# Patient Record
Sex: MALE | Race: BLACK OR AFRICAN AMERICAN | Employment: UNEMPLOYED | URBAN - METROPOLITAN AREA
[De-identification: names, ages, dates, MRNs, and addresses within clinical notes are randomized per-mention and may not be internally consistent; named-entity substitution may affect disease eponyms.]

---

## 2019-01-01 ENCOUNTER — APPOINTMENT (OUTPATIENT)
Dept: GENERAL RADIOLOGY | Age: 74
DRG: 840 | End: 2019-01-01
Attending: FAMILY MEDICINE
Payer: MEDICARE

## 2019-01-01 ENCOUNTER — APPOINTMENT (OUTPATIENT)
Dept: ULTRASOUND IMAGING | Age: 74
DRG: 840 | End: 2019-01-01
Attending: INTERNAL MEDICINE
Payer: MEDICARE

## 2019-01-01 ENCOUNTER — APPOINTMENT (OUTPATIENT)
Dept: GENERAL RADIOLOGY | Age: 74
DRG: 840 | End: 2019-01-01
Attending: INTERNAL MEDICINE
Payer: MEDICARE

## 2019-01-01 ENCOUNTER — APPOINTMENT (OUTPATIENT)
Dept: INTERVENTIONAL RADIOLOGY/VASCULAR | Age: 74
DRG: 840 | End: 2019-01-01
Attending: INTERNAL MEDICINE
Payer: MEDICARE

## 2019-01-01 ENCOUNTER — APPOINTMENT (OUTPATIENT)
Dept: CT IMAGING | Age: 74
DRG: 840 | End: 2019-01-01
Attending: INTERNAL MEDICINE
Payer: MEDICARE

## 2019-01-01 ENCOUNTER — HOSPITAL ENCOUNTER (INPATIENT)
Age: 74
LOS: 16 days | DRG: 840 | End: 2019-10-28
Attending: EMERGENCY MEDICINE | Admitting: FAMILY MEDICINE
Payer: MEDICARE

## 2019-01-01 ENCOUNTER — APPOINTMENT (OUTPATIENT)
Dept: GENERAL RADIOLOGY | Age: 74
DRG: 840 | End: 2019-01-01
Attending: EMERGENCY MEDICINE
Payer: MEDICARE

## 2019-01-01 ENCOUNTER — HOSPITAL ENCOUNTER (OUTPATIENT)
Dept: NUCLEAR MEDICINE | Age: 74
Discharge: HOME | DRG: 840 | End: 2019-10-18
Attending: INTERNAL MEDICINE
Payer: MEDICARE

## 2019-01-01 ENCOUNTER — APPOINTMENT (OUTPATIENT)
Dept: GENERAL RADIOLOGY | Age: 74
DRG: 840 | End: 2019-01-01
Attending: NURSE PRACTITIONER
Payer: MEDICARE

## 2019-01-01 ENCOUNTER — APPOINTMENT (OUTPATIENT)
Dept: CT IMAGING | Age: 74
DRG: 840 | End: 2019-01-01
Attending: EMERGENCY MEDICINE
Payer: MEDICARE

## 2019-01-01 ENCOUNTER — APPOINTMENT (OUTPATIENT)
Dept: CT IMAGING | Age: 74
DRG: 840 | End: 2019-01-01
Attending: NURSE PRACTITIONER
Payer: MEDICARE

## 2019-01-01 ENCOUNTER — APPOINTMENT (OUTPATIENT)
Dept: MRI IMAGING | Age: 74
DRG: 840 | End: 2019-01-01
Attending: NURSE PRACTITIONER
Payer: MEDICARE

## 2019-01-01 VITALS
OXYGEN SATURATION: 92 % | HEIGHT: 72 IN | BODY MASS INDEX: 31.76 KG/M2 | WEIGHT: 234.5 LBS | DIASTOLIC BLOOD PRESSURE: 42 MMHG | RESPIRATION RATE: 118 BRPM | TEMPERATURE: 96.4 F | SYSTOLIC BLOOD PRESSURE: 66 MMHG

## 2019-01-01 DIAGNOSIS — Z71.89 COUNSELING AND COORDINATION OF CARE: ICD-10-CM

## 2019-01-01 DIAGNOSIS — I48.91 ATRIAL FIBRILLATION, UNSPECIFIED TYPE (HCC): ICD-10-CM

## 2019-01-01 DIAGNOSIS — E83.39 HYPERPHOSPHATEMIA: ICD-10-CM

## 2019-01-01 DIAGNOSIS — I27.20 PULMONARY HYPERTENSION (HCC): ICD-10-CM

## 2019-01-01 DIAGNOSIS — D64.9 ANEMIA, UNSPECIFIED TYPE: ICD-10-CM

## 2019-01-01 DIAGNOSIS — J69.0 ASPIRATION PNEUMONIA OF RIGHT LOWER LOBE DUE TO GASTRIC SECRETIONS (HCC): ICD-10-CM

## 2019-01-01 DIAGNOSIS — R17 JAUNDICE: ICD-10-CM

## 2019-01-01 DIAGNOSIS — D48.0: ICD-10-CM

## 2019-01-01 DIAGNOSIS — D59.9 ACQUIRED HEMOLYTIC ANEMIA (HCC): ICD-10-CM

## 2019-01-01 DIAGNOSIS — R53.83 LETHARGY: ICD-10-CM

## 2019-01-01 DIAGNOSIS — E88.09 HYPERPROTEINEMIA: ICD-10-CM

## 2019-01-01 DIAGNOSIS — D74.9 METHEMOGLOBINEMIA: ICD-10-CM

## 2019-01-01 DIAGNOSIS — J96.01 ACUTE RESPIRATORY FAILURE WITH HYPOXIA (HCC): ICD-10-CM

## 2019-01-01 DIAGNOSIS — G93.41 ACUTE METABOLIC ENCEPHALOPATHY: ICD-10-CM

## 2019-01-01 DIAGNOSIS — R65.21 SEPTIC SHOCK (HCC): ICD-10-CM

## 2019-01-01 DIAGNOSIS — E87.5 HYPERKALEMIA: ICD-10-CM

## 2019-01-01 DIAGNOSIS — N17.9 AKI (ACUTE KIDNEY INJURY) (HCC): ICD-10-CM

## 2019-01-01 DIAGNOSIS — E80.6 HYPERBILIRUBINEMIA: ICD-10-CM

## 2019-01-01 DIAGNOSIS — R41.82 ALTERED MENTAL STATUS, UNSPECIFIED ALTERED MENTAL STATUS TYPE: ICD-10-CM

## 2019-01-01 DIAGNOSIS — E83.52 HYPERCALCEMIA: ICD-10-CM

## 2019-01-01 DIAGNOSIS — I46.9 ASYSTOLE (HCC): ICD-10-CM

## 2019-01-01 DIAGNOSIS — A41.9 SEPTIC SHOCK (HCC): ICD-10-CM

## 2019-01-01 DIAGNOSIS — R06.00 DYSPNEA, UNSPECIFIED TYPE: ICD-10-CM

## 2019-01-01 DIAGNOSIS — C90.00 MULTIPLE MYELOMA NOT HAVING ACHIEVED REMISSION (HCC): Primary | ICD-10-CM

## 2019-01-01 DIAGNOSIS — R53.81 DEBILITY: ICD-10-CM

## 2019-01-01 DIAGNOSIS — Z51.5 ENCOUNTER FOR PALLIATIVE CARE: ICD-10-CM

## 2019-01-01 DIAGNOSIS — N18.9 CHRONIC KIDNEY DISEASE, UNSPECIFIED CKD STAGE: ICD-10-CM

## 2019-01-01 DIAGNOSIS — R54 FRAILTY: ICD-10-CM

## 2019-01-01 LAB
ABO + RH BLD: NORMAL
ALBUMIN SERPL ELPH-MCNC: 2.86 G/DL (ref 3.2–5.6)
ALBUMIN SERPL-MCNC: 0.8 G/DL (ref 3.2–4.6)
ALBUMIN SERPL-MCNC: 1.1 G/DL (ref 3.2–4.6)
ALBUMIN SERPL-MCNC: 1.3 G/DL (ref 3.2–4.6)
ALBUMIN SERPL-MCNC: 1.6 G/DL (ref 3.2–4.6)
ALBUMIN SERPL-MCNC: 1.6 G/DL (ref 3.2–4.6)
ALBUMIN SERPL-MCNC: 1.7 G/DL (ref 3.2–4.6)
ALBUMIN SERPL-MCNC: 1.8 G/DL (ref 3.2–4.6)
ALBUMIN SERPL-MCNC: 1.8 G/DL (ref 3.2–4.6)
ALBUMIN SERPL-MCNC: 1.9 G/DL (ref 3.2–4.6)
ALBUMIN SERPL-MCNC: 1.9 G/DL (ref 3.2–4.6)
ALBUMIN SERPL-MCNC: 2 G/DL (ref 3.2–4.6)
ALBUMIN SERPL-MCNC: 2 G/DL (ref 3.2–4.6)
ALBUMIN SERPL-MCNC: 2.4 G/DL (ref 3.2–4.6)
ALBUMIN UR ELPH-MCNC: 254.9 MG/DL
ALBUMIN/GLOB SERPL: 0.2 {RATIO}
ALBUMIN/GLOB SERPL: 0.2 {RATIO} (ref 1.2–3.5)
ALBUMIN/GLOB SERPL: 0.4 {RATIO}
ALP SERPL-CCNC: 109 U/L (ref 50–136)
ALP SERPL-CCNC: 110 U/L (ref 50–136)
ALP SERPL-CCNC: 120 U/L (ref 50–136)
ALP SERPL-CCNC: 132 U/L (ref 50–136)
ALP SERPL-CCNC: 223 U/L (ref 50–136)
ALP SERPL-CCNC: 255 U/L (ref 50–136)
ALP SERPL-CCNC: 279 U/L (ref 50–136)
ALP SERPL-CCNC: 333 U/L (ref 50–136)
ALP SERPL-CCNC: 338 U/L (ref 50–136)
ALP SERPL-CCNC: 342 U/L (ref 50–136)
ALP SERPL-CCNC: 81 U/L (ref 50–136)
ALP SERPL-CCNC: 81 U/L (ref 50–136)
ALP SERPL-CCNC: 83 U/L (ref 50–136)
ALP SERPL-CCNC: 95 U/L (ref 50–136)
ALP SERPL-CCNC: 96 U/L (ref 50–136)
ALPHA1 GLOB 24H UR ELPH-MCNC: 27.9 MG/DL
ALPHA1 GLOB SERPL ELPH-MCNC: 0.25 G/DL (ref 0.1–0.4)
ALPHA2 GLOB SERPL ELPH-MCNC: 0.77 G/DL (ref 0.4–1.2)
ALPHA2 GLOB SERPL ELPH-MCNC: 30 MG/DL
ALT SERPL-CCNC: 10 U/L (ref 12–65)
ALT SERPL-CCNC: 144 U/L (ref 12–65)
ALT SERPL-CCNC: 162 U/L (ref 12–65)
ALT SERPL-CCNC: 17 U/L (ref 12–65)
ALT SERPL-CCNC: 181 U/L (ref 12–65)
ALT SERPL-CCNC: 190 U/L (ref 12–65)
ALT SERPL-CCNC: 208 U/L (ref 12–65)
ALT SERPL-CCNC: 210 U/L (ref 12–65)
ALT SERPL-CCNC: 226 U/L (ref 12–65)
ALT SERPL-CCNC: 226 U/L (ref 12–65)
ALT SERPL-CCNC: 241 U/L (ref 12–65)
ALT SERPL-CCNC: 263 U/L (ref 12–65)
ALT SERPL-CCNC: 325 U/L (ref 12–65)
ALT SERPL-CCNC: 37 U/L (ref 12–65)
ALT SERPL-CCNC: 95 U/L (ref 12–65)
AMMONIA PLAS-SCNC: 36 UMOL/L (ref 11–32)
AMMONIA PLAS-SCNC: 41 UMOL/L (ref 11–32)
ANION GAP SERPL CALC-SCNC: 10 MMOL/L (ref 7–16)
ANION GAP SERPL CALC-SCNC: 11 MMOL/L (ref 7–16)
ANION GAP SERPL CALC-SCNC: 12 MMOL/L (ref 7–16)
ANION GAP SERPL CALC-SCNC: 13 MMOL/L (ref 7–16)
ANION GAP SERPL CALC-SCNC: 15 MMOL/L (ref 7–16)
ANION GAP SERPL CALC-SCNC: 16 MMOL/L (ref 7–16)
ANION GAP SERPL CALC-SCNC: 16 MMOL/L (ref 7–16)
ANION GAP SERPL CALC-SCNC: 20 MMOL/L (ref 7–16)
ANION GAP SERPL CALC-SCNC: 7 MMOL/L (ref 7–16)
ANION GAP SERPL CALC-SCNC: 8 MMOL/L (ref 7–16)
ANION GAP SERPL CALC-SCNC: 9 MMOL/L (ref 7–16)
ANION GAP SERPL CALC-SCNC: 9 MMOL/L (ref 7–16)
APTT PPP: 101.6 SEC (ref 24.7–39.8)
APTT PPP: 101.9 SEC (ref 24.7–39.8)
APTT PPP: 115 SEC (ref 24.7–39.8)
APTT PPP: 138 SEC (ref 24.7–39.8)
APTT PPP: 140.5 SEC (ref 24.7–39.8)
APTT PPP: 166.6 SEC (ref 24.7–39.8)
APTT PPP: 175.5 SEC (ref 24.7–39.8)
APTT PPP: 77.2 SEC (ref 24.7–39.8)
APTT PPP: 90.7 SEC (ref 24.7–39.8)
ARTERIAL PATENCY WRIST A: ABNORMAL
ARTERIAL PATENCY WRIST A: NO
ARTERIAL PATENCY WRIST A: NO
ARTERIAL PATENCY WRIST A: YES
AST SERPL-CCNC: 209 U/L (ref 15–37)
AST SERPL-CCNC: 242 U/L (ref 15–37)
AST SERPL-CCNC: 258 U/L (ref 15–37)
AST SERPL-CCNC: 289 U/L (ref 15–37)
AST SERPL-CCNC: 314 U/L (ref 15–37)
AST SERPL-CCNC: 320 U/L (ref 15–37)
AST SERPL-CCNC: 354 U/L (ref 15–37)
AST SERPL-CCNC: 397 U/L (ref 15–37)
AST SERPL-CCNC: 425 U/L (ref 15–37)
AST SERPL-CCNC: 450 U/L (ref 15–37)
AST SERPL-CCNC: 46 U/L (ref 15–37)
AST SERPL-CCNC: 54 U/L (ref 15–37)
AST SERPL-CCNC: 573 U/L (ref 15–37)
AST SERPL-CCNC: 600 U/L (ref 15–37)
AST SERPL-CCNC: 609 U/L (ref 15–37)
ATRIAL RATE: 125 BPM
ATRIAL RATE: 136 BPM
ATRIAL RATE: 234 BPM
ATRIAL RATE: 267 BPM
ATRIAL RATE: 66 BPM
B-GLOBULIN SERPL QL ELPH: 3.77 G/DL (ref 0.6–1.3)
B-GLOBULIN UR QL ELPH: 97.7 MG/DL
B2 MICROGLOB SERPL-MCNC: 30 MG/L (ref 0.8–2.34)
BACTERIA URNS QL MICRO: NORMAL /HPF
BASE DEFICIT BLD-SCNC: 14 MMOL/L
BASE DEFICIT BLD-SCNC: 19 MMOL/L
BASE DEFICIT BLD-SCNC: 19 MMOL/L
BASE DEFICIT BLD-SCNC: 3 MMOL/L
BASE DEFICIT BLD-SCNC: 4 MMOL/L
BASE DEFICIT BLD-SCNC: 5 MMOL/L
BASE DEFICIT BLD-SCNC: 6 MMOL/L
BASE DEFICIT BLD-SCNC: 9 MMOL/L
BASE EXCESS BLD CALC-SCNC: 2 MMOL/L
BASE EXCESS BLD CALC-SCNC: 3 MMOL/L
BASE EXCESS BLD CALC-SCNC: 6 MMOL/L
BASOPHILS # BLD: 0 K/UL (ref 0–0.2)
BASOPHILS # BLD: 0.1 K/UL (ref 0–0.2)
BASOPHILS NFR BLD: 0 % (ref 0–2)
BASOPHILS NFR BLD: 1 % (ref 0–2)
BDY SITE: ABNORMAL
BILIRUB DIRECT SERPL-MCNC: 0.2 MG/DL
BILIRUB DIRECT SERPL-MCNC: 18 MG/DL
BILIRUB DIRECT SERPL-MCNC: 7.3 MG/DL
BILIRUB INDIRECT SERPL-MCNC: 2.8 MG/DL (ref 0–1.1)
BILIRUB SERPL-MCNC: 0.3 MG/DL (ref 0.2–1.1)
BILIRUB SERPL-MCNC: 0.5 MG/DL (ref 0.2–1.1)
BILIRUB SERPL-MCNC: 10.1 MG/DL (ref 0.2–1.1)
BILIRUB SERPL-MCNC: 10.9 MG/DL (ref 0.2–1.1)
BILIRUB SERPL-MCNC: 11.1 MG/DL (ref 0.2–1.1)
BILIRUB SERPL-MCNC: 15.2 MG/DL (ref 0.2–1.1)
BILIRUB SERPL-MCNC: 16.6 MG/DL (ref 0.2–1.1)
BILIRUB SERPL-MCNC: 18.2 MG/DL (ref 0.2–1.1)
BILIRUB SERPL-MCNC: 19 MG/DL (ref 0.2–1.1)
BILIRUB SERPL-MCNC: 19.5 MG/DL (ref 0.2–1.1)
BILIRUB SERPL-MCNC: 20 MG/DL (ref 0.2–1.1)
BILIRUB SERPL-MCNC: 20.3 MG/DL (ref 0.2–1.1)
BILIRUB SERPL-MCNC: 20.5 MG/DL (ref 0.2–1.1)
BILIRUB SERPL-MCNC: 21 MG/DL (ref 0.2–1.1)
BILIRUB SERPL-MCNC: 21 MG/DL (ref 0.2–1.1)
BILIRUB SERPL-MCNC: 21.5 MG/DL (ref 0.2–1.1)
BLASTS NFR BLD MANUAL: 2 %
BLD PROD TYP BPU: NORMAL
BLOOD GROUP ANTIBODIES SERPL: NORMAL
BNP SERPL-MCNC: 547 PG/ML
BNP SERPL-MCNC: 656 PG/ML
BODY TEMPERATURE: 98.6
BONE MARROW PREP & W,BMA: NORMAL
BPU ID: NORMAL
BUN SERPL-MCNC: 101 MG/DL (ref 8–23)
BUN SERPL-MCNC: 27 MG/DL (ref 8–23)
BUN SERPL-MCNC: 30 MG/DL (ref 8–23)
BUN SERPL-MCNC: 32 MG/DL (ref 8–23)
BUN SERPL-MCNC: 35 MG/DL (ref 8–23)
BUN SERPL-MCNC: 40 MG/DL (ref 8–23)
BUN SERPL-MCNC: 47 MG/DL (ref 8–23)
BUN SERPL-MCNC: 60 MG/DL (ref 8–23)
BUN SERPL-MCNC: 61 MG/DL (ref 8–23)
BUN SERPL-MCNC: 64 MG/DL (ref 8–23)
BUN SERPL-MCNC: 65 MG/DL (ref 8–23)
BUN SERPL-MCNC: 70 MG/DL (ref 8–23)
BUN SERPL-MCNC: 76 MG/DL (ref 8–23)
BUN SERPL-MCNC: 78 MG/DL (ref 8–23)
BUN SERPL-MCNC: 79 MG/DL (ref 8–23)
BUN SERPL-MCNC: 82 MG/DL (ref 8–23)
BUN SERPL-MCNC: 88 MG/DL (ref 8–23)
BUN SERPL-MCNC: 99 MG/DL (ref 8–23)
CA-I BLD-MCNC: 1.47 MMOL/L (ref 1.12–1.32)
CA-I BLD-MCNC: 1.48 MMOL/L (ref 1.12–1.32)
CA-I BLD-MCNC: 7.32 MG/DL (ref 4–5.2)
CALCIUM SERPL-MCNC: 10 MG/DL (ref 8.3–10.4)
CALCIUM SERPL-MCNC: 10.6 MG/DL (ref 8.3–10.4)
CALCIUM SERPL-MCNC: 10.7 MG/DL (ref 8.3–10.4)
CALCIUM SERPL-MCNC: 10.9 MG/DL (ref 8.3–10.4)
CALCIUM SERPL-MCNC: 11.3 MG/DL (ref 8.3–10.4)
CALCIUM SERPL-MCNC: 11.5 MG/DL (ref 8.3–10.4)
CALCIUM SERPL-MCNC: 12.4 MG/DL (ref 8.3–10.4)
CALCIUM SERPL-MCNC: 13 MG/DL (ref 8.3–10.4)
CALCIUM SERPL-MCNC: 13.2 MG/DL (ref 8.3–10.4)
CALCIUM SERPL-MCNC: 13.8 MG/DL (ref 8.3–10.4)
CALCIUM SERPL-MCNC: 8.4 MG/DL (ref 8.3–10.4)
CALCIUM SERPL-MCNC: 8.7 MG/DL (ref 8.3–10.4)
CALCIUM SERPL-MCNC: 8.8 MG/DL (ref 8.3–10.4)
CALCIUM SERPL-MCNC: 9 MG/DL (ref 8.3–10.4)
CALCIUM SERPL-MCNC: 9 MG/DL (ref 8.3–10.4)
CALCIUM SERPL-MCNC: 9.6 MG/DL (ref 8.3–10.4)
CALCIUM SERPL-MCNC: 9.7 MG/DL (ref 8.3–10.4)
CALCIUM SERPL-MCNC: 9.8 MG/DL (ref 8.3–10.4)
CALCIUM SERPL-MCNC: 9.9 MG/DL (ref 8.3–10.4)
CALCULATED P AXIS, ECG09: -101 DEGREES
CALCULATED R AXIS, ECG10: -29 DEGREES
CALCULATED R AXIS, ECG10: -48 DEGREES
CALCULATED R AXIS, ECG10: -53 DEGREES
CALCULATED R AXIS, ECG10: -55 DEGREES
CALCULATED R AXIS, ECG10: -69 DEGREES
CALCULATED T AXIS, ECG11: -32 DEGREES
CALCULATED T AXIS, ECG11: -38 DEGREES
CALCULATED T AXIS, ECG11: -4 DEGREES
CALCULATED T AXIS, ECG11: 12 DEGREES
CALCULATED T AXIS, ECG11: 19 DEGREES
CASTS URNS QL MICRO: NORMAL /LPF
CHLORIDE SERPL-SCNC: 101 MMOL/L (ref 98–107)
CHLORIDE SERPL-SCNC: 101 MMOL/L (ref 98–107)
CHLORIDE SERPL-SCNC: 102 MMOL/L (ref 98–107)
CHLORIDE SERPL-SCNC: 103 MMOL/L (ref 98–107)
CHLORIDE SERPL-SCNC: 105 MMOL/L (ref 98–107)
CHLORIDE SERPL-SCNC: 108 MMOL/L (ref 98–107)
CHLORIDE SERPL-SCNC: 109 MMOL/L (ref 98–107)
CHLORIDE SERPL-SCNC: 109 MMOL/L (ref 98–107)
CHLORIDE SERPL-SCNC: 112 MMOL/L (ref 98–107)
CHLORIDE SERPL-SCNC: 92 MMOL/L (ref 98–107)
CHLORIDE SERPL-SCNC: 93 MMOL/L (ref 98–107)
CHLORIDE SERPL-SCNC: 93 MMOL/L (ref 98–107)
CHLORIDE SERPL-SCNC: 94 MMOL/L (ref 98–107)
CHLORIDE SERPL-SCNC: 95 MMOL/L (ref 98–107)
CHLORIDE SERPL-SCNC: 95 MMOL/L (ref 98–107)
CHLORIDE SERPL-SCNC: 96 MMOL/L (ref 98–107)
CHLORIDE SERPL-SCNC: 96 MMOL/L (ref 98–107)
CHLORIDE SERPL-SCNC: 99 MMOL/L (ref 98–107)
CK MB CFR SERPL CALC: 1.2 %
CK MB CFR SERPL CALC: 1.3 %
CK MB CFR SERPL CALC: 1.3 %
CK MB SERPL-MCNC: 1.1 NG/ML (ref 0.5–3.6)
CK MB SERPL-MCNC: 1.2 NG/ML (ref 0.5–3.6)
CK MB SERPL-MCNC: 1.3 NG/ML (ref 0.5–3.6)
CK SERPL-CCNC: 103 U/L (ref 21–215)
CK SERPL-CCNC: 103 U/L (ref 21–215)
CK SERPL-CCNC: 83 U/L (ref 21–215)
CO2 BLD-SCNC: 14 MMOL/L
CO2 BLD-SCNC: 17 MMOL/L
CO2 BLD-SCNC: 20 MMOL/L
CO2 BLD-SCNC: 20 MMOL/L
CO2 BLD-SCNC: 21 MMOL/L
CO2 BLD-SCNC: 21 MMOL/L
CO2 BLD-SCNC: 22 MMOL/L
CO2 BLD-SCNC: 27 MMOL/L
CO2 BLD-SCNC: 28 MMOL/L
CO2 BLD-SCNC: 30 MMOL/L
CO2 SERPL-SCNC: 17 MMOL/L (ref 21–32)
CO2 SERPL-SCNC: 19 MMOL/L (ref 21–32)
CO2 SERPL-SCNC: 20 MMOL/L (ref 21–32)
CO2 SERPL-SCNC: 21 MMOL/L (ref 21–32)
CO2 SERPL-SCNC: 22 MMOL/L (ref 21–32)
CO2 SERPL-SCNC: 24 MMOL/L (ref 21–32)
CO2 SERPL-SCNC: 24 MMOL/L (ref 21–32)
CO2 SERPL-SCNC: 25 MMOL/L (ref 21–32)
CO2 SERPL-SCNC: 25 MMOL/L (ref 21–32)
CO2 SERPL-SCNC: 26 MMOL/L (ref 21–32)
CO2 SERPL-SCNC: 27 MMOL/L (ref 21–32)
CO2 SERPL-SCNC: 28 MMOL/L (ref 21–32)
COHGB MFR BLD: 4.8 % (ref 0–3.6)
COLLECT DURATION TIME UR: NORMAL HR
COLLECT TIME,HTIME: 1135
COLLECT TIME,HTIME: 1135
COLLECT TIME,HTIME: 1217
COLLECT TIME,HTIME: 1355
COLLECT TIME,HTIME: 1403
COLLECT TIME,HTIME: 1415
COLLECT TIME,HTIME: 1530
COLLECT TIME,HTIME: 1945
COLLECT TIME,HTIME: 2035
COLLECT TIME,HTIME: 2250
COLLECT TIME,HTIME: 24
COLLECT TIME,HTIME: 246
COLLECT TIME,HTIME: 345
COLLECT TIME,HTIME: 724
CREAT SERPL-MCNC: 4.06 MG/DL (ref 0.8–1.5)
CREAT SERPL-MCNC: 4.13 MG/DL (ref 0.8–1.5)
CREAT SERPL-MCNC: 4.63 MG/DL (ref 0.8–1.5)
CREAT SERPL-MCNC: 4.72 MG/DL (ref 0.8–1.5)
CREAT SERPL-MCNC: 4.73 MG/DL (ref 0.8–1.5)
CREAT SERPL-MCNC: 4.8 MG/DL (ref 0.8–1.5)
CREAT SERPL-MCNC: 4.95 MG/DL (ref 0.8–1.5)
CREAT SERPL-MCNC: 5.01 MG/DL (ref 0.8–1.5)
CREAT SERPL-MCNC: 5.07 MG/DL (ref 0.8–1.5)
CREAT SERPL-MCNC: 5.41 MG/DL (ref 0.8–1.5)
CREAT SERPL-MCNC: 5.83 MG/DL (ref 0.8–1.5)
CREAT SERPL-MCNC: 5.84 MG/DL (ref 0.8–1.5)
CREAT SERPL-MCNC: 6.6 MG/DL (ref 0.8–1.5)
CREAT SERPL-MCNC: 6.84 MG/DL (ref 0.8–1.5)
CREAT SERPL-MCNC: 7.41 MG/DL (ref 0.8–1.5)
CREAT SERPL-MCNC: 7.58 MG/DL (ref 0.8–1.5)
CREAT SERPL-MCNC: 7.71 MG/DL (ref 0.8–1.5)
CREAT SERPL-MCNC: 8 MG/DL (ref 0.8–1.5)
CROSSMATCH RESULT,%XM: NORMAL
CRYSTALS URNS QL MICRO: NORMAL /LPF
D DIMER PPP FEU-MCNC: 5.4 UG/ML(FEU)
DAT POLY-SP REAG RBC QL: NORMAL
DIAGNOSIS, 93000: NORMAL
DIFFERENTIAL METHOD BLD: ABNORMAL
EOSINOPHIL # BLD: 0 K/UL (ref 0–0.8)
EOSINOPHIL # BLD: 0.1 K/UL (ref 0–0.8)
EOSINOPHIL # BLD: 0.3 K/UL (ref 0–0.8)
EOSINOPHIL NFR BLD MANUAL: 1 % (ref 1–8)
EOSINOPHIL NFR BLD: 0 % (ref 0.5–7.8)
EOSINOPHIL NFR BLD: 1 % (ref 0.5–7.8)
EOSINOPHIL NFR BLD: 3 % (ref 0.5–7.8)
EPI CELLS #/AREA URNS HPF: NORMAL /HPF
ERYTHROCYTE [DISTWIDTH] IN BLOOD BY AUTOMATED COUNT: 14.7 % (ref 11.9–14.6)
ERYTHROCYTE [DISTWIDTH] IN BLOOD BY AUTOMATED COUNT: 14.9 % (ref 11.9–14.6)
ERYTHROCYTE [DISTWIDTH] IN BLOOD BY AUTOMATED COUNT: 15.9 % (ref 11.9–14.6)
ERYTHROCYTE [DISTWIDTH] IN BLOOD BY AUTOMATED COUNT: 16.2 % (ref 11.9–14.6)
ERYTHROCYTE [DISTWIDTH] IN BLOOD BY AUTOMATED COUNT: 16.3 % (ref 11.9–14.6)
ERYTHROCYTE [DISTWIDTH] IN BLOOD BY AUTOMATED COUNT: 17.1 % (ref 11.9–14.6)
ERYTHROCYTE [DISTWIDTH] IN BLOOD BY AUTOMATED COUNT: 17.1 % (ref 11.9–14.6)
ERYTHROCYTE [DISTWIDTH] IN BLOOD BY AUTOMATED COUNT: 17.4 % (ref 11.9–14.6)
ERYTHROCYTE [DISTWIDTH] IN BLOOD BY AUTOMATED COUNT: 17.7 % (ref 11.9–14.6)
ERYTHROCYTE [DISTWIDTH] IN BLOOD BY AUTOMATED COUNT: 17.8 % (ref 11.9–14.6)
ERYTHROCYTE [DISTWIDTH] IN BLOOD BY AUTOMATED COUNT: 18.4 % (ref 11.9–14.6)
ERYTHROCYTE [DISTWIDTH] IN BLOOD BY AUTOMATED COUNT: 18.6 % (ref 11.9–14.6)
ERYTHROCYTE [DISTWIDTH] IN BLOOD BY AUTOMATED COUNT: 18.6 % (ref 11.9–14.6)
ERYTHROCYTE [DISTWIDTH] IN BLOOD BY AUTOMATED COUNT: 18.9 % (ref 11.9–14.6)
ERYTHROCYTE [DISTWIDTH] IN BLOOD BY AUTOMATED COUNT: 19 % (ref 11.9–14.6)
ERYTHROCYTE [DISTWIDTH] IN BLOOD BY AUTOMATED COUNT: 19.1 % (ref 11.9–14.6)
ERYTHROCYTE [DISTWIDTH] IN BLOOD BY AUTOMATED COUNT: 19.4 % (ref 11.9–14.6)
ERYTHROCYTE [DISTWIDTH] IN BLOOD BY AUTOMATED COUNT: 19.8 % (ref 11.9–14.6)
EST. AVERAGE GLUCOSE BLD GHB EST-MCNC: 111 MG/DL
EXHALED MINUTE VOLUME, VE: 11.3 L/MIN
EXHALED MINUTE VOLUME, VE: 19.5 L/MIN
EXHALED MINUTE VOLUME, VE: 26.1 L/MIN
EXHALED MINUTE VOLUME, VE: 9.2 L/MIN
FERRITIN SERPL-MCNC: 9984 NG/ML (ref 8–388)
FLOW CYTOMETRY, FBTC1: NORMAL
FLOW RATE ISTAT,IFRATE: 11 L/MIN
FLOW RATE ISTAT,IFRATE: 12 L/MIN
FLOW RATE ISTAT,IFRATE: 2 L/MIN
FLOW RATE ISTAT,IFRATE: 50 L/MIN
FLOW RATE ISTAT,IFRATE: 55 L/MIN
FLOW RATE ISTAT,IFRATE: 60 L/MIN
FLOW RATE ISTAT,IFRATE: 60 L/MIN
FLOW RATE ISTAT,IFRATE: 8 L/MIN
FLUAV AG NPH QL IA: NEGATIVE
FLUBV AG NPH QL IA: NEGATIVE
FOLATE SERPL-MCNC: 16.4 NG/ML (ref 3.1–17.5)
G6PD BLD QN: 267 U/10E12 RBC (ref 146–376)
GAMMA GLOB MFR SERPL ELPH: 3.44 G/DL (ref 0.5–1.6)
GAMMA GLOB MFR UR ELPH: 1007.5 MG/DL
GAS FLOW.O2 O2 DELIVERY SYS: ABNORMAL L/MIN
GAS FLOW.O2 SETTING OXYMISER: 16 BPM
GAS FLOW.O2 SETTING OXYMISER: 16 BPM
GAS FLOW.O2 SETTING OXYMISER: 17 BPM
GAS FLOW.O2 SETTING OXYMISER: 20 BPM
GLOBULIN SER CALC-MCNC: 5.2 G/DL (ref 2.3–3.5)
GLOBULIN SER CALC-MCNC: 6.9 G/DL (ref 2.3–3.5)
GLOBULIN SER CALC-MCNC: 7.6 G/DL (ref 2.3–3.5)
GLOBULIN SER CALC-MCNC: 8.5 G/DL (ref 2.3–3.5)
GLOBULIN SER CALC-MCNC: 8.6 G/DL (ref 2.3–3.5)
GLOBULIN SER CALC-MCNC: 8.7 G/DL (ref 2.3–3.5)
GLOBULIN SER CALC-MCNC: 8.9 G/DL (ref 2.3–3.5)
GLOBULIN SER CALC-MCNC: 9 G/DL (ref 2.3–3.5)
GLOBULIN SER CALC-MCNC: 9.3 G/DL (ref 2.3–3.5)
GLOBULIN SER CALC-MCNC: 9.9 G/DL (ref 2.3–3.5)
GLUCOSE BLD STRIP.AUTO-MCNC: 105 MG/DL (ref 65–100)
GLUCOSE BLD STRIP.AUTO-MCNC: 107 MG/DL (ref 65–100)
GLUCOSE BLD STRIP.AUTO-MCNC: 110 MG/DL (ref 65–100)
GLUCOSE BLD STRIP.AUTO-MCNC: 119 MG/DL (ref 65–100)
GLUCOSE BLD STRIP.AUTO-MCNC: 137 MG/DL (ref 65–100)
GLUCOSE BLD STRIP.AUTO-MCNC: 142 MG/DL (ref 65–100)
GLUCOSE BLD STRIP.AUTO-MCNC: 147 MG/DL (ref 65–100)
GLUCOSE BLD STRIP.AUTO-MCNC: 151 MG/DL (ref 65–100)
GLUCOSE BLD STRIP.AUTO-MCNC: 166 MG/DL (ref 65–100)
GLUCOSE BLD STRIP.AUTO-MCNC: 166 MG/DL (ref 65–100)
GLUCOSE BLD STRIP.AUTO-MCNC: 170 MG/DL (ref 65–100)
GLUCOSE BLD STRIP.AUTO-MCNC: 173 MG/DL (ref 65–100)
GLUCOSE BLD STRIP.AUTO-MCNC: 177 MG/DL (ref 65–100)
GLUCOSE BLD STRIP.AUTO-MCNC: 178 MG/DL (ref 65–100)
GLUCOSE BLD STRIP.AUTO-MCNC: 179 MG/DL (ref 65–100)
GLUCOSE BLD STRIP.AUTO-MCNC: 186 MG/DL (ref 65–100)
GLUCOSE BLD STRIP.AUTO-MCNC: 189 MG/DL (ref 65–100)
GLUCOSE BLD STRIP.AUTO-MCNC: 191 MG/DL (ref 65–100)
GLUCOSE BLD STRIP.AUTO-MCNC: 193 MG/DL (ref 65–100)
GLUCOSE BLD STRIP.AUTO-MCNC: 196 MG/DL (ref 65–100)
GLUCOSE BLD STRIP.AUTO-MCNC: 202 MG/DL (ref 65–100)
GLUCOSE BLD STRIP.AUTO-MCNC: 207 MG/DL (ref 65–100)
GLUCOSE BLD STRIP.AUTO-MCNC: 216 MG/DL (ref 65–100)
GLUCOSE BLD STRIP.AUTO-MCNC: 222 MG/DL (ref 65–100)
GLUCOSE BLD STRIP.AUTO-MCNC: 225 MG/DL (ref 65–100)
GLUCOSE BLD STRIP.AUTO-MCNC: 226 MG/DL (ref 65–100)
GLUCOSE BLD STRIP.AUTO-MCNC: 249 MG/DL (ref 65–100)
GLUCOSE BLD STRIP.AUTO-MCNC: 250 MG/DL (ref 65–100)
GLUCOSE BLD STRIP.AUTO-MCNC: 250 MG/DL (ref 65–100)
GLUCOSE BLD STRIP.AUTO-MCNC: 251 MG/DL (ref 65–100)
GLUCOSE BLD STRIP.AUTO-MCNC: 261 MG/DL (ref 65–100)
GLUCOSE BLD STRIP.AUTO-MCNC: 270 MG/DL (ref 65–100)
GLUCOSE BLD STRIP.AUTO-MCNC: 275 MG/DL (ref 65–100)
GLUCOSE BLD STRIP.AUTO-MCNC: 289 MG/DL (ref 65–100)
GLUCOSE BLD STRIP.AUTO-MCNC: 298 MG/DL (ref 65–100)
GLUCOSE BLD STRIP.AUTO-MCNC: 301 MG/DL (ref 65–100)
GLUCOSE BLD STRIP.AUTO-MCNC: 323 MG/DL (ref 65–100)
GLUCOSE BLD STRIP.AUTO-MCNC: 333 MG/DL (ref 65–100)
GLUCOSE BLD STRIP.AUTO-MCNC: 345 MG/DL (ref 65–100)
GLUCOSE BLD STRIP.AUTO-MCNC: 372 MG/DL (ref 65–100)
GLUCOSE BLD STRIP.AUTO-MCNC: 68 MG/DL (ref 65–100)
GLUCOSE BLD STRIP.AUTO-MCNC: 68 MG/DL (ref 65–100)
GLUCOSE BLD STRIP.AUTO-MCNC: 75 MG/DL (ref 65–100)
GLUCOSE BLD STRIP.AUTO-MCNC: 78 MG/DL (ref 65–100)
GLUCOSE BLD STRIP.AUTO-MCNC: 84 MG/DL (ref 65–100)
GLUCOSE BLD STRIP.AUTO-MCNC: 85 MG/DL (ref 65–100)
GLUCOSE SERPL-MCNC: 121 MG/DL (ref 65–100)
GLUCOSE SERPL-MCNC: 123 MG/DL (ref 65–100)
GLUCOSE SERPL-MCNC: 133 MG/DL (ref 65–100)
GLUCOSE SERPL-MCNC: 141 MG/DL (ref 65–100)
GLUCOSE SERPL-MCNC: 146 MG/DL (ref 65–100)
GLUCOSE SERPL-MCNC: 152 MG/DL (ref 65–100)
GLUCOSE SERPL-MCNC: 157 MG/DL (ref 65–100)
GLUCOSE SERPL-MCNC: 185 MG/DL (ref 65–100)
GLUCOSE SERPL-MCNC: 196 MG/DL (ref 65–100)
GLUCOSE SERPL-MCNC: 205 MG/DL (ref 65–100)
GLUCOSE SERPL-MCNC: 205 MG/DL (ref 65–100)
GLUCOSE SERPL-MCNC: 226 MG/DL (ref 65–100)
GLUCOSE SERPL-MCNC: 230 MG/DL (ref 65–100)
GLUCOSE SERPL-MCNC: 239 MG/DL (ref 65–100)
GLUCOSE SERPL-MCNC: 269 MG/DL (ref 65–100)
GLUCOSE SERPL-MCNC: 275 MG/DL (ref 65–100)
GLUCOSE SERPL-MCNC: 58 MG/DL (ref 65–100)
GLUCOSE SERPL-MCNC: 99 MG/DL (ref 65–100)
HAPTOGLOB SERPL-MCNC: <8 MG/DL (ref 30–200)
HBA1C MFR BLD: 5.5 % (ref 4.8–6)
HBV SURFACE AG SERPL QL IA: NEGATIVE
HCO3 BLD-SCNC: 12.5 MMOL/L (ref 22–26)
HCO3 BLD-SCNC: 12.8 MMOL/L (ref 22–26)
HCO3 BLD-SCNC: 15.9 MMOL/L (ref 22–26)
HCO3 BLD-SCNC: 17 MMOL/L (ref 22–26)
HCO3 BLD-SCNC: 18.7 MMOL/L (ref 22–26)
HCO3 BLD-SCNC: 19 MMOL/L (ref 22–26)
HCO3 BLD-SCNC: 20.1 MMOL/L (ref 22–26)
HCO3 BLD-SCNC: 20.2 MMOL/L (ref 22–26)
HCO3 BLD-SCNC: 20.8 MMOL/L (ref 22–26)
HCO3 BLD-SCNC: 21 MMOL/L (ref 22–26)
HCO3 BLD-SCNC: 21.4 MMOL/L (ref 22–26)
HCO3 BLD-SCNC: 25.9 MMOL/L (ref 22–26)
HCO3 BLD-SCNC: 26.9 MMOL/L (ref 22–26)
HCO3 BLD-SCNC: 28.9 MMOL/L (ref 22–26)
HCT VFR BLD AUTO: 20.2 % (ref 41.1–50.3)
HCT VFR BLD AUTO: 20.2 % (ref 41.1–50.3)
HCT VFR BLD AUTO: 20.7 % (ref 41.1–50.3)
HCT VFR BLD AUTO: 20.8 % (ref 41.1–50.3)
HCT VFR BLD AUTO: 21.4 % (ref 41.1–50.3)
HCT VFR BLD AUTO: 22 % (ref 41.1–50.3)
HCT VFR BLD AUTO: 22.3 % (ref 41.1–50.3)
HCT VFR BLD AUTO: 22.6 % (ref 41.1–50.3)
HCT VFR BLD AUTO: 22.8 % (ref 41.1–50.3)
HCT VFR BLD AUTO: 23 % (ref 41.1–50.3)
HCT VFR BLD AUTO: 23.2 % (ref 41.1–50.3)
HCT VFR BLD AUTO: 23.3 % (ref 41.1–50.3)
HCT VFR BLD AUTO: 24.2 % (ref 41.1–50.3)
HCT VFR BLD AUTO: 24.3 % (ref 41.1–50.3)
HCT VFR BLD AUTO: 24.6 % (ref 41.1–50.3)
HCT VFR BLD AUTO: 25.1 % (ref 41.1–50.3)
HCT VFR BLD AUTO: 26 % (ref 41.1–50.3)
HCT VFR BLD AUTO: 27.1 % (ref 41.1–50.3)
HCT VFR BLD AUTO: 27.7 % (ref 41.1–50.3)
HCT VFR BLD AUTO: 28 % (ref 41.1–50.3)
HGB BLD-MCNC: 6.1 G/DL (ref 13.6–17.2)
HGB BLD-MCNC: 6.6 G/DL (ref 13.6–17.2)
HGB BLD-MCNC: 6.6 G/DL (ref 13.6–17.2)
HGB BLD-MCNC: 6.7 G/DL (ref 13.6–17.2)
HGB BLD-MCNC: 6.7 G/DL (ref 13.6–17.2)
HGB BLD-MCNC: 7.1 G/DL (ref 13.6–17.2)
HGB BLD-MCNC: 7.1 G/DL (ref 13.6–17.2)
HGB BLD-MCNC: 7.2 G/DL (ref 13.6–17.2)
HGB BLD-MCNC: 7.4 G/DL (ref 13.6–17.2)
HGB BLD-MCNC: 7.6 G/DL (ref 13.6–17.2)
HGB BLD-MCNC: 7.7 G/DL (ref 13.6–17.2)
HGB BLD-MCNC: 7.8 G/DL (ref 13.6–17.2)
HGB BLD-MCNC: 8.1 G/DL (ref 13.6–17.2)
HGB BLD-MCNC: 8.2 G/DL (ref 13.6–17.2)
HGB BLD-MCNC: 8.3 G/DL (ref 13.6–17.2)
HGB BLD-MCNC: 8.4 G/DL (ref 13.6–17.2)
HGB BLD-MCNC: 8.4 G/DL (ref 13.6–17.2)
HGB BLD-MCNC: 9.2 G/DL (ref 13.6–17.2)
HGB BLD-MCNC: 9.3 G/DL (ref 13.6–17.2)
HGB RETIC QN AUTO: 35 PG (ref 29–35)
IGA SERPL-MCNC: 6015 MG/DL (ref 85–499)
IGG SERPL-MCNC: 204 MG/DL (ref 610–1616)
IGM SERPL-MCNC: 17 MG/DL (ref 35–242)
IMM GRANULOCYTES # BLD AUTO: 0 K/UL (ref 0–0.5)
IMM GRANULOCYTES # BLD AUTO: 0.1 K/UL (ref 0–0.5)
IMM GRANULOCYTES # BLD AUTO: 0.1 K/UL (ref 0–0.5)
IMM GRANULOCYTES # BLD AUTO: 0.2 K/UL (ref 0–0.5)
IMM GRANULOCYTES # BLD AUTO: 0.3 K/UL (ref 0–0.5)
IMM GRANULOCYTES # BLD AUTO: 0.3 K/UL (ref 0–0.5)
IMM GRANULOCYTES # BLD AUTO: 0.4 K/UL (ref 0–0.5)
IMM GRANULOCYTES # BLD AUTO: 0.5 K/UL (ref 0–0.5)
IMM GRANULOCYTES # BLD AUTO: 0.5 K/UL (ref 0–0.5)
IMM GRANULOCYTES # BLD AUTO: 0.6 K/UL (ref 0–0.5)
IMM GRANULOCYTES NFR BLD AUTO: 1 % (ref 0–5)
IMM GRANULOCYTES NFR BLD AUTO: 2 % (ref 0–5)
IMM GRANULOCYTES NFR BLD AUTO: 3 % (ref 0–5)
IMM GRANULOCYTES NFR BLD AUTO: 3 % (ref 0–5)
IMM GRANULOCYTES NFR BLD AUTO: 4 % (ref 0–5)
IMM GRANULOCYTES NFR BLD AUTO: 4 % (ref 0–5)
IMM GRANULOCYTES NFR BLD AUTO: 5 % (ref 0–5)
IMM GRANULOCYTES NFR BLD AUTO: 5 % (ref 0–5)
IMM RETICS NFR: 27.6 % (ref 2.3–13.4)
INR PPP: 1.7
INR PPP: 1.8
INR PPP: 2.1
INR PPP: 3.5
INSPIRATION.DURATION SETTING TIME VENT: 0.7 SEC
INSPIRATION.DURATION SETTING TIME VENT: 0.9 SEC
INSPIRATION.DURATION SETTING TIME VENT: 0.9 SEC
INSPIRATION.DURATION SETTING TIME VENT: 1 SEC
IRON SATN MFR SERPL: 38 %
IRON SERPL-MCNC: 56 UG/DL (ref 35–150)
KAPPA LC FREE SER-MCNC: ABNORMAL MG/L (ref 3.3–19.4)
KAPPA LC FREE/LAMBDA FREE SER: 4148.56 {RATIO} (ref 0.26–1.65)
LACTATE SERPL-SCNC: 0.8 MMOL/L (ref 0.4–2)
LACTATE SERPL-SCNC: 11 MMOL/L (ref 0.4–2)
LACTATE SERPL-SCNC: 15.4 MMOL/L (ref 0.4–2)
LAMBDA LC FREE SERPL-MCNC: 3.34 MG/L (ref 5.71–26.3)
LDH SERPL L TO P-CCNC: 179 U/L (ref 110–210)
LDH SERPL L TO P-CCNC: 1964 U/L (ref 110–210)
LIPASE SERPL-CCNC: 497 U/L (ref 73–393)
LYMPHOCYTES # BLD: 0.2 K/UL (ref 0.5–4.6)
LYMPHOCYTES # BLD: 0.3 K/UL (ref 0.5–4.6)
LYMPHOCYTES # BLD: 0.4 K/UL (ref 0.5–4.6)
LYMPHOCYTES # BLD: 0.6 K/UL (ref 0.5–4.6)
LYMPHOCYTES # BLD: 0.6 K/UL (ref 0.5–4.6)
LYMPHOCYTES # BLD: 0.7 K/UL (ref 0.5–4.6)
LYMPHOCYTES # BLD: 0.8 K/UL (ref 0.5–4.6)
LYMPHOCYTES # BLD: 0.8 K/UL (ref 0.5–4.6)
LYMPHOCYTES # BLD: 1.1 K/UL (ref 0.5–4.6)
LYMPHOCYTES # BLD: 1.2 K/UL (ref 0.5–4.6)
LYMPHOCYTES # BLD: 1.7 K/UL (ref 0.5–4.6)
LYMPHOCYTES # BLD: 1.7 K/UL (ref 0.5–4.6)
LYMPHOCYTES # BLD: 2.4 K/UL (ref 0.5–4.6)
LYMPHOCYTES # BLD: 2.5 K/UL (ref 0.5–4.6)
LYMPHOCYTES # BLD: 2.7 K/UL (ref 0.5–4.6)
LYMPHOCYTES # BLD: 3 K/UL (ref 0.5–4.6)
LYMPHOCYTES # BLD: 3.4 K/UL (ref 0.5–4.6)
LYMPHOCYTES NFR BLD MANUAL: 30 % (ref 16–44)
LYMPHOCYTES NFR BLD: 10 % (ref 13–44)
LYMPHOCYTES NFR BLD: 13 % (ref 13–44)
LYMPHOCYTES NFR BLD: 17 % (ref 13–44)
LYMPHOCYTES NFR BLD: 2 % (ref 13–44)
LYMPHOCYTES NFR BLD: 20 % (ref 13–44)
LYMPHOCYTES NFR BLD: 26 % (ref 13–44)
LYMPHOCYTES NFR BLD: 28 % (ref 13–44)
LYMPHOCYTES NFR BLD: 34 % (ref 13–44)
LYMPHOCYTES NFR BLD: 35 % (ref 13–44)
LYMPHOCYTES NFR BLD: 38 % (ref 13–44)
LYMPHOCYTES NFR BLD: 4 % (ref 13–44)
LYMPHOCYTES NFR BLD: 5 % (ref 13–44)
LYMPHOCYTES NFR BLD: 6 % (ref 13–44)
LYMPHOCYTES NFR BLD: 7 % (ref 13–44)
LYMPHOCYTES NFR BLD: 7 % (ref 13–44)
Lab: NORMAL
Lab: NORMAL
M PROTEIN SERPL ELPH-MCNC: 4.89 G/DL
M PROTEIN UR-MCNC: 797.5 MG/DL
MAGNESIUM SERPL-MCNC: 1.8 MG/DL (ref 1.8–2.4)
MAGNESIUM SERPL-MCNC: 1.8 MG/DL (ref 1.8–2.4)
MAGNESIUM SERPL-MCNC: 2.1 MG/DL (ref 1.8–2.4)
MAGNESIUM SERPL-MCNC: 2.1 MG/DL (ref 1.8–2.4)
MAGNESIUM SERPL-MCNC: 2.2 MG/DL (ref 1.8–2.4)
MAGNESIUM SERPL-MCNC: 2.2 MG/DL (ref 1.8–2.4)
MAGNESIUM SERPL-MCNC: 2.3 MG/DL (ref 1.8–2.4)
MAGNESIUM SERPL-MCNC: 2.4 MG/DL (ref 1.8–2.4)
MAGNESIUM SERPL-MCNC: 2.5 MG/DL (ref 1.8–2.4)
MAGNESIUM SERPL-MCNC: 2.7 MG/DL (ref 1.8–2.4)
MAGNESIUM SERPL-MCNC: 2.9 MG/DL (ref 1.8–2.4)
MCH RBC QN AUTO: 30 PG (ref 26.1–32.9)
MCH RBC QN AUTO: 30.2 PG (ref 26.1–32.9)
MCH RBC QN AUTO: 30.2 PG (ref 26.1–32.9)
MCH RBC QN AUTO: 30.5 PG (ref 26.1–32.9)
MCH RBC QN AUTO: 30.6 PG (ref 26.1–32.9)
MCH RBC QN AUTO: 30.7 PG (ref 26.1–32.9)
MCH RBC QN AUTO: 30.8 PG (ref 26.1–32.9)
MCH RBC QN AUTO: 30.9 PG (ref 26.1–32.9)
MCH RBC QN AUTO: 30.9 PG (ref 26.1–32.9)
MCH RBC QN AUTO: 31 PG (ref 26.1–32.9)
MCH RBC QN AUTO: 31.6 PG (ref 26.1–32.9)
MCH RBC QN AUTO: 31.6 PG (ref 26.1–32.9)
MCH RBC QN AUTO: 31.8 PG (ref 26.1–32.9)
MCH RBC QN AUTO: 31.9 PG (ref 26.1–32.9)
MCH RBC QN AUTO: 32.1 PG (ref 26.1–32.9)
MCH RBC QN AUTO: 32.5 PG (ref 26.1–32.9)
MCHC RBC AUTO-ENTMCNC: 29.5 G/DL (ref 31.4–35)
MCHC RBC AUTO-ENTMCNC: 29.9 G/DL (ref 31.4–35)
MCHC RBC AUTO-ENTMCNC: 30.5 G/DL (ref 31.4–35)
MCHC RBC AUTO-ENTMCNC: 30.8 G/DL (ref 31.4–35)
MCHC RBC AUTO-ENTMCNC: 31.1 G/DL (ref 31.4–35)
MCHC RBC AUTO-ENTMCNC: 31.2 G/DL (ref 31.4–35)
MCHC RBC AUTO-ENTMCNC: 31.4 G/DL (ref 31.4–35)
MCHC RBC AUTO-ENTMCNC: 32.8 G/DL (ref 31.4–35)
MCHC RBC AUTO-ENTMCNC: 33.2 G/DL (ref 31.4–35)
MCHC RBC AUTO-ENTMCNC: 33.5 G/DL (ref 31.4–35)
MCHC RBC AUTO-ENTMCNC: 33.7 G/DL (ref 31.4–35)
MCHC RBC AUTO-ENTMCNC: 33.7 G/DL (ref 31.4–35)
MCHC RBC AUTO-ENTMCNC: 34.6 G/DL (ref 31.4–35)
MCHC RBC AUTO-ENTMCNC: 34.7 G/DL (ref 31.4–35)
MCHC RBC AUTO-ENTMCNC: 34.8 G/DL (ref 31.4–35)
MCV RBC AUTO: 100.4 FL (ref 79.6–97.8)
MCV RBC AUTO: 100.4 FL (ref 79.6–97.8)
MCV RBC AUTO: 102.2 FL (ref 79.6–97.8)
MCV RBC AUTO: 103.4 FL (ref 79.6–97.8)
MCV RBC AUTO: 103.5 FL (ref 79.6–97.8)
MCV RBC AUTO: 103.8 FL (ref 79.6–97.8)
MCV RBC AUTO: 88.3 FL (ref 79.6–97.8)
MCV RBC AUTO: 88.6 FL (ref 79.6–97.8)
MCV RBC AUTO: 90.7 FL (ref 79.6–97.8)
MCV RBC AUTO: 91 FL (ref 79.6–97.8)
MCV RBC AUTO: 91.4 FL (ref 79.6–97.8)
MCV RBC AUTO: 92.1 FL (ref 79.6–97.8)
MCV RBC AUTO: 92.3 FL (ref 79.6–97.8)
MCV RBC AUTO: 92.3 FL (ref 79.6–97.8)
MCV RBC AUTO: 92.4 FL (ref 79.6–97.8)
MCV RBC AUTO: 92.9 FL (ref 79.6–97.8)
MCV RBC AUTO: 98.1 FL (ref 79.6–97.8)
MCV RBC AUTO: 99.6 FL (ref 79.6–97.8)
MM INDURATION POC: 0 MM (ref 0–5)
MM INDURATION POC: 0 MM (ref 0–5)
MONOCYTES # BLD: 0 K/UL (ref 0.1–1.3)
MONOCYTES # BLD: 0.1 K/UL (ref 0.1–1.3)
MONOCYTES # BLD: 0.1 K/UL (ref 0.1–1.3)
MONOCYTES # BLD: 0.3 K/UL (ref 0.1–1.3)
MONOCYTES # BLD: 0.4 K/UL (ref 0.1–1.3)
MONOCYTES # BLD: 0.4 K/UL (ref 0.1–1.3)
MONOCYTES # BLD: 0.5 K/UL (ref 0.1–1.3)
MONOCYTES # BLD: 0.6 K/UL (ref 0.1–1.3)
MONOCYTES # BLD: 0.7 K/UL (ref 0.1–1.3)
MONOCYTES # BLD: 0.8 K/UL (ref 0.1–1.3)
MONOCYTES NFR BLD MANUAL: 5 % (ref 3–9)
MONOCYTES NFR BLD: 1 % (ref 4–12)
MONOCYTES NFR BLD: 1 % (ref 4–12)
MONOCYTES NFR BLD: 2 % (ref 4–12)
MONOCYTES NFR BLD: 3 % (ref 4–12)
MONOCYTES NFR BLD: 3 % (ref 4–12)
MONOCYTES NFR BLD: 4 % (ref 4–12)
MONOCYTES NFR BLD: 4 % (ref 4–12)
MONOCYTES NFR BLD: 5 % (ref 4–12)
MONOCYTES NFR BLD: 6 % (ref 4–12)
MONOCYTES NFR BLD: 7 % (ref 4–12)
MONOCYTES NFR BLD: 8 % (ref 4–12)
MONOCYTES NFR BLD: 8 % (ref 4–12)
MUCOUS THREADS URNS QL MICRO: 0 /LPF
NEUTS SEG # BLD: 1.6 K/UL (ref 1.7–8.2)
NEUTS SEG # BLD: 10.4 K/UL (ref 1.7–8.2)
NEUTS SEG # BLD: 10.4 K/UL (ref 1.7–8.2)
NEUTS SEG # BLD: 10.5 K/UL (ref 1.7–8.2)
NEUTS SEG # BLD: 10.8 K/UL (ref 1.7–8.2)
NEUTS SEG # BLD: 11.4 K/UL (ref 1.7–8.2)
NEUTS SEG # BLD: 12.8 K/UL (ref 1.7–8.2)
NEUTS SEG # BLD: 2 K/UL (ref 1.7–8.2)
NEUTS SEG # BLD: 3.7 K/UL (ref 1.7–8.2)
NEUTS SEG # BLD: 3.9 K/UL (ref 1.7–8.2)
NEUTS SEG # BLD: 4.1 K/UL (ref 1.7–8.2)
NEUTS SEG # BLD: 5 K/UL (ref 1.7–8.2)
NEUTS SEG # BLD: 7.7 K/UL (ref 1.7–8.2)
NEUTS SEG # BLD: 8.9 K/UL (ref 1.7–8.2)
NEUTS SEG # BLD: 9 K/UL (ref 1.7–8.2)
NEUTS SEG # BLD: 9.6 K/UL (ref 1.7–8.2)
NEUTS SEG # BLD: 9.8 K/UL (ref 1.7–8.2)
NEUTS SEG NFR BLD MANUAL: 62 % (ref 47–75)
NEUTS SEG NFR BLD: 51 % (ref 43–78)
NEUTS SEG NFR BLD: 53 % (ref 43–78)
NEUTS SEG NFR BLD: 59 % (ref 43–78)
NEUTS SEG NFR BLD: 62 % (ref 43–78)
NEUTS SEG NFR BLD: 63 % (ref 43–78)
NEUTS SEG NFR BLD: 72 % (ref 43–78)
NEUTS SEG NFR BLD: 78 % (ref 43–78)
NEUTS SEG NFR BLD: 79 % (ref 43–78)
NEUTS SEG NFR BLD: 80 % (ref 43–78)
NEUTS SEG NFR BLD: 84 % (ref 43–78)
NEUTS SEG NFR BLD: 85 % (ref 43–78)
NEUTS SEG NFR BLD: 89 % (ref 43–78)
NEUTS SEG NFR BLD: 90 % (ref 43–78)
NEUTS SEG NFR BLD: 91 % (ref 43–78)
NEUTS SEG NFR BLD: 91 % (ref 43–78)
NRBC # BLD: 0.03 K/UL (ref 0–0.2)
NRBC # BLD: 0.06 K/UL (ref 0–0.2)
NRBC # BLD: 0.16 K/UL (ref 0–0.2)
NRBC # BLD: 0.17 K/UL (ref 0–0.2)
NRBC # BLD: 0.18 K/UL (ref 0–0.2)
NRBC # BLD: 0.19 K/UL (ref 0–0.2)
NRBC # BLD: 0.31 K/UL (ref 0–0.2)
NRBC # BLD: 0.35 K/UL (ref 0–0.2)
NRBC # BLD: 0.58 K/UL (ref 0–0.2)
NRBC # BLD: 0.94 K/UL (ref 0–0.2)
NRBC # BLD: 0.95 K/UL (ref 0–0.2)
NRBC # BLD: 1.06 K/UL (ref 0–0.2)
NRBC # BLD: 1.27 K/UL (ref 0–0.2)
NRBC # BLD: 1.74 K/UL (ref 0–0.2)
NRBC # BLD: 1.93 K/UL (ref 0–0.2)
NRBC # BLD: 2 K/UL (ref 0–0.2)
O2/TOTAL GAS SETTING VFR VENT: 100 %
O2/TOTAL GAS SETTING VFR VENT: 28 %
O2/TOTAL GAS SETTING VFR VENT: 50 %
O2/TOTAL GAS SETTING VFR VENT: 55 %
O2/TOTAL GAS SETTING VFR VENT: 60 %
O2/TOTAL GAS SETTING VFR VENT: 65 %
P-R INTERVAL, ECG05: 152 MS
PATH REV BLD -IMP: NORMAL
PCO2 BLD: 28.2 MMHG (ref 35–45)
PCO2 BLD: 31.2 MMHG (ref 35–45)
PCO2 BLD: 31.4 MMHG (ref 35–45)
PCO2 BLD: 32.3 MMHG (ref 35–45)
PCO2 BLD: 33.8 MMHG (ref 35–45)
PCO2 BLD: 34.1 MMHG (ref 35–45)
PCO2 BLD: 35 MMHG (ref 35–45)
PCO2 BLD: 36.4 MMHG (ref 35–45)
PCO2 BLD: 36.6 MMHG (ref 35–45)
PCO2 BLD: 37 MMHG (ref 35–45)
PCO2 BLD: 43.1 MMHG (ref 35–45)
PCO2 BLD: 59 MMHG (ref 35–45)
PCO2 BLD: 62.8 MMHG (ref 35–45)
PCO2 BLD: 70.5 MMHG (ref 35–45)
PEEP RESPIRATORY: 10 CMH2O
PEEP RESPIRATORY: 10 CMH2O
PEEP RESPIRATORY: 8 CMH2O
PEEP RESPIRATORY: 8 CMH2O
PH BLD: 6.92 [PH] (ref 7.35–7.45)
PH BLD: 6.93 [PH] (ref 7.35–7.45)
PH BLD: 6.99 [PH] (ref 7.35–7.45)
PH BLD: 7.36 [PH] (ref 7.35–7.45)
PH BLD: 7.36 [PH] (ref 7.35–7.45)
PH BLD: 7.37 [PH] (ref 7.35–7.45)
PH BLD: 7.38 [PH] (ref 7.35–7.45)
PH BLD: 7.39 [PH] (ref 7.35–7.45)
PH BLD: 7.4 [PH] (ref 7.35–7.45)
PH BLD: 7.51 [PH] (ref 7.35–7.45)
PH BLD: 7.53 [PH] (ref 7.35–7.45)
PHOSPHATE SERPL-MCNC: 5.7 MG/DL (ref 2.3–3.7)
PHOSPHATE SERPL-MCNC: 5.8 MG/DL (ref 2.3–3.7)
PHOSPHATE SERPL-MCNC: 6.5 MG/DL (ref 2.3–3.7)
PIP ISTAT,IPIP: 14
PLATELET # BLD AUTO: 109 K/UL (ref 150–450)
PLATELET # BLD AUTO: 122 K/UL (ref 150–450)
PLATELET # BLD AUTO: 125 K/UL (ref 150–450)
PLATELET # BLD AUTO: 127 K/UL (ref 150–450)
PLATELET # BLD AUTO: 132 K/UL (ref 150–450)
PLATELET # BLD AUTO: 132 K/UL (ref 150–450)
PLATELET # BLD AUTO: 133 K/UL (ref 150–450)
PLATELET # BLD AUTO: 137 K/UL (ref 150–450)
PLATELET # BLD AUTO: 139 K/UL (ref 150–450)
PLATELET # BLD AUTO: 142 K/UL (ref 150–450)
PLATELET # BLD AUTO: 142 K/UL (ref 150–450)
PLATELET # BLD AUTO: 143 K/UL (ref 150–450)
PLATELET # BLD AUTO: 147 K/UL (ref 150–450)
PLATELET # BLD AUTO: 150 K/UL (ref 150–450)
PLATELET # BLD AUTO: 159 K/UL (ref 150–450)
PLATELET # BLD AUTO: 161 K/UL (ref 150–450)
PLATELET # BLD AUTO: 170 K/UL (ref 150–450)
PLATELET # BLD AUTO: 188 K/UL (ref 150–450)
PLATELET COMMENTS,PCOM: ABNORMAL
PLATELET COMMENTS,PCOM: ADEQUATE
PLATELET COMMENTS,PCOM: SLIGHT
PMV BLD AUTO: 10.3 FL (ref 9.4–12.3)
PMV BLD AUTO: 10.3 FL (ref 9.4–12.3)
PMV BLD AUTO: 10.6 FL (ref 9.4–12.3)
PMV BLD AUTO: 10.6 FL (ref 9.4–12.3)
PMV BLD AUTO: 11 FL (ref 9.4–12.3)
PMV BLD AUTO: 11.2 FL (ref 9.4–12.3)
PMV BLD AUTO: 11.3 FL (ref 9.4–12.3)
PMV BLD AUTO: 11.3 FL (ref 9.4–12.3)
PMV BLD AUTO: 11.4 FL (ref 9.4–12.3)
PMV BLD AUTO: 9.9 FL (ref 9.4–12.3)
PO2 BLD: 126 MMHG (ref 75–100)
PO2 BLD: 132 MMHG (ref 75–100)
PO2 BLD: 183 MMHG (ref 75–100)
PO2 BLD: 281 MMHG (ref 75–100)
PO2 BLD: 306 MMHG (ref 75–100)
PO2 BLD: 323 MMHG (ref 75–100)
PO2 BLD: 356 MMHG (ref 75–100)
PO2 BLD: 53 MMHG (ref 75–100)
PO2 BLD: 60 MMHG (ref 75–100)
PO2 BLD: 62 MMHG (ref 75–100)
PO2 BLD: 68 MMHG (ref 75–100)
PO2 BLD: 76 MMHG (ref 75–100)
PO2 BLD: 82 MMHG (ref 75–100)
PO2 BLD: 91 MMHG (ref 75–100)
POTASSIUM BLD-SCNC: 5.4 MMOL/L (ref 3.5–5.1)
POTASSIUM BLD-SCNC: 7.1 MMOL/L (ref 3.5–5.1)
POTASSIUM SERPL-SCNC: 3.6 MMOL/L (ref 3.5–5.1)
POTASSIUM SERPL-SCNC: 3.7 MMOL/L (ref 3.5–5.1)
POTASSIUM SERPL-SCNC: 3.8 MMOL/L (ref 3.5–5.1)
POTASSIUM SERPL-SCNC: 4 MMOL/L (ref 3.5–5.1)
POTASSIUM SERPL-SCNC: 4.3 MMOL/L (ref 3.5–5.1)
POTASSIUM SERPL-SCNC: 4.4 MMOL/L (ref 3.5–5.1)
POTASSIUM SERPL-SCNC: 4.5 MMOL/L (ref 3.5–5.1)
POTASSIUM SERPL-SCNC: 4.7 MMOL/L (ref 3.5–5.1)
POTASSIUM SERPL-SCNC: 4.7 MMOL/L (ref 3.5–5.1)
POTASSIUM SERPL-SCNC: 4.8 MMOL/L (ref 3.5–5.1)
POTASSIUM SERPL-SCNC: 5.2 MMOL/L (ref 3.5–5.1)
POTASSIUM SERPL-SCNC: 5.2 MMOL/L (ref 3.5–5.1)
POTASSIUM SERPL-SCNC: 6.9 MMOL/L (ref 3.5–5.1)
PPD POC: NEGATIVE NEGATIVE
PPD POC: NORMAL
PRESSURE CONTROL, IPC: 14
PRESSURE CONTROL, IPC: 14
PRESSURE SUPPORT SETTING VENT: 20 CMH2O
PRESSURE SUPPORT SETTING VENT: 20 CMH2O
PROCALCITONIN SERPL-MCNC: 0.9 NG/ML
PROT PATTERN SERPL ELPH-IMP: ABNORMAL
PROT PATTERN SPEC IFE-IMP: ABNORMAL
PROT PATTERN SPEC IFE-IMP: ABNORMAL
PROT PATTERN UR ELPH-IMP: ABNORMAL
PROT SERPL-MCNC: 10.2 G/DL (ref 6.3–8.2)
PROT SERPL-MCNC: 10.4 G/DL (ref 6.3–8.2)
PROT SERPL-MCNC: 10.4 G/DL (ref 6.3–8.2)
PROT SERPL-MCNC: 10.8 G/DL (ref 6.3–8.2)
PROT SERPL-MCNC: 11 G/DL (ref 6.3–8.2)
PROT SERPL-MCNC: 11.1 G/DL (ref 6.3–8.2)
PROT SERPL-MCNC: 11.3 G/DL (ref 6.3–8.2)
PROT SERPL-MCNC: 12.3 G/DL (ref 6.3–8.2)
PROT SERPL-MCNC: 6 G/DL (ref 6.3–8.2)
PROT SERPL-MCNC: 8 G/DL (ref 6.3–8.2)
PROT SERPL-MCNC: 8.9 G/DL (ref 6.3–8.2)
PROT SERPL-MCNC: 9.2 G/DL (ref 6.3–8.2)
PROT UR-MCNC: 1418 MG/DL
PROT UR-MCNC: 1418 MG/DL
PROTHROMBIN TIME: 20.8 SEC (ref 11.7–14.5)
PROTHROMBIN TIME: 21.6 SEC (ref 11.7–14.5)
PROTHROMBIN TIME: 23.8 SEC (ref 11.7–14.5)
PROTHROMBIN TIME: 36.2 SEC (ref 11.7–14.5)
PTH-INTACT SERPL-MCNC: 7.3 PG/ML (ref 18.5–88)
Q-T INTERVAL, ECG07: 278 MS
Q-T INTERVAL, ECG07: 370 MS
Q-T INTERVAL, ECG07: 386 MS
Q-T INTERVAL, ECG07: 396 MS
Q-T INTERVAL, ECG07: 478 MS
QRS DURATION, ECG06: 124 MS
QRS DURATION, ECG06: 130 MS
QRS DURATION, ECG06: 136 MS
QRS DURATION, ECG06: 142 MS
QRS DURATION, ECG06: 150 MS
QTC CALCULATION (BEZET), ECG08: 418 MS
QTC CALCULATION (BEZET), ECG08: 431 MS
QTC CALCULATION (BEZET), ECG08: 439 MS
QTC CALCULATION (BEZET), ECG08: 467 MS
QTC CALCULATION (BEZET), ECG08: 542 MS
RBC # BLD AUTO: 2 M/UL (ref 4.23–5.6)
RBC # BLD AUTO: 2.06 M/UL (ref 4.23–5.6)
RBC # BLD AUTO: 2.07 M/UL (ref 4.23–5.6)
RBC # BLD AUTO: 2.12 M/UL (ref 4.23–5.6)
RBC # BLD AUTO: 2.12 X10E6/UL (ref 4.14–5.8)
RBC # BLD AUTO: 2.23 M/UL (ref 4.23–5.6)
RBC # BLD AUTO: 2.24 M/UL (ref 4.23–5.6)
RBC # BLD AUTO: 2.25 M/UL (ref 4.23–5.6)
RBC # BLD AUTO: 2.45 M/UL (ref 4.23–5.6)
RBC # BLD AUTO: 2.52 M/UL (ref 4.23–5.6)
RBC # BLD AUTO: 2.61 M/UL (ref 4.23–5.6)
RBC # BLD AUTO: 2.64 M/UL (ref 4.23–5.6)
RBC # BLD AUTO: 2.68 M/UL (ref 4.23–5.6)
RBC # BLD AUTO: 2.69 M/UL (ref 4.23–5.6)
RBC # BLD AUTO: 2.7 M/UL (ref 4.23–5.6)
RBC # BLD AUTO: 2.72 M/UL (ref 4.23–5.6)
RBC # BLD AUTO: 2.73 M/UL (ref 4.23–5.6)
RBC # BLD AUTO: 3 M/UL (ref 4.23–5.6)
RBC # BLD AUTO: 3.03 M/UL (ref 4.23–5.6)
RBC #/AREA URNS HPF: NORMAL /HPF
RBC MORPH BLD: ABNORMAL
REFERENCE LAB,REFLB: NORMAL
REFERENCE LAB,REFLB: NORMAL
RETICS # AUTO: 0.05 M/UL (ref 0.03–0.1)
RETICS/RBC NFR AUTO: 2.6 % (ref 0.3–2)
SAO2 % BLD: 100 % (ref 95–98)
SAO2 % BLD: 67 % (ref 95–98)
SAO2 % BLD: 69 % (ref 95–98)
SAO2 % BLD: 73 % (ref 95–98)
SAO2 % BLD: 93 % (ref 95–98)
SAO2 % BLD: 95 % (ref 95–98)
SAO2 % BLD: 96 % (ref 95–98)
SAO2 % BLD: 97 % (ref 95–98)
SAO2 % BLD: 99 % (ref 95–98)
SAO2 % BLD: 99 % (ref 95–98)
SERVICE CMNT-IMP: ABNORMAL
SODIUM BLD-SCNC: 135 MMOL/L (ref 136–145)
SODIUM BLD-SCNC: 138 MMOL/L (ref 136–145)
SODIUM SERPL-SCNC: 131 MMOL/L (ref 136–145)
SODIUM SERPL-SCNC: 132 MMOL/L (ref 136–145)
SODIUM SERPL-SCNC: 133 MMOL/L (ref 136–145)
SODIUM SERPL-SCNC: 134 MMOL/L (ref 136–145)
SODIUM SERPL-SCNC: 134 MMOL/L (ref 136–145)
SODIUM SERPL-SCNC: 135 MMOL/L (ref 136–145)
SODIUM SERPL-SCNC: 138 MMOL/L (ref 136–145)
SODIUM SERPL-SCNC: 139 MMOL/L (ref 136–145)
SODIUM SERPL-SCNC: 140 MMOL/L (ref 136–145)
SODIUM SERPL-SCNC: 145 MMOL/L (ref 136–145)
SPECIMEN EXP DATE BLD: NORMAL
SPECIMEN SOURCE: NORMAL
SPECIMEN SOURCE: NORMAL
SPECIMEN TYPE: ABNORMAL
SPONTANEOUS TIMED, IST: 16
STATUS OF UNIT,%ST: NORMAL
TEST DESCRIPTION:,ATST: NORMAL
TEST DESCRIPTION:,ATST: NORMAL
TEST ORDERED:: NORMAL
TIBC SERPL-MCNC: 146 UG/DL (ref 250–450)
TROPONIN I SERPL-MCNC: 0.03 NG/ML (ref 0.02–0.05)
TROPONIN I SERPL-MCNC: 0.08 NG/ML (ref 0.02–0.05)
TROPONIN I SERPL-MCNC: 0.29 NG/ML (ref 0.02–0.05)
TROPONIN I SERPL-MCNC: 0.31 NG/ML (ref 0.02–0.05)
TROPONIN I SERPL-MCNC: 0.33 NG/ML (ref 0.02–0.05)
TSH SERPL DL<=0.005 MIU/L-ACNC: 0.14 UIU/ML (ref 0.36–3.74)
UNIT DIVISION, %UDIV: 0
URATE SERPL-MCNC: 1.3 MG/DL (ref 2.6–6)
URATE SERPL-MCNC: 1.4 MG/DL (ref 2.6–6)
URATE SERPL-MCNC: 12.9 MG/DL (ref 2.6–6)
URATE SERPL-MCNC: 2.6 MG/DL (ref 2.6–6)
URATE SERPL-MCNC: 3.6 MG/DL (ref 2.6–6)
URATE SERPL-MCNC: 5.1 MG/DL (ref 2.6–6)
URATE SERPL-MCNC: 5.2 MG/DL (ref 2.6–6)
URATE SERPL-MCNC: 7.6 MG/DL (ref 2.6–6)
URATE SERPL-MCNC: <0.2 MG/DL (ref 2.6–6)
URATE SERPL-MCNC: <2.6 MG/DL (ref 2.6–6)
VENTILATION MODE VENT: ABNORMAL
VENTILATION MODE VENT: ABNORMAL
VENTRICULAR RATE, ECG03: 119 BPM
VENTRICULAR RATE, ECG03: 136 BPM
VENTRICULAR RATE, ECG03: 49 BPM
VENTRICULAR RATE, ECG03: 74 BPM
VENTRICULAR RATE, ECG03: 96 BPM
VISC SER: NORMAL REL.SALINE
VIT B12 SERPL-MCNC: 421 PG/ML (ref 193–986)
VT SETTING VENT: 500 ML
VT SETTING VENT: 550 ML
VT SETTING VENT: 707 ML
WBC # BLD AUTO: 1.9 K/UL (ref 4.3–11.1)
WBC # BLD AUTO: 10.6 K/UL (ref 4.3–11.1)
WBC # BLD AUTO: 11.3 K/UL (ref 4.3–11.1)
WBC # BLD AUTO: 11.3 K/UL (ref 4.3–11.1)
WBC # BLD AUTO: 11.4 K/UL (ref 4.3–11.1)
WBC # BLD AUTO: 11.5 K/UL (ref 4.3–11.1)
WBC # BLD AUTO: 11.6 K/UL (ref 4.3–11.1)
WBC # BLD AUTO: 12 K/UL (ref 4.3–11.1)
WBC # BLD AUTO: 12.3 K/UL (ref 4.3–11.1)
WBC # BLD AUTO: 12.4 K/UL (ref 4.3–11.1)
WBC # BLD AUTO: 13.4 K/UL (ref 4.3–11.1)
WBC # BLD AUTO: 13.5 K/UL (ref 4.3–11.1)
WBC # BLD AUTO: 14 K/UL (ref 4.3–11.1)
WBC # BLD AUTO: 3.5 K/UL (ref 4.3–11.1)
WBC # BLD AUTO: 6.5 K/UL (ref 4.3–11.1)
WBC # BLD AUTO: 7 K/UL (ref 4.3–11.1)
WBC # BLD AUTO: 7.9 K/UL (ref 4.3–11.1)
WBC # BLD AUTO: 8 K/UL (ref 4.3–11.1)
WBC MORPH BLD: ABNORMAL
WBC URNS QL MICRO: NORMAL /HPF

## 2019-01-01 PROCEDURE — 71045 X-RAY EXAM CHEST 1 VIEW: CPT

## 2019-01-01 PROCEDURE — 74011250636 HC RX REV CODE- 250/636: Performed by: NURSE PRACTITIONER

## 2019-01-01 PROCEDURE — 80053 COMPREHEN METABOLIC PANEL: CPT

## 2019-01-01 PROCEDURE — 65610000001 HC ROOM ICU GENERAL

## 2019-01-01 PROCEDURE — 36600 WITHDRAWAL OF ARTERIAL BLOOD: CPT

## 2019-01-01 PROCEDURE — 65270000015 HC RM PRIVATE ONCOLOGY

## 2019-01-01 PROCEDURE — 83735 ASSAY OF MAGNESIUM: CPT

## 2019-01-01 PROCEDURE — 77010033711 HC HIGH FLOW OXYGEN

## 2019-01-01 PROCEDURE — 77030004566 HC CATH ANGI DX TORCON COOK -B

## 2019-01-01 PROCEDURE — 74011250637 HC RX REV CODE- 250/637: Performed by: INTERNAL MEDICINE

## 2019-01-01 PROCEDURE — 82803 BLOOD GASES ANY COMBINATION: CPT

## 2019-01-01 PROCEDURE — 93005 ELECTROCARDIOGRAM TRACING: CPT | Performed by: EMERGENCY MEDICINE

## 2019-01-01 PROCEDURE — 85730 THROMBOPLASTIN TIME PARTIAL: CPT

## 2019-01-01 PROCEDURE — 86923 COMPATIBILITY TEST ELECTRIC: CPT

## 2019-01-01 PROCEDURE — 82607 VITAMIN B-12: CPT

## 2019-01-01 PROCEDURE — 80076 HEPATIC FUNCTION PANEL: CPT

## 2019-01-01 PROCEDURE — 84100 ASSAY OF PHOSPHORUS: CPT

## 2019-01-01 PROCEDURE — 77030020263 HC SOL INJ SOD CL0.9% LFCR 1000ML

## 2019-01-01 PROCEDURE — 74011000250 HC RX REV CODE- 250: Performed by: NURSE PRACTITIONER

## 2019-01-01 PROCEDURE — 74011000258 HC RX REV CODE- 258: Performed by: NURSE PRACTITIONER

## 2019-01-01 PROCEDURE — 99233 SBSQ HOSP IP/OBS HIGH 50: CPT | Performed by: INTERNAL MEDICINE

## 2019-01-01 PROCEDURE — C1752 CATH,HEMODIALYSIS,SHORT-TERM: HCPCS

## 2019-01-01 PROCEDURE — 74011250637 HC RX REV CODE- 250/637: Performed by: NURSE PRACTITIONER

## 2019-01-01 PROCEDURE — 87340 HEPATITIS B SURFACE AG IA: CPT

## 2019-01-01 PROCEDURE — 82140 ASSAY OF AMMONIA: CPT

## 2019-01-01 PROCEDURE — 77010033678 HC OXYGEN DAILY

## 2019-01-01 PROCEDURE — 36415 COLL VENOUS BLD VENIPUNCTURE: CPT

## 2019-01-01 PROCEDURE — 74011250636 HC RX REV CODE- 250/636: Performed by: INTERNAL MEDICINE

## 2019-01-01 PROCEDURE — 36591 DRAW BLOOD OFF VENOUS DEVICE: CPT

## 2019-01-01 PROCEDURE — 84165 PROTEIN E-PHORESIS SERUM: CPT

## 2019-01-01 PROCEDURE — 86335 IMMUNFIX E-PHORSIS/URINE/CSF: CPT

## 2019-01-01 PROCEDURE — 85379 FIBRIN DEGRADATION QUANT: CPT

## 2019-01-01 PROCEDURE — 90935 HEMODIALYSIS ONE EVALUATION: CPT

## 2019-01-01 PROCEDURE — 74011000250 HC RX REV CODE- 250: Performed by: RADIOLOGY

## 2019-01-01 PROCEDURE — 83880 ASSAY OF NATRIURETIC PEPTIDE: CPT

## 2019-01-01 PROCEDURE — 70450 CT HEAD/BRAIN W/O DYE: CPT

## 2019-01-01 PROCEDURE — 92610 EVALUATE SWALLOWING FUNCTION: CPT

## 2019-01-01 PROCEDURE — 85025 COMPLETE CBC W/AUTO DIFF WBC: CPT

## 2019-01-01 PROCEDURE — 80048 BASIC METABOLIC PNL TOTAL CA: CPT

## 2019-01-01 PROCEDURE — 76450000000

## 2019-01-01 PROCEDURE — 99291 CRITICAL CARE FIRST HOUR: CPT | Performed by: INTERNAL MEDICINE

## 2019-01-01 PROCEDURE — 85018 HEMOGLOBIN: CPT

## 2019-01-01 PROCEDURE — 74011250637 HC RX REV CODE- 250/637: Performed by: FAMILY MEDICINE

## 2019-01-01 PROCEDURE — 02HV33Z INSERTION OF INFUSION DEVICE INTO SUPERIOR VENA CAVA, PERCUTANEOUS APPROACH: ICD-10-PCS | Performed by: RADIOLOGY

## 2019-01-01 PROCEDURE — 99223 1ST HOSP IP/OBS HIGH 75: CPT | Performed by: INTERNAL MEDICINE

## 2019-01-01 PROCEDURE — 85610 PROTHROMBIN TIME: CPT

## 2019-01-01 PROCEDURE — 74011000250 HC RX REV CODE- 250

## 2019-01-01 PROCEDURE — 74011000258 HC RX REV CODE- 258: Performed by: INTERNAL MEDICINE

## 2019-01-01 PROCEDURE — 86644 CMV ANTIBODY: CPT

## 2019-01-01 PROCEDURE — 74011250636 HC RX REV CODE- 250/636: Performed by: RADIOLOGY

## 2019-01-01 PROCEDURE — 82955 ASSAY OF G6PD ENZYME: CPT

## 2019-01-01 PROCEDURE — 88342 IMHCHEM/IMCYTCHM 1ST ANTB: CPT

## 2019-01-01 PROCEDURE — 88184 FLOWCYTOMETRY/ TC 1 MARKER: CPT

## 2019-01-01 PROCEDURE — 71046 X-RAY EXAM CHEST 2 VIEWS: CPT

## 2019-01-01 PROCEDURE — 65660000000 HC RM CCU STEPDOWN

## 2019-01-01 PROCEDURE — 93970 EXTREMITY STUDY: CPT

## 2019-01-01 PROCEDURE — 97161 PT EVAL LOW COMPLEX 20 MIN: CPT

## 2019-01-01 PROCEDURE — 38222 DX BONE MARROW BX & ASPIR: CPT

## 2019-01-01 PROCEDURE — 74011250637 HC RX REV CODE- 250/637: Performed by: HOSPITALIST

## 2019-01-01 PROCEDURE — C1769 GUIDE WIRE: HCPCS

## 2019-01-01 PROCEDURE — 02HV33Z INSERTION OF INFUSION DEVICE INTO SUPERIOR VENA CAVA, PERCUTANEOUS APPROACH: ICD-10-PCS | Performed by: INTERNAL MEDICINE

## 2019-01-01 PROCEDURE — 88365 INSITU HYBRIDIZATION (FISH): CPT

## 2019-01-01 PROCEDURE — 99232 SBSQ HOSP IP/OBS MODERATE 35: CPT | Performed by: NURSE PRACTITIONER

## 2019-01-01 PROCEDURE — 5A09357 ASSISTANCE WITH RESPIRATORY VENTILATION, LESS THAN 24 CONSECUTIVE HOURS, CONTINUOUS POSITIVE AIRWAY PRESSURE: ICD-10-PCS | Performed by: INTERNAL MEDICINE

## 2019-01-01 PROCEDURE — 97530 THERAPEUTIC ACTIVITIES: CPT

## 2019-01-01 PROCEDURE — 36430 TRANSFUSION BLD/BLD COMPNT: CPT

## 2019-01-01 PROCEDURE — 82962 GLUCOSE BLOOD TEST: CPT

## 2019-01-01 PROCEDURE — C8929 TTE W OR WO FOL WCON,DOPPLER: HCPCS

## 2019-01-01 PROCEDURE — 65270000029 HC RM PRIVATE

## 2019-01-01 PROCEDURE — 74011000250 HC RX REV CODE- 250: Performed by: INTERNAL MEDICINE

## 2019-01-01 PROCEDURE — C1750 CATH, HEMODIALYSIS,LONG-TERM: HCPCS

## 2019-01-01 PROCEDURE — 83605 ASSAY OF LACTIC ACID: CPT

## 2019-01-01 PROCEDURE — 71250 CT THORAX DX C-: CPT

## 2019-01-01 PROCEDURE — 74011636637 HC RX REV CODE- 636/637: Performed by: INTERNAL MEDICINE

## 2019-01-01 PROCEDURE — 76705 ECHO EXAM OF ABDOMEN: CPT

## 2019-01-01 PROCEDURE — 81015 MICROSCOPIC EXAM OF URINE: CPT

## 2019-01-01 PROCEDURE — 77074 RADEX OSSEOUS SURVEY LMTD: CPT

## 2019-01-01 PROCEDURE — 82947 ASSAY GLUCOSE BLOOD QUANT: CPT

## 2019-01-01 PROCEDURE — 94762 N-INVAS EAR/PLS OXIMTRY CONT: CPT

## 2019-01-01 PROCEDURE — 84156 ASSAY OF PROTEIN URINE: CPT

## 2019-01-01 PROCEDURE — 93005 ELECTROCARDIOGRAM TRACING: CPT | Performed by: INTERNAL MEDICINE

## 2019-01-01 PROCEDURE — 74011250636 HC RX REV CODE- 250/636: Performed by: EMERGENCY MEDICINE

## 2019-01-01 PROCEDURE — 83970 ASSAY OF PARATHORMONE: CPT

## 2019-01-01 PROCEDURE — 74011000250 HC RX REV CODE- 250: Performed by: EMERGENCY MEDICINE

## 2019-01-01 PROCEDURE — 84550 ASSAY OF BLOOD/URIC ACID: CPT

## 2019-01-01 PROCEDURE — 94760 N-INVAS EAR/PLS OXIMETRY 1: CPT

## 2019-01-01 PROCEDURE — 85046 RETICYTE/HGB CONCENTRATE: CPT

## 2019-01-01 PROCEDURE — 83540 ASSAY OF IRON: CPT

## 2019-01-01 PROCEDURE — 77030003445 HC NDL BIOP BN BD -B

## 2019-01-01 PROCEDURE — 87106 FUNGI IDENTIFICATION YEAST: CPT

## 2019-01-01 PROCEDURE — 36556 INSERT NON-TUNNEL CV CATH: CPT | Performed by: INTERNAL MEDICINE

## 2019-01-01 PROCEDURE — 78582 LUNG VENTILAT&PERFUS IMAGING: CPT

## 2019-01-01 PROCEDURE — 83036 HEMOGLOBIN GLYCOSYLATED A1C: CPT

## 2019-01-01 PROCEDURE — 86900 BLOOD TYPING SEROLOGIC ABO: CPT

## 2019-01-01 PROCEDURE — 77030040361 HC SLV COMPR DVT MDII -B

## 2019-01-01 PROCEDURE — 94660 CPAP INITIATION&MGMT: CPT

## 2019-01-01 PROCEDURE — 85027 COMPLETE CBC AUTOMATED: CPT

## 2019-01-01 PROCEDURE — 88364 INSITU HYBRIDIZATION (FISH): CPT

## 2019-01-01 PROCEDURE — 0JH63XZ INSERTION OF TUNNELED VASCULAR ACCESS DEVICE INTO CHEST SUBCUTANEOUS TISSUE AND FASCIA, PERCUTANEOUS APPROACH: ICD-10-PCS | Performed by: RADIOLOGY

## 2019-01-01 PROCEDURE — 93005 ELECTROCARDIOGRAM TRACING: CPT | Performed by: FAMILY MEDICINE

## 2019-01-01 PROCEDURE — 36592 COLLECT BLOOD FROM PICC: CPT

## 2019-01-01 PROCEDURE — 74011250636 HC RX REV CODE- 250/636: Performed by: FAMILY MEDICINE

## 2019-01-01 PROCEDURE — 84443 ASSAY THYROID STIM HORMONE: CPT

## 2019-01-01 PROCEDURE — P9040 RBC LEUKOREDUCED IRRADIATED: HCPCS

## 2019-01-01 PROCEDURE — 5A1935Z RESPIRATORY VENTILATION, LESS THAN 24 CONSECUTIVE HOURS: ICD-10-PCS | Performed by: INTERNAL MEDICINE

## 2019-01-01 PROCEDURE — 88185 FLOWCYTOMETRY/TC ADD-ON: CPT

## 2019-01-01 PROCEDURE — 87070 CULTURE OTHR SPECIMN AEROBIC: CPT

## 2019-01-01 PROCEDURE — 97112 NEUROMUSCULAR REEDUCATION: CPT

## 2019-01-01 PROCEDURE — 82330 ASSAY OF CALCIUM: CPT

## 2019-01-01 PROCEDURE — 0BH17EZ INSERTION OF ENDOTRACHEAL AIRWAY INTO TRACHEA, VIA NATURAL OR ARTIFICIAL OPENING: ICD-10-PCS | Performed by: INTERNAL MEDICINE

## 2019-01-01 PROCEDURE — 31500 INSERT EMERGENCY AIRWAY: CPT | Performed by: INTERNAL MEDICINE

## 2019-01-01 PROCEDURE — 30233N1 TRANSFUSION OF NONAUTOLOGOUS RED BLOOD CELLS INTO PERIPHERAL VEIN, PERCUTANEOUS APPROACH: ICD-10-PCS | Performed by: INTERNAL MEDICINE

## 2019-01-01 PROCEDURE — 88311 DECALCIFY TISSUE: CPT

## 2019-01-01 PROCEDURE — 06HN33Z INSERTION OF INFUSION DEVICE INTO LEFT FEMORAL VEIN, PERCUTANEOUS APPROACH: ICD-10-PCS | Performed by: INTERNAL MEDICINE

## 2019-01-01 PROCEDURE — 74011000302 HC RX REV CODE- 302: Performed by: INTERNAL MEDICINE

## 2019-01-01 PROCEDURE — 07DR3ZX EXTRACTION OF ILIAC BONE MARROW, PERCUTANEOUS APPROACH, DIAGNOSTIC: ICD-10-PCS | Performed by: PATHOLOGY

## 2019-01-01 PROCEDURE — 84484 ASSAY OF TROPONIN QUANT: CPT

## 2019-01-01 PROCEDURE — 76937 US GUIDE VASCULAR ACCESS: CPT

## 2019-01-01 PROCEDURE — 94761 N-INVAS EAR/PLS OXIMETRY MLT: CPT

## 2019-01-01 PROCEDURE — 83690 ASSAY OF LIPASE: CPT

## 2019-01-01 PROCEDURE — 83615 LACTATE (LD) (LDH) ENZYME: CPT

## 2019-01-01 PROCEDURE — 99231 SBSQ HOSP IP/OBS SF/LOW 25: CPT | Performed by: NURSE PRACTITIONER

## 2019-01-01 PROCEDURE — 86334 IMMUNOFIX E-PHORESIS SERUM: CPT

## 2019-01-01 PROCEDURE — 51798 US URINE CAPACITY MEASURE: CPT

## 2019-01-01 PROCEDURE — 82248 BILIRUBIN DIRECT: CPT

## 2019-01-01 PROCEDURE — 87804 INFLUENZA ASSAY W/OPTIC: CPT

## 2019-01-01 PROCEDURE — 88313 SPECIAL STAINS GROUP 2: CPT

## 2019-01-01 PROCEDURE — 5A1D70Z PERFORMANCE OF URINARY FILTRATION, INTERMITTENT, LESS THAN 6 HOURS PER DAY: ICD-10-PCS | Performed by: INTERNAL MEDICINE

## 2019-01-01 PROCEDURE — C1894 INTRO/SHEATH, NON-LASER: HCPCS

## 2019-01-01 PROCEDURE — 97162 PT EVAL MOD COMPLEX 30 MIN: CPT

## 2019-01-01 PROCEDURE — 86880 COOMBS TEST DIRECT: CPT

## 2019-01-01 PROCEDURE — 94002 VENT MGMT INPAT INIT DAY: CPT

## 2019-01-01 PROCEDURE — 82375 ASSAY CARBOXYHB QUANT: CPT

## 2019-01-01 PROCEDURE — 82232 ASSAY OF BETA-2 PROTEIN: CPT

## 2019-01-01 PROCEDURE — 82728 ASSAY OF FERRITIN: CPT

## 2019-01-01 PROCEDURE — C9113 INJ PANTOPRAZOLE SODIUM, VIA: HCPCS | Performed by: INTERNAL MEDICINE

## 2019-01-01 PROCEDURE — 93005 ELECTROCARDIOGRAM TRACING: CPT | Performed by: NURSE PRACTITIONER

## 2019-01-01 PROCEDURE — 83883 ASSAY NEPHELOMETRY NOT SPEC: CPT

## 2019-01-01 PROCEDURE — 76770 US EXAM ABDO BACK WALL COMP: CPT

## 2019-01-01 PROCEDURE — 87077 CULTURE AEROBIC IDENTIFY: CPT

## 2019-01-01 PROCEDURE — 92526 ORAL FUNCTION THERAPY: CPT

## 2019-01-01 PROCEDURE — 3E01305 INTRODUCTION OF OTHER ANTINEOPLASTIC INTO SUBCUTANEOUS TISSUE, PERCUTANEOUS APPROACH: ICD-10-PCS | Performed by: INTERNAL MEDICINE

## 2019-01-01 PROCEDURE — 99232 SBSQ HOSP IP/OBS MODERATE 35: CPT | Performed by: INTERNAL MEDICINE

## 2019-01-01 PROCEDURE — 88312 SPECIAL STAINS GROUP 1: CPT

## 2019-01-01 PROCEDURE — 74011000258 HC RX REV CODE- 258: Performed by: PHYSICIAN ASSISTANT

## 2019-01-01 PROCEDURE — 74019 RADEX ABDOMEN 2 VIEWS: CPT

## 2019-01-01 PROCEDURE — 74011250636 HC RX REV CODE- 250/636: Performed by: PHYSICIAN ASSISTANT

## 2019-01-01 PROCEDURE — 77030019605

## 2019-01-01 PROCEDURE — 3E04305 INTRODUCTION OF OTHER ANTINEOPLASTIC INTO CENTRAL VEIN, PERCUTANEOUS APPROACH: ICD-10-PCS | Performed by: INTERNAL MEDICINE

## 2019-01-01 PROCEDURE — 86580 TB INTRADERMAL TEST: CPT | Performed by: INTERNAL MEDICINE

## 2019-01-01 PROCEDURE — 77030002916 HC SUT ETHLN J&J -A

## 2019-01-01 PROCEDURE — 88305 TISSUE EXAM BY PATHOLOGIST: CPT

## 2019-01-01 PROCEDURE — 99285 EMERGENCY DEPT VISIT HI MDM: CPT | Performed by: EMERGENCY MEDICINE

## 2019-01-01 PROCEDURE — 99222 1ST HOSP IP/OBS MODERATE 55: CPT | Performed by: NURSE PRACTITIONER

## 2019-01-01 PROCEDURE — 84145 PROCALCITONIN (PCT): CPT

## 2019-01-01 PROCEDURE — 87186 SC STD MICRODIL/AGAR DIL: CPT

## 2019-01-01 PROCEDURE — 77001 FLUOROGUIDE FOR VEIN DEVICE: CPT

## 2019-01-01 PROCEDURE — 74181 MRI ABDOMEN W/O CONTRAST: CPT

## 2019-01-01 PROCEDURE — 82550 ASSAY OF CK (CPK): CPT

## 2019-01-01 PROCEDURE — 83010 ASSAY OF HAPTOGLOBIN QUANT: CPT

## 2019-01-01 PROCEDURE — 97165 OT EVAL LOW COMPLEX 30 MIN: CPT

## 2019-01-01 PROCEDURE — 82746 ASSAY OF FOLIC ACID SERUM: CPT

## 2019-01-01 PROCEDURE — 87040 BLOOD CULTURE FOR BACTERIA: CPT

## 2019-01-01 PROCEDURE — 96374 THER/PROPH/DIAG INJ IV PUSH: CPT | Performed by: EMERGENCY MEDICINE

## 2019-01-01 PROCEDURE — 97110 THERAPEUTIC EXERCISES: CPT

## 2019-01-01 PROCEDURE — 85810 BLOOD VISCOSITY EXAMINATION: CPT

## 2019-01-01 PROCEDURE — 81003 URINALYSIS AUTO W/O SCOPE: CPT | Performed by: EMERGENCY MEDICINE

## 2019-01-01 RX ORDER — SODIUM CHLORIDE 9 MG/ML
125 INJECTION, SOLUTION INTRAVENOUS CONTINUOUS
Status: DISCONTINUED | OUTPATIENT
Start: 2019-01-01 | End: 2019-01-01 | Stop reason: HOSPADM

## 2019-01-01 RX ORDER — ASPIRIN 81 MG/1
81 TABLET ORAL DAILY
Status: CANCELLED | COMMUNITY
Start: 2019-01-01

## 2019-01-01 RX ORDER — SODIUM CHLORIDE 9 MG/ML
250 INJECTION, SOLUTION INTRAVENOUS AS NEEDED
Status: DISCONTINUED | OUTPATIENT
Start: 2019-01-01 | End: 2019-01-01 | Stop reason: SDUPTHER

## 2019-01-01 RX ORDER — HYDROCORTISONE SODIUM SUCCINATE 100 MG/2ML
100 INJECTION, POWDER, FOR SOLUTION INTRAMUSCULAR; INTRAVENOUS AS NEEDED
Status: CANCELLED | OUTPATIENT
Start: 2019-01-01

## 2019-01-01 RX ORDER — ONDANSETRON 2 MG/ML
8 INJECTION INTRAMUSCULAR; INTRAVENOUS AS NEEDED
Status: CANCELLED | OUTPATIENT
Start: 2019-01-01

## 2019-01-01 RX ORDER — SODIUM BICARBONATE 84 MG/ML
50 INJECTION, SOLUTION INTRAVENOUS ONCE
Status: COMPLETED | OUTPATIENT
Start: 2019-01-01 | End: 2019-01-01

## 2019-01-01 RX ORDER — DEXAMETHASONE 4 MG/1
40 TABLET ORAL ONCE
Status: CANCELLED | COMMUNITY
Start: 2019-01-01

## 2019-01-01 RX ORDER — HYDRALAZINE HYDROCHLORIDE 20 MG/ML
10 INJECTION INTRAMUSCULAR; INTRAVENOUS
Status: DISCONTINUED | OUTPATIENT
Start: 2019-01-01 | End: 2019-01-01 | Stop reason: HOSPADM

## 2019-01-01 RX ORDER — ALBUTEROL SULFATE 0.83 MG/ML
SOLUTION RESPIRATORY (INHALATION)
Status: COMPLETED
Start: 2019-01-01 | End: 2019-01-01

## 2019-01-01 RX ORDER — CALCIUM CHLORIDE INJECTION 100 MG/ML
INJECTION, SOLUTION INTRAVENOUS
Status: COMPLETED | OUTPATIENT
Start: 2019-01-01 | End: 2019-01-01

## 2019-01-01 RX ORDER — ACYCLOVIR 400 MG/1
400 TABLET ORAL 2 TIMES DAILY
Status: SHIPPED | COMMUNITY
Start: 2019-01-01

## 2019-01-01 RX ORDER — EPINEPHRINE 0.1 MG/ML
INJECTION INTRACARDIAC; INTRAVENOUS
Status: COMPLETED | OUTPATIENT
Start: 2019-01-01 | End: 2019-01-01

## 2019-01-01 RX ORDER — INSULIN GLARGINE 100 [IU]/ML
13 INJECTION, SOLUTION SUBCUTANEOUS
Status: DISCONTINUED | OUTPATIENT
Start: 2019-01-01 | End: 2019-01-01 | Stop reason: HOSPADM

## 2019-01-01 RX ORDER — LIDOCAINE HYDROCHLORIDE 10 MG/ML
10 INJECTION INFILTRATION; PERINEURAL ONCE
Status: DISCONTINUED | OUTPATIENT
Start: 2019-01-01 | End: 2019-01-01 | Stop reason: SDUPTHER

## 2019-01-01 RX ORDER — SODIUM BICARBONATE 1 MEQ/ML
100 SYRINGE (ML) INTRAVENOUS ONCE
Status: DISCONTINUED | OUTPATIENT
Start: 2019-01-01 | End: 2019-01-01 | Stop reason: SDUPTHER

## 2019-01-01 RX ORDER — HYDROCORTISONE SODIUM SUCCINATE 100 MG/2ML
100 INJECTION, POWDER, FOR SOLUTION INTRAMUSCULAR; INTRAVENOUS AS NEEDED
Status: CANCELLED | OUTPATIENT
Start: 2019-11-03

## 2019-01-01 RX ORDER — EPINEPHRINE 1 MG/ML
0.3 INJECTION, SOLUTION, CONCENTRATE INTRAVENOUS AS NEEDED
Status: CANCELLED | OUTPATIENT
Start: 2019-11-03

## 2019-01-01 RX ORDER — SODIUM CHLORIDE 0.9 % (FLUSH) 0.9 %
5-40 SYRINGE (ML) INJECTION AS NEEDED
Status: DISCONTINUED | OUTPATIENT
Start: 2019-01-01 | End: 2019-01-01 | Stop reason: HOSPADM

## 2019-01-01 RX ORDER — ACETAMINOPHEN 325 MG/1
650 TABLET ORAL AS NEEDED
Status: CANCELLED
Start: 2019-10-31

## 2019-01-01 RX ORDER — SODIUM CHLORIDE 0.9 % (FLUSH) 0.9 %
10 SYRINGE (ML) INJECTION AS NEEDED
Status: CANCELLED
Start: 2019-01-01

## 2019-01-01 RX ORDER — DIPHENHYDRAMINE HYDROCHLORIDE 50 MG/ML
50 INJECTION, SOLUTION INTRAMUSCULAR; INTRAVENOUS AS NEEDED
Status: ACTIVE | OUTPATIENT
Start: 2019-01-01 | End: 2019-01-01

## 2019-01-01 RX ORDER — HYDROCORTISONE SODIUM SUCCINATE 100 MG/2ML
100 INJECTION, POWDER, FOR SOLUTION INTRAMUSCULAR; INTRAVENOUS AS NEEDED
Status: ACTIVE | OUTPATIENT
Start: 2019-01-01 | End: 2019-01-01

## 2019-01-01 RX ORDER — INSULIN GLARGINE 100 [IU]/ML
5 INJECTION, SOLUTION SUBCUTANEOUS
Status: DISCONTINUED | OUTPATIENT
Start: 2019-01-01 | End: 2019-01-01

## 2019-01-01 RX ORDER — DIPHENHYDRAMINE HYDROCHLORIDE 50 MG/ML
50 INJECTION, SOLUTION INTRAMUSCULAR; INTRAVENOUS AS NEEDED
Status: CANCELLED
Start: 2019-10-31

## 2019-01-01 RX ORDER — SODIUM CHLORIDE 9 MG/ML
250 INJECTION, SOLUTION INTRAVENOUS AS NEEDED
Status: DISCONTINUED | OUTPATIENT
Start: 2019-01-01 | End: 2019-01-01 | Stop reason: HOSPADM

## 2019-01-01 RX ORDER — FENTANYL CITRATE 50 UG/ML
25-100 INJECTION, SOLUTION INTRAMUSCULAR; INTRAVENOUS
Status: DISCONTINUED | OUTPATIENT
Start: 2019-01-01 | End: 2019-01-01 | Stop reason: ALTCHOICE

## 2019-01-01 RX ORDER — DILTIAZEM HYDROCHLORIDE 30 MG/1
60 TABLET, FILM COATED ORAL
Status: DISPENSED | OUTPATIENT
Start: 2019-01-01 | End: 2019-01-01

## 2019-01-01 RX ORDER — VANCOMYCIN 2 GRAM/500 ML IN 0.9 % SODIUM CHLORIDE INTRAVENOUS
2000 ONCE
Status: COMPLETED | OUTPATIENT
Start: 2019-01-01 | End: 2019-01-01

## 2019-01-01 RX ORDER — DIPHENHYDRAMINE HCL 25 MG
25 CAPSULE ORAL
Status: DISCONTINUED | OUTPATIENT
Start: 2019-01-01 | End: 2019-01-01 | Stop reason: HOSPADM

## 2019-01-01 RX ORDER — ACETAMINOPHEN 325 MG/1
650 TABLET ORAL AS NEEDED
Status: ACTIVE | OUTPATIENT
Start: 2019-01-01 | End: 2019-01-01

## 2019-01-01 RX ORDER — DEXAMETHASONE 4 MG/1
40 TABLET ORAL ONCE
Status: SHIPPED | COMMUNITY
Start: 2019-01-01 | End: 2019-01-01

## 2019-01-01 RX ORDER — DEXAMETHASONE 4 MG/1
4 TABLET ORAL EVERY 12 HOURS
Status: CANCELLED
Start: 2019-01-01

## 2019-01-01 RX ORDER — DIPHENHYDRAMINE HYDROCHLORIDE 50 MG/ML
50 INJECTION, SOLUTION INTRAMUSCULAR; INTRAVENOUS AS NEEDED
Status: CANCELLED
Start: 2019-01-01

## 2019-01-01 RX ORDER — DEXTROSE 50 % IN WATER (D50W) INTRAVENOUS SYRINGE
25 ONCE
Status: COMPLETED | OUTPATIENT
Start: 2019-01-01 | End: 2019-01-01

## 2019-01-01 RX ORDER — DIPHENHYDRAMINE HYDROCHLORIDE 50 MG/ML
50 INJECTION, SOLUTION INTRAMUSCULAR; INTRAVENOUS AS NEEDED
Status: CANCELLED
Start: 2019-11-03

## 2019-01-01 RX ORDER — DILTIAZEM HYDROCHLORIDE 5 MG/ML
20 INJECTION INTRAVENOUS ONCE
Status: COMPLETED | OUTPATIENT
Start: 2019-01-01 | End: 2019-01-01

## 2019-01-01 RX ORDER — SODIUM BICARBONATE 84 MG/ML
INJECTION, SOLUTION INTRAVENOUS
Status: COMPLETED
Start: 2019-01-01 | End: 2019-01-01

## 2019-01-01 RX ORDER — ACETAMINOPHEN 325 MG/1
650 TABLET ORAL AS NEEDED
Status: CANCELLED
Start: 2019-11-03

## 2019-01-01 RX ORDER — SODIUM CHLORIDE 9 MG/ML
10 INJECTION INTRAMUSCULAR; INTRAVENOUS; SUBCUTANEOUS AS NEEDED
Status: CANCELLED | OUTPATIENT
Start: 2019-01-01

## 2019-01-01 RX ORDER — DEXTROSE 50 % IN WATER (D50W) INTRAVENOUS SYRINGE
Status: COMPLETED
Start: 2019-01-01 | End: 2019-01-01

## 2019-01-01 RX ORDER — LENALIDOMIDE 5 MG/1
25 CAPSULE ORAL DAILY
Status: CANCELLED | COMMUNITY
Start: 2019-01-01

## 2019-01-01 RX ORDER — ASPIRIN 81 MG/1
81 TABLET ORAL DAILY
Status: SHIPPED | COMMUNITY
Start: 2019-01-01

## 2019-01-01 RX ORDER — SODIUM CHLORIDE 9 MG/ML
10 INJECTION INTRAMUSCULAR; INTRAVENOUS; SUBCUTANEOUS AS NEEDED
Status: CANCELLED | OUTPATIENT
Start: 2019-11-03

## 2019-01-01 RX ORDER — MIDAZOLAM HYDROCHLORIDE 1 MG/ML
.25-2 INJECTION, SOLUTION INTRAMUSCULAR; INTRAVENOUS
Status: DISCONTINUED | OUTPATIENT
Start: 2019-01-01 | End: 2019-01-01 | Stop reason: ALTCHOICE

## 2019-01-01 RX ORDER — SODIUM BICARBONATE 1 MEQ/ML
100 SYRINGE (ML) INTRAVENOUS ONCE
Status: COMPLETED | OUTPATIENT
Start: 2019-01-01 | End: 2019-01-01

## 2019-01-01 RX ORDER — SODIUM CHLORIDE 9 MG/ML
100 INJECTION, SOLUTION INTRAVENOUS CONTINUOUS
Status: DISCONTINUED | OUTPATIENT
Start: 2019-01-01 | End: 2019-01-01

## 2019-01-01 RX ORDER — DEXAMETHASONE SODIUM PHOSPHATE 100 MG/10ML
40 INJECTION INTRAMUSCULAR; INTRAVENOUS DAILY
Status: DISCONTINUED | OUTPATIENT
Start: 2019-01-01 | End: 2019-01-01

## 2019-01-01 RX ORDER — TRAMADOL HYDROCHLORIDE 50 MG/1
50 TABLET ORAL
Status: DISCONTINUED | OUTPATIENT
Start: 2019-01-01 | End: 2019-01-01 | Stop reason: HOSPADM

## 2019-01-01 RX ORDER — SODIUM BICARBONATE 84 MG/ML
100 INJECTION, SOLUTION INTRAVENOUS ONCE
Status: COMPLETED | OUTPATIENT
Start: 2019-01-01 | End: 2019-01-01

## 2019-01-01 RX ORDER — ACETAMINOPHEN 325 MG/1
650 TABLET ORAL
Status: DISCONTINUED | OUTPATIENT
Start: 2019-01-01 | End: 2019-01-01 | Stop reason: HOSPADM

## 2019-01-01 RX ORDER — ALBUTEROL SULFATE 0.83 MG/ML
2.5 SOLUTION RESPIRATORY (INHALATION) AS NEEDED
Status: CANCELLED
Start: 2019-11-03

## 2019-01-01 RX ORDER — HEPARIN SODIUM 5000 [USP'U]/ML
6000 INJECTION, SOLUTION INTRAVENOUS; SUBCUTANEOUS ONCE
Status: COMPLETED | OUTPATIENT
Start: 2019-01-01 | End: 2019-01-01

## 2019-01-01 RX ORDER — SODIUM CHLORIDE 0.9 % (FLUSH) 0.9 %
10 SYRINGE (ML) INJECTION
Status: COMPLETED | OUTPATIENT
Start: 2019-01-01 | End: 2019-01-01

## 2019-01-01 RX ORDER — HEPARIN SODIUM 1000 [USP'U]/ML
1500 INJECTION, SOLUTION INTRAVENOUS; SUBCUTANEOUS ONCE
Status: COMPLETED | OUTPATIENT
Start: 2019-01-01 | End: 2019-01-01

## 2019-01-01 RX ORDER — ATORVASTATIN CALCIUM 10 MG/1
20 TABLET, FILM COATED ORAL
Status: DISCONTINUED | OUTPATIENT
Start: 2019-01-01 | End: 2019-01-01

## 2019-01-01 RX ORDER — EPINEPHRINE 1 MG/ML
0.3 INJECTION, SOLUTION, CONCENTRATE INTRAVENOUS AS NEEDED
Status: CANCELLED | OUTPATIENT
Start: 2019-01-01

## 2019-01-01 RX ORDER — ONDANSETRON 2 MG/ML
8 INJECTION INTRAMUSCULAR; INTRAVENOUS AS NEEDED
Status: CANCELLED | OUTPATIENT
Start: 2019-11-03

## 2019-01-01 RX ORDER — HYDROCORTISONE SODIUM SUCCINATE 100 MG/2ML
100 INJECTION, POWDER, FOR SOLUTION INTRAMUSCULAR; INTRAVENOUS AS NEEDED
Status: CANCELLED | OUTPATIENT
Start: 2019-10-31

## 2019-01-01 RX ORDER — HEPARIN 100 UNIT/ML
300-500 SYRINGE INTRAVENOUS AS NEEDED
Status: ACTIVE | OUTPATIENT
Start: 2019-01-01 | End: 2019-01-01

## 2019-01-01 RX ORDER — HEPARIN SODIUM 5000 [USP'U]/100ML
18-36 INJECTION, SOLUTION INTRAVENOUS
Status: DISCONTINUED | OUTPATIENT
Start: 2019-01-01 | End: 2019-01-01

## 2019-01-01 RX ORDER — DIPHENHYDRAMINE HYDROCHLORIDE 50 MG/ML
50 INJECTION, SOLUTION INTRAMUSCULAR; INTRAVENOUS AS NEEDED
Status: DISCONTINUED | OUTPATIENT
Start: 2019-01-01 | End: 2019-01-01 | Stop reason: SDUPTHER

## 2019-01-01 RX ORDER — SODIUM CHLORIDE 0.9 % (FLUSH) 0.9 %
5-40 SYRINGE (ML) INJECTION EVERY 8 HOURS
Status: DISCONTINUED | OUTPATIENT
Start: 2019-01-01 | End: 2019-01-01 | Stop reason: HOSPADM

## 2019-01-01 RX ORDER — GLIPIZIDE 10 MG/1
10 TABLET ORAL
COMMUNITY

## 2019-01-01 RX ORDER — ACYCLOVIR 400 MG/1
400 TABLET ORAL 2 TIMES DAILY
Status: CANCELLED | COMMUNITY
Start: 2019-01-01

## 2019-01-01 RX ORDER — DOPAMINE HYDROCHLORIDE 320 MG/100ML
INJECTION, SOLUTION INTRAVENOUS
Status: COMPLETED | OUTPATIENT
Start: 2019-01-01 | End: 2019-01-01

## 2019-01-01 RX ORDER — CEFAZOLIN SODIUM/WATER 2 G/20 ML
2 SYRINGE (ML) INTRAVENOUS ONCE
Status: COMPLETED | OUTPATIENT
Start: 2019-01-01 | End: 2019-01-01

## 2019-01-01 RX ORDER — EPINEPHRINE 1 MG/ML
0.3 INJECTION, SOLUTION, CONCENTRATE INTRAVENOUS AS NEEDED
Status: CANCELLED | OUTPATIENT
Start: 2019-10-31

## 2019-01-01 RX ORDER — INSULIN LISPRO 100 [IU]/ML
INJECTION, SOLUTION INTRAVENOUS; SUBCUTANEOUS EVERY 6 HOURS
Status: DISCONTINUED | OUTPATIENT
Start: 2019-01-01 | End: 2019-01-01

## 2019-01-01 RX ORDER — DILTIAZEM HYDROCHLORIDE 240 MG/1
240 CAPSULE, EXTENDED RELEASE ORAL
COMMUNITY
Start: 2017-03-16

## 2019-01-01 RX ORDER — DILTIAZEM HYDROCHLORIDE 120 MG/1
240 CAPSULE, COATED, EXTENDED RELEASE ORAL DAILY
Status: DISCONTINUED | OUTPATIENT
Start: 2019-01-01 | End: 2019-01-01 | Stop reason: HOSPADM

## 2019-01-01 RX ORDER — HEPARIN 100 UNIT/ML
300-500 SYRINGE INTRAVENOUS AS NEEDED
Status: CANCELLED
Start: 2019-10-31

## 2019-01-01 RX ORDER — DOPAMINE HYDROCHLORIDE 320 MG/100ML
0-50 INJECTION, SOLUTION INTRAVENOUS
Status: DISCONTINUED | OUTPATIENT
Start: 2019-01-01 | End: 2019-01-01 | Stop reason: HOSPADM

## 2019-01-01 RX ORDER — ALBUTEROL SULFATE 0.83 MG/ML
2.5 SOLUTION RESPIRATORY (INHALATION) AS NEEDED
Status: ACTIVE | OUTPATIENT
Start: 2019-01-01 | End: 2019-01-01

## 2019-01-01 RX ORDER — FUROSEMIDE 10 MG/ML
40 INJECTION INTRAMUSCULAR; INTRAVENOUS ONCE
Status: COMPLETED | OUTPATIENT
Start: 2019-01-01 | End: 2019-01-01

## 2019-01-01 RX ORDER — SODIUM CHLORIDE 9 MG/ML
10 INJECTION INTRAMUSCULAR; INTRAVENOUS; SUBCUTANEOUS AS NEEDED
Status: CANCELLED | OUTPATIENT
Start: 2019-10-31

## 2019-01-01 RX ORDER — ALBUTEROL SULFATE 0.83 MG/ML
2.5 SOLUTION RESPIRATORY (INHALATION) AS NEEDED
Status: CANCELLED
Start: 2019-10-31

## 2019-01-01 RX ORDER — GUAIFENESIN 600 MG/1
600 TABLET, EXTENDED RELEASE ORAL EVERY 12 HOURS
Status: DISCONTINUED | OUTPATIENT
Start: 2019-01-01 | End: 2019-01-01 | Stop reason: HOSPADM

## 2019-01-01 RX ORDER — SODIUM CHLORIDE 0.9 % (FLUSH) 0.9 %
10 SYRINGE (ML) INJECTION AS NEEDED
Status: CANCELLED
Start: 2019-10-31

## 2019-01-01 RX ORDER — FAMOTIDINE 20 MG/1
20 TABLET, FILM COATED ORAL DAILY
Status: DISCONTINUED | OUTPATIENT
Start: 2019-01-01 | End: 2019-01-01

## 2019-01-01 RX ORDER — PANTOPRAZOLE SODIUM 40 MG/1
40 TABLET, DELAYED RELEASE ORAL
Status: DISCONTINUED | OUTPATIENT
Start: 2019-01-01 | End: 2019-01-01

## 2019-01-01 RX ORDER — ACETAMINOPHEN 325 MG/1
650 TABLET ORAL AS NEEDED
Status: CANCELLED
Start: 2019-01-01

## 2019-01-01 RX ORDER — LENALIDOMIDE 5 MG/1
5 CAPSULE ORAL DAILY
Qty: 30 CAP | Refills: 0 | Status: SHIPPED | OUTPATIENT
Start: 2019-01-01

## 2019-01-01 RX ORDER — HEPARIN 100 UNIT/ML
300-500 SYRINGE INTRAVENOUS AS NEEDED
Status: CANCELLED
Start: 2019-01-01

## 2019-01-01 RX ORDER — DEXTROSE 50 % IN WATER (D50W) INTRAVENOUS SYRINGE
12.5 ONCE
Status: COMPLETED | OUTPATIENT
Start: 2019-01-01 | End: 2019-01-01

## 2019-01-01 RX ORDER — CALCITONIN SALMON 200 [USP'U]/ML
400 INJECTION, SOLUTION INTRAMUSCULAR; SUBCUTANEOUS EVERY 12 HOURS
Status: COMPLETED | OUTPATIENT
Start: 2019-01-01 | End: 2019-01-01

## 2019-01-01 RX ORDER — DEXAMETHASONE 4 MG/1
40 TABLET ORAL EVERY 12 HOURS
Status: CANCELLED
Start: 2019-01-01

## 2019-01-01 RX ORDER — DILTIAZEM HYDROCHLORIDE 120 MG/1
240 CAPSULE, COATED, EXTENDED RELEASE ORAL DAILY
Status: DISCONTINUED | OUTPATIENT
Start: 2019-01-01 | End: 2019-01-01

## 2019-01-01 RX ORDER — CALCITONIN SALMON 200 [USP'U]/ML
300 INJECTION, SOLUTION INTRAMUSCULAR; SUBCUTANEOUS EVERY 12 HOURS
Status: COMPLETED | OUTPATIENT
Start: 2019-01-01 | End: 2019-01-01

## 2019-01-01 RX ORDER — SODIUM CHLORIDE 0.9 % (FLUSH) 0.9 %
10 SYRINGE (ML) INJECTION AS NEEDED
Status: ACTIVE | OUTPATIENT
Start: 2019-01-01 | End: 2019-01-01

## 2019-01-01 RX ORDER — ALLOPURINOL 100 MG/1
50 TABLET ORAL DAILY
Status: DISCONTINUED | OUTPATIENT
Start: 2019-01-01 | End: 2019-01-01 | Stop reason: HOSPADM

## 2019-01-01 RX ORDER — SODIUM CHLORIDE 0.9 % (FLUSH) 0.9 %
10 SYRINGE (ML) INJECTION AS NEEDED
Status: CANCELLED
Start: 2019-11-03

## 2019-01-01 RX ORDER — EPINEPHRINE 1 MG/ML
0.3 INJECTION, SOLUTION, CONCENTRATE INTRAVENOUS AS NEEDED
Status: ACTIVE | OUTPATIENT
Start: 2019-01-01 | End: 2019-01-01

## 2019-01-01 RX ORDER — LIDOCAINE HYDROCHLORIDE 10 MG/ML
10 INJECTION INFILTRATION; PERINEURAL ONCE
Status: COMPLETED | OUTPATIENT
Start: 2019-01-01 | End: 2019-01-01

## 2019-01-01 RX ORDER — FUROSEMIDE 10 MG/ML
80 INJECTION INTRAMUSCULAR; INTRAVENOUS ONCE
Status: ACTIVE | OUTPATIENT
Start: 2019-01-01 | End: 2019-01-01

## 2019-01-01 RX ORDER — ATORVASTATIN CALCIUM 20 MG/1
20 TABLET, FILM COATED ORAL
COMMUNITY

## 2019-01-01 RX ORDER — SUCRALFATE 1 G/1
1 TABLET ORAL
Status: DISCONTINUED | OUTPATIENT
Start: 2019-01-01 | End: 2019-01-01

## 2019-01-01 RX ORDER — ALBUTEROL SULFATE 0.83 MG/ML
2.5 SOLUTION RESPIRATORY (INHALATION) AS NEEDED
Status: CANCELLED
Start: 2019-01-01

## 2019-01-01 RX ORDER — ONDANSETRON 2 MG/ML
8 INJECTION INTRAMUSCULAR; INTRAVENOUS AS NEEDED
Status: ACTIVE | OUTPATIENT
Start: 2019-01-01 | End: 2019-01-01

## 2019-01-01 RX ORDER — LIDOCAINE HYDROCHLORIDE 20 MG/ML
1-10 INJECTION, SOLUTION EPIDURAL; INFILTRATION; INTRACAUDAL; PERINEURAL
Status: DISCONTINUED | OUTPATIENT
Start: 2019-01-01 | End: 2019-01-01 | Stop reason: ALTCHOICE

## 2019-01-01 RX ORDER — SODIUM CHLORIDE 9 MG/ML
10 INJECTION INTRAMUSCULAR; INTRAVENOUS; SUBCUTANEOUS AS NEEDED
Status: ACTIVE | OUTPATIENT
Start: 2019-01-01 | End: 2019-01-01

## 2019-01-01 RX ORDER — ONDANSETRON 2 MG/ML
8 INJECTION INTRAMUSCULAR; INTRAVENOUS ONCE
Status: COMPLETED | OUTPATIENT
Start: 2019-01-01 | End: 2019-01-01

## 2019-01-01 RX ORDER — DEXAMETHASONE 4 MG/1
40 TABLET ORAL ONCE
Status: DISCONTINUED | OUTPATIENT
Start: 2019-01-01 | End: 2019-01-01 | Stop reason: SDUPTHER

## 2019-01-01 RX ORDER — INSULIN GLARGINE 100 [IU]/ML
10 INJECTION, SOLUTION SUBCUTANEOUS
Status: DISCONTINUED | OUTPATIENT
Start: 2019-01-01 | End: 2019-01-01

## 2019-01-01 RX ORDER — SODIUM BICARBONATE 1 MEQ/ML
50 SYRINGE (ML) INTRAVENOUS ONCE
Status: DISCONTINUED | OUTPATIENT
Start: 2019-01-01 | End: 2019-01-01 | Stop reason: SDUPTHER

## 2019-01-01 RX ORDER — NOREPINEPHRINE BITARTRATE/D5W 4MG/250ML
PLASTIC BAG, INJECTION (ML) INTRAVENOUS
Status: COMPLETED | OUTPATIENT
Start: 2019-01-01 | End: 2019-01-01

## 2019-01-01 RX ORDER — SODIUM BICARBONATE 1 MEQ/ML
SYRINGE (ML) INTRAVENOUS
Status: COMPLETED | OUTPATIENT
Start: 2019-01-01 | End: 2019-01-01

## 2019-01-01 RX ORDER — SODIUM CHLORIDE 9 MG/ML
25 INJECTION, SOLUTION INTRAVENOUS ONCE
Status: COMPLETED | OUTPATIENT
Start: 2019-01-01 | End: 2019-01-01

## 2019-01-01 RX ORDER — ALBUTEROL SULFATE 0.83 MG/ML
10 SOLUTION RESPIRATORY (INHALATION)
Status: COMPLETED | OUTPATIENT
Start: 2019-01-01 | End: 2019-01-01

## 2019-01-01 RX ORDER — DEXAMETHASONE SODIUM PHOSPHATE 4 MG/ML
40 INJECTION, SOLUTION INTRA-ARTICULAR; INTRALESIONAL; INTRAMUSCULAR; INTRAVENOUS; SOFT TISSUE DAILY
Status: DISCONTINUED | OUTPATIENT
Start: 2019-01-01 | End: 2019-01-01

## 2019-01-01 RX ORDER — HEPARIN SODIUM 1000 [USP'U]/ML
1000-8000 INJECTION, SOLUTION INTRAVENOUS; SUBCUTANEOUS
Status: COMPLETED | OUTPATIENT
Start: 2019-01-01 | End: 2019-01-01

## 2019-01-01 RX ORDER — ONDANSETRON 2 MG/ML
8 INJECTION INTRAMUSCULAR; INTRAVENOUS AS NEEDED
Status: CANCELLED | OUTPATIENT
Start: 2019-10-31

## 2019-01-01 RX ORDER — HEPARIN 100 UNIT/ML
300-500 SYRINGE INTRAVENOUS AS NEEDED
Status: CANCELLED
Start: 2019-11-03

## 2019-01-01 RX ORDER — INSULIN LISPRO 100 [IU]/ML
INJECTION, SOLUTION INTRAVENOUS; SUBCUTANEOUS
Status: DISCONTINUED | OUTPATIENT
Start: 2019-01-01 | End: 2019-01-01 | Stop reason: HOSPADM

## 2019-01-01 RX ORDER — ONDANSETRON 2 MG/ML
4 INJECTION INTRAMUSCULAR; INTRAVENOUS
Status: DISCONTINUED | OUTPATIENT
Start: 2019-01-01 | End: 2019-01-01 | Stop reason: HOSPADM

## 2019-01-01 RX ADMIN — DILTIAZEM HYDROCHLORIDE 240 MG: 240 CAPSULE, COATED, EXTENDED RELEASE ORAL at 08:37

## 2019-01-01 RX ADMIN — INSULIN LISPRO 2 UNITS: 100 INJECTION, SOLUTION INTRAVENOUS; SUBCUTANEOUS at 12:35

## 2019-01-01 RX ADMIN — INSULIN LISPRO 4 UNITS: 100 INJECTION, SOLUTION INTRAVENOUS; SUBCUTANEOUS at 11:30

## 2019-01-01 RX ADMIN — ALLOPURINOL 50 MG: 100 TABLET ORAL at 09:07

## 2019-01-01 RX ADMIN — Medication 10 ML: at 13:23

## 2019-01-01 RX ADMIN — DIPHENHYDRAMINE HYDROCHLORIDE 25 MG: 25 CAPSULE ORAL at 10:36

## 2019-01-01 RX ADMIN — Medication 10 ML: at 06:00

## 2019-01-01 RX ADMIN — DEXTROSE 50 % IN WATER (D50W) INTRAVENOUS SYRINGE 12.5 G: at 21:43

## 2019-01-01 RX ADMIN — HEPARIN SODIUM 16 UNITS/KG/HR: 5000 INJECTION, SOLUTION INTRAVENOUS at 19:02

## 2019-01-01 RX ADMIN — Medication 50 MEQ: at 08:46

## 2019-01-01 RX ADMIN — SODIUM BICARBONATE 100 MEQ: 84 INJECTION, SOLUTION INTRAVENOUS at 02:16

## 2019-01-01 RX ADMIN — Medication 10 ML: at 05:49

## 2019-01-01 RX ADMIN — AZITHROMYCIN MONOHYDRATE 500 MG: 500 INJECTION, POWDER, LYOPHILIZED, FOR SOLUTION INTRAVENOUS at 08:25

## 2019-01-01 RX ADMIN — CEFTRIAXONE 1 G: 1 INJECTION, POWDER, FOR SOLUTION INTRAMUSCULAR; INTRAVENOUS at 08:11

## 2019-01-01 RX ADMIN — HEPARIN SODIUM 8 UNITS/KG/HR: 5000 INJECTION, SOLUTION INTRAVENOUS at 21:12

## 2019-01-01 RX ADMIN — Medication 300 MG: at 22:38

## 2019-01-01 RX ADMIN — Medication 10 ML: at 14:43

## 2019-01-01 RX ADMIN — Medication 10 ML: at 05:20

## 2019-01-01 RX ADMIN — DEXAMETHASONE SODIUM PHOSPHATE 40 MG: 10 INJECTION, SOLUTION INTRAMUSCULAR; INTRAVENOUS at 08:51

## 2019-01-01 RX ADMIN — Medication 10 ML: at 05:04

## 2019-01-01 RX ADMIN — AZITHROMYCIN MONOHYDRATE 500 MG: 500 INJECTION, POWDER, LYOPHILIZED, FOR SOLUTION INTRAVENOUS at 08:45

## 2019-01-01 RX ADMIN — SODIUM CHLORIDE 1000 ML: 900 INJECTION, SOLUTION INTRAVENOUS at 23:57

## 2019-01-01 RX ADMIN — TRAMADOL HYDROCHLORIDE 50 MG: 50 TABLET ORAL at 14:13

## 2019-01-01 RX ADMIN — Medication 2 G: at 15:23

## 2019-01-01 RX ADMIN — EPINEPHRINE 1 MG: 0.1 INJECTION, SOLUTION ENDOTRACHEAL; INTRACARDIAC; INTRAVENOUS at 04:00

## 2019-01-01 RX ADMIN — INSULIN LISPRO 4 UNITS: 100 INJECTION, SOLUTION INTRAVENOUS; SUBCUTANEOUS at 11:54

## 2019-01-01 RX ADMIN — LACTULOSE 30 ML: 10 SOLUTION ORAL at 21:33

## 2019-01-01 RX ADMIN — LACTULOSE 30 ML: 10 SOLUTION ORAL at 16:22

## 2019-01-01 RX ADMIN — LACTULOSE 30 ML: 20 SOLUTION ORAL at 17:54

## 2019-01-01 RX ADMIN — Medication 10 ML: at 14:00

## 2019-01-01 RX ADMIN — Medication 10 ML: at 05:46

## 2019-01-01 RX ADMIN — Medication 10 ML: at 22:05

## 2019-01-01 RX ADMIN — Medication 10 ML: at 22:14

## 2019-01-01 RX ADMIN — SODIUM BICARBONATE 50 MEQ: 84 INJECTION, SOLUTION INTRAVENOUS at 22:40

## 2019-01-01 RX ADMIN — METHYLENE BLUE 100 MG: 5 INJECTION INTRAVENOUS at 18:13

## 2019-01-01 RX ADMIN — FUROSEMIDE 40 MG: 10 INJECTION, SOLUTION INTRAMUSCULAR; INTRAVENOUS at 09:07

## 2019-01-01 RX ADMIN — Medication 4 MCG/MIN: at 22:49

## 2019-01-01 RX ADMIN — INSULIN LISPRO 4 UNITS: 100 INJECTION, SOLUTION INTRAVENOUS; SUBCUTANEOUS at 22:13

## 2019-01-01 RX ADMIN — CALCIUM CHLORIDE 1 G: 100 INJECTION, SOLUTION INTRAVENOUS; INTRAVENTRICULAR at 23:37

## 2019-01-01 RX ADMIN — Medication 1 EACH: at 05:35

## 2019-01-01 RX ADMIN — INSULIN LISPRO 4 UNITS: 100 INJECTION, SOLUTION INTRAVENOUS; SUBCUTANEOUS at 06:00

## 2019-01-01 RX ADMIN — SODIUM BICARBONATE 100 MEQ: 84 INJECTION, SOLUTION INTRAVENOUS at 03:22

## 2019-01-01 RX ADMIN — LIDOCAINE HYDROCHLORIDE 1 ML: 10 INJECTION, SOLUTION INFILTRATION; PERINEURAL at 08:00

## 2019-01-01 RX ADMIN — CALCIUM CHLORIDE 1 G: 100 INJECTION, SOLUTION INTRAVENOUS; INTRAVENTRICULAR at 23:35

## 2019-01-01 RX ADMIN — INSULIN LISPRO 4 UNITS: 100 INJECTION, SOLUTION INTRAVENOUS; SUBCUTANEOUS at 18:25

## 2019-01-01 RX ADMIN — Medication 10 ML: at 21:30

## 2019-01-01 RX ADMIN — ONDANSETRON 4 MG: 2 INJECTION INTRAMUSCULAR; INTRAVENOUS at 21:13

## 2019-01-01 RX ADMIN — DILTIAZEM HYDROCHLORIDE 60 MG: 60 TABLET, FILM COATED ORAL at 08:33

## 2019-01-01 RX ADMIN — FAMOTIDINE 20 MG: 10 INJECTION INTRAVENOUS at 09:39

## 2019-01-01 RX ADMIN — Medication 10 ML: at 21:36

## 2019-01-01 RX ADMIN — DEXAMETHASONE SODIUM PHOSPHATE 40 MG: 10 INJECTION, SOLUTION INTRAMUSCULAR; INTRAVENOUS at 09:31

## 2019-01-01 RX ADMIN — CEFTRIAXONE 1 G: 1 INJECTION, POWDER, FOR SOLUTION INTRAMUSCULAR; INTRAVENOUS at 08:45

## 2019-01-01 RX ADMIN — FAMOTIDINE 20 MG: 10 INJECTION INTRAVENOUS at 12:40

## 2019-01-01 RX ADMIN — INSULIN LISPRO 2 UNITS: 100 INJECTION, SOLUTION INTRAVENOUS; SUBCUTANEOUS at 17:00

## 2019-01-01 RX ADMIN — SODIUM CHLORIDE 125 ML/HR: 900 INJECTION, SOLUTION INTRAVENOUS at 16:58

## 2019-01-01 RX ADMIN — ONDANSETRON 4 MG: 2 INJECTION INTRAMUSCULAR; INTRAVENOUS at 07:38

## 2019-01-01 RX ADMIN — EPINEPHRINE 1 MG: 0.1 INJECTION, SOLUTION ENDOTRACHEAL; INTRACARDIAC; INTRAVENOUS at 22:37

## 2019-01-01 RX ADMIN — ONDANSETRON 4 MG: 2 INJECTION INTRAMUSCULAR; INTRAVENOUS at 04:41

## 2019-01-01 RX ADMIN — INSULIN LISPRO 2 UNITS: 100 INJECTION, SOLUTION INTRAVENOUS; SUBCUTANEOUS at 07:44

## 2019-01-01 RX ADMIN — DILTIAZEM HYDROCHLORIDE 60 MG: 60 TABLET, FILM COATED ORAL at 09:39

## 2019-01-01 RX ADMIN — SODIUM CHLORIDE 6 MG: 900 INJECTION, SOLUTION INTRAVENOUS at 09:35

## 2019-01-01 RX ADMIN — EPINEPHRINE 1 MG: 0.1 INJECTION, SOLUTION ENDOTRACHEAL; INTRACARDIAC; INTRAVENOUS at 04:05

## 2019-01-01 RX ADMIN — INSULIN GLARGINE 13 UNITS: 100 INJECTION, SOLUTION SUBCUTANEOUS at 21:33

## 2019-01-01 RX ADMIN — ALBUTEROL SULFATE 10 MG: 0.83 SOLUTION RESPIRATORY (INHALATION) at 00:00

## 2019-01-01 RX ADMIN — Medication 10 ML: at 15:49

## 2019-01-01 RX ADMIN — INSULIN LISPRO 4 UNITS: 100 INJECTION, SOLUTION INTRAVENOUS; SUBCUTANEOUS at 11:11

## 2019-01-01 RX ADMIN — INSULIN LISPRO 8 UNITS: 100 INJECTION, SOLUTION INTRAVENOUS; SUBCUTANEOUS at 06:09

## 2019-01-01 RX ADMIN — INSULIN LISPRO 8 UNITS: 100 INJECTION, SOLUTION INTRAVENOUS; SUBCUTANEOUS at 02:05

## 2019-01-01 RX ADMIN — INSULIN GLARGINE 10 UNITS: 100 INJECTION, SOLUTION SUBCUTANEOUS at 22:13

## 2019-01-01 RX ADMIN — DILTIAZEM HYDROCHLORIDE 60 MG: 60 TABLET, FILM COATED ORAL at 14:21

## 2019-01-01 RX ADMIN — EPINEPHRINE 1 MG: 0.1 INJECTION, SOLUTION ENDOTRACHEAL; INTRACARDIAC; INTRAVENOUS at 22:43

## 2019-01-01 RX ADMIN — DIPHENHYDRAMINE HYDROCHLORIDE 25 MG: 25 CAPSULE ORAL at 22:55

## 2019-01-01 RX ADMIN — LACTULOSE 30 ML: 10 SOLUTION ORAL at 11:53

## 2019-01-01 RX ADMIN — SODIUM CHLORIDE 100 ML/HR: 900 INJECTION, SOLUTION INTRAVENOUS at 01:34

## 2019-01-01 RX ADMIN — FAMOTIDINE 20 MG: 10 INJECTION INTRAVENOUS at 07:44

## 2019-01-01 RX ADMIN — LACTULOSE 30 ML: 10 SOLUTION ORAL at 09:40

## 2019-01-01 RX ADMIN — CALCITONIN SALMON 400 INT'L UNITS: 200 INJECTION, SOLUTION INTRAMUSCULAR; SUBCUTANEOUS at 03:00

## 2019-01-01 RX ADMIN — Medication 10 ML: at 21:28

## 2019-01-01 RX ADMIN — DEXAMETHASONE SODIUM PHOSPHATE 40 MG: 10 INJECTION, SOLUTION INTRAMUSCULAR; INTRAVENOUS at 08:21

## 2019-01-01 RX ADMIN — LACTULOSE 30 ML: 10 SOLUTION ORAL at 08:31

## 2019-01-01 RX ADMIN — Medication 10 ML: at 17:43

## 2019-01-01 RX ADMIN — HEPARIN SODIUM 2000 UNITS: 1000 INJECTION, SOLUTION INTRAVENOUS; SUBCUTANEOUS at 15:53

## 2019-01-01 RX ADMIN — LACTULOSE 30 ML: 10 SOLUTION ORAL at 22:11

## 2019-01-01 RX ADMIN — SODIUM CHLORIDE 1000 ML: 900 INJECTION, SOLUTION INTRAVENOUS at 01:06

## 2019-01-01 RX ADMIN — Medication 10 ML: at 05:12

## 2019-01-01 RX ADMIN — ALLOPURINOL 50 MG: 100 TABLET ORAL at 08:37

## 2019-01-01 RX ADMIN — FAMOTIDINE 20 MG: 20 TABLET ORAL at 00:13

## 2019-01-01 RX ADMIN — CALCITONIN SALMON 300 INT'L UNITS: 200 INJECTION, SOLUTION INTRAMUSCULAR; SUBCUTANEOUS at 18:53

## 2019-01-01 RX ADMIN — TUBERCULIN PURIFIED PROTEIN DERIVATIVE 5 UNITS: 5 INJECTION, SOLUTION INTRADERMAL at 14:07

## 2019-01-01 RX ADMIN — AZITHROMYCIN MONOHYDRATE 500 MG: 500 INJECTION, POWDER, LYOPHILIZED, FOR SOLUTION INTRAVENOUS at 09:06

## 2019-01-01 RX ADMIN — HEPARIN SODIUM 11 UNITS/KG/HR: 5000 INJECTION, SOLUTION INTRAVENOUS at 13:06

## 2019-01-01 RX ADMIN — HEPARIN SODIUM 6000 UNITS: 5000 INJECTION INTRAVENOUS; SUBCUTANEOUS at 11:03

## 2019-01-01 RX ADMIN — HEPARIN SODIUM 13 UNITS/KG/HR: 5000 INJECTION, SOLUTION INTRAVENOUS at 03:04

## 2019-01-01 RX ADMIN — INSULIN LISPRO 2 UNITS: 100 INJECTION, SOLUTION INTRAVENOUS; SUBCUTANEOUS at 21:20

## 2019-01-01 RX ADMIN — Medication 10 ML: at 05:24

## 2019-01-01 RX ADMIN — DILTIAZEM HYDROCHLORIDE 60 MG: 60 TABLET, FILM COATED ORAL at 16:44

## 2019-01-01 RX ADMIN — HEPARIN SODIUM 2000 UNITS: 1000 INJECTION, SOLUTION INTRAVENOUS; SUBCUTANEOUS at 15:52

## 2019-01-01 RX ADMIN — ALLOPURINOL 50 MG: 100 TABLET ORAL at 09:40

## 2019-01-01 RX ADMIN — DEXAMETHASONE SODIUM PHOSPHATE 40 MG: 10 INJECTION, SOLUTION INTRAMUSCULAR; INTRAVENOUS at 16:30

## 2019-01-01 RX ADMIN — EPINEPHRINE 1 MG: 0.1 INJECTION, SOLUTION ENDOTRACHEAL; INTRACARDIAC; INTRAVENOUS at 22:40

## 2019-01-01 RX ADMIN — DOPAMINE HYDROCHLORIDE IN DEXTROSE 5 MCG/KG/MIN: 3.2 INJECTION, SOLUTION INTRAVENOUS at 22:42

## 2019-01-01 RX ADMIN — DILTIAZEM HYDROCHLORIDE 60 MG: 60 TABLET, FILM COATED ORAL at 18:04

## 2019-01-01 RX ADMIN — LACTULOSE 30 ML: 10 SOLUTION ORAL at 21:26

## 2019-01-01 RX ADMIN — Medication 10 ML: at 05:36

## 2019-01-01 RX ADMIN — LACTULOSE 30 ML: 10 SOLUTION ORAL at 22:19

## 2019-01-01 RX ADMIN — ALLOPURINOL 50 MG: 100 TABLET ORAL at 12:42

## 2019-01-01 RX ADMIN — DILTIAZEM HYDROCHLORIDE 240 MG: 120 CAPSULE, COATED, EXTENDED RELEASE ORAL at 09:34

## 2019-01-01 RX ADMIN — CALCIUM CHLORIDE 1 G: 100 INJECTION, SOLUTION INTRAVENOUS; INTRAVENTRICULAR at 22:45

## 2019-01-01 RX ADMIN — EPINEPHRINE 1 MCG/MIN: 1 INJECTION INTRAMUSCULAR; INTRAVENOUS; SUBCUTANEOUS at 02:28

## 2019-01-01 RX ADMIN — CEFTRIAXONE 1 G: 1 INJECTION, POWDER, FOR SOLUTION INTRAMUSCULAR; INTRAVENOUS at 12:24

## 2019-01-01 RX ADMIN — INSULIN LISPRO 2 UNITS: 100 INJECTION, SOLUTION INTRAVENOUS; SUBCUTANEOUS at 21:27

## 2019-01-01 RX ADMIN — FAMOTIDINE 20 MG: 10 INJECTION INTRAVENOUS at 09:18

## 2019-01-01 RX ADMIN — NOREPINEPHRINE BITARTRATE 30 MCG/MIN: 1 INJECTION INTRAVENOUS at 01:43

## 2019-01-01 RX ADMIN — PHYTONADIONE 5 MG: 10 INJECTION, EMULSION INTRAMUSCULAR; INTRAVENOUS; SUBCUTANEOUS at 15:47

## 2019-01-01 RX ADMIN — VASOPRESSIN 0.01 UNITS/MIN: 20 INJECTION INTRAVENOUS at 01:45

## 2019-01-01 RX ADMIN — ONDANSETRON 8 MG: 2 INJECTION INTRAMUSCULAR; INTRAVENOUS at 18:59

## 2019-01-01 RX ADMIN — Medication 10 ML: at 05:59

## 2019-01-01 RX ADMIN — LIDOCAINE HYDROCHLORIDE 200 MG: 20 INJECTION, SOLUTION EPIDURAL; INFILTRATION; INTRACAUDAL; PERINEURAL at 15:45

## 2019-01-01 RX ADMIN — BORTEZOMIB 1.53 MG: 3.5 INJECTION, POWDER, LYOPHILIZED, FOR SOLUTION INTRAVENOUS; SUBCUTANEOUS at 16:42

## 2019-01-01 RX ADMIN — Medication 10 ML: at 22:28

## 2019-01-01 RX ADMIN — CEFTRIAXONE 1 G: 1 INJECTION, POWDER, FOR SOLUTION INTRAMUSCULAR; INTRAVENOUS at 09:40

## 2019-01-01 RX ADMIN — Medication 10 ML: at 21:48

## 2019-01-01 RX ADMIN — HUMAN INSULIN 10 UNITS: 100 INJECTION, SOLUTION SUBCUTANEOUS at 23:07

## 2019-01-01 RX ADMIN — CALCITONIN SALMON 300 INT'L UNITS: 200 INJECTION, SOLUTION INTRAMUSCULAR; SUBCUTANEOUS at 03:32

## 2019-01-01 RX ADMIN — SODIUM BICARBONATE 50 MEQ: 84 INJECTION, SOLUTION INTRAVENOUS at 22:42

## 2019-01-01 RX ADMIN — LACTULOSE 30 ML: 20 SOLUTION ORAL at 08:51

## 2019-01-01 RX ADMIN — FAMOTIDINE 20 MG: 10 INJECTION INTRAVENOUS at 08:33

## 2019-01-01 RX ADMIN — LACTULOSE 30 ML: 10 SOLUTION ORAL at 21:27

## 2019-01-01 RX ADMIN — ONDANSETRON 4 MG: 2 INJECTION INTRAMUSCULAR; INTRAVENOUS at 20:08

## 2019-01-01 RX ADMIN — Medication 10 ML: at 21:33

## 2019-01-01 RX ADMIN — SODIUM CHLORIDE 150 ML/HR: 900 INJECTION, SOLUTION INTRAVENOUS at 03:03

## 2019-01-01 RX ADMIN — INSULIN GLARGINE 5 UNITS: 100 INJECTION, SOLUTION SUBCUTANEOUS at 22:05

## 2019-01-01 RX ADMIN — INSULIN LISPRO 6 UNITS: 100 INJECTION, SOLUTION INTRAVENOUS; SUBCUTANEOUS at 00:00

## 2019-01-01 RX ADMIN — INSULIN LISPRO 6 UNITS: 100 INJECTION, SOLUTION INTRAVENOUS; SUBCUTANEOUS at 09:07

## 2019-01-01 RX ADMIN — Medication 10 ML: at 15:19

## 2019-01-01 RX ADMIN — PIPERACILLIN SODIUM AND TAZOBACTAM SODIUM 4.5 G: 4; .5 INJECTION, POWDER, LYOPHILIZED, FOR SOLUTION INTRAVENOUS at 01:56

## 2019-01-01 RX ADMIN — DILTIAZEM HYDROCHLORIDE 240 MG: 120 CAPSULE, COATED, EXTENDED RELEASE ORAL at 09:08

## 2019-01-01 RX ADMIN — CEFTRIAXONE 1 G: 1 INJECTION, POWDER, FOR SOLUTION INTRAMUSCULAR; INTRAVENOUS at 07:44

## 2019-01-01 RX ADMIN — HEPARIN SODIUM 500 UNITS: 1000 INJECTION, SOLUTION INTRAVENOUS; SUBCUTANEOUS at 10:41

## 2019-01-01 RX ADMIN — INSULIN LISPRO 2 UNITS: 100 INJECTION, SOLUTION INTRAVENOUS; SUBCUTANEOUS at 21:23

## 2019-01-01 RX ADMIN — FAMOTIDINE 20 MG: 10 INJECTION INTRAVENOUS at 08:28

## 2019-01-01 RX ADMIN — AZITHROMYCIN MONOHYDRATE 500 MG: 500 INJECTION, POWDER, LYOPHILIZED, FOR SOLUTION INTRAVENOUS at 08:40

## 2019-01-01 RX ADMIN — Medication 10 ML: at 14:22

## 2019-01-01 RX ADMIN — Medication 10 ML: at 05:33

## 2019-01-01 RX ADMIN — BORTEZOMIB 1.53 MG: 3.5 INJECTION, POWDER, LYOPHILIZED, FOR SOLUTION INTRAVENOUS; SUBCUTANEOUS at 15:50

## 2019-01-01 RX ADMIN — INSULIN LISPRO 8 UNITS: 100 INJECTION, SOLUTION INTRAVENOUS; SUBCUTANEOUS at 14:22

## 2019-01-01 RX ADMIN — EPINEPHRINE 1 MG: 0.1 INJECTION, SOLUTION ENDOTRACHEAL; INTRACARDIAC; INTRAVENOUS at 04:03

## 2019-01-01 RX ADMIN — SODIUM CHLORIDE 3 MG: 900 INJECTION, SOLUTION INTRAVENOUS at 16:33

## 2019-01-01 RX ADMIN — VASOPRESSIN 0.01 UNITS/MIN: 20 INJECTION INTRAVENOUS at 01:50

## 2019-01-01 RX ADMIN — DILTIAZEM HYDROCHLORIDE 60 MG: 60 TABLET, FILM COATED ORAL at 23:00

## 2019-01-01 RX ADMIN — SODIUM CHLORIDE 125 ML/HR: 900 INJECTION, SOLUTION INTRAVENOUS at 08:00

## 2019-01-01 RX ADMIN — ALBUTEROL SULFATE 10 MG: 2.5 SOLUTION RESPIRATORY (INHALATION) at 00:00

## 2019-01-01 RX ADMIN — INSULIN LISPRO 2 UNITS: 100 INJECTION, SOLUTION INTRAVENOUS; SUBCUTANEOUS at 09:20

## 2019-01-01 RX ADMIN — SODIUM CHLORIDE 40 MG: 9 INJECTION, SOLUTION INTRAMUSCULAR; INTRAVENOUS; SUBCUTANEOUS at 02:16

## 2019-01-01 RX ADMIN — DEXAMETHASONE SODIUM PHOSPHATE 40 MG: 10 INJECTION, SOLUTION INTRAMUSCULAR; INTRAVENOUS at 16:29

## 2019-01-01 RX ADMIN — HEPARIN SODIUM 16 UNITS/KG/HR: 5000 INJECTION, SOLUTION INTRAVENOUS at 18:17

## 2019-01-01 RX ADMIN — LACTULOSE 30 ML: 10 SOLUTION ORAL at 16:00

## 2019-01-01 RX ADMIN — SODIUM CHLORIDE 25 ML/HR: 900 INJECTION, SOLUTION INTRAVENOUS at 15:31

## 2019-01-01 RX ADMIN — PERFLUTREN 1 ML: 6.52 INJECTION, SUSPENSION INTRAVENOUS at 11:05

## 2019-01-01 RX ADMIN — INSULIN LISPRO 6 UNITS: 100 INJECTION, SOLUTION INTRAVENOUS; SUBCUTANEOUS at 09:08

## 2019-01-01 RX ADMIN — DEXTROSE 50 % IN WATER (D50W) INTRAVENOUS SYRINGE 25 G: at 23:07

## 2019-01-01 RX ADMIN — DILTIAZEM HYDROCHLORIDE 240 MG: 120 CAPSULE, COATED, EXTENDED RELEASE ORAL at 10:09

## 2019-01-01 RX ADMIN — LACTULOSE 30 ML: 10 SOLUTION ORAL at 08:37

## 2019-01-01 RX ADMIN — INSULIN LISPRO 6 UNITS: 100 INJECTION, SOLUTION INTRAVENOUS; SUBCUTANEOUS at 17:47

## 2019-01-01 RX ADMIN — FAMOTIDINE 20 MG: 10 INJECTION INTRAVENOUS at 09:06

## 2019-01-01 RX ADMIN — INSULIN LISPRO 2 UNITS: 100 INJECTION, SOLUTION INTRAVENOUS; SUBCUTANEOUS at 16:30

## 2019-01-01 RX ADMIN — ALLOPURINOL 50 MG: 100 TABLET ORAL at 08:33

## 2019-01-01 RX ADMIN — DILTIAZEM HYDROCHLORIDE 240 MG: 120 CAPSULE, COATED, EXTENDED RELEASE ORAL at 09:05

## 2019-01-01 RX ADMIN — DILTIAZEM HYDROCHLORIDE 60 MG: 60 TABLET, FILM COATED ORAL at 11:00

## 2019-01-01 RX ADMIN — LACTULOSE 30 ML: 10 SOLUTION ORAL at 15:47

## 2019-01-01 RX ADMIN — INSULIN GLARGINE 13 UNITS: 100 INJECTION, SOLUTION SUBCUTANEOUS at 22:00

## 2019-01-01 RX ADMIN — AZITHROMYCIN MONOHYDRATE 500 MG: 500 INJECTION, POWDER, LYOPHILIZED, FOR SOLUTION INTRAVENOUS at 13:48

## 2019-01-01 RX ADMIN — CYCLOPHOSPHAMIDE 327 MG: 500 INJECTION, POWDER, FOR SOLUTION INTRAVENOUS; ORAL at 18:55

## 2019-01-01 RX ADMIN — LIDOCAINE HYDROCHLORIDE 40 MG: 10; .005 INJECTION, SOLUTION EPIDURAL; INFILTRATION; INTRACAUDAL; PERINEURAL at 15:45

## 2019-01-01 RX ADMIN — INSULIN LISPRO 10 UNITS: 100 INJECTION, SOLUTION INTRAVENOUS; SUBCUTANEOUS at 18:04

## 2019-01-01 RX ADMIN — ATORVASTATIN CALCIUM 20 MG: 10 TABLET, FILM COATED ORAL at 22:55

## 2019-01-01 RX ADMIN — Medication 10 ML: at 22:46

## 2019-01-01 RX ADMIN — Medication 10 ML: at 06:25

## 2019-01-01 RX ADMIN — DIPHENHYDRAMINE HYDROCHLORIDE 25 MG: 25 CAPSULE ORAL at 15:23

## 2019-01-01 RX ADMIN — Medication 10 ML: at 13:05

## 2019-01-01 RX ADMIN — FAMOTIDINE 20 MG: 10 INJECTION INTRAVENOUS at 08:27

## 2019-01-01 RX ADMIN — Medication 10 ML: at 23:01

## 2019-01-01 RX ADMIN — ONDANSETRON 4 MG: 2 INJECTION INTRAMUSCULAR; INTRAVENOUS at 09:09

## 2019-01-01 RX ADMIN — Medication 10 ML: at 21:06

## 2019-01-01 RX ADMIN — AZITHROMYCIN MONOHYDRATE 500 MG: 500 INJECTION, POWDER, LYOPHILIZED, FOR SOLUTION INTRAVENOUS at 09:40

## 2019-01-01 RX ADMIN — FAMOTIDINE 20 MG: 10 INJECTION INTRAVENOUS at 08:11

## 2019-01-01 RX ADMIN — ALLOPURINOL 50 MG: 100 TABLET ORAL at 09:04

## 2019-01-01 RX ADMIN — LACTULOSE 30 ML: 10 SOLUTION ORAL at 22:05

## 2019-01-01 RX ADMIN — INSULIN GLARGINE 13 UNITS: 100 INJECTION, SOLUTION SUBCUTANEOUS at 21:22

## 2019-01-01 RX ADMIN — INSULIN LISPRO 2 UNITS: 100 INJECTION, SOLUTION INTRAVENOUS; SUBCUTANEOUS at 16:58

## 2019-01-01 RX ADMIN — DILTIAZEM HYDROCHLORIDE 60 MG: 60 TABLET, FILM COATED ORAL at 05:34

## 2019-01-01 RX ADMIN — GUAIFENESIN 600 MG: 600 TABLET ORAL at 22:25

## 2019-01-01 RX ADMIN — INSULIN GLARGINE 13 UNITS: 100 INJECTION, SOLUTION SUBCUTANEOUS at 21:27

## 2019-01-01 RX ADMIN — DILTIAZEM HYDROCHLORIDE 20 MG: 5 INJECTION INTRAVENOUS at 22:22

## 2019-01-01 RX ADMIN — INSULIN LISPRO 6 UNITS: 100 INJECTION, SOLUTION INTRAVENOUS; SUBCUTANEOUS at 09:29

## 2019-01-01 RX ADMIN — LIDOCAINE HYDROCHLORIDE 60 MG: 20 INJECTION, SOLUTION EPIDURAL; INFILTRATION; INTRACAUDAL; PERINEURAL at 15:40

## 2019-01-01 RX ADMIN — ALLOPURINOL 50 MG: 100 TABLET ORAL at 11:53

## 2019-01-01 RX ADMIN — INSULIN GLARGINE 13 UNITS: 100 INJECTION, SOLUTION SUBCUTANEOUS at 21:34

## 2019-01-01 RX ADMIN — Medication 10 ML: at 22:24

## 2019-01-01 RX ADMIN — VANCOMYCIN HYDROCHLORIDE 2000 MG: 10 INJECTION, POWDER, LYOPHILIZED, FOR SOLUTION INTRAVENOUS at 21:02

## 2019-01-01 RX ADMIN — ALLOPURINOL 50 MG: 100 TABLET ORAL at 09:34

## 2019-01-01 RX ADMIN — Medication 10 ML: at 22:55

## 2019-01-01 RX ADMIN — ACETAMINOPHEN 650 MG: 325 TABLET, FILM COATED ORAL at 15:23

## 2019-01-01 RX ADMIN — Medication 10 ML: at 21:46

## 2019-01-01 RX ADMIN — Medication 100 MEQ: at 00:27

## 2019-01-01 RX ADMIN — SODIUM CHLORIDE 125 ML/HR: 900 INJECTION, SOLUTION INTRAVENOUS at 03:15

## 2019-01-01 RX ADMIN — CEFEPIME HYDROCHLORIDE 1 G: 1 INJECTION, POWDER, FOR SOLUTION INTRAMUSCULAR; INTRAVENOUS at 19:37

## 2019-01-01 RX ADMIN — DILTIAZEM HYDROCHLORIDE 60 MG: 60 TABLET, FILM COATED ORAL at 17:54

## 2019-01-01 RX ADMIN — Medication 10 ML: at 13:07

## 2019-01-01 RX ADMIN — AZITHROMYCIN MONOHYDRATE 500 MG: 500 INJECTION, POWDER, LYOPHILIZED, FOR SOLUTION INTRAVENOUS at 08:52

## 2019-01-01 RX ADMIN — DILTIAZEM HYDROCHLORIDE 60 MG: 60 TABLET, FILM COATED ORAL at 22:19

## 2019-01-01 RX ADMIN — INSULIN LISPRO 6 UNITS: 100 INJECTION, SOLUTION INTRAVENOUS; SUBCUTANEOUS at 06:00

## 2019-01-01 RX ADMIN — ACETAMINOPHEN 650 MG: 325 TABLET, FILM COATED ORAL at 22:55

## 2019-01-01 RX ADMIN — ALLOPURINOL 50 MG: 100 TABLET ORAL at 09:18

## 2019-01-01 RX ADMIN — ALLOPURINOL 50 MG: 100 TABLET ORAL at 08:31

## 2019-01-01 RX ADMIN — SODIUM CHLORIDE 1000 ML: 900 INJECTION, SOLUTION INTRAVENOUS at 01:05

## 2019-01-01 RX ADMIN — FAMOTIDINE 20 MG: 10 INJECTION INTRAVENOUS at 08:37

## 2019-01-01 RX ADMIN — Medication 10 ML: at 21:35

## 2019-01-01 RX ADMIN — SODIUM CHLORIDE 100 ML/HR: 900 INJECTION, SOLUTION INTRAVENOUS at 14:01

## 2019-01-01 RX ADMIN — INSULIN LISPRO 2 UNITS: 100 INJECTION, SOLUTION INTRAVENOUS; SUBCUTANEOUS at 21:33

## 2019-01-01 RX ADMIN — INSULIN LISPRO 4 UNITS: 100 INJECTION, SOLUTION INTRAVENOUS; SUBCUTANEOUS at 16:30

## 2019-01-01 RX ADMIN — HEPARIN SODIUM 18 UNITS/KG/HR: 5000 INJECTION, SOLUTION INTRAVENOUS at 11:06

## 2019-01-01 RX ADMIN — Medication: at 03:43

## 2019-01-01 RX ADMIN — ACETAMINOPHEN 650 MG: 325 TABLET, FILM COATED ORAL at 10:36

## 2019-01-01 RX ADMIN — FAMOTIDINE 20 MG: 10 INJECTION INTRAVENOUS at 09:09

## 2019-01-01 RX ADMIN — LACTULOSE 30 ML: 10 SOLUTION ORAL at 15:23

## 2019-01-01 RX ADMIN — CEFTRIAXONE 1 G: 1 INJECTION, POWDER, FOR SOLUTION INTRAMUSCULAR; INTRAVENOUS at 08:28

## 2019-01-01 RX ADMIN — DILTIAZEM HYDROCHLORIDE 240 MG: 240 CAPSULE, COATED, EXTENDED RELEASE ORAL at 08:31

## 2019-01-01 RX ADMIN — ACETAMINOPHEN 650 MG: 325 TABLET, FILM COATED ORAL at 11:09

## 2019-01-01 RX ADMIN — Medication 10 ML: at 05:35

## 2019-01-01 RX ADMIN — HYDRALAZINE HYDROCHLORIDE 10 MG: 20 INJECTION INTRAMUSCULAR; INTRAVENOUS at 21:04

## 2019-01-01 RX ADMIN — ONDANSETRON 4 MG: 2 INJECTION INTRAMUSCULAR; INTRAVENOUS at 21:57

## 2019-01-01 RX ADMIN — Medication 10 ML: at 06:12

## 2019-01-01 RX ADMIN — Medication 10 ML: at 13:00

## 2019-01-01 RX ADMIN — ONDANSETRON 4 MG: 2 INJECTION INTRAMUSCULAR; INTRAVENOUS at 08:07

## 2019-01-01 RX ADMIN — CALCITONIN SALMON 400 INT'L UNITS: 200 INJECTION, SOLUTION INTRAMUSCULAR; SUBCUTANEOUS at 15:49

## 2019-01-01 RX ADMIN — ATORVASTATIN CALCIUM 20 MG: 10 TABLET, FILM COATED ORAL at 21:45

## 2019-01-01 RX ADMIN — DIPHENHYDRAMINE HYDROCHLORIDE 25 MG: 25 CAPSULE ORAL at 15:13

## 2019-01-01 RX ADMIN — ACETAMINOPHEN 650 MG: 325 TABLET, FILM COATED ORAL at 15:13

## 2019-01-01 RX ADMIN — LACTULOSE 30 ML: 10 SOLUTION ORAL at 21:20

## 2019-01-01 RX ADMIN — Medication 10 ML: at 14:28

## 2019-01-01 RX ADMIN — Medication 10 ML: at 05:23

## 2019-10-12 PROBLEM — E83.52 HYPERCALCEMIA: Status: ACTIVE | Noted: 2019-01-01

## 2019-10-12 PROBLEM — N17.9 AKI (ACUTE KIDNEY INJURY) (HCC): Status: ACTIVE | Noted: 2019-01-01

## 2019-10-12 PROBLEM — D64.9 ANEMIA: Status: ACTIVE | Noted: 2019-01-01

## 2019-10-13 PROBLEM — I10 ESSENTIAL HYPERTENSION: Status: ACTIVE | Noted: 2019-01-01

## 2019-10-13 PROBLEM — I48.0 PAROXYSMAL ATRIAL FIBRILLATION (HCC): Status: ACTIVE | Noted: 2019-01-01

## 2019-10-13 PROBLEM — E11.9 DIABETES MELLITUS (HCC): Status: ACTIVE | Noted: 2019-01-01

## 2019-10-13 NOTE — PROGRESS NOTES
New order to place pt on remote tele.  Per monitor room no current boxes available; pt placed on list.

## 2019-10-13 NOTE — ED TRIAGE NOTES
Pt arrives via POV from home, pt states he has a cough for 2 months, pt states overall weakness as well. Seen by urgent care on Wednesday for the same. Pt states put on steroids and antibiotics, no change. Pt denies leg swelling, denies n/v/d, pt states he also has a knot on the back of his head. Pt has multiple other complaints, but overall just \"wants to feel well\".

## 2019-10-13 NOTE — CONSULTS
HEMATOLOGY/ MEDICAL ONCOLOGY CONSULTATION Patient Name: Ned Bedoya    Date of Consult: 10/13/2019 : 1945  Age:74 y.o. [facility]:[id number] Reason for Consultation: Mr. Farida Suero is a 75 y.o. male admitted on 10/12/2019 with a primary diagnosis of hypercalcemia and possible metastatic malignancy/myeloma. History of Present Illness: Mr. Farida Suero is a gentleman who has received no formal medical care over the past several years. He stated that he had no primary care physician. Lab data from [facility] in 2017 show a creatinine that evelyn as high as 6.1 and was 2.80 at discharge. His chronic baseline is unknown. As noted, there is a history of diabetes mellitus. For two-three months leading to this admission, he has gradually deteriorated with low back discomfort, forgetfulness, anorexia and 35 pound weight loss. He has noted a growth on the back of his head. He was finally prevailed upon to come to the hospital for evaluation. Data are listed below but he was found to be in acute renal failure, hypercalcemic, anemic and with multiple lytic lesions involving his calvarium including a 3 cm destructive lesion involving the left occipital bone. Medications:  
Current Facility-Administered Medications Medication Dose Route Frequency Provider Last Rate Last Dose  atorvastatin (LIPITOR) tablet 20 mg  20 mg Oral QHS Edward Coleman MD      
 dilTIAZem CD (CARDIZEM CD) capsule 240 mg  240 mg Oral DAILY Edward Coleman MD   240 mg at 10/13/19 1009  sodium chloride (NS) flush 5-40 mL  5-40 mL IntraVENous Q8H Edward Coleman MD   10 mL at 10/13/19 3092  sodium chloride (NS) flush 5-40 mL  5-40 mL IntraVENous PRN Edward Coleman MD      
 acetaminophen (TYLENOL) tablet 650 mg  650 mg Oral Q4H PRN Edward Coleman MD   650 mg at 10/13/19 1036  ondansetron (ZOFRAN) injection 4 mg  4 mg IntraVENous Q4H PRN Edward Coleman MD   4 mg at 10/13/19 2869  0.9% sodium chloride infusion  150 mL/hr IntraVENous CONTINUOUS Henri Wang  mL/hr at 10/13/19 0303 150 mL/hr at 10/13/19 0303  
 0.9% sodium chloride infusion 250 mL  250 mL IntraVENous PRN Henri Wang MD      
 diphenhydrAMINE (BENADRYL) capsule 25 mg  25 mg Oral Q6H PRN Chio aMrtinez MD   25 mg at 10/13/19 1036  calciTONIN (MIACALCIN) injection 300 Int'l Units  300 Int'l Units SubCUTAneous Q12H Marcos Nuñez MD   300 Int'l Units at 10/13/19 2140 Allergies: 
No Known Allergies Review of Systems: The Review of Systems is documented in full in the internal medical record. All systems are negative other than for those noted above. Past Medical History: 
History reviewed. No pertinent past medical history. Past Surgical History: 
History reviewed. No pertinent surgical history. Social History: 
Social History Tobacco Use  Smoking status: Not on file Substance Use Topics  Alcohol use: Not on file  Drug use: Not on file Family History: 
History reviewed. No pertinent family history. Physical Examination: 
General Appearance: Chronically ill and lethargic appearing patient in no acute distress. Vital signs:  
Visit Vitals BP (!) 136/97 (BP 1 Location: Left arm) Pulse 64 Temp 97.6 °F (36.4 °C) Resp 20 Ht 6' (1.829 m) Wt 224 lb 11.2 oz (101.9 kg) SpO2 96% BMI 30.47 kg/m² HEENT: No oral or pharyngeal masses. There is no ulceration or thrush. There is no sinus tenderness. Left occipital mass. Neck: Supple. There is no thyromegaly. Lymph nodes: There is no cervical, supraclavicular, axillary or inguinal adenopathy. Lungs: The lungs are clear to auscultation and percussion. There is no egophony. There is no chest wall tenderness and no  use of accessory respiratory musculature. Heart: There is no jugular venous distention.  The rate is normal and rhythm regular. The S1 and S2 are normal and there are no murmurs or rubs. Abdomen: Soft, non-tender, bowel sounds present and normal, no appreciated hepatosplenomegaly. No palpable masses. Skin: No rash, petechiae or ecchymoses. No evidence of malignancy. Musculoskeletal: No bony or muscular tenderness. No joint effusions Extremities: No cyanosis, clubbing or edema. Neurologic: Somewhat slow to respond. Cranial nerves intact. No focality in motor, sensory or reflex exams. Labs: 
 
Recent Results (from the past 24 hour(s)) EKG, 12 LEAD, SUBSEQUENT Collection Time: 10/12/19  9:55 PM  
Result Value Ref Range Ventricular Rate 96 BPM  
 Atrial Rate 234 BPM  
 QRS Duration 130 ms  
 Q-T Interval 370 ms QTC Calculation (Bezet) 467 ms Calculated R Axis -48 degrees Calculated T Axis 19 degrees Diagnosis Atrial fibrillation Left axis deviation Right bundle branch block Abnormal ECG No previous ECGs available Confirmed by Eduardo Cheng ([id number]) on 10/13/2019 6:57:02 AM 
  
CBC WITH AUTOMATED DIFF Collection Time: 10/12/19 10:02 PM  
Result Value Ref Range WBC 7.9 4.3 - 11.1 K/uL  
 RBC 2.23 (L) 4.23 - 5.6 M/uL HGB 7.1 (L) 13.6 - 17.2 g/dL HCT 22.8 (L) 41.1 - 50.3 % .2 (H) 79.6 - 97.8 FL  
 MCH 31.8 26.1 - 32.9 PG  
 MCHC 31.1 (L) 31.4 - 35.0 g/dL  
 RDW 14.9 (H) 11.9 - 14.6 % PLATELET 224 123 - 220 K/uL MPV 10.3 9.4 - 12.3 FL ABSOLUTE NRBC 0.03 0.0 - 0.2 K/uL NEUTROPHILS 51 43 - 78 % LYMPHOCYTES 38 13 - 44 % MONOCYTES 7 4.0 - 12.0 % EOSINOPHILS 1 0.5 - 7.8 % BASOPHILS 1 0.0 - 2.0 % IMMATURE GRANULOCYTES 2 0.0 - 5.0 %  
 ABS. NEUTROPHILS 3.9 1.7 - 8.2 K/UL  
 ABS. LYMPHOCYTES 3.0 0.5 - 4.6 K/UL  
 ABS. MONOCYTES 0.6 0.1 - 1.3 K/UL  
 ABS. EOSINOPHILS 0.1 0.0 - 0.8 K/UL  
 ABS. BASOPHILS 0.1 0.0 - 0.2 K/UL  
 ABS. IMM.  GRANS. 0.2 0.0 - 0.5 K/UL  
 RBC COMMENTS OCCASIONAL 
ROULEAUX 
    
 RBC COMMENTS OCCASIONAL 
TARGET CELLS 
    
 RBC COMMENTS HYPOCHROMIA    
 WBC COMMENTS OCCASIONAL    
 PLATELET COMMENTS ADEQUATE    
 DF AUTOMATED METABOLIC PANEL, COMPREHENSIVE Collection Time: 10/12/19 10:02 PM  
Result Value Ref Range Sodium 131 (L) 136 - 145 mmol/L Potassium 4.4 3.5 - 5.1 mmol/L Chloride 101 98 - 107 mmol/L  
 CO2 22 21 - 32 mmol/L Anion gap 8 7 - 16 mmol/L Glucose 123 (H) 65 - 100 mg/dL BUN 35 (H) 8 - 23 MG/DL Creatinine 4.95 (H) 0.8 - 1.5 MG/DL  
 GFR est AA 15 (L) >60 ml/min/1.73m2 GFR est non-AA 12 (L) >60 ml/min/1.73m2 Calcium 13.2 (HH) 8.3 - 10.4 MG/DL Bilirubin, total 0.3 0.2 - 1.1 MG/DL  
 ALT (SGPT) 17 12 - 65 U/L  
 AST (SGOT) 54 (H) 15 - 37 U/L Alk. phosphatase 109 50 - 136 U/L Protein, total 12.3 (H) 6.3 - 8.2 g/dL Albumin 2.4 (L) 3.2 - 4.6 g/dL Globulin 9.9 (H) 2.3 - 3.5 g/dL A-G Ratio 0.2 (L) 1.2 - 3.5 BNP Collection Time: 10/12/19 10:02 PM  
Result Value Ref Range  (H) 0 pg/mL LIPASE Collection Time: 10/12/19 10:02 PM  
Result Value Ref Range Lipase 497 (H) 73 - 393 U/L MAGNESIUM Collection Time: 10/12/19 10:02 PM  
Result Value Ref Range Magnesium 1.8 1.8 - 2.4 mg/dL TROPONIN I Collection Time: 10/12/19 10:02 PM  
Result Value Ref Range Troponin-I, Qt. 0.03 0.02 - 0.05 NG/ML  
PHOSPHORUS Collection Time: 10/12/19 10:02 PM  
Result Value Ref Range Phosphorus 5.7 (H) 2.3 - 3.7 MG/DL  
PTH INTACT Collection Time: 10/12/19 10:02 PM  
Result Value Ref Range Calcium 13.8 (HH) 8.3 - 10.4 MG/DL  
 PTH, Intact 7.3 (L) 18.5 - 88.0 pg/mL EKG, 12 LEAD, INITIAL Collection Time: 10/12/19 10:12 PM  
Result Value Ref Range Ventricular Rate 136 BPM  
 Atrial Rate 136 BPM  
 P-R Interval 152 ms QRS Duration 124 ms Q-T Interval 278 ms QTC Calculation (Bezet) 418 ms Calculated P Axis -101 degrees Calculated R Axis -69 degrees Calculated T Axis -32 degrees Diagnosis    
  atiral flutter Left axis deviation Right bundle branch block Abnormal ECG When compared with ECG of 12-OCT-2019 21:55, 
ST now depressed in Inferior leads ST now depressed in Lateral leads Confirmed by Neal Coto ([id number]) on 10/13/2019 6:57:41 AM 
  
URINE MICROSCOPIC Collection Time: 10/12/19 10:52 PM  
Result Value Ref Range WBC 3-5 0 /hpf  
 RBC 0-3 0 /hpf Epithelial cells 0-3 0 /hpf Bacteria TRACE 0 /hpf Casts 0-3 0 /lpf Crystals, urine OCCASIONAL 0 /LPF Mucus 0 0 /lpf  
TYPE & SCREEN Collection Time: 10/13/19 12:13 AM  
Result Value Ref Range Crossmatch Expiration 10/16/2019 ABO/Rh(D) O POSITIVE Antibody screen NEG Unit number [id number] Blood component type  LRIR Unit division 00 Status of unit ALLOCATED Crossmatch result Compatible Unit [id number] [facility] Status of unit ALLOCATED Crossmatch result Compatible INFLUENZA A & B AG (RAPID TEST) Collection Time: 10/13/19 12:28 AM  
Result Value Ref Range Influenza A Ag NEGATIVE  NEG Influenza B Ag NEGATIVE  NEG Source NASOPHARYNGEAL    
CALCIUM, IONIZED Collection Time: 10/13/19 12:32 AM  
Result Value Ref Range Calcium, ionized 7.32 (H) 4.0 - 5.2 mg/dL METABOLIC PANEL, BASIC Collection Time: 10/13/19  3:52 AM  
Result Value Ref Range Sodium 135 (L) 136 - 145 mmol/L Potassium 4.3 3.5 - 5.1 mmol/L Chloride 105 98 - 107 mmol/L  
 CO2 22 21 - 32 mmol/L Anion gap 8 7 - 16 mmol/L Glucose 157 (H) 65 - 100 mg/dL BUN 32 (H) 8 - 23 MG/DL Creatinine 4.80 (H) 0.8 - 1.5 MG/DL  
 GFR est AA 15 (L) >60 ml/min/1.73m2 GFR est non-AA 13 (L) >60 ml/min/1.73m2 Calcium 13.0 (H) 8.3 - 10.4 MG/DL  
CBC WITH AUTOMATED DIFF Collection Time: 10/13/19  3:52 AM  
Result Value Ref Range WBC 7.0 4.3 - 11.1 K/uL  
 RBC 2.12 (L) 4.23 - 5.6 M/uL HGB 6.7 (LL) 13.6 - 17.2 g/dL HCT 22.0 (L) 41.1 - 50.3 % .8 (H) 79.6 - 97.8 FL  
 MCH 31.6 26.1 - 32.9 PG  
 MCHC 30.5 (L) 31.4 - 35.0 g/dL  
 RDW 14.7 (H) 11.9 - 14.6 % PLATELET 789 419 - 784 K/uL MPV 10.6 9.4 - 12.3 FL ABSOLUTE NRBC 0.03 0.0 - 0.2 K/uL  
 DF AUTOMATED NEUTROPHILS 53 43 - 78 % LYMPHOCYTES 35 13 - 44 % MONOCYTES 8 4.0 - 12.0 % EOSINOPHILS 1 0.5 - 7.8 % BASOPHILS 1 0.0 - 2.0 % IMMATURE GRANULOCYTES 2 0.0 - 5.0 %  
 ABS. NEUTROPHILS 3.7 1.7 - 8.2 K/UL  
 ABS. LYMPHOCYTES 2.5 0.5 - 4.6 K/UL  
 ABS. MONOCYTES 0.6 0.1 - 1.3 K/UL  
 ABS. EOSINOPHILS 0.1 0.0 - 0.8 K/UL  
 ABS. BASOPHILS 0.0 0.0 - 0.2 K/UL  
 ABS. IMM. GRANS. 0.2 0.0 - 0.5 K/UL  
VITAMIN B12 Collection Time: 10/13/19  3:52 AM  
Result Value Ref Range Vitamin B12 421 193 - 986 pg/mL FOLATE Collection Time: 10/13/19  3:52 AM  
Result Value Ref Range Folate 16.4 3.1 - 17.5 ng/mL PATHOLOGIST REVIEW SMEARS Collection Time: 10/13/19  3:52 AM  
Result Value Ref Range PATHOLOGIST REVIEW PENDING Imaging: 
 
See above ASSESSMENT: 
Although this could be some other form of metastatic malignancy, this far more likely represents multiple myeloma based on features available to date including anemia, hypercalcemia, renal failure and lytic lesions. PLAN: 
We will initiate workup for myeloma based on strong presumption and if negative will pursue other alternatives. Check skeletal survey, SPEP, serum free light chains, beta 2 microglobulin , LDH, urine IEP, marrow biopsy Treatment of his hypercalcemia may be problematic with elevated creatinine and elevated BNP. Continue fluids and I have added calcitonin. Use Lasix to balance I/O's. Hold of on Zometa for now given creatinine. Nephrology should be on board to manage what is likely acute kidney injury from ? myeloma, hypercalcemia, etc. Superimposed on CRF. Hopefully this will reverse but dialysis could be an equally potential outcome. We will follow with you. Mike Dill MD FACP Oncology and Hematology  [place] [address] [address] 
P [phone] F [phone] 
[email or web address] 
 
 
Elements of this note have been dictated using speech recognition software. As a result, errors of speech recognition may have occurred.

## 2019-10-13 NOTE — PROGRESS NOTES
EOS note: 
 
Pt received first dose of calcitonin. Pt ate sandwich tray with good appetite. No other complaints. Pt's wife at bedside. Pt used urinal at bedside with no issues. Pt to received 2 units of PRBCs for Hgb of 6.7. Pt resting quietly at this time.

## 2019-10-13 NOTE — H&P
HOSPITALIST H&P/CONSULT 
NAME:  Figueroa Shen Age:  75 y.o. 
:   1945 MRN:   [id number] PCP: None Consulting MD: Treatment Team: Primary Nurse: Declan Noland RN 
HPI:  
Patient is a pleasant 87CUJ with PMH significant for afib, DM2, HLD who presents with complaint of feeling weak and ill for 2 months. He reports that his family made him come in to be evaluated, as he seemed to be declining and feeling worse daily. He also noticed a lump on the back of his head which he noticed after being seen in Urgent Care 1-2 weeks ago and being prescribed Augmentin. On ER evaluation, patient was found to be hypercalcemic with Ca of 13.2, anemic with hgb of 7.1, and with MARCELA with Cr of 4.95. Patient also has a very concerning CT head, which shows multiple lytic skull lesions c/w multiple myeloma or metastatic disease. Hospitalist Service consulted for admission. Patient reports severe general malaise, poor PO intake, dyspnea, weakness. Complete ROS done and is as stated in HPI or otherwise negative. PMH includes:  Atrial fibrillation, Hypertension, DM2. Past Medical History reviewed. History reviewed. No pertinent surgical history. Prior to Admission Medications Prescriptions Last Dose Informant Patient Reported? Taking?  
atorvastatin (LIPITOR) 20 mg tablet   Yes No  
Sig: Take 20 mg by mouth. dilTIAZem XR (DILACOR XR) 240 mg XR capsule   Yes Yes Sig: Take 240 mg by mouth. glipiZIDE (GLUCOTROL) 10 mg tablet   Yes No  
Sig: Take 10 mg by mouth. Facility-Administered Medications: None No Known Allergies Social History Tobacco Use  Smoking status: Not on file Substance Use Topics  Alcohol use: Not on file History reviewed. No pertinent family history. Family History reviewed and is non-contributory to current presentation. Objective:  
 
Visit Vitals /82 Pulse 82 Temp 97.7 °F (36.5 °C) Resp 18 Ht 6' (1.829 m) Wt 92.5 kg (204 lb) SpO2 94% BMI 27.67 kg/m² Temp (24hrs), Av.7 °F (36.5 °C), Min:97.7 °F (36.5 °C), Max:97.7 °F (36.5 °C) Oxygen Therapy O2 Sat (%): 94 % (10/13/19 0033) Pulse via Oximetry: 98 beats per minute (10/13/19 0033) O2 Device: Nasal cannula (10/13/19 0033) O2 Flow Rate (L/min): 2 l/min (10/13/19 0033) Physical Exam: 
General:    Alert, cooperative, no distress, appears stated age. Head:  Atraumatic. Fluctuant mass palpable at superior occipital region, non-painful. Nose:  Nares normal. No drainage or sinus tenderness. Lungs:   Clear to auscultation bilaterally. No Wheezing or Rhonchi. No rales. Heart:   Regular rate and rhythm, no murmur, rub or gallop. Abdomen:   Soft, non-tender. Not distended. Bowel sounds normal.  
Extremities: No cyanosis. No edema. No clubbing. Skin:     Texture, turgor normal.  Not Jaundiced. Neurologic: Alert and oriented x 3, no focal deficits. Data Review:  
Recent Results (from the past 24 hour(s)) CBC WITH AUTOMATED DIFF Collection Time: 10/12/19 10:02 PM  
Result Value Ref Range WBC 7.9 4.3 - 11.1 K/uL  
 RBC 2.23 (L) 4.23 - 5.6 M/uL HGB 7.1 (L) 13.6 - 17.2 g/dL HCT 22.8 (L) 41.1 - 50.3 % .2 (H) 79.6 - 97.8 FL  
 MCH 31.8 26.1 - 32.9 PG  
 MCHC 31.1 (L) 31.4 - 35.0 g/dL  
 RDW 14.9 (H) 11.9 - 14.6 % PLATELET 667 268 - 434 K/uL MPV 10.3 9.4 - 12.3 FL ABSOLUTE NRBC 0.03 0.0 - 0.2 K/uL NEUTROPHILS 51 43 - 78 % LYMPHOCYTES 38 13 - 44 % MONOCYTES 7 4.0 - 12.0 % EOSINOPHILS 1 0.5 - 7.8 % BASOPHILS 1 0.0 - 2.0 % IMMATURE GRANULOCYTES 2 0.0 - 5.0 %  
 ABS. NEUTROPHILS 3.9 1.7 - 8.2 K/UL  
 ABS. LYMPHOCYTES 3.0 0.5 - 4.6 K/UL  
 ABS. MONOCYTES 0.6 0.1 - 1.3 K/UL  
 ABS. EOSINOPHILS 0.1 0.0 - 0.8 K/UL  
 ABS. BASOPHILS 0.1 0.0 - 0.2 K/UL  
 ABS. IMM.  GRANS. 0.2 0.0 - 0.5 K/UL  
 RBC COMMENTS OCCASIONAL 
ROULEAUX 
    
 RBC COMMENTS OCCASIONAL 
TARGET CELLS 
    
 RBC COMMENTS HYPOCHROMIA    
 WBC COMMENTS OCCASIONAL    
 PLATELET COMMENTS ADEQUATE    
 DF AUTOMATED METABOLIC PANEL, COMPREHENSIVE Collection Time: 10/12/19 10:02 PM  
Result Value Ref Range Sodium 131 (L) 136 - 145 mmol/L Potassium 4.4 3.5 - 5.1 mmol/L Chloride 101 98 - 107 mmol/L  
 CO2 22 21 - 32 mmol/L Anion gap 8 7 - 16 mmol/L Glucose 123 (H) 65 - 100 mg/dL BUN 35 (H) 8 - 23 MG/DL Creatinine 4.95 (H) 0.8 - 1.5 MG/DL  
 GFR est AA 15 (L) >60 ml/min/1.73m2 GFR est non-AA 12 (L) >60 ml/min/1.73m2 Calcium 13.2 (HH) 8.3 - 10.4 MG/DL Bilirubin, total 0.3 0.2 - 1.1 MG/DL  
 ALT (SGPT) 17 12 - 65 U/L  
 AST (SGOT) 54 (H) 15 - 37 U/L Alk. phosphatase 109 50 - 136 U/L Protein, total 12.3 (H) 6.3 - 8.2 g/dL Albumin 2.4 (L) 3.2 - 4.6 g/dL Globulin 9.9 (H) 2.3 - 3.5 g/dL A-G Ratio 0.2 (L) 1.2 - 3.5 BNP Collection Time: 10/12/19 10:02 PM  
Result Value Ref Range  (H) 0 pg/mL LIPASE Collection Time: 10/12/19 10:02 PM  
Result Value Ref Range Lipase 497 (H) 73 - 393 U/L MAGNESIUM Collection Time: 10/12/19 10:02 PM  
Result Value Ref Range Magnesium 1.8 1.8 - 2.4 mg/dL TROPONIN I Collection Time: 10/12/19 10:02 PM  
Result Value Ref Range Troponin-I, Qt. 0.03 0.02 - 0.05 NG/ML  
PHOSPHORUS Collection Time: 10/12/19 10:02 PM  
Result Value Ref Range Phosphorus 5.7 (H) 2.3 - 3.7 MG/DL  
PTH INTACT Collection Time: 10/12/19 10:02 PM  
Result Value Ref Range Calcium 13.8 (HH) 8.3 - 10.4 MG/DL  
 PTH, Intact 7.3 (L) 18.5 - 88.0 pg/mL URINE MICROSCOPIC Collection Time: 10/12/19 10:52 PM  
Result Value Ref Range WBC 3-5 0 /hpf  
 RBC 0-3 0 /hpf Epithelial cells 0-3 0 /hpf Bacteria TRACE 0 /hpf Casts 0-3 0 /lpf Crystals, urine OCCASIONAL 0 /LPF Mucus 0 0 /lpf INFLUENZA A & B AG (RAPID TEST) Collection Time: 10/13/19 12:28 AM  
Result Value Ref Range Influenza A Ag NEGATIVE  NEG Influenza B Ag NEGATIVE  NEG  Source NASOPHARYNGEAL    
 CALCIUM, IONIZED Collection Time: 10/13/19 12:32 AM  
Result Value Ref Range Calcium, ionized 7.32 (H) 4.0 - 5.2 mg/dL Imaging Kip Christine Henderson Assessment and Plan: Active Hospital Problems Diagnosis Date Noted  Hypercalcemia 10/12/2019  Anemia 10/12/2019  MARCELA (acute kidney injury) ([facility] [address] 10/12/2019 PLAN Hypercalcemia - Will start Calcitonin 250 unit IM BID 
- Aggressive IVFs - Recheck Ca in 12 hours MARCELA 
- Cr is elevated at 4.95 
- No reported h/o known kidney disease, but pt does have h/o HTN and DM 
- As above, aggressive IVFs - Watch Cr closely - May benefit from Nephrology consultation Anemia 
- No known blood disorders - No complaint of blood loss - Macrocytic anemia (will check B12 and folate) - Anemia is likely source of periodic dyspnea as noted by pt as well Concern for Multiple Myeloma - Given above findings, concern for MM is high - Will consult with Oncology Anticipated discharge: 3-4 days, pending clinical course Signed By: Marinus Boast, MD   
 October 13, 2019

## 2019-10-13 NOTE — PROGRESS NOTES
TRANSFER - IN REPORT: 
 
Verbal report received from Kodak Marques RN (name) on [address]  being received from ED (unit) for routine progression of care Report consisted of patients Situation, Background, Assessment and  
Recommendations(SBAR). Information from the following report(s) SBAR, ED Summary, [facility] and Recent Results was reviewed with the receiving nurse. Opportunity for questions and clarification was provided. Assessment to be completed upon patients arrival to unit and care assumed. Patient to have negative flu swab prior to transfer to 5th floor.

## 2019-10-13 NOTE — PROGRESS NOTES
Progress Note Patient: Macarena Villa MRN: [id number]  SSN: [id number] YOB: 1945  Age: 75 y.o. Sex: male Admit Date: 10/12/2019 LOS: 1 day Subjective: He was found in no distress. He feels tired though, which has been present with him for a few months. He wishes to be full code. Objective:  
 
Vitals:  
 10/13/19 0224 10/13/19 0700 10/13/19 1007 10/13/19 1215 BP: (!) 147/92 138/64 140/69 (!) 136/97 Pulse: (!) 120 98 75 64 Resp: 18 18 20 20 Temp: 97.9 °F (36.6 °C) 98.4 °F (36.9 °C) 97.6 °F (36.4 °C) 97.6 °F (36.4 °C) SpO2: 94% 96% 93% 96% Weight:      
Height:      
  
 
Intake and Output: 
Current Shift: 10/13 0701 - 10/13 1900 In: 240 [P.O.:240] Out: 300 [Urine:300] Last three shifts: 10/11 1901 - 10/13 0700 In: 240 [P.O.:240] Out: - Physical Exam:  
GENERAL: alert, cooperative, no distress, appears stated age Head: indurated raised lesion over his occipital area EYE: negative LYMPHATIC: Cervical, supraclavicular, and axillary nodes normal.  
THROAT & NECK: normal and no erythema or exudates noted. LUNG: clear to auscultation bilaterally HEART: regular rate and rhythm, S1, S2 normal, no murmur, click, rub or gallop ABDOMEN: soft, non-tender. Bowel sounds normal. No masses,  no organomegaly EXTREMITIES:  extremities normal, atraumatic, no cyanosis or edema SKIN: Normal. 
NEUROLOGIC: negative PSYCHIATRIC: non focal 
 
Lab/Data Review: All lab results for the last 24 hours reviewed. Assessment:  
 
Principal Problem: Hypercalcemia (10/12/2019) Active Problems: 
  Anemia (10/12/2019) MARCELA (acute kidney injury) ([facility] [address] (10/12/2019) Plan:  
-Hypercalcemia, anemia, marcela on ckd, fatigue: CRAB criteria, highly suggestive of multiple myeloma He has been already started on IVFs, and will continue Calcitonin Not a candidate to zometa due to CKD Oncology on board Bone survey revealed innumerable lytic foci within the jud, left humerus. Follow up rest of MM work up ECHO-pending  
  
-Non oliguric MARCELA on CKD:  
Avoid nephrotoxic meds Strict IO 
IVFs Check renal US -he has a history of kidney stones, on a ct scan checked in 2017 Daily labs If Cr. Worsens may contact nephrology 
 
-Anemia, possible of chronic disease: 
Sp 2 PRBCs Oncology consulted Monitor daily cbc -HTN: cardizem po  
 
DVT ppx: heparin sq Code status: full. Discussed with the patient and his wife Disposition: home once medically stable Signed By: Galina Ferguson MD   
 October 13, 2019

## 2019-10-13 NOTE — ED PROVIDER NOTES
69-year-old male with history of A. fib presents with complaint of generalized weakness, fatigue, productive cough with white sputum over the past several days. States he was seen at urgent care earlier this week and prescribed antibiotics and steroids. Patient states that he has had no improvement of symptoms. Patient states that he feels so tired and weak that he is having difficulty walking. Patient denies focal weakness, numbness, tingling, facial droop, slurred speech, chest pain, shortness of breath, nausea, vomiting, abdominal pain. Patient does report occasional burning with urination. 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 Reports developing soft tissue mass to the posterior aspect of his scalp around 2 weeks ago and associates it with taking the antibiotic that he was prescribed; Augmentin. The history is provided by the patient. No  was used. Fatigue This is a new problem. The current episode started more than 2 days ago. The problem has not changed since onset. There was no focality noted. Pertinent negatives include no focal weakness, no loss of sensation, no loss of balance, no slurred speech, no speech difficulty, no memory loss, no movement disorder, no agitation, no visual change, no auditory change, no mental status change, no unresponsiveness and no disorientation. There has been no fever. Pertinent negatives include no shortness of breath, no chest pain, no vomiting, no altered mental status, no confusion, no headaches, no choking, no nausea, no bowel incontinence and no bladder incontinence. History reviewed. No pertinent past medical history. History reviewed. No pertinent surgical history. History reviewed. No pertinent family history. Social History Socioeconomic History  Marital status:  Spouse name: Not on file  Number of children: Not on file  Years of education: Not on file  Highest education level: Not on file Occupational History  Not on file Social Needs  Financial resource strain: Not on file  Food insecurity:  
  Worry: Not on file Inability: Not on file  Transportation needs:  
  Medical: Not on file Non-medical: Not on file Tobacco Use  Smoking status: Not on file Substance and Sexual Activity  Alcohol use: Not on file  Drug use: Not on file  Sexual activity: Not on file Lifestyle  Physical activity:  
  Days per week: Not on file Minutes per session: Not on file  Stress: Not on file Relationships  Social connections:  
  Talks on phone: Not on file Gets together: Not on file Attends [employer] service: Not on file Active member of club or organization: Not on file Attends meetings of clubs or organizations: Not on file Relationship status: Not on file  Intimate partner violence:  
  Fear of current or ex partner: Not on file Emotionally abused: Not on file Physically abused: Not on file Forced sexual activity: Not on file Other Topics Concern  Not on file Social History Narrative  Not on file ALLERGIES: Patient has no known allergies. Review of Systems Constitutional: Positive for fatigue. Negative for chills and fever. HENT: Negative for congestion and sore throat. Respiratory: Positive for cough. Negative for choking and shortness of breath. Cardiovascular: Negative for chest pain. Gastrointestinal: Negative for abdominal pain, bowel incontinence, constipation, diarrhea, nausea and vomiting. Genitourinary: Positive for dysuria. Negative for bladder incontinence, flank pain and hematuria. Musculoskeletal: Negative for myalgias, neck pain and neck stiffness. Skin: Negative for rash and wound.   
Neurological: Negative for focal weakness, seizures, facial asymmetry, speech difficulty, weakness, light-headedness, numbness, headaches and loss of balance. Psychiatric/Behavioral: Negative for agitation, confusion and memory loss. Vitals:  
 10/12/19 2133 BP: 153/67 Pulse: 98 Resp: 16 Temp: 97.7 °F (36.5 °C) SpO2: 98% Weight: 92.5 kg (204 lb) Height: 6' (1.829 m) Physical Exam  
Constitutional: He is oriented to person, place, and time. He appears well-developed and well-nourished. Well appearing and in NAD. HENT:  
Head: Normocephalic. Mouth/Throat: Oropharynx is clear and moist.  
MMM. No tonsillar erythema or exudate. Uvula midline. No trismus or stridor. Patient tolerating secretions. Soft  mass noted to the posterior left occiput. No drainage. Eyes: Pupils are equal, round, and reactive to light. Patient with chronic left-sided strabismus and cataract formation. Neck: Normal range of motion. No JVD present. No tracheal deviation present. Cardiovascular: Normal rate, regular rhythm, normal heart sounds and intact distal pulses. Pulmonary/Chest: Effort normal and breath sounds normal.  
CTAB. Abdominal: Soft. There is no tenderness. Soft, NTND. No rebound or guarding. No CVAT. Musculoskeletal: Normal range of motion. He exhibits no edema. No LE edema. No calf TTP. Neurological: He is alert and oriented to person, place, and time. No cranial nerve deficit or sensory deficit. Strength 5/5 throughout. Normal sensory. No meningeal signs. No facial droop. No dysarthria. No drift. Skin: Skin is warm and dry. No rash. Nursing note and vitals reviewed. 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 Significant hypercalcemia. Patient with previous history of anemia and CKD. Denies melena, hematochezia. Initial EKG with evidence of A. fib with a heart rate of 97. Subsequently patient developed A. fib with RVR requiring 20 mg Diltiazem IV. Rate controlled. At this time patient with desats to 88 to 91% on room air. Patient subsequent placed on 2 L O2. Chest x-ray clear. CT head with evidence of 3.3 cm lytic lesion to posterior occiput with numerous subcentimeter lytic lesions throughout calvarium concerning for metastatic multiple myeloma. Oncology consulted. Dr. Troy Puckett on call. Recommends IVF hydration for hypercalcemia and calcitonin 250 units IM twice daily. Hospitalist consulted. Dr. Belkis Foster on call. Offered to contact neurosurgery in regards to case. States neurosurgery does not be consulted at this time. Amount and/or Complexity of Data Reviewed Clinical lab tests: ordered and reviewed Tests in the radiology section of CPT®: ordered and reviewed Tests in the medicine section of CPT®: ordered and reviewed Review and summarize past medical records: yes Independent visualization of images, tracings, or specimens: yes Risk of Complications, Morbidity, and/or Mortality Presenting problems: moderate Diagnostic procedures: moderate Management options: moderate Patient Progress Patient progress: stable ED Course as of Oct 12 2351 Sat Oct 12, 2019  
2333 CXR IMPRESSION: No acute process. [DF] 2334 CT head IMPRESSION:  
1. 3.3 cm lytic soft tissue mass in the left occipital bone, minimally indenting 
the underlying left occipital lobe. In addition, there are innumerable 
subcentimeter lytic lesions throughout the remainder of the calvarium. These 
could relate to metastatic disease or multiple myeloma. 2. No acute infarct or hemorrhage. [DF] ED Course User Index [DF] Bang Lin MD  
 
 
EKG Date/Time: 10/12/2019 11:52 PM 
Performed by: Bang Lin MD 
Authorized by: aBng Lin MD  
 
ECG reviewed by ED Physician in the absence of a cardiologist: yes Rate:  
  ECG rate:  96 ECG rate assessment: normal   
Rhythm:  
  Rhythm: atrial fibrillation Ectopy:  
  Ectopy: none QRS:  
  QRS axis:  Normal 
Conduction:  
  Conduction: normal   
ST segments: ST segments:  Normal 
T waves:  
  T waves: normal   
 
 
 
 
 
Results Include: 
 
Recent Results (from the past 24 hour(s)) CBC WITH AUTOMATED DIFF Collection Time: 10/12/19 10:02 PM  
Result Value Ref Range WBC 7.9 4.3 - 11.1 K/uL  
 RBC 2.23 (L) 4.23 - 5.6 M/uL HGB 7.1 (L) 13.6 - 17.2 g/dL HCT 22.8 (L) 41.1 - 50.3 % .2 (H) 79.6 - 97.8 FL  
 MCH 31.8 26.1 - 32.9 PG  
 MCHC 31.1 (L) 31.4 - 35.0 g/dL  
 RDW 14.9 (H) 11.9 - 14.6 % PLATELET 040 952 - 784 K/uL MPV 10.3 9.4 - 12.3 FL ABSOLUTE NRBC 0.03 0.0 - 0.2 K/uL NEUTROPHILS 51 43 - 78 % LYMPHOCYTES 38 13 - 44 % MONOCYTES 7 4.0 - 12.0 % EOSINOPHILS 1 0.5 - 7.8 % BASOPHILS 1 0.0 - 2.0 % IMMATURE GRANULOCYTES 2 0.0 - 5.0 %  
 ABS. NEUTROPHILS 3.9 1.7 - 8.2 K/UL  
 ABS. LYMPHOCYTES 3.0 0.5 - 4.6 K/UL  
 ABS. MONOCYTES 0.6 0.1 - 1.3 K/UL  
 ABS. EOSINOPHILS 0.1 0.0 - 0.8 K/UL  
 ABS. BASOPHILS 0.1 0.0 - 0.2 K/UL  
 ABS. IMM. GRANS. 0.2 0.0 - 0.5 K/UL  
 RBC COMMENTS OCCASIONAL 
ROULEAUX 
    
 RBC COMMENTS OCCASIONAL 
TARGET CELLS 
    
 RBC COMMENTS HYPOCHROMIA    
 WBC COMMENTS OCCASIONAL    
 PLATELET COMMENTS ADEQUATE    
 DF AUTOMATED METABOLIC PANEL, COMPREHENSIVE Collection Time: 10/12/19 10:02 PM  
Result Value Ref Range Sodium 131 (L) 136 - 145 mmol/L Potassium 4.4 3.5 - 5.1 mmol/L Chloride 101 98 - 107 mmol/L  
 CO2 22 21 - 32 mmol/L Anion gap 8 7 - 16 mmol/L Glucose 123 (H) 65 - 100 mg/dL  BUN 35 (H) 8 - 23 MG/DL  
 Creatinine 4.95 (H) 0.8 - 1.5 MG/DL  
 GFR est AA 15 (L) >60 ml/min/1.73m2 GFR est non-AA 12 (L) >60 ml/min/1.73m2 Calcium 13.2 (HH) 8.3 - 10.4 MG/DL Bilirubin, total 0.3 0.2 - 1.1 MG/DL  
 ALT (SGPT) 17 12 - 65 U/L  
 AST (SGOT) 54 (H) 15 - 37 U/L Alk. phosphatase 109 50 - 136 U/L Protein, total 12.3 (H) 6.3 - 8.2 g/dL Albumin 2.4 (L) 3.2 - 4.6 g/dL Globulin 9.9 (H) 2.3 - 3.5 g/dL A-G Ratio 0.2 (L) 1.2 - 3.5 BNP Collection Time: 10/12/19 10:02 PM  
Result Value Ref Range  (H) 0 pg/mL LIPASE Collection Time: 10/12/19 10:02 PM  
Result Value Ref Range Lipase 497 (H) 73 - 393 U/L MAGNESIUM Collection Time: 10/12/19 10:02 PM  
Result Value Ref Range Magnesium 1.8 1.8 - 2.4 mg/dL TROPONIN I Collection Time: 10/12/19 10:02 PM  
Result Value Ref Range Troponin-I, Qt. 0.03 0.02 - 0.05 NG/ML  
PHOSPHORUS Collection Time: 10/12/19 10:02 PM  
Result Value Ref Range Phosphorus 5.7 (H) 2.3 - 3.7 MG/DL  
PTH INTACT Collection Time: 10/12/19 10:02 PM  
Result Value Ref Range Calcium 13.8 (HH) 8.3 - 10.4 MG/DL  
 PTH, Intact PENDING pg/mL URINE MICROSCOPIC Collection Time: 10/12/19 10:52 PM  
Result Value Ref Range WBC 3-5 0 /hpf  
 RBC 0-3 0 /hpf Epithelial cells 0-3 0 /hpf Bacteria TRACE 0 /hpf Casts 0-3 0 /lpf Crystals, urine OCCASIONAL 0 /LPF Mucus 0 0 /lpf Apolonia Zarco MD; 10/12/2019 @9:46 PM Voice dictation software was used during the making of this note. This software is not perfect and grammatical and other typographical errors may be present.   This note has not been proofread for errors. 
===================================================================

## 2019-10-13 NOTE — ED NOTES
TRANSFER - OUT REPORT: 
 
Verbal report given to Mandi Pritchett RN on [employer]  being transferred to 5th floor for routine progression of care Report consisted of patients Situation, Background, Assessment and  
Recommendations(SBAR). Information from the following report(s) SBAR, Kardex, ED Summary, [facility] and Recent Results was reviewed with the receiving nurse. Lines:  
Peripheral IV 10/12/19 Right Antecubital (Active) Site Assessment Clean, dry, & intact 10/12/2019 10:07 PM  
Phlebitis Assessment 0 10/12/2019 10:07 PM  
Infiltration Assessment 0 10/12/2019 10:07 PM  
Dressing Status Clean, dry, & intact 10/12/2019 10:07 PM  
  
 
Opportunity for questions and clarification was provided. Patient transported with: 
 [employer]

## 2019-10-13 NOTE — PROGRESS NOTES
10/13/19 0252 Dual Skin Pressure Injury Assessment Dual Skin Pressure Injury Assessment WDL Second Care Provider (Based on [address] [place] [address]) Nica Hassan, RN Skin assessment completed this RN and Nica Hassan, RN. Pt has no breakdown or skin issues noted. Will continue with POC.

## 2019-10-14 NOTE — PROGRESS NOTES
END OF SHIFT NOTE: 
 
Intake/Output 10/13 1901 - 10/14 0700 In: 310 Out: 200 [Urine:200] Voiding: YES Catheter: NO 
Drain:   
 
 
 
 
 
Stool:  0 occurrences. Stool Assessment Stool Appearance: Soft (10/13/19 0849) Emesis:  1 occurrences. Emesis Assessment Appearance: Undigested food (10/13/19 0848) Emesis Amount: Large (10/13/19 0848) VITAL SIGNS Patient Vitals for the past 12 hrs: 
 Temp Pulse Resp BP SpO2  
10/14/19 0305 98.2 °F (36.8 °C) 83 18 133/72 94 % 10/14/19 0235 97.9 °F (36.6 °C) 75 17 147/71 97 % 10/14/19 0140 98 °F (36.7 °C) 76 17 140/70 97 % 10/14/19 0041 98.5 °F (36.9 °C) 68 18 150/76 98 % 10/13/19 2341 98.1 °F (36.7 °C) 72 17 131/89 98 % 10/13/19 2258 98.6 °F (37 °C) 67 16 145/83 97 % 10/13/19 2001 98.5 °F (36.9 °C) 72 16 141/63 99 % Pain Assessment Pain 1 Pain Scale 1: Visual (10/14/19 0305) Pain Intensity 1: 0 (10/14/19 0305) Patient Stated Pain Goal: 0 (10/13/19 2008) Pain Reassessment 1: Patient resting w/respiratory rate greater than 10 (10/14/19 0305) Pain Location 1: Back (10/13/19 0131) Pain Orientation 1: Left;Right (10/12/19 2133) Pain Description 1: Aching; Sore (10/13/19 0131) Pain Intervention(s) 1: Rest;Position (10/13/19 0131) Ambulating Yes Additional Information: blood given, hgb up to 8.1. zofran given. Keeping an eye for any signs of overload. Shift report given to oncoming nurse at the bedside.  
 
Dell Small RN

## 2019-10-14 NOTE — CONSULTS
[facility] CONSULT NOTE Admission Date: 
10/12/2019 Admission Diagnosis: Hypercalcemia [E83.52] Anemia [D64.9] [facility] [N17.9] Consulting physician: Shellie Sellers NP Reason for consult: MARCELA with hypercalcemia Subjective:  
History of Present Illness: Figueroa Nuñez is a 75year old male who has received no formal medical care over the past several years. He did not have a primary care physician. Lab data from [facility] in 2/2017 show a creatinine that evelyn as high as 6.1 and was 2.80 at discharge with CT of the chest, abdomen, pelvis showing left nephrolithiasis and left hydronephrosis. His chronic baseline is unknown. He has a history of diabetes mellitus. For the past 2-3 months he has had a gradual deterioration with low back discomfort, forgetfulness, anorexia and 35 pound weight loss. He has noted a growth on the back of his head. He was finally prevailed upon to come to the hospital for evaluation. He was noted to have an MARCELA, hypercalcemia, anemia and had a CT of the head showing multiple lytic lesions involving the calvarium including a 3 cm destructive lesion involving the left occipital bone. Skeletal survey showed innumerable lytic foci within the skull and left humerus. Oncology is following. His calcium was treated with calcitonin and he has been on IVF. Creatinine was 4.95 on admission and 4.63 today. Calcium corrected is down to 12.3 from 14.4. K/L ratio was 4148. Bone marrow biopsy is pending. Nephrology is consulted for MARCELA  due to likely multiple myeloma and hypercalcemia. Renal US here shows small nonobstructing stone, no hydronephrosis and increased renal echogenicity. History reviewed. No pertinent past medical history. History reviewed. No pertinent surgical history. Current Facility-Administered Medications Medication Dose Route Frequency  allopurinol (ZYLOPRIM) tablet 50 mg  50 mg Oral DAILY  atorvastatin (LIPITOR) tablet 20 mg  20 mg Oral QHS  dilTIAZem CD (CARDIZEM CD) capsule 240 mg  240 mg Oral DAILY  sodium chloride (NS) flush 5-40 mL  5-40 mL IntraVENous Q8H  
 sodium chloride (NS) flush 5-40 mL  5-40 mL IntraVENous PRN  
 acetaminophen (TYLENOL) tablet 650 mg  650 mg Oral Q4H PRN  
 ondansetron (ZOFRAN) injection 4 mg  4 mg IntraVENous Q4H PRN  
 0.9% sodium chloride infusion  150 mL/hr IntraVENous CONTINUOUS  
 0.9% sodium chloride infusion 250 mL  250 mL IntraVENous PRN  
 diphenhydrAMINE (BENADRYL) capsule 25 mg  25 mg Oral Q6H PRN No Known Allergies Social History Tobacco Use  Smoking status: Not on file Substance Use Topics  Alcohol use: Not on file History reviewed. No pertinent family history. Review of Systems Gen - no fever, no chills, appetite okay, +weight loss HEENT - no sore throat, no decreased vision, no hearing loss Neck - no neck mass CV - no chest pain, no palpitation, no orthopnea Lung - no shortness of breath, no cough, no hemoptysis Abd - no tenderness, no nausea/vomiting, no bloody stool Ext - no edema, no clubbing, no cyanosis Musculoskeletal - no joint pain, no back pain Neurologic - no headaches, no dizziness, no seizures Psychiatric - no anxiety, no depression Skin - no rashes, no pupura Genitourinary - no decreased urine output, no hematuria, no foamy urine Objective:  
Vitals:  
 10/14/19 0737 10/14/19 0923 10/14/19 0933 10/14/19 1136 BP: 131/69   134/74 Pulse: 75   70 Resp: 18   18 Temp: 97.6 °F (36.4 °C)   97.8 °F (36.6 °C) SpO2: 97% 97% 97% 93% Weight:      
Height:      
 
 
Intake/Output Summary (Last 24 hours) at 10/14/2019 1335 Last data filed at 10/14/2019 1137 Gross per 24 hour Intake 1200.8 ml Output 700 ml Net 500.8 ml Physical Exam 
GEN :in no distress, alert and oriented HEENT: anicteric sclerae, eomi. Oropharynx without lesions.   Mucous membranes are moist. 
 Neck - supple without JVD, no thyromegaly. No lymphadenopathy. CV - regular rate and rhythm, no murmur, no rub Lung - clear bilaterally, lungs expand symmetrically Chest wall - normal appearance Abd - soft, nontender, bowel sounds present, no hepatosplenomegaly Ext - no clubbing, no cyanosis, no edema Neurologic - nonfocal 
Genitourinary - bladder nonpalpable Skin - no rashes, no purpura, no ecchymoses Psychiatric: Normal mood and affect. Data Review:  
Recent Labs 10/14/19 
0340 10/13/19 
0352 10/12/19 
2202 WBC 6.5 7.0 7.9 HGB 8.1* 6.7* 7.1*  
HCT 26.0* 22.0* 22.8*  
 170 188 Recent Labs 10/14/19 
0340 10/13/19 
0352 10/12/19 
2202  135* 131* K 4.5 4.3 4.4  
* 105 101 CO2 21 22 22 BUN 27* 32* 35* CREA 4.63* 4.80* 4.95* * 157* 123* CA 10.9* 13.0* 13.8*  13.2*  
MG  --   --  1.8 PHOS  --   --  5.7* No results for input(s): PH, PCO2, PO2, PCO2 in the last 72 hours. US RETROPERITONEUM COMP  Collected: 10/14/19 6722 Renal ultrasound. FINDINGS: The right kidney is normal in size but diffusely increased in 
echogenicity measuring 12.2 cm. A 2.4 cm simple cyst is noted off the midpole. The left kidney is normal in size and diffusely increased in echogenicity 
measuring 11.5 cm. A 1.2 cm simple cyst is noted within the midpole region. There is an indeterminate echogenic focus within the renal parenchyma likely a 
small stone. There is no hydronephrosis. The bladder is unremarkable. The aorta 
is normal in caliber. IMPRESSION:  
1. Bilateral diffuse increased renal cortical echogenicity can be seen with 
medical renal disease. 2. Indeterminate calcifications in the left renal parenchyma. Nephrolithiasis 
favored. 3. No hydronephrosis. 4. Bilateral simple renal cysts. Problem List:  
 
Patient Active Problem List  
 Diagnosis Date Noted  Diabetes mellitus ([facility] 10/13/2019  Essential hypertension 10/13/2019  Paroxysmal atrial fibrillation ([facility] 10/13/2019  Hypercalcemia 10/12/2019  Anemia 10/12/2019  MARCELA (acute kidney injury) ([facility] 10/12/2019 Assessment and Plan: MARCELA on possible CKD with concern for multiple myeloma - Free light chain K/L ratio 4148. skeltal survey with innumerable lytic foci in the skull and left humerus, CT head with multiple lytic lesions involving the calvarium including a 3 cm destructive lesion involving the left occipital bone 
- bone marrow biopsy pending - SPEP/UPEP pending 
- oncology following 
- non-oliguric 
- creatinine 4.95 on admission with unclear baseline. Continue IVF and follow labs. 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 Hopefully will respond to treatment 
- no acute indication for dialysis at present. Electrolytes and volume status stable 
- follow labs Hypercalcemia 
- s/p calcitonin. Repeat x 2 doses today 
- continue IVF 
- improving 
- hold off on Zometa with MARCELA Probable multiple myeloma 
- findings as above 
- oncology following 
- bone marrow biopsy pending Anemia 
- s/p prbc SENIA Mora

## 2019-10-14 NOTE — PROGRESS NOTES
Visit with patient to build rapport with . Patient is calm Family at bedside Encouraged them Yna Daniels,  Staff  C: [phone]  /  [email or web address]

## 2019-10-14 NOTE — PROGRESS NOTES
[facility] Hematology & Oncology Inpatient Hematology / Oncology Progress Note Admission Date: 10/12/2019  9:36 PM 
Reason for Admission/Hospital Course: Hypercalcemia [E83.52] Anemia [D64.9] MARCELA (acute kidney injury) ([address] [N17.9] 24 Hour Events: 
Afebrile, VSS, on O2 @ 2L Myeloma work-up pending Awaiting BMbx Skeletal survey with innumerable lytic foci w/i the skill as well as within the L humerus Family at bedside ROS: 
Constitutional: Negative for fever, chills. CV: Negative for chest pain, palpitations, edema. Respiratory: Negative for dyspnea, cough, wheezing. GI: Negative for nausea, abdominal pain, diarrhea. 10 point review of systems is otherwise negative with the exception of the elements mentioned above in the HPI. No Known Allergies OBJECTIVE: 
Patient Vitals for the past 8 hrs: 
 BP Temp Pulse Resp SpO2  
10/14/19 0933     97 % 10/14/19 0923     97 % 10/14/19 0737 131/69 97.6 °F (36.4 °C) 75 18 97 % 10/14/19 0305 133/72 98.2 °F (36.8 °C) 83 18 94 % Temp (24hrs), Av °F (36.7 °C), Min:97.6 °F (36.4 °C), Max:98.6 °F (37 °C) 
 
10/14 0701 - 10/14 1900 In: 542 [I.V.:542] Out: - Physical Exam: 
Constitutional: Well developed, well nourished elderly male in no acute distress, sitting comfortably in the hospital bed. HEENT: Normocephalic and atraumatic. Oropharynx is clear, mucous membranes are moist.  Extraocular muscles are intact. Sclerae anicteric. Neck supple without JVD. No thyromegaly present. L occipital mass. Skin Warm and dry. No bruising and no rash noted. No erythema. No pallor. Respiratory Lungs are clear to auscultation bilaterally without wheezes, rales or rhonchi, normal air exchange without accessory muscle use. On O2 via NC.  
CVS Normal rate, regular rhythm and normal S1 and S2. No murmurs, gallops, or rubs. Abdomen Soft, nontender and nondistended, normoactive bowel sounds.   No palpable mass. No hepatosplenomegaly. Neuro Grossly nonfocal with no obvious sensory or motor deficits. MSK Normal range of motion in general.  No edema and no tenderness. Psych Appropriate mood and affect. Labs: 
   
Recent Labs 10/14/19 
0340 10/13/19 
0352 10/12/19 
2202 WBC 6.5 7.0 7.9  
RBC 2.61* 2.12* 2.23* HGB 8.1* 6.7* 7.1*  
HCT 26.0* 22.0* 22.8* MCV 99.6* 103.8* 102.2*  
MCH 31.0 31.6 31.8 MCHC 31.2* 30.5* 31.1*  
RDW 15.9* 14.7* 14.9*  
 170 188 GRANS 63 53 51 LYMPH 26 35 38 MONOS 8 8 7 EOS 1 1 1  
BASOS 1 1 1 IG 2 2 2 DF AUTOMATED AUTOMATED AUTOMATED ANEU 4.1 3.7 3.9 ABL 1.7 2.5 3.0 ABM 0.5 0.6 0.6 CHAVA 0.0 0.1 0.1 ABB 0.0 0.0 0.1 AIG 0.1 0.2 0.2 Recent Labs 10/14/19 
0340 10/13/19 
0311 10/13/19 
0351 10/12/19 
2202  135*  --  131* K 4.5 4.3  --  4.4 * 105  --  101 CO2 21 22  --  22 AGAP 9 8  --  8  
* 157*  --  123* BUN 27* 32*  --  35* CREA 4.63* 4.80*  --  4.95* GFRAA 16* 15*  --  15* GFRNA 13* 13*  --  12* CA 10.9* 13.0*  --  13.8*  13.2* SGOT 46*  --   --  54* AP 95  --   --  109 TP 11.3*  --  11.1* 12.3* ALB 2.0*  --   --  2.4*  
GLOB 9.3*  --   --  9.9* AGRAT 0.2*  --  PENDING 0.2* MG  --   --   --  1.8 PHOS  --   --   --  5.7* Imaging: 
Joy Delacruz [[id number]] Collected: 10/14/19 8582 Order Status: Completed Updated: 10/14/19 6145 Narrative:    
Renal ultrasound. CLINICAL INDICATION:  Acute on chronic renal disease PROCEDURE: Realtime grayscale color Doppler evaluation of the kidneys and 
bladder. COMPARISON: No prior similar studies available for direct comparison. FINDINGS: The right kidney is normal in size but diffusely increased in 
echogenicity measuring 12.2 cm. A 2.4 cm simple cyst is noted off the midpole. The left kidney is normal in size and diffusely increased in echogenicity measuring 11.5 cm. A 1.2 cm simple cyst is noted within the midpole region. There is an indeterminate echogenic focus within the renal parenchyma likely a 
small stone. There is no hydronephrosis. The bladder is unremarkable. The aorta 
is normal in caliber. Impression:    
IMPRESSION:  
1. Bilateral diffuse increased renal cortical echogenicity can be seen with 
medical renal disease. 2. Indeterminate calcifications in the left renal parenchyma. Nephrolithiasis 
favored. 3. No hydronephrosis. 4. Bilateral simple renal cysts. IR BX BONE MARROW DIAGNOSTIC [[id number]] Order Status: No result XR BONE SURVEY LTD (METS) [[id number]] Collected: 10/13/19 1121 Order Status: Completed Updated: 10/13/19 1127 Narrative:    
History: Low back, lateral rib, and left posterior shoulder pain EXAM: Metastatic bone survey FINDINGS: Innumerable lytic lesions are present throughout the skull, including 
a large lesion within the occipital portion of the calvarium measuring 5 cm. Degenerative change of the cervical, thoracic, and lumbar spine noted without 
additional lytic foci. The included lungs are clear. Multiple lytic lesions seen 
within the left humerus. No definite lytic foci within the femoral bones are 
within the pelvis, though evaluation the pelvis is limited due to overlying 
bowel gas. Impression:    
IMPRESSION: 
 
Innumerable lytic foci within the skull as well as within the left humerus. Findings suggestive of multiple myeloma or metastatic disease. XR CHEST PA LAT [[id number]] Collected: 10/12/19 2328 Order Status: Completed Updated: 10/12/19 2330 Narrative:    
EXAM: Chest x-ray. INDICATION: Cough. COMPARISON: None. TECHNIQUE: Frontal and lateral view chest x-ray. FINDINGS: The lungs are clear. The cardiac size, mediastinal contour and 
pulmonary vasculature are normal. No pneumothorax or pleural effusion is seen. Impression:    
IMPRESSION: No acute process. CT HEAD WITHOUT CONTRAST [[id number]] Collected: 10/12/19 2323 Order Status: Completed Updated: 10/12/19 2330 Narrative:    
EXAM: Noncontrast CT head. INDICATION: Dizziness. COMPARISON: None. TECHNIQUE: Axial noncontrast CT images of the head were obtained.  Radiation 
dose reduction techniques were used for this study.  Our CT scanners use one or 
all of the following:  Automated exposure control, adjustment of the mA and/or 
kV according to patient size, iterative reconstruction. FINDINGS: There is a 3.3 x 2.9 cm lytic soft tissue mass in the left occipital 
bone, which minimally indents the underlying left occipital lobe. There is no 
midline shift or significant mass effect. There are also innumerable smaller 
subcentimeter round lytic lesions throughout the calvarium. Brain volume is 
appropriate for age. No acute infarct, hemorrhage or evidence of hydrocephalus 
is seen. The basal cisterns are preserved. The visualized paranasal sinuses and 
mastoid air cells are clear. There has been bilateral eye surgery. Impression:    
IMPRESSION:  
1. 3.3 cm lytic soft tissue mass in the left occipital bone, minimally indenting 
the underlying left occipital lobe. In addition, there are innumerable 
subcentimeter lytic lesions throughout the remainder of the calvarium. These 
could relate to metastatic disease or multiple myeloma. 2. No acute infarct or hemorrhage. Medications: 
Current Facility-Administered Medications Medication Dose Route Frequency  allopurinol (ZYLOPRIM) tablet 50 mg  50 mg Oral DAILY  atorvastatin (LIPITOR) tablet 20 mg  20 mg Oral QHS  dilTIAZem CD (CARDIZEM CD) capsule 240 mg  240 mg Oral DAILY  sodium chloride (NS) flush 5-40 mL  5-40 mL IntraVENous Q8H  
 sodium chloride (NS) flush 5-40 mL  5-40 mL IntraVENous PRN  
 acetaminophen (TYLENOL) tablet 650 mg  650 mg Oral Q4H PRN  
 ondansetron (ZOFRAN) injection 4 mg  4 mg IntraVENous Q4H PRN  
  0.9% sodium chloride infusion  150 mL/hr IntraVENous CONTINUOUS  
 0.9% sodium chloride infusion 250 mL  250 mL IntraVENous PRN  
 diphenhydrAMINE (BENADRYL) capsule 25 mg  25 mg Oral Q6H PRN  
 
 
 
ASSESSMENT: 
 
Problem List  Never Reviewed Codes Class Noted Diabetes mellitus ([facility] ICD-10-CM: E11.9 ICD-9-CM: 250.00  10/13/2019 Essential hypertension ICD-10-CM: I10 
ICD-9-CM: 401.9  10/13/2019 Overview Signed 10/13/2019  1:04 AM by Jessica Baker MD  
  Last Assessment & Plan: Hypertension is unchanged. Continue current treatment regimen. Blood pressure will be reassessed at the next regular appointment. Paroxysmal atrial fibrillation (HCC) ICD-10-CM: I48.0 ICD-9-CM: 427.31  10/13/2019 Overview Signed 10/13/2019  1:04 AM by Jessica Baker MD  
  Last Assessment & Plan: He has had no recurrences. I still think he remains a bleeding risk. I would like to hold off of [address] [place] [address] for now--and check again on him in 3 months. Continue dilt--but change to 360 mg tabs. * (Principal) Hypercalcemia ICD-10-CM: W59.50 
ICD-9-CM: 275.42  10/12/2019 Anemia ICD-10-CM: D64.9 ICD-9-CM: 285.9  10/12/2019 MARCELA (acute kidney injury) ([facility] ICD-10-CM: N17.9 ICD-9-CM: 584.9  10/12/2019 Mr. Sampson Kenny is a 75 y.o. male admitted on 10/12/2019 with a primary diagnosis of hypercalcemia and possible metastatic malignancy/myeloma. Mr. Sampson Kenny is a gentleman who has received no formal medical care over the past several years. He stated that he had no primary care physician. Lab data from [facility] in 2017 show a creatinine that evelyn as high as 6.1 and was 2.80 at discharge. His chronic baseline is unknown. As noted, there is a history of diabetes mellitus. For two-three months leading to this admission, he has gradually deteriorated with low back discomfort, forgetfulness, anorexia and 35 pound weight loss.  He has noted a growth on the back of his head. He was finally prevailed upon to come to the hospital for evaluation. Data are listed below but he was found to be in acute renal failure, hypercalcemic, anemic and with multiple lytic lesions involving his calvarium including a 3 cm destructive lesion involving the left occipital bone. PLAN: 
Concern for myeloma - We will initiate workup for myeloma based on strong presumption and if negative will pursue other alternatives. - Check skeletal survey, SPEP, serum free light chains, beta 2 microglobulin , LDH, urine IEP, marrow biopsy 10/14 Skeletal survey with innumerable lytic foci w/i the skull and L humerus. SPEP/UPEP/FLC/Beta 2 pending. Awaiting BMbx. Hypercalcemia - Treatment of his hypercalcemia may be problematic with elevated creatinine and elevated BNP. Continue fluids and I have added calcitonin. Use Lasix to balance I/O's. Hold of on Zometa for now given creatinine. 10/14 CCa++ down to 12.2. Con't IVF. MARCELA 
- Nephrology should be on board to manage what is likely acute kidney injury from ? myeloma, hypercalcemia, etc. Superimposed on CRF. Hopefully this will reverse but dialysis could be an equally potential outcome. 10/14 Cr 4.6. Renal US c/w medical renal disease and possible L nephrolithiasis. Neph consult pending. Hyperuricemia / TLS 
10/14 Uric acid 12.9. Rasburicase and renally dosed allopurinol ordered. Goals and plan of care reviewed with the patient. All questions answered to the best of our ability. Thank you for allowing us to participate in the care of Mr. Ann Baker. Nallely Perry NP [facility] Hematology & Oncology [id number] [place] [address] Office : [phone] Fax : [phone]

## 2019-10-14 NOTE — PROGRESS NOTES
10/14/19 9418 Oxygen Therapy O2 Sat (%) 97 % Pulse via Oximetry 86 beats per minute O2 Device Nasal cannula 
(weaned to 1L from 2L) O2 Flow Rate (L/min) 2 l/min 
(weaned to 1L from 2L)

## 2019-10-14 NOTE — PROGRESS NOTES
END OF SHIFT NOTE: 
 
Intake/Output No intake/output data recorded. Voiding: YES Catheter: NO 
Drain:   
 
 
 
 
 
Stool:  0 occurrences. Stool Assessment Stool Appearance: Soft (10/13/19 0849) Emesis:  1 occurrences. Emesis Assessment Appearance: Undigested food (10/13/19 0848) Emesis Amount: Small (10/14/19 0744) VITAL SIGNS Patient Vitals for the past 12 hrs: 
 Temp Pulse Resp BP SpO2  
10/14/19 1500 97.8 °F (36.6 °C) 67 18 147/70 95 % 10/14/19 1136 97.8 °F (36.6 °C) 70 18 134/74 93 % 10/14/19 0933     97 % 10/14/19 0923     97 % 10/14/19 0737 97.6 °F (36.4 °C) 75 18 131/69 97 % Pain Assessment Pain 1 Pain Scale 1: Visual (10/14/19 0305) Pain Intensity 1: 0 (10/14/19 0305) Patient Stated Pain Goal: 0 (10/13/19 2008) Pain Reassessment 1: Patient resting w/respiratory rate greater than 10 (10/14/19 0305) Pain Location 1: Back (10/13/19 0131) Pain Orientation 1: Left;Right (10/12/19 2133) Pain Description 1: Aching; Sore (10/13/19 0131) Pain Intervention(s) 1: Rest;Position (10/13/19 0131) Ambulating Yes Additional Information: Bmbx in morning Calcitonin restarted Shift report given to oncoming nurse at the bedside.  
 
Luis Alberto Aquino RN

## 2019-10-14 NOTE — PROGRESS NOTES
SW reviewed patient's chart, and met with patient / wife in room to introduce role of case management and assess for possible DC needs. Patient was alert and oriented x4 at time of discussion and engaged appropriately in conversation. Wife also participated actively in discussion. SOCIAL: 
Patient lives with his wife and adult son. Wife is in good health and is retired. She reports being physically able to assist with patient's care if needed. Son, who lives in the home, reportedly works full time. Patient is not a . His insurance is confirmed as Medicare. Uncertain about ability to afford prescriptions. He has no PCP. New PCP request sent via secure email to [facility]. No POA. No history reported of mental health or substance abuse issues. Source of income: patient and spouse both receive social security. No other income. MOBILITY / HISTORY: At baseline, patient is reportedly independent with ambulation (no devices), ADLs, and driving. For the past 2 months, he reports a decline in his mobility. States he has become more sedentary. Wife denies assisting patient with ADLs at home. No falls reported. DME at home: cane. No home oxygen. No dialysis history. No privately paid sitters, aids, caregivers, or CLTC hours. No HH or STR history reported. PLAN FOR DISCHARGE: 
Currently planning DC to home with no needs. Nephrology consult is pending; SW will monitor for possible dialysis needs. Awaiting BMbx and further work up for possible multiple myeloma. SW will remain available to patient, family, and medical team as his needs may change pending further work up during current admission. Care Management Interventions PCP Verified by CM: Yes(New PCP request sent to [facility].) Mode of Transport at Discharge: Other (see comment)(Wife) Transition of Care Consult (CM Consult): Discharge Planning(Unconsulted - met with patient to introduce role of CM and assess for DC needs. ) Discharge Durable Medical Equipment: No 
Physical Therapy Consult: No 
Occupational Therapy Consult: No 
Speech Therapy Consult: No 
Current Support Network: Lives with Spouse Confirm Follow Up Transport: Family Plan discussed with Pt/Family/Caregiver: Yes(Spoke with patient and wife in room) Freedom of Choice Offered: Yes Discharge Location Discharge Placement: Home

## 2019-10-14 NOTE — PROGRESS NOTES
Progress Note Patient: Sammie Hale MRN: [id number]  SSN: [id number] YOB: 1945  Age: 75 y.o. Sex: male Admit Date: 10/12/2019 LOS: 2 days Mr. Mikhail Degroot is a pleasant 76yoM with PMH significant for afib, DM2, HLD who was admitted for progressive weakness and malaise for 2 months. On ER evaluation, patient was found to be hypercalcemic with Ca of 13.2, anemic with hgb of 7.1, and with MARCELA with Cr of 4.95. Patient also has a very concerning CT head, which shows multiple lytic skull lesions c/w multiple myeloma or metastatic disease. He was started on IVF, calcitonin, and transfused 2 PRBC. Oncology on board, concerns of MM. Underwent bone survey with multiple lytic lesions. Plan for bone marrow biopsy on 10/15. Subjective: He was found in mild distress due to fatigue and weakness. He understands about the possible diagnosis and is willing to have BM biopsy tomorrow. Objective:  
 
Vitals:  
 10/14/19 0140 10/14/19 0235 10/14/19 0305 10/14/19 2332 BP: 140/70 147/71 133/72 131/69 Pulse: 76 75 83 75 Resp: 17 17 18 18 Temp: 98 °F (36.7 °C) 97.9 °F (36.6 °C) 98.2 °F (36.8 °C) 97.6 °F (36.4 °C) SpO2: 97% 97% 94% 97% Weight:      
Height:      
  
 
Intake and Output: 
Current Shift: No intake/output data recorded. Last three shifts: 10/12 1901 - 10/14 0700 In: 1138.8 [P.O.:480] Out: 700 [Urine:700] Physical Exam:  
GENERAL: alert, cooperative, no distress, appears stated age Head: indurated raised lesion over his occipital area EYE: negative LYMPHATIC: Cervical, supraclavicular, and axillary nodes normal.  
THROAT & NECK: normal and no erythema or exudates noted. LUNG: clear to auscultation bilaterally HEART: regular rate and rhythm, S1, S2 normal, no murmur, click, rub or gallop ABDOMEN: soft, non-tender. Bowel sounds normal. No masses,  no organomegaly EXTREMITIES:  extremities normal, atraumatic, no cyanosis or edema SKIN: Normal. 
 NEUROLOGIC: negative PSYCHIATRIC: non focal 
 
Lab/Data Review: All lab results for the last 24 hours reviewed. Assessment:  
 
Principal Problem: Hypercalcemia (10/12/2019) Active Problems: 
  Anemia (10/12/2019) MARCELA (acute kidney injury) ([facility] (10/12/2019) Plan:  
-Hypercalcemia, anemia, marcela on ckd, fatigue: CRAB criteria, highly suggestive of multiple myeloma Bone survey revealed innumerable lytic foci within the jud, left humerus. Peripheral smear: marked rouleaux formation  
continue IVFs S/p Calcitonin Not a candidate to zometa due to CKD Pain control Oncology on board Follow up rest of MM work up Plan for bone marrow biopsy Tomorrow  
  
-Non oliguric MARCELA on CKD: He has nephrolithiasis on renal US Avoid nephrotoxic meds Strict IO 
IVFs Nephrology consult  
 
-Hyperuricemia: On renal dose allopurinol and s/p rasburicase Monitor  
 
-Anemia, possible of chronic disease: 
Sp 2 PRBCs -HTN: cardizem po  
 
DVT ppx: compression stockings Code status: full. Discussed with the patient and his wife Disposition: home once medically stable Signed By: Mauricio Johnson MD   
 October 14, 2019

## 2019-10-15 PROBLEM — I10 ESSENTIAL HYPERTENSION: Chronic | Status: ACTIVE | Noted: 2019-01-01

## 2019-10-15 PROBLEM — I27.20 PULMONARY HYPERTENSION (HCC): Status: ACTIVE | Noted: 2019-01-01

## 2019-10-15 PROBLEM — I48.0 PAROXYSMAL ATRIAL FIBRILLATION (HCC): Chronic | Status: ACTIVE | Noted: 2019-01-01

## 2019-10-15 PROBLEM — J96.01 ACUTE RESPIRATORY FAILURE WITH HYPOXIA (HCC): Status: ACTIVE | Noted: 2019-01-01

## 2019-10-15 PROBLEM — E88.09 HYPERPROTEINEMIA: Status: ACTIVE | Noted: 2019-01-01

## 2019-10-15 PROBLEM — E11.9 DIABETES MELLITUS (HCC): Chronic | Status: ACTIVE | Noted: 2019-01-01

## 2019-10-15 NOTE — PROGRESS NOTES
Found pt with sats of 75% on Optiflow of 35L and 50%. Increased to 100% FIO2 and 60L/min with sats of 88%. BBS clear/dim. Suggested CXR for increased O2 demand to RN.

## 2019-10-15 NOTE — PROGRESS NOTES
Date of Outreach Update: 
Figueroa Childress was seen and assessed. MEWS Score: 1 (10/15/19 1550) Vitals:  
 10/15/19 1205 10/15/19 1403 10/15/19 1545 10/15/19 1550 BP:    147/75 Pulse:    65 Resp:    20 Temp:    97.4 °F (36.3 °C) SpO2: (!) 88% 91% (!) 89% (!) 89% Weight:      
Height:      
  
 
 Pain Assessment Pain Intensity 1: 0 (10/15/19 0300) Pain Location 1: Back Pain Intervention(s) 1: Rest, Position Patient Stated Pain Goal: 0 Previous Outreach assessment has been reviewed. There have been no significant clinical changes since the completion of the last dated Outreach assessment. Will continue to follow up per outreach protocol. Signed By:   Tim Paige RN   October 15, 2019 6:33 PM

## 2019-10-15 NOTE — PROGRESS NOTES
[facility] Inpatient Hematology / Oncology Progress Note Admission Date: 10/12/2019  9:36 PM 
Reason for Admission/Hospital Course: Hypercalcemia [E83.52] Anemia [D64.9] MARCELA (acute kidney injury) ([facility] [address] [N17.9] 24 Hour Events: 
Afebrile, on Optiflow Awaiting BMbx [place] [facility] 13k Family at bedside ROS: 
Constitutional: Negative for fever, chills. CV: Negative for chest pain, palpitations, edema. Respiratory: +dyspnea. Negative for cough, wheezing. GI: Negative for nausea, abdominal pain, diarrhea. 10 point review of systems is otherwise negative with the exception of the elements mentioned above in the HPI. No Known Allergies OBJECTIVE: 
Patient Vitals for the past 8 hrs: 
 BP Temp Pulse Resp SpO2  
10/15/19 1118 138/72 98 °F (36.7 °C) 68 20 (!) 89 % 10/15/19 1100     (!) 89 % 10/15/19 0918   84  (!) 88 % 10/15/19 0728 (!) 149/91 97.9 °F (36.6 °C) 79 18 (!) 87 % 10/15/19 0714     (!) 75 % 10/15/19 0335 149/77 97.6 °F (36.4 °C) 77 18 (!) 88 % Temp (24hrs), Av.9 °F (36.6 °C), Min:97.6 °F (36.4 °C), Max:98.4 °F (36.9 °C) 
 
10/15 0701 - 10/15 1900 In: -  
Out: Kenzie  Physical Exam: 
Constitutional: Ill-appearing elderly male in no acute distress, sitting in the hospital bed. HEENT: Normocephalic and atraumatic. Oropharynx is clear, mucous membranes are moist.  Extraocular muscles are intact. Sclerae anicteric. Neck supple without JVD. No thyromegaly present. L occipital mass. Skin Warm and dry. No bruising and no rash noted. No erythema. No pallor. Respiratory Lungs are clear to auscultation bilaterally. On Optiflow. CVS Normal rate, regular rhythm and normal S1 and S2. No murmurs, gallops, or rubs. Abdomen Soft, nontender and nondistended, normoactive bowel sounds. No palpable mass. No hepatosplenomegaly. Neuro Grossly nonfocal with no obvious sensory or motor deficits. MSK Normal range of motion in general.  No edema and no tenderness. Psych Appropriate mood and affect. Labs: 
   
Recent Labs 10/15/19 
0340 10/14/19 
2042 10/14/19 
0340 10/13/19 
0352 10/12/19 
2202 WBC 8.0  --  6.5 7.0 7.9  
RBC 2.25*  --  2.61* 2.12* 2.23* HGB 7.1* 7.7* 8.1* 6.7* 7.1*  
HCT 22.6*  --  26.0* 22.0* 22.8*  
.4*  --  99.6* 103.8* 102.2*  
MCH 31.6  --  31.0 31.6 31.8 MCHC 31.4  --  31.2* 30.5* 31.1*  
RDW 17.1*  --  15.9* 14.7* 14.9*  
  --  150 170 188 GRANS 62  --  63 53 51 LYMPH 30  --  26 35 38 MONOS 5  --  8 8 7 EOS 1  --  1 1 1  
BASOS  --   --  1 1 1 IG  --   --  2 2 2  
DF MANUAL  --  AUTOMATED AUTOMATED AUTOMATED ANEU 5.0  --  4.1 3.7 3.9 ABL 2.4  --  1.7 2.5 3.0 ABM 0.4  --  0.5 0.6 0.6 CHAVA 0.1  --  0.0 0.1 0.1 ABB  --   --  0.0 0.0 0.1 AIG  --   --  0.1 0.2 0.2 Recent Labs 10/15/19 
0340 10/14/19 
0340 10/13/19 
0743 10/13/19 
0351 10/12/19 
2252 10/12/19 
2202  139 135*  --   --  131* K 4.7 4.5 4.3  --   --  4.4 * 109* 105  --   --  101 CO2 21 21 22  --   --  22 AGAP 7 9 8  --   --  8  
GLU 99 141* 157*  --   --  123* BUN 30* 27* 32*  --   --  35* CREA 4.73* 4.63* 4.80*  --   --  4.95* GFRAA 16* 16* 15*  --   --  15* GFRNA 13* 13* 13*  --   --  12* CA 11.5* 10.9* 13.0*  --   --  13.8*  13.2* SGOT  --  46*  --   --   --  54* AP  --  95  --   --   --  109 TP  --  11.3*  --  11.1*  --  12.3* ALB  --  2.0*  --   --   --  2.4*  
GLOB  --  9.3*  --   --   --  9.9* AGRAT  --  0.2*  --  PENDING PENDING 0.2* MG  --   --   --   --   --  1.8 PHOS  --   --   --   --   --  5.7* Imaging: 
Rita Sizer [[id number]] Collected: 10/14/19 6606 Order Status: Completed Updated: 10/14/19 2734 Narrative:    
Renal ultrasound. CLINICAL INDICATION:  Acute on chronic renal disease PROCEDURE: Realtime grayscale color Doppler evaluation of the kidneys and 
bladder. COMPARISON: No prior similar studies available for direct comparison. FINDINGS: The right kidney is normal in size but diffusely increased in 
echogenicity measuring 12.2 cm. A 2.4 cm simple cyst is noted off the midpole. The left kidney is normal in size and diffusely increased in echogenicity 
measuring 11.5 cm. A 1.2 cm simple cyst is noted within the midpole region. There is an indeterminate echogenic focus within the renal parenchyma likely a 
small stone. There is no hydronephrosis. The bladder is unremarkable. The aorta 
is normal in caliber. Impression:    
IMPRESSION:  
1. Bilateral diffuse increased renal cortical echogenicity can be seen with 
medical renal disease. 2. Indeterminate calcifications in the left renal parenchyma. Nephrolithiasis 
favored. 3. No hydronephrosis. 4. Bilateral simple renal cysts. IR BX BONE MARROW DIAGNOSTIC [[id number]] Order Status: No result XR BONE SURVEY LTD ([facility]) [[id number]] Collected: 10/13/19 1121 Order Status: Completed Updated: 10/13/19 1127 Narrative:    
History: Low back, lateral rib, and left posterior shoulder pain EXAM: Metastatic bone survey FINDINGS: Innumerable lytic lesions are present throughout the skull, including 
a large lesion within the occipital portion of the calvarium measuring 5 cm. Degenerative change of the cervical, thoracic, and lumbar spine noted without 
additional lytic foci. The included lungs are clear. Multiple lytic lesions seen 
within the left humerus. No definite lytic foci within the femoral bones are 
within the pelvis, though evaluation the pelvis is limited due to overlying 
bowel gas. Impression:    
IMPRESSION: 
 
Innumerable lytic foci within the skull as well as within the left humerus. Findings suggestive of multiple myeloma or metastatic disease. XR CHEST PA LAT [[id number]] Collected: 10/12/19 2329 Order Status: Completed Updated: 10/12/19 2330 Narrative:    
EXAM: Chest x-ray. INDICATION: Cough. COMPARISON: None. TECHNIQUE: Frontal and lateral view chest x-ray. FINDINGS: The lungs are clear. The cardiac size, mediastinal contour and 
pulmonary vasculature are normal. No pneumothorax or pleural effusion is seen. Impression:    
IMPRESSION: No acute process. CT HEAD WITHOUT CONTRAST [[id number]] Collected: 10/12/19 2323 Order Status: Completed Updated: 10/12/19 2330 Narrative:    
EXAM: Noncontrast CT head. INDICATION: Dizziness. COMPARISON: None. TECHNIQUE: Axial noncontrast CT images of the head were obtained.  Radiation 
dose reduction techniques were used for this study.  Our CT scanners use one or 
all of the following:  Automated exposure control, adjustment of the mA and/or 
kV according to patient size, iterative reconstruction. FINDINGS: There is a 3.3 x 2.9 cm lytic soft tissue mass in the left occipital 
bone, which minimally indents the underlying left occipital lobe. There is no 
midline shift or significant mass effect. There are also innumerable smaller 
subcentimeter round lytic lesions throughout the calvarium. Brain volume is 
appropriate for age. No acute infarct, hemorrhage or evidence of hydrocephalus 
is seen. The basal cisterns are preserved. The visualized paranasal sinuses and 
mastoid air cells are clear. There has been bilateral eye surgery. Impression:    
IMPRESSION:  
1. 3.3 cm lytic soft tissue mass in the left occipital bone, minimally indenting 
the underlying left occipital lobe. In addition, there are innumerable 
subcentimeter lytic lesions throughout the remainder of the calvarium. These 
could relate to metastatic disease or multiple myeloma. 2. No acute infarct or hemorrhage. Medications: 
Current Facility-Administered Medications Medication Dose Route Frequency  cefTRIAXone (ROCEPHIN) 1 g in 0.9% sodium chloride (MBP/ADV) 50 mL  1 g IntraVENous Q24H  azithromycin (ZITHROMAX) 500 mg in 0.9% sodium chloride (MBP/ADV) 250 mL  500 mg IntraVENous Q24H  
 heparin 25,000 units in dextrose 500 mL infusion  18-36 Units/kg/hr IntraVENous TITRATE  allopurinol (ZYLOPRIM) tablet 50 mg  50 mg Oral DAILY  atorvastatin (LIPITOR) tablet 20 mg  20 mg Oral QHS  dilTIAZem CD (CARDIZEM CD) capsule 240 mg  240 mg Oral DAILY  sodium chloride (NS) flush 5-40 mL  5-40 mL IntraVENous Q8H  
 sodium chloride (NS) flush 5-40 mL  5-40 mL IntraVENous PRN  
 acetaminophen (TYLENOL) tablet 650 mg  650 mg Oral Q4H PRN  
 ondansetron (ZOFRAN) injection 4 mg  4 mg IntraVENous Q4H PRN  
 0.9% sodium chloride infusion  100 mL/hr IntraVENous CONTINUOUS  
 0.9% sodium chloride infusion 250 mL  250 mL IntraVENous PRN  
 diphenhydrAMINE (BENADRYL) capsule 25 mg  25 mg Oral Q6H PRN  
 
 
 
ASSESSMENT: 
 
Problem List  Never Reviewed Codes Class Noted Acute respiratory failure with hypoxia [facility]) ICD-10-CM: J96.01 
ICD-9-CM: 518.81  10/15/2019 Pulmonary hypertension (HCC) ICD-10-CM: I27.20 ICD-9-CM: 416.8  10/15/2019 Hyperproteinemia- with likely hyperviscosity ICD-10-CM: E88.09 
ICD-9-CM: 273.8  10/15/2019 Diabetes mellitus (HCC) (Chronic) ICD-10-CM: E11.9 ICD-9-CM: 250.00  10/13/2019 Essential hypertension (Chronic) ICD-10-CM: I10 
ICD-9-CM: 401.9  10/13/2019 Overview Signed 10/13/2019  1:04 AM by Del Kline MD  
  Last Assessment & Plan: Hypertension is unchanged. Continue current treatment regimen. Blood pressure will be reassessed at the next regular appointment. Paroxysmal atrial fibrillation (HCC) (Chronic) ICD-10-CM: I48.0 ICD-9-CM: 427.31  10/13/2019 Overview Signed 10/13/2019  1:04 AM by Del Kline MD  
  Last Assessment & Plan: He has had no recurrences. I still think he remains a bleeding risk. I would like to hold off of [address] for now--and check again on him in 3 months. Continue dilt--but change to 360 mg tabs. * (Principal) Hypercalcemia ICD-10-CM: J48.79 
ICD-9-CM: 275.42  10/12/2019 Anemia ICD-10-CM: D64.9 ICD-9-CM: 285.9  10/12/2019 MARCELA (acute kidney injury) ([facility] [address] ICD-10-CM: N17.9 ICD-9-CM: 584.9  10/12/2019 Mr. Odette Gomes is a 75 y.o. male admitted on 10/12/2019 with a primary diagnosis of hypercalcemia and possible metastatic malignancy/myeloma. Mr. Odette Gomes is a gentleman who has received no formal medical care over the past several years. He stated that he had no primary care physician. Lab data from [facility] in 2017 show a creatinine that evelyn as high as 6.1 and was 2.80 at discharge. His chronic baseline is unknown. As noted, there is a history of diabetes mellitus. For two-three months leading to this admission, he has gradually deteriorated with low back discomfort, forgetfulness, anorexia and 35 pound weight loss. He has noted a growth on the back of his head. He was finally prevailed upon to come to the hospital for evaluation. Data are listed below but he was found to be in acute renal failure, hypercalcemic, anemic and with multiple lytic lesions involving his calvarium including a 3 cm destructive lesion involving the left occipital bone. PLAN: 
Concern for myeloma - We will initiate workup for myeloma based on strong presumption and if negative will pursue other alternatives. - Check skeletal survey, SPEP, serum free light chains, beta 2 microglobulin , LDH, urine IEP, marrow biopsy 10/14 Skeletal survey with innumerable lytic foci w/i the skull and L humerus. SPEP/UPEP/FLC/Beta 2 pending. Awaiting BMbx. 10/15 [place] [facility] 13k. Awaiting BMbx. Hypercalcemia - Treatment of his hypercalcemia may be problematic with elevated creatinine and elevated BNP. Continue fluids and I have added calcitonin. Use Lasix to balance I/O's. Hold of on Zometa for now given creatinine. 10/14 CCa++ down to 12.2. Con't IVF. 10/15 CCa++ 13.1. Con't calcitonin. MARCELA 
- Nephrology should be on board to manage what is likely acute kidney injury from ? myeloma, hypercalcemia, etc. Superimposed on CRF. Hopefully this will reverse but dialysis could be an equally potential outcome. 10/14 Cr 4.6. Renal US c/w medical renal disease and possible L nephrolithiasis. Neph consult pending. 10/15 Cr 4.73. Neph following. Hyperuricemia / TLS 
10/14 Uric acid 12.9. Rasburicase and renally dosed allopurinol ordered. 10/15 Uric acid down to 1.3 Dyspnea / hypoxia 10/15 On Optiflow. CXR/CT chest pending. Pulm following. On Azith/Roland. On empiric heparin gtt. . Echo with dilated RV and pulm HTN. Goals and plan of care reviewed with the patient. All questions answered to the best of our ability. Thank you for allowing us to participate in the care of Mr. Todd Childress. Alexis Spaulding NP [facility] [id number] [place] [address] Office : [phone] Fax : [phone]

## 2019-10-15 NOTE — PROGRESS NOTES
[facility] NURSE PROGRESS REPORT SUBJECTIVE: Called to assess patient secondary to nursing concern. MEWS Score: 1 (10/15/19 1899) Vitals:  
 10/15/19 0714 10/15/19 0728 10/15/19 0918 10/15/19 1100 BP:  (!) 149/91 Pulse:  79 84 Resp:  18 Temp:  97.9 °F (36.6 °C) SpO2: (!) 75% (!) 87% (!) 88% (!) 89% Weight:      
Height:      
  
 
LAB DATA: 
 
Recent Labs 10/15/19 
0340 10/14/19 
0340 10/13/19 
8329 10/13/19 
0351 10/12/19 
2252 10/12/19 
2202  139 135*  --   --  131* K 4.7 4.5 4.3  --   --  4.4 * 109* 105  --   --  101 CO2 21 21 22  --   --  22 AGAP 7 9 8  --   --  8  
GLU 99 141* 157*  --   --  123* BUN 30* 27* 32*  --   --  35* CREA 4.73* 4.63* 4.80*  --   --  4.95* GFRAA 16* 16* 15*  --   --  15* GFRNA 13* 13* 13*  --   --  12* CA 11.5* 10.9* 13.0*  --   --  13.8*  13.2*  
MG  --   --   --   --   --  1.8 PHOS  --   --   --   --   --  5.7* ALB  --  2.0*  --   --   --  2.4* TP  --  11.3*  --  11.1*  --  12.3*  
GLOB  --  9.3*  --   --   --  9.9* AGRAT  --  0.2*  --  PENDING PENDING 0.2* ALT  --  10*  --   --   --  17 Recent Labs 10/15/19 
0340 10/14/19 
2042 10/14/19 
0340 10/13/19 
8481 WBC 8.0  --  6.5 7.0 HGB 7.1* 7.7* 8.1* 6.7* HCT 22.6*  --  26.0* 22.0*  
  --  150 170 OBJECTIVE: On arrival to room, I found patient to be resting in bed, family at bedside. General appearance: alert, cooperative, mild distress, appears stated age Lungs: diminished Neurologic: Alert and oriented X 3, normal strength and tone. Normal symmetric reflexes. Normal coordination and gait Pain Assessment Pain Intensity 1: 0 (10/15/19 0300) Pain Location 1: Back Pain Intervention(s) 1: Rest, Position Patient Stated Pain Goal: 0 
 
  
  
  
  
 
  
  
  
   
 
ASSESSMENT:  Pt resting in bed, SOB but able to speak in full sentences. Respirations slightly labored. Pt denies pain. Tech at bedside to place pt on bedpan. PLAN:  Continue to follow per outreach protocol.

## 2019-10-15 NOTE — PROGRESS NOTES
Problem: Falls - Risk of 
Goal: *Absence of Falls Description Document Caitlyn Leavitt Fall Risk and appropriate interventions in the flowsheet. Outcome: Progressing Towards Goal 
Note:  
Fall Risk Interventions: 
Mobility Interventions: Bed/chair exit alarm, Communicate number of staff needed for ambulation/transfer, Patient to call before getting OOB Medication Interventions: Bed/chair exit alarm, Patient to call before getting OOB, Teach patient to arise slowly Elimination Interventions: Call light in reach, Bed/chair exit alarm, Patient to call for help with toileting needs, Stay With Me (per policy), Toileting schedule/hourly rounds, Urinal in reach

## 2019-10-15 NOTE — PROGRESS NOTES
10/15/19 7763 Oxygen Therapy O2 Sat (%) (!) 75 % (sats increased to 88%) Pulse via Oximetry 88 beats per minute O2 Device Heated; Hi flow nasal cannula O2 Flow Rate (L/min) 60 l/min (increased from [address]) O2 Temperature 87.8 °F (31 °C) FIO2 (%) 100 % (increased from 50%)

## 2019-10-15 NOTE — PROGRESS NOTES
Progress Note Patient: Ary Lange MRN: [id number]  SSN: [id number] YOB: 1945  Age: 75 y.o. Sex: male Admit Date: 10/12/2019 LOS: 3 days Mr. Shani Tucker is a pleasant 76yoM with PMH significant for afib, DM2, HLD who was admitted for progressive weakness and malaise for 2 months. On ER evaluation, patient was found to be hypercalcemic with Ca of 13.2, anemic with hgb of 7.1, and with MARCELA with Cr of 4.95. Patient also has a very concerning CT head, which shows multiple lytic skull lesions c/w multiple myeloma or metastatic disease. He was started on IVF, calcitonin, and transfused 2 PRBC. Oncology on board, concerns of MM. Underwent bone survey with multiple lytic lesions. Plan for bone marrow biopsy on 10/15. Subjective:  
Patient examined at bedside. No acute overnight events but patient reported to have worsening hypoxia overnight and placed on Optiflow, currently on 60L/min with FiO2 of 100%. Patient says he feels \"a little\" short of breath with some chills. No sore throat or cough. He denies chest pain or abdominal pain. Objective:  
 
Vitals:  
 10/15/19 0728 10/15/19 0918 10/15/19 1100 10/15/19 1118 BP: (!) 149/91   138/72 Pulse: 79 84  68 Resp: 18   20 Temp: 97.9 °F (36.6 °C)   98 °F (36.7 °C) SpO2: (!) 87% (!) 88% (!) 89% (!) 89% Weight:      
Height:      
  
 
Intake and Output: 
Current Shift: 10/15 0701 - 10/15 1900 In: -  
Out: Kenzie 1960 Last three shifts: 10/13 1901 - 10/15 0700 In: 972 [P.O.:120; I.V.:542] Out: 5508 [[id number]] Physical Exam:  
GENERAL: alert, cooperative, no distress, appears stated age Head: indurated raised lesion over his occipital area EYE: negative LYMPHATIC: Cervical, supraclavicular, and axillary nodes normal.  
THROAT & NECK: normal and no erythema or exudates noted. LUNG: clear to auscultation bilaterally, no audible wheezes HEART: regular rate and rhythm, S1, S2 normal, no murmur, click, rub or gallop ABDOMEN: soft, non-tender. Bowel sounds normal. No masses,  no organomegaly EXTREMITIES:  extremities normal, atraumatic, no cyanosis or edema SKIN: Normal. 
NEUROLOGIC: negative PSYCHIATRIC: non focal 
 
Lab/Data Review: All lab results for the last 24 hours reviewed. Assessment:  
 
Principal Problem: Hypercalcemia (10/12/2019) Active Problems: 
  Anemia (10/12/2019) MARCELA (acute kidney injury) ([facility] [address] (10/12/2019) Acute respiratory failure with hypoxia ([facility] [address] (10/15/2019) Pulmonary hypertension ([facility] [address] (10/15/2019) Hyperproteinemia- with likely hyperviscosity (10/15/2019) Plan: # Acute hypoxic respiratory failure - patient with progressively worsening hypoxia that has been rapidly increasing overnight 
- currently on 60L/min with FiO2 of 100% 
- ordered CT chest without contrast 
- ordered procalcitonin/LA 
- STAT pulmonology consult, patient will need to be transferred to the ICU 
- elevated d dimer is nonspecific but given underlying malignancy will start on heparin gtt 
- cannot get CTA chest to evaluate for PE due to underlying MARCELA, may need to get V/Q scan 
- will also start on empiric azithromycin/Rocephin for CAP coverage while working up for sepsis 
- wean supplemental oxygen as tolerated # Nonoliguric MARCELA 
- likely due to underlying (undiagnosed) myeloma 
- nephrology following 
- continue with IVFs 
- avoid nephrotoxic meds 
- serial BMPs 
- strict I's/O's # Hypercalcemia 
- likely due to underlying (undiagnosed) myeloma 
- continue with IVFs - patient received calcitonin/rasburicase 
- not a candidate for Zometa due to CKD 
- continue with renal dosed allopurinol  
- nephrology following - patient with plans for bone marrow biopsy but currently hemodynamically unstable # Hyperuricemia: 
- on renal dose allopurinol and s/p rasburicase # Anemia, possible of chronic disease: 
- patient has received 2 units pRBCs 
- transfuse for Hgb<7 
 
 # HTN:  
- continue with oral Cardizem Ppx: heparin gtt for VTE Code Status: FULL CODE Disposition: patient critically ill and will need to be transferred to ICU with workup as above. Discussed extensively with patient and wife at bedside. All questions answered. 45 minutes of critical care time spent with this patient. Signed By: Catrina Ponce DO October 15, 2019

## 2019-10-15 NOTE — CONSULTS
CONSULT NOTE Figueroa Jha 10/15/2019 Date of Admission:  10/12/2019 The patient's chart is reviewed and the patient is discussed with the staff. Subjective:  
 
Patient is a 75 y.o.  male seen and evaluated at the request of Dr. Henry Perez.  He was admitted with a several month history of weight loss (35 lbs) and progressive weakness. He has had some back pain and he feels nauseated. Upon admission, he was found to be in acute renal failure (creat 4.9, ? Baseline) and had an elevated calcium level (13.8). He has received IV fluids and calcitonin. Head CT showed multiple lytic lesions in the skull and the bone survey showed a lesion in the left humerus as well. His beta 2 microglobulin level, IgA levels are elevated and the [place] free light chain studies are abnormal.  Oncology has seen and suspects multiple myeloma and plans a bone marrow biopsy. He was anemic on admission and has received 2 units of blood in addition to IV fluids. His fluid balance is recorded to be positive 250 mls. He was on 2 liters of oxygen yesterday morning but last night, he oxygen saturation decreased significantly and he is now on optiflow. He denies any chest pain but he does feel short of breath and states that he felt that way \"after they placed the oxygen\". He has not had any leg swelling or tenderness. 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 wears glasses Ears, nose, mouth, throat, and face: positive for none Respiratory: positive for negative Cardiovascular: positive for history of a fib - occured two years ago when he was hospitalized with UTI Gastrointestinal: positive for nausea Genitourinary: positive for history of UTI and renal stones in 2017. Integument/breast: positive for none Hematologic/lymphatic: positive for none Musculoskeletal: positive for back pain Neurological: positive for weakness Behvioral/Psych: positive for none Endocrine: positive for diabetes Allergic/Immunologic: positive for none Patient Active Problem List  
Diagnosis Code  Hypercalcemia E83.52  
 Anemia D64.9  [facility]ARCEL[facility]) ([facility] N17.9  Diabetes mellitus ([facility] E11.9  
 Essential hypertension I10  
 Paroxysmal atrial fibrillation (HCC) I48.0  Acute respiratory failure with hypoxia (HCC) J96.01  
 Pulmonary hypertension (HCC) I27.20 Prior to Admission Medications Prescriptions Last Dose Informant Patient Reported? Taking?  
atorvastatin (LIPITOR) 20 mg tablet Not Taking at Unknown time  Yes No  
Sig: Take 20 mg by mouth. dilTIAZem XR (DILACOR XR) 240 mg XR capsule Not Taking at Unknown time  Yes No  
Sig: Take 240 mg by mouth. glipiZIDE (GLUCOTROL) 10 mg tablet Not Taking at Unknown time  Yes No  
Sig: Take 10 mg by mouth. Facility-Administered Medications: None History reviewed. No pertinent past medical history. Active Ambulatory Problems Diagnosis Date Noted  Diabetes mellitus ([facility] 10/13/2019  Essential hypertension 10/13/2019  Paroxysmal atrial fibrillation ([facility] 10/13/2019 Resolved Ambulatory Problems Diagnosis Date Noted  No Resolved Ambulatory Problems No Additional Past Medical History History reviewed. No pertinent surgical history. Social History Socioeconomic History  Marital status:  Spouse name: Not on file  Number of children: Not on file  Years of education: Not on file  Highest education level: Not on file Occupational History  Occupation: retired police/ Social Needs  Financial resource strain: Not on file  Food insecurity:  
  Worry: Not on file Inability: Not on file  Transportation needs:  
  Medical: Not on file Non-medical: Not on file Tobacco Use  Smoking status: Current Every Day Smoker Packs/day: 1.00 Years: 21.00 Pack years: 21.00  Tobacco comment: quit in 1979 Substance and Sexual Activity  Alcohol use: Not Currently  Drug use: Not on file  Sexual activity: Not on file Lifestyle  Physical activity:  
  Days per week: Not on file Minutes per session: Not on file  Stress: Not on file Relationships  Social connections:  
  Talks on phone: Not on file Gets together: Not on file Attends Shinto service: Not on file Active member of club or organization: Not on file Attends meetings of clubs or organizations: Not on file Relationship status: Not on file  Intimate partner violence:  
  Fear of current or ex partner: Not on file Emotionally abused: Not on file Physically abused: Not on file Forced sexual activity: Not on file Other Topics Concern  Not on file Social History Narrative . Lives with wife Family History Problem Relation Age of Onset  Heart Disease Mother  Diabetes Father  Stroke Father  Heart Disease Father  No Known Problems Sister No Known Allergies Current Facility-Administered Medications Medication Dose Route Frequency  cefTRIAXone (ROCEPHIN) 1 g in 0.9% sodium chloride (MBP/ADV) 50 mL  1 g IntraVENous Q24H  
 azithromycin (ZITHROMAX) 500 mg in 0.9% sodium chloride (MBP/ADV) 250 mL  500 mg IntraVENous Q24H  
 heparin (porcine) injection 6,000 Units  6,000 Units IntraVENous ONCE  
 heparin 25,000 units in dextrose 500 mL infusion  18-36 Units/kg/hr IntraVENous TITRATE  allopurinol (ZYLOPRIM) tablet 50 mg  50 mg Oral DAILY  atorvastatin (LIPITOR) tablet 20 mg  20 mg Oral QHS  dilTIAZem CD (CARDIZEM CD) capsule 240 mg  240 mg Oral DAILY  sodium chloride (NS) flush 5-40 mL  5-40 mL IntraVENous Q8H  
 sodium chloride (NS) flush 5-40 mL  5-40 mL IntraVENous PRN  
  acetaminophen (TYLENOL) tablet 650 mg  650 mg Oral Q4H PRN  
 ondansetron (ZOFRAN) injection 4 mg  4 mg IntraVENous Q4H PRN  
 0.9% sodium chloride infusion  100 mL/hr IntraVENous CONTINUOUS  
 0.9% sodium chloride infusion 250 mL  250 mL IntraVENous PRN  
 diphenhydrAMINE (BENADRYL) capsule 25 mg  25 mg Oral Q6H PRN Objective:  
 
Vitals:  
 10/15/19 0525 10/15/19 1189 10/15/19 9047 10/15/19 2641 BP: 149/77  (!) 149/91 Pulse: 77  79 84 Resp: 18  18 Temp: 97.6 °F (36.4 °C)  97.9 °F (36.6 °C) SpO2: (!) 88% (!) 75% (!) 87% (!) 88% Weight:      
Height: PHYSICAL EXAM  
 
Constitutional:  the patient is well developed and in no acute distress HEENT:  Sclera clear, pupils equal, oral mucosa moist 
Lungs: clear bilaterally. Wearing 100% optiflow device. Respirations even and not labored Cardiovascular:  RRR without M,G,R 
Abd/GI: soft and non-tender; with positive bowel sounds. Ext: warm without cyanosis. There is no lower leg edema. Skin:  no jaundice or rashes, no wounds Neuro: no gross neuro deficits. Alert and oriented Musculoskeletal: moves all four extremities. No deformities. Psychiatric: calm. Does not appear anxious or depressed Chest CT: 1. Lower lobe atelectasis with asymmetric posterior right lower lobe atelectasis 
or consolidation.  
2. Multiple lytic osseous lesions. Findings are suggestive of metastatic disease 
or multiple myeloma Echo: Left ventricle: Systolic function was normal. Ejection fraction was 
estimated to be greater than 55 %. There were no regional wall motion 
abnormalities. Wall thickness was at the upper limits of normal. 
-  Ventricular septum: There was \"bounce\" motion. These changes are Consistent with a conduction abnormality or paced rhythm. -  Right ventricle: The ventricle was mildly dilated. Systolic function was 
mildly reduced. Estimated peak pressure was in the range of 45-50 mmHg. Aida David, systemic arteries: There was dilatation of the ascending aorta. The 
aortic root diameter was 40 mm. The ascending aorta maximal AP dimension was 40 mm. -  Pulmonary arteries: The artery was mildly to moderately dilated. -  Additional impressions: Consider imaging of aortic dimensions in 6 months 
with either repeat echocardiogram or CT/MRA if aortic size not previously 
documented. No comparison echocardiogram available for review. Recent Labs 10/15/19 
0340 10/14/19 
2042 10/14/19 
0340 10/13/19 
0352 10/12/19 
2202 WBC 8.0  --  6.5 7.0 7.9 HGB 7.1* 7.7* 8.1* 6.7* 7.1*  
HCT 22.6*  --  26.0* 22.0* 22.8*  
  --  150 170 188 Recent Labs 10/15/19 
0340 10/14/19 
0340 10/13/19 
0352 10/12/19 
2202  139 135* 131* K 4.7 4.5 4.3 4.4  
* 109* 105 101 GLU 99 141* 157* 123* CO2 21 21 22 22 BUN 30* 27* 32* 35* CREA 4.73* 4.63* 4.80* 4.95* MG  --   --   --  1.8 PHOS  --   --   --  5.7*  
CA 11.5* 10.9* 13.0* 13.8*  13.2* ALB  --  2.0*  --  2.4* SGOT  --  46*  --  54* Lab Results Component Value Date/Time Calcium 11.5 (H) 10/15/2019 03:40 AM  
 Phosphorus 5.7 (H) 10/12/2019 10:02 PM  
 
Assessment:  (Medical Decision Making) Hospital Problems  Never Reviewed Codes Class Noted POA Acute respiratory failure with hypoxia [facility]) ICD-10-CM: J96.01 
ICD-9-CM: 518.81  10/15/2019 No  
   
 Pulmonary hypertension (HCC) ICD-10-CM: I27.20 ICD-9-CM: 416.8  10/15/2019 Yes * (Principal) Hypercalcemia ICD-10-CM: M97.78 
ICD-9-CM: 275.42  10/12/2019 Yes Anemia ICD-10-CM: D64.9 ICD-9-CM: 285.9  10/12/2019 Yes MARCELA (acute kidney injury) ([facility] ICD-10-CM: N17.9 ICD-9-CM: 584.9  10/12/2019 Yes Plan:  (Medical Decision Making) 1. Patient with increased oxygen needs over past 12 hours. Concerned about possibility of pulmonary embolism.  Echo with dilated RV and pulmonary hypertension (not old study for review). Will check d dimer and duplex of his legs (can't do spiral CT with renal failure). Will check viscosity study as well. Heparin infusion ordered empirically. Awaiting transfer to the ICU. 2. Rocephin/Zithromax as ordered for now - day 4 - ? Pneumonia. Secretions have been white in color per patient report. CT chest suggests atelectasis as opposed to pneumonia 3. Bone marrow biopsy per oncology 4. Continue IV fluids with hypercalcemia. No evidence volume overload 5. Monitor labs - nephrology not involved yet but no improvement in renal failure since admission Annabelle Ye NP More than 50% of time documented was spent face-to-face contact with the patient and in the care of the patient on the floor/unit where the patient is located Lungs:  Mildly decreased BS bilaterally Heart:  RRR with no Murmur/Rubs/Gallops Additional Comments:  76 yo AAM with likely underlying MM now with rapidly progressive hypoxemia. Had transfusion earlier this admission. Was on RA yesterday and now on 100% Optiflow with borderline sat. Clinical picture worrisome for PTE. Also has markedly elevated protein fraction in the blood with likely hyperviscosity which could also compromise oxygenation. CT chest w/o contrast due to renal failure with posterior basilar infiltrates for which there may be a component of atx. Will start heparin drip empirically, check D-dimer, and venous US of lower extremities. Plan transfer to ICU when bed available. Obtain ABG. I have spoken with and examined the patient. I agree with the above assessment and plan as documented.  
 
Raúl Aleman MD

## 2019-10-15 NOTE — PROGRESS NOTES
END OF SHIFT NOTE: 
 
Intake/Output 10/14 1901 - 10/15 0700 In: 0 Out: 550 [Urine:550] Voiding: YES Catheter: NO 
Drain:   
 
 
 
 
 
Stool:  1 occurrences. Stool Assessment Stool Color: Dylon Batter (10/15/19 0443) Stool Appearance: Loose (10/15/19 0443) Stool Amount: Small (10/15/19 0443) Stool Source/Status: Rectum (10/15/19 0443) Emesis:  1 occurrences. Emesis Assessment Appearance: Undigested food (10/13/19 0848) Emesis Amount: Small (10/14/19 0744) VITAL SIGNS Patient Vitals for the past 12 hrs: 
 Temp Pulse Resp BP SpO2  
10/15/19 0335 97.6 °F (36.4 °C) 77 18 149/77 (!) 88 % 10/15/19 0020     (!) 89 % 10/14/19 2356     (!) 88 % 10/14/19 2304 98.4 °F (36.9 °C) 98 18 (!) 147/94 (!) 86 % 10/14/19 2018     92 % 10/14/19 2017     (!) 86 % 10/14/19 2016     (!) 83 % 10/14/19 2015 97.9 °F (36.6 °C) (!) 110 18 157/87 (!) 80 % Pain Assessment Pain 1 Pain Scale 1: Numeric (0 - 10) (10/15/19 0300) Pain Intensity 1: 0 (10/15/19 0300) Patient Stated Pain Goal: 0 (10/15/19 0300) Pain Reassessment 1: Patient resting w/respiratory rate greater than 10 (10/14/19 0305) Pain Location 1: Back (10/13/19 0131) Pain Orientation 1: Left;Right (10/12/19 2133) Pain Description 1: Aching; Sore (10/13/19 0131) Pain Intervention(s) 1: Rest;Position (10/13/19 0131) Ambulating Yes Additional Information: Patient went from room air to optiflow overnight. Sating at 88% continuously. Calcium and creatinine still high. VSS. No needs voiced. Shift report given to oncoming nurse at the bedside. Celeste Carney

## 2019-10-15 NOTE — PROGRESS NOTES
Dr Werner Maxwell notified of increased oxygen demand, will come and assess patient. 0825: Pt transported to CT on 15L Non rebreather O2 87-90%.

## 2019-10-15 NOTE — PROGRESS NOTES
[facility] PROGRESS NOTE Follow up for: Albert Reagan Subjective:  
 
Patient seen and examined. Chart, notes, labs, imaging, results all reviewed. On high flow oxygen for hypoxia , awaiting top be transferred to ICU  
 
ROS: 
UTO Objective:  
Exam: 
Vitals:  
 10/15/19 0728 10/15/19 0918 10/15/19 1100 10/15/19 1118 BP: (!) 149/91   138/72 Pulse: 79 84  68 Resp: 18   20 Temp: 97.9 °F (36.6 °C)   98 °F (36.7 °C) SpO2: (!) 87% (!) 88% (!) 89% (!) 89% Weight:      
Height:      
 
 
 
Intake/Output Summary (Last 24 hours) at 10/15/2019 1324 Last data filed at 10/15/2019 1119 Gross per 24 hour Intake 0 ml Output 1175 ml Net -1175 ml Current Facility-Administered Medications Medication Dose Route Frequency  cefTRIAXone (ROCEPHIN) 1 g in 0.9% sodium chloride (MBP/ADV) 50 mL  1 g IntraVENous Q24H  
 azithromycin (ZITHROMAX) 500 mg in 0.9% sodium chloride (MBP/ADV) 250 mL  500 mg IntraVENous Q24H  
 heparin 25,000 units in dextrose 500 mL infusion  18-36 Units/kg/hr IntraVENous TITRATE  allopurinol (ZYLOPRIM) tablet 50 mg  50 mg Oral DAILY  atorvastatin (LIPITOR) tablet 20 mg  20 mg Oral QHS  dilTIAZem CD (CARDIZEM CD) capsule 240 mg  240 mg Oral DAILY  sodium chloride (NS) flush 5-40 mL  5-40 mL IntraVENous Q8H  
 sodium chloride (NS) flush 5-40 mL  5-40 mL IntraVENous PRN  
 acetaminophen (TYLENOL) tablet 650 mg  650 mg Oral Q4H PRN  
 ondansetron (ZOFRAN) injection 4 mg  4 mg IntraVENous Q4H PRN  
 0.9% sodium chloride infusion  100 mL/hr IntraVENous CONTINUOUS  
 0.9% sodium chloride infusion 250 mL  250 mL IntraVENous PRN  
 diphenhydrAMINE (BENADRYL) capsule 25 mg  25 mg Oral Q6H PRN  
 
 
EXAM 
GEN - on high flow oxygen CV - S1, S2, RRR, no rub, murmur, or gallop Lung - clear to auscultation bilaterally Abd - soft, nontender, BS present Ext - no edema Recent Labs 10/15/19 
0340 10/14/19 
2042 10/14/19 
0340 10/13/19 
9166 WBC 8.0  --  6.5 7.0 HGB 7.1* 7.7* 8.1* 6.7* HCT 22.6*  --  26.0* 22.0*  
  --  150 170 Recent Labs 10/15/19 
0340 10/14/19 
0340 10/13/19 
0352 10/12/19 
2202  139 135* 131* K 4.7 4.5 4.3 4.4  
* 109* 105 101 CO2 21 21 22 22 BUN 30* 27* 32* 35* CREA 4.73* 4.63* 4.80* 4.95* CA 11.5* 10.9* 13.0* 13.8*  13.2*  
GLU 99 141* 157* 123* MG  --   --   --  1.8 PHOS  --   --   --  5.7* Assessment and Plan:  
 
1. ? 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 Hypercalcemia ca 13 Received calcitonin Cannot use bisphosphonate sec to MARCELA  
 
3. ? PE  
TO be started on heparin 4. Hyperuricemia Received rasburicase Verito Nuñez MD

## 2019-10-15 NOTE — PROGRESS NOTES
[facility] NURSE PROGRESS REPORT SUBJECTIVE: Called to assess patient secondary to low O2 sat. MEWS Score: 1 (10/14/19 1500) Vitals:  
 10/14/19 1136 10/14/19 1500 10/14/19 2015 10/14/19 2304 BP: 134/74 147/70 157/87 (!) 147/94 Pulse: 70 67 (!) 110 98 Resp: 18 18 18 18 Temp: 97.8 °F (36.6 °C) 97.8 °F (36.6 °C) 97.9 °F (36.6 °C) 98.4 °F (36.9 °C) SpO2: 93% 95% (!) 80% (!) 86% Weight:   101.3 kg (223 lb 4.8 oz) Height:      
  
EKG: normal EKG, normal sinus rhythm, rate 80-90s on remote tele. LAB DATA: 
 
Recent Labs 10/14/19 
0340 10/13/19 
3133 10/13/19 
0351 10/12/19 
2252 10/12/19 
2202  135*  --   --  131* K 4.5 4.3  --   --  4.4 * 105  --   --  101 CO2 21 22  --   --  22 AGAP 9 8  --   --  8  
* 157*  --   --  123* BUN 27* 32*  --   --  35* CREA 4.63* 4.80*  --   --  4.95* GFRAA 16* 15*  --   --  15* GFRNA 13* 13*  --   --  12* CA 10.9* 13.0*  --   --  13.8*  13.2*  
MG  --   --   --   --  1.8 PHOS  --   --   --   --  5.7* ALB 2.0*  --   --   --  2.4* TP 11.3*  --  11.1*  --  12.3*  
GLOB 9.3*  --   --   --  9.9* AGRAT 0.2*  --  PENDING PENDING 0.2* ALT 10*  --   --   --  17 Recent Labs 10/14/19 
2042 10/14/19 
0340 10/13/19 
0352 10/12/19 
2202 WBC  --  6.5 7.0 7.9 HGB 7.7* 8.1* 6.7* 7.1* HCT  --  26.0* 22.0* 22.8* PLT  --  150 170 188 OBJECTIVE: On arrival to room, I found patient to be resting in bed. Pain Assessment Pain Intensity 1: 0 (10/14/19 0305) Pain Location 1: Back Pain Intervention(s) 1: Rest, Position Patient Stated Pain Goal: 0 
 
ASSESSMENT:  Patient is drowsy, resting in bed. O2 sat reading 88% on monitor, but patient not in distress, respirations even and unlabored. NSR 80-90s on remote tele. BP slightly elevated but stable. Previous ABG @ 2041 with PO2 126 and O2 sat 99%.  Lung sounds clear upper lobes, diminished lower bilaterally. CXR @ 3184 with \"New mild bibasilar lung atelectasis or early infiltrates. \" Talked with Dr. Catalina Varghese, orders to place patient on OptiFlow and she will place other orders in chart. Will continue to monitor, if patient worsens, may need ICU for BiPAP. PLAN:  Will continue to follow per outreach protocol.

## 2019-10-15 NOTE — PROGRESS NOTES
END OF SHIFT NOTE: 
 
Intake/Output 10/15 0701 - 10/15 1900 In: 120 [P.O.:120] Out: 625 [Urine:625] Voiding: YES Catheter: NO 
Drain:   
 
 
 
 
 
Stool:  0 occurrences. Stool Assessment Stool Color: Rhoderick Vasu (10/15/19 0443) Stool Appearance: Loose (10/15/19 0443) Stool Amount: Small (10/15/19 0443) Stool Source/Status: Rectum (10/15/19 0443) Emesis:  0 occurrences. Emesis Assessment Appearance: Undigested food (10/13/19 0848) Emesis Amount: Small (10/14/19 0744) VITAL SIGNS Patient Vitals for the past 12 hrs: 
 Temp Pulse Resp BP SpO2  
10/15/19 1550 97.4 °F (36.3 °C) 65 20 147/75 (!) 89 % 10/15/19 1545     (!) 89 % 10/15/19 1403     91 % 10/15/19 1205     (!) 88 % 10/15/19 1118 98 °F (36.7 °C) 68 20 138/72 (!) 89 % 10/15/19 1100     (!) 89 % 10/15/19 0918  84   (!) 88 % 10/15/19 0728 97.9 °F (36.6 °C) 79 18 (!) 149/91 (!) 87 % 10/15/19 0714     (!) 75 % Pain Assessment Pain 1 Pain Scale 1: Numeric (0 - 10) (10/15/19 0300) Pain Intensity 1: 0 (10/15/19 0300) Patient Stated Pain Goal: 0 (10/15/19 0300) Pain Reassessment 1: Patient resting w/respiratory rate greater than 10 (10/14/19 0305) Pain Location 1: Back (10/13/19 0131) Pain Orientation 1: Left;Right (10/12/19 2133) Pain Description 1: Aching; Sore (10/13/19 0131) Pain Intervention(s) 1: Rest;Position (10/13/19 0131) Ambulating Yes Additional Information: See progress notes from RT. Unable to have bmbx today due to resp distress, will try tomorrow On heparin drip Shift report will be given to oncoming nurse at the bedside.  
 
Jazmine Ramos RN

## 2019-10-15 NOTE — PROGRESS NOTES
Pulled ABG and ran a repeat per ERIBERTO Samuel, NP to confirm accuracy. PaO2 >300mmHg, but O2 saturation confirmed on finger and forehead probe to be 88%. Dr. Grecia Easley aware. Started to wean FIO2 to 65% with repeat ABG in an hour.

## 2019-10-15 NOTE — PROGRESS NOTES
Tech calls me in the room, patient sating at 80% on room. Put on 2L NC and went up to 83%. Then bumped up to 5L and went up to 86%. Then bumped up to 10L and finally went up to 92%. Patient in no distress the entire time. Helped patient back to bed and called respiratory to come assess him and called Dr. Kayla Ortiz to update her. Dr. Solange Brown said to get a stat hemoglobin, a stat ABG, and a stat chest x-ray. Order all of those and will continue to monitor. 2305: Tech to take vitals again and said patient was sating at 86% on 11L high flow nasal canula. Called respiratory again to come assess patient and called Dr. Solange Brown again. Dr. Solange Brown mentioned putting him on bipap but there were no ICU beds available so said to call rover. There is no rover so ICU charge nurse came up to assess him with respiratory. Patient still in no distress just high BP. Pulled patient up in bed and he is staying steady at 88% on 14L high flow. Since he is not in distress they are going to try optiflow and ICU charge will come assess him again later on tonight. Will continue to closely monitor.

## 2019-10-15 NOTE — PROGRESS NOTES
Date of Outreach Update: 
Figueroa Sanchez was seen and assessed. Patient on OptiFlow 50%/ 35L, O2 sat 89-90%, respirations even and unlabored. NAD noted. MEWS Score: 1 (10/14/19 2304) Vitals:  
 10/14/19 2018 10/14/19 2304 10/14/19 2356 10/15/19 0020 BP:  (!) 147/94 Pulse:  98 Resp:  18 Temp:  98.4 °F (36.9 °C) SpO2: 92% (!) 86% (!) 88% (!) 89% Weight:      
Height:      
  
 
 Pain Assessment Pain Intensity 1: 0 (10/14/19 2015) Pain Location 1: Back Pain Intervention(s) 1: Rest, Position Patient Stated Pain Goal: 0 Previous Outreach assessment has been reviewed. There have been no significant clinical changes since the completion of the last dated Outreach assessment. Will continue to follow up per outreach protocol. Signed By:   Paul Penn RN   October 15, 2019 2:20 AM

## 2019-10-16 PROBLEM — G93.41 ACUTE METABOLIC ENCEPHALOPATHY: Status: ACTIVE | Noted: 2019-01-01

## 2019-10-16 PROBLEM — I27.20 PULMONARY HYPERTENSION (HCC): Chronic | Status: ACTIVE | Noted: 2019-01-01

## 2019-10-16 NOTE — PROGRESS NOTES
Patient received to ICU and placed on monitors. Patient placed on Bipap and current o2 sat is reading 83%. Patient is lethargic and able to [facility] answers. Eyes are cross which is patient's baseline. Patient bathed with CHG wipes and dual skin assessment performed with Veronique Trivedi. Patient's skin is intact, Allevyn placed to Sacrum for wound prevention. Dr. Klever Henning made aware patient arrived to unit. Patient to get dialysis line placed and run dialysis today.

## 2019-10-16 NOTE — PROGRESS NOTES
Figueroa Little Admission Date: 10/12/2019 Daily Progress Note: 10/16/2019 The patient's chart is reviewed and the patient is discussed with the staff. 75 y.o.  male seen and evaluated at the request of Dr. Larisa Dangelo.  He was admitted with a several month history of weight loss (35 lbs) and progressive weakness. He has had some back pain and he feels nauseated. Upon admission, he was found to be in acute renal failure (creat 4.9, ? Baseline) and had an elevated calcium level (13.8). He has received IV fluids and calcitonin. Head CT showed multiple lytic lesions in the skull and the bone survey showed a lesion in the left humerus as well. His beta 2 microglobulin level, IgA levels are elevated and the Hendron free light chain studies are abnormal.  Oncology has seen and suspects multiple myeloma - bone marrow biopsy planned. He was anemic on admission and has received 2 units of blood in addition to IV fluids. His calcium level has improved. Nephrology following for renal failure. Subjective:  
   Less alert today. Complained of chest pain over night but can't communicate specifics at present. Denies feeling short of breath. Wife at bedside. Current Facility-Administered Medications Medication Dose Route Frequency  cefTRIAXone (ROCEPHIN) 1 g in 0.9% sodium chloride (MBP/ADV) 50 mL  1 g IntraVENous Q24H  
 azithromycin (ZITHROMAX) 500 mg in 0.9% sodium chloride (MBP/ADV) 250 mL  500 mg IntraVENous Q24H  
 heparin 25,000 units in dextrose 500 mL infusion  18-36 Units/kg/hr IntraVENous TITRATE  sucralfate (CARAFATE) tablet 1 g  1 g Oral AC&HS  famotidine (PEPCID) tablet 20 mg  20 mg Oral DAILY  allopurinol (ZYLOPRIM) tablet 50 mg  50 mg Oral DAILY  atorvastatin (LIPITOR) tablet 20 mg  20 mg Oral QHS  dilTIAZem CD (CARDIZEM CD) capsule 240 mg  240 mg Oral DAILY  sodium chloride (NS) flush 5-40 mL  5-40 mL IntraVENous Q8H  
  sodium chloride (NS) flush 5-40 mL  5-40 mL IntraVENous PRN  
 acetaminophen (TYLENOL) tablet 650 mg  650 mg Oral Q4H PRN  
 ondansetron (ZOFRAN) injection 4 mg  4 mg IntraVENous Q4H PRN  
 0.9% sodium chloride infusion  100 mL/hr IntraVENous CONTINUOUS  
 0.9% sodium chloride infusion 250 mL  250 mL IntraVENous PRN  
 diphenhydrAMINE (BENADRYL) capsule 25 mg  25 mg Oral Q6H PRN Objective:  
 
Vitals:  
 10/15/19 2033 10/15/19 2254 10/16/19 0012 10/16/19 0815 BP:  (!) 152/94  150/87 Pulse:  69  78 Resp:  28  (!) 32 Temp:  98.1 °F (36.7 °C)  97.9 °F (36.6 °C) SpO2: (!) 89% (!) 86% (!) 87% (!) 85% Weight:      
Height:      
 
Intake and Output:  
10/14 1901 - 10/16 0700 In: 9105 [P.O.:240; I.V.:1177] Out: 2780 [Urine:2780] No intake/output data recorded. Physical Exam:  
Constitutional:  the patient is well developed and in no acute distress HEENT:  Sclera clear, pupils equal, oral mucosa moist 
Lungs: Coarse bilaterally. Currently wearing 12 liter high flow cannula. Respirations look more labored today than yesterday. Cardiovascular:  RRR without M,G,R 
Abd/GI: soft and non-tender; with positive bowel sounds. Ext: warm without cyanosis. There is no lower leg edema. Musculoskeletal: moves all four extremities with equal strength Skin:  no jaundice or rashes, no wounds Neuro: Less alert today. Mumbling to answer questions. Musculoskeletal: can't ambulate at present - not alert enough and oxygen levels low. No deformity Psychiatric: Calm. Review of Systems - Review of Systems Constitutional: Positive for malaise/fatigue. Respiratory: Positive for cough. Cardiovascular: Positive for chest pain. Gastrointestinal: Positive for nausea. Musculoskeletal: Positive for back pain. Lines: peripheral IV forearm CHEST XRAY: pending LAB Recent Labs 10/16/19 
0018 10/15/19 
0340 10/14/19 
2042 10/14/19 
0340 WBC 12.3* 8.0  --  6.5 HGB 6.6* 7.1* 7.7* 8.1*  
 HCT 21.4* 22.6*  --  26.0*  
 161  --  150 Recent Labs 10/16/19 
0018 10/15/19 
0340 10/14/19 
0340  140 139  
K 5.2* 4.7 4.5  
* 112* 109* CO2 21 21 21 * 99 141* BUN 40* 30* 27* CREA 5.01* 4.73* 4.63* ALB  --   --  2.0*  
SGOT  --   --  46* Lab Results Component Value Date/Time Calcium 12.4 (H) 10/16/2019 12:18 AM  
 Phosphorus 5.7 (H) 10/12/2019 10:02 PM  
 
Assessment:  (Medical Decision Making) Hospital Problems  Never Reviewed Codes Class Noted POA Acute respiratory failure with hypoxia [facility]) ICD-10-CM: J96.01 
ICD-9-CM: 518.81  10/15/2019 No  
 Oxygenation still a problem. Bedside oximetry has not been coorelating with ABG so were following blood gas. Had to increase flow last night and he had some chest pain. Remains on heparin infusion for suspected PE Pulmonary hypertension (HCC) ICD-10-CM: I27.20 ICD-9-CM: 416.8  10/15/2019 Yes RV dilated on echo - ? PE versus chronic. No previous echo for review Hyperproteinemia- with likely hyperviscosity ICD-10-CM: E88.09 
ICD-9-CM: 273.8  10/15/2019 Unknown Viscosity results pending * (Principal) Hypercalcemia ICD-10-CM: E72.63 
ICD-9-CM: 275.42  10/12/2019 Yes Calcium back up some today. IV fluids currently on hold due to loss of IV site Anemia ICD-10-CM: D64.9 ICD-9-CM: 285.9  10/12/2019 Yes Worse. MARCELA (acute kidney injury) ([facility] [address] ICD-10-CM: N17.9 ICD-9-CM: 584.9  10/12/2019 Yes Worse. Nephrology now involved. Urine dark but good output Plan:  (Medical Decision Making) 1. ABG and CXR now. Will need to go ahead and move to ICU. Held off on transfer yesterday as ABG showed that oxygenation was better than what bedside oximetry suggested. Now with increased oxygen needs again (was on 8 liters yesterday with PO2 of 91 but now up to 12 liters with sat reading 85 to 90%). Concerned about hypercapnea with decreased mentation.  -> ABG - 7.38/34/82/20 2. Replace on optiflow 3. Nephrology following - renal function worse - more acidotic. Will replace bicarb 4. Hemoglobin down today - have ordered 1 unit of blood. Will check stool for heme with heparin infusion 5. Heparin infusion continues for suspected PE. Duplex of legs negative. D dimer 5.4. Will need to do VQ scan when he is stable enough. 6. Viscosity study pending 7. IV fluids for hypercalcemia - level up more today. Has been getting calcitonin. Will check CXR in case of volume overload but fluid balance negative per chart 8. Day 5 rocephin and zithromax. ? Pneumonia but with instability, will continue. Secretions have been white in color per patient report. 9. Bone marrow biopsy per oncology - not done yesterday due to instability 10. Will need additional IV catheter Dang Blancas NP More than 50% of time documented was spent face-to-face contact with the patient and in the care of the patient on the floor/unit where the patient is located. Addendum:   CXR reviewed - will give dose of lasix now Lungs decreased effort on 50% and 50 LPM saturation noted form 87 to less Heart S1 and S2 audible, no murmers or rubs appreciated Other Mentally sluggish. Bed in ICU almost ready and place on BIPAP Given ABG with P02 initially in 180's and still fine but saturation low. (large discrepancy)  will check with co-oxymetry to see methemoglobinemia levels. May need methylene blue of >20 with vit C. Just spoke to ICU roving nurse and reports bed is almost ready. Will tell staff to get BIPAP up here, if not ready. Mental status overall is worse than yesterday per roving nurse. I spoke with family and aware of critical nature and risk for likely intubation. Recent Labs 10/16/19 
0728 10/15/19 
1538 10/15/19 
1400 PHI 7.382 7.383 7.375 PCO2I 34.1* 33.8* 36.6 PO2I 82 91 183* HCO3I 20.2* 20.1* 21.4* Recent Labs 10/15/19 
1027 LAC 0.8 I have spoken with and examined the patient. I have reviewed the history, examination, assessment, and plan and agree with the above. Chayo Way MD 
 
 
This note was signed electronically. Errors are unfortunately her likely due to dictation software.

## 2019-10-16 NOTE — PROGRESS NOTES
TRANSFER - IN REPORT: 
 
Verbal report received from Macie(name) on Figueroa Weber  being received from [facility](unit) for change in patient condition(increased o2 needs and needs dialysis) Report consisted of patients Situation, Background, Assessment and  
Recommendations(SBAR). Information from the following report(s) SBAR was reviewed with the receiving nurse. Opportunity for questions and clarification was provided. Assessment completed upon patients arrival to unit and care assumed.

## 2019-10-16 NOTE — PROGRESS NOTES
Progress Note Patient: Puja Hein MRN: [id number]  SSN: [id number] YOB: 1945  Age: 75 y.o. Sex: male Admit Date: 10/12/2019 LOS: 4 days Mr. Otoniel Mayen is a pleasant 76yoM with PMH significant for afib, DM2, HLD who was admitted for progressive weakness and malaise for 2 months. On ER evaluation, patient was found to be hypercalcemic with Ca of 13.2, anemic with hgb of 7.1, and with MARCELA with Cr of 4.95. Patient also has a very concerning CT head, which shows multiple lytic skull lesions c/w multiple myeloma or metastatic disease. He was started on IVF, calcitonin, and transfused 2 PRBC. Oncology on board, concerns of MM. Underwent bone survey with multiple lytic lesions. Plan for bone marrow biopsy on 10/15. Subjective:  
Patient examined at bedside. Patient more confused and somnolent today. Answers questions but is more muffled today. Son and wife at bedside. Objective:  
 
Vitals:  
 10/16/19 1126 10/16/19 1244 10/16/19 1252 10/16/19 1308 BP: 146/78 Pulse: 74 Resp: 22 Temp: 98 °F (36.7 °C) 98 °F (36.7 °C)  98.4 °F (36.9 °C) SpO2: (!) 83%  (!) 88% Weight:  101.4 kg (223 lb 8.7 oz) Height:      
  
 
Intake and Output: 
Current Shift: No intake/output data recorded. Last three shifts: 10/14 1901 - 10/16 0700 In: 8965 [P.O.:240; I.V.:1177] Out: 2780 [Urine:2780] Physical Exam:  
GENERAL: alert, cooperative, no distress, appears stated age Head: indurated raised lesion over his occipital area EYE: negative LYMPHATIC: Cervical, supraclavicular, and axillary nodes normal.  
THROAT & NECK: normal and no erythema or exudates noted. LUNG: clear to auscultation bilaterally, no audible wheezes HEART: regular rate and rhythm, S1, S2 normal, no murmur, click, rub or gallop ABDOMEN: soft, non-tender. Bowel sounds normal. No masses,  no organomegaly EXTREMITIES:  extremities normal, atraumatic, no cyanosis or edema SKIN: Normal. 
 NEUROLOGIC: negative PSYCHIATRIC: non focal 
 
Lab/Data Review: All lab results for the last 24 hours reviewed. Assessment:  
 
Principal Problem: Hypercalcemia (10/12/2019) Active Problems: 
  Anemia (10/12/2019) MARCELA (acute kidney injury) ([facility] [address] (10/12/2019) Acute respiratory failure with hypoxia ([facility] [address] (10/15/2019) Pulmonary hypertension ([facility] [address] (10/15/2019) Hyperproteinemia- with likely hyperviscosity (10/15/2019) Acute metabolic encephalopathy (31/36/5634) Plan: # Acute hypoxic respiratory failure - patient with progressively worsening hypoxia - large discrepancy between pulse ox and with ABG regarding pO2 vs SpO2 (methemohemoglobin level?) - likely multifactorial etiology (MARCELA, myeloma, hypervolemia, ? PNA/PE) - pulmonology following, patient will need to be transferred to the ICU 
- cannot diagnose PE but high likelihood given underlying malignancy, currently on heparin gtt 
- cannot get CTA chest to evaluate for PE due to underlying MARCELA, will need to get V/Q scan if clinically stabilizes - continue empiric azithromycin/Rocephin for CAP coverage while working up for sepsis - Heme/Onc following with plans to start high dose corticosteroids as diagnosis of myeloma is all but confirmed (needs to be clinically stable to get bone marrow biopsy) 
- wean supplemental oxygen as tolerated - palliative medicine consulted for goals of care discussion # Nonoliguric MARCELA 
- now complicated by hypervolemia from IVFs 
- likely due to underlying (undiagnosed) myeloma as well as hypercalcemia 
- nephrology following, plan to start iHD 
- stop fluids 
- avoid nephrotoxic meds 
- serial BMPs 
- strict I's/O's # Hypercalcemia 
- likely due to underlying (undiagnosed) myeloma 
- stop fluids due to hypervolemia - patient has received calcitonin/rasburicase 
- not a candidate for Zometa due to CKD 
- continue with renal dosed allopurinol  
- nephrology following - patient with plans for bone marrow biopsy but currently hemodynamically unstable # Hyperuricemia: 
- on renal dose allopurinol and s/p rasburicase # Anemia, possible of chronic disease: 
- patient has received 2 units pRBCs 
- Hgb of 6.6 today 
- will transfuse 1 unit pRBC with HD after line inserted 
- transfuse for Hgb<7 
 
# HTN:  
- continue with oral Cardizem Ppx: heparin gtt for VTE Code Status: FULL CODE Disposition: patient critically ill and will need to be transferred to ICU with workup as above. Discussed extensively with patient and wife/son at bedside. All questions answered. 50 minutes of critical care time spent with this patient. Signed By: Yanni Prince DO October 16, 2019

## 2019-10-16 NOTE — PROGRESS NOTES
Problem: Falls - Risk of 
Goal: *Absence of Falls Description Document Silvia Bains Fall Risk and appropriate interventions in the flowsheet. 10/16/2019 1043 by Juneaujose l Scruggs Outcome: Progressing Towards Goal 
Note:  
Fall Risk Interventions: 
Mobility Interventions: Patient to call before getting OOB Medication Interventions: Teach patient to arise slowly Elimination Interventions: Call light in reach, Patient to call for help with toileting needs 10/16/2019 1041 by Juneaujose l Scruggs Outcome: Progressing Towards Goal 
Note:  
Fall Risk Interventions: 
Mobility Interventions: Patient to call before getting OOB Medication Interventions: Teach patient to arise slowly Elimination Interventions: Call light in reach, Patient to call for help with toileting needs Problem: Patient Education: Go to Patient Education Activity Goal: Patient/Family Education 10/16/2019 1043 by Fabricio Scruggs Outcome: Progressing Towards Goal 
10/16/2019 1041 by Juneaujose l Scruggs Outcome: Progressing Towards Goal 
  
Problem: Pressure Injury - Risk of 
Goal: *Prevention of pressure injury Description Document Adalid Scale and appropriate interventions in the flowsheet. 10/16/2019 1043 by Fabricio Scruggs Outcome: Progressing Towards Goal 
Note:  
Pressure Injury Interventions: Activity Interventions: Increase time out of bed Mobility Interventions: Pressure redistribution bed/mattress (bed type) Nutrition Interventions: Document food/fluid/supplement intake 10/16/2019 1041 by Fabricio Scruggs Outcome: Progressing Towards Goal 
Note:  
Pressure Injury Interventions: Activity Interventions: Increase time out of bed Mobility Interventions: Pressure redistribution bed/mattress (bed type) Nutrition Interventions: Document food/fluid/supplement intake Problem: Patient Education: Go to Patient Education Activity Goal: Patient/Family Education 10/16/2019 1043 by Abbey Rodriguez Outcome: Progressing Towards Goal 
10/16/2019 1041 by Abbey Rodriguez Outcome: Progressing Towards Goal 
  
Problem: Gas Exchange - Impaired Goal: *Absence of hypoxia Outcome: Progressing Towards Goal

## 2019-10-16 NOTE — PROGRESS NOTES
Chart reviewed after tx to ICU for BIPAP currently. Screen completed by [facility] on 5th floor. Referral sent for PCP to [facility]. No other needs voiced at present. Oncology following pt. CM will continue to follow pt for any assist and d/c POC when medically stable.

## 2019-10-16 NOTE — PROCEDURES
The pt was placed in supine positon The R neck was prepped and drapped 1 % lidocaine was injected locally. Time out done The R ij was accessed with 18 gauge needle using ultrasound guidance A 20 cm trialysis catheter was then placed using SEldinger technique Good blood return. cxr pending

## 2019-10-16 NOTE — CONSULTS
Palliative Care Patient: Callie Lopez MRN: [id number]  SSN: [id number] YOB: 1945  Age: 75 y.o. Sex: male Date of Request: 10/16/2019 Date of Consult:  10/16/2019 Reason for Consult:  goals of care and medical decision making Requesting Physician: Dr. Kimberly Basurto 
 
 Assessment/Plan:  
 
Principal Diagnosis:   
Fatigue, Lethargy  R53.83 Additional Diagnoses: · Acute Respiratory Failure, Unspecified  J96.00 
· Encephalopathy, Unspecified  G93.40 · Counseling, Encounter for Medical Advice  Z71.9 
· Encounter for Palliative Care  Z51.5 Palliative Performance Scale (PPS): PPS: 30 Medical Decision Making:  
Reviewed and summarized chart from admission to present. Discussed case with appropriate providers. Reviewed laboratory and x-ray data. Patient lethargic, resting in bed on BiPAP. Wife, son, and other family members are present. Introduced the role of palliative care. Expressed that patient is very ill, but reassured them of our ongoing care. Patient is preparing to have temporary dialysis catheter placed for initiation of dialysis following. He is on maximum support on BiPAP. His wife has expressed to Sera Ellsworth, primary RN, that they want everything done other than long term ventilatory support. We will continue to follow. Will discuss findings with members of the interdisciplinary team.   
 
Thank you for this referral.    
 
  
. 
 
Subjective:  
 
History obtained from:  Care Provider and Chart Chief Complaint: Hypoxic respiratory failure History of Present Illness:  Mr. Avani Olvera is a 74 yo male with PMH DM, a fib, HLD, and other history as listed below. He presented to ER with 2 month history of feeling generally weak and unwell. Labs in ER revealed calcium of 13.2, hgb 7.1, creatinine of 4.95.  CT head completed concerning for multiple lytic lesions in the skull. He was admitted for further management of above issues and workup concerning for multiple myeloma. Patient with increasing oxygen requirements 10/15-10/16. On 10/16, he was transferred to ICU for BiPAP and urgent initiation of dialysis. Bone marrow biopsy on hold due to respiratory status. Advance Directive: No      
Code Status:  Full Code Health Care Power of : Unknown History reviewed. No pertinent past medical history. History reviewed. No pertinent surgical history. Family History Problem Relation Age of Onset  Heart Disease Mother  Diabetes Father  Stroke Father  Heart Disease Father  No Known Problems Sister Social History Tobacco Use  Smoking status: Current Every Day Smoker Packs/day: 1.00 Years: 21.00 Pack years: 21.00  Tobacco comment: quit in 1979 Substance Use Topics  Alcohol use: Not Currently Prior to Admission medications Medication Sig Start Date End Date Taking? Authorizing Provider  
atorvastatin (LIPITOR) 20 mg tablet Take 20 mg by mouth. Provider, Historical  
dilTIAZem XR (DILACOR XR) 240 mg XR capsule Take 240 mg by mouth. 3/16/17   Provider, Historical  
glipiZIDE (GLUCOTROL) 10 mg tablet Take 10 mg by mouth. Provider, Historical  
 
 
No Known Allergies Review of Systems: 
Review of systems not obtained due to patient factors: lethargic Objective:  
 
Visit Vitals /69 Pulse 63 Temp 99.2 °F (37.3 °C) Resp 30 Ht 6' (1.829 m) Wt 223 lb 8.7 oz (101.4 kg) SpO2 (!) 88% BMI 30.32 kg/m² Physical Exam: 
 
General:  Lethargic. Eyes:  Deferred. Nose: Nares normal. Septum midline. Neck: Supple, symmetrical, trachea midline. Lungs:   Coarse bilaterally, unlabored Heart:  Regular rate and rhythm. Abdomen:   Soft. Extremities: Normal, atraumatic, no cyanosis or edema Skin: Skin color, texture, turgor normal. No rash. Neurologic: Nonfocal  
Psych: Alert and oriented Assessment:  
 
Hospital Problems  Never Reviewed Codes Class Noted POA Acute metabolic encephalopathy FWA-64-XT: G93.41 
ICD-9-CM: 348.31  10/16/2019 Unknown Acute respiratory failure with hypoxia [facility]) ICD-10-CM: J96.01 
ICD-9-CM: 518.81  10/15/2019 No  
   
 Pulmonary hypertension (HCC) (Chronic) ICD-10-CM: I57.18 ICD-9-CM: 416.8  10/15/2019 Yes Hyperproteinemia- with likely hyperviscosity ICD-10-CM: E88.09 
ICD-9-CM: 273.8  10/15/2019 Unknown * (Principal) Hypercalcemia ICD-10-CM: L97.51 
ICD-9-CM: 275.42  10/12/2019 Yes Anemia ICD-10-CM: D64.9 ICD-9-CM: 285.9  10/12/2019 Yes MARCELA [facility] ICD-10-CM: N17.9 ICD-9-CM: 584.9  10/12/2019 Yes Signed By: Indigo Quiles NP October 16, 2019

## 2019-10-16 NOTE — PROGRESS NOTES
TRANSFER - OUT REPORT: 
 
Verbal report given to [facility] RN on [employer]  being transferred to ICU (unit) for change in patient condition(Hypoxemia) Report consisted of patients Situation, Background, Assessment and  
Recommendations(SBAR). Information from the following report(s) SBAR, Kardex, Recent Results and Procedure Verification was reviewed with the receiving nurse. Lines:  
Peripheral IV 10/14/19 Right Forearm (Active) Site Assessment Clean, dry, & intact 10/16/2019 10:38 AM  
Phlebitis Assessment 0 10/16/2019 10:38 AM  
Infiltration Assessment 0 10/16/2019 10:38 AM  
Dressing Status Clean, dry, & intact 10/16/2019  2:09 AM  
Dressing Type Tape;Transparent 10/16/2019 10:38 AM  
Hub Color/Line Status Pink;Flushed;Patent 10/16/2019 10:38 AM  
Alcohol Cap Used No 10/16/2019 10:38 AM  
   
Peripheral IV 10/16/19 Left Forearm (Active) Site Assessment Clean, dry, & intact 10/16/2019 10:38 AM  
Phlebitis Assessment 0 10/16/2019 10:38 AM  
Infiltration Assessment 0 10/16/2019 10:38 AM  
Dressing Status New 10/16/2019 10:38 AM  
Dressing Type Transparent 10/16/2019 10:38 AM  
Hub Color/Line Status Flushed 10/16/2019 10:38 AM  
Alcohol Cap Used No 10/16/2019 10:38 AM  
  
 
Opportunity for questions and clarification was provided. Patient transported with: 
 O2 @ 12 liters

## 2019-10-16 NOTE — PROGRESS NOTES
END OF SHIFT NOTE: 
 
Intake/Output 10/15 1901 - 10/16 0700 In: 5912 [P.O.:120; I.V.:1177] Out: 1155 [QRP:4594] Voiding: YES Catheter: NO 
Drain:   
 
 
 
 
 
Stool:  0 occurrences. Stool Assessment Stool Color: Leidy Elaina (10/15/19 0443) Stool Appearance: Loose (10/15/19 0443) Stool Amount: Small (10/15/19 0443) Stool Source/Status: Rectum (10/15/19 0443) Emesis:  0 occurrences. Emesis Assessment Appearance: Undigested food (10/13/19 0848) Emesis Amount: Small (10/14/19 0744) VITAL SIGNS Patient Vitals for the past 12 hrs: 
 Temp Pulse Resp BP SpO2  
10/16/19 0316 97.9 °F (36.6 °C) 78 (!) 32 150/87 (!) 85 % 10/16/19 0012     (!) 87 % 10/15/19 2254 98.1 °F (36.7 °C) 69 28 (!) 152/94 (!) 86 % 10/15/19 2033     (!) 89 % 10/15/19 1932  68   (!) 89 % 10/15/19 1918 97.6 °F (36.4 °C) 62 22 (!) 151/91 (!) 81 % Pain Assessment Pain 1 Pain Scale 1: Numeric (0 - 10) (10/15/19 0300) Pain Intensity 1: 0 (10/15/19 0300) Patient Stated Pain Goal: 0 (10/15/19 0300) Pain Reassessment 1: Patient resting w/respiratory rate greater than 10 (10/14/19 0305) Pain Location 1: Back (10/13/19 0131) Pain Orientation 1: Left;Right (10/12/19 2133) Pain Description 1: Aching; Sore (10/13/19 0131) Pain Intervention(s) 1: Rest;Position (10/13/19 0131) Ambulating Yes Additional Information: patient has heparin gtt infusing @ 13u/kg/hr. Pt pulled other IV out. Pt in need of blood, PICC team consulted for peripheral IV. Shift report to be given to oncoming nurse at the bedside.  
 
Pj Gayle RN

## 2019-10-16 NOTE — DIALYSIS
Hemodialysis treatment completed without complications. Patient alert and VS stable  /72  P 53   
 
 2 Kgs removed. Flushed both ports with 10 mL of NS.  CVC dressing clean, dry, and intact, tego caps intact, bilateral lumens wrapped with 4x4 gauze. Patient remains in 3102.

## 2019-10-16 NOTE — PROGRESS NOTES
Picc line ordered for PIV only. Arrived to pt's room to start PIV; pt has two working PIV's now. We will be on standby if further assistance needed for PIV.

## 2019-10-16 NOTE — PROGRESS NOTES
[facility] PROGRESS NOTE Follow up for: Kolton Mayer Subjective:  
 
Patient seen and examined. Chart, notes, labs, imaging, results all reviewed. On high flow oxygen for hypoxia , awaiting top be transferred to ICU Son and family present bedside Explained to family and pt the need to initiate dialysis today as soon as he is transferred to the ICU  
 
ROS: 
UTO Objective:  
Exam: 
Vitals:  
 10/16/19 0316 10/16/19 0728 10/16/19 0733 10/16/19 0800 BP: 150/87 192/84 Pulse: 78 77  (!) 109 Resp: (!) 32 24 Temp: 97.9 °F (36.6 °C) 99.3 °F (37.4 °C) SpO2: (!) 85% 93% (!) 89% Weight:      
Height:      
 
 
 
Intake/Output Summary (Last 24 hours) at 10/16/2019 8714 Last data filed at 10/16/2019 9448 Gross per 24 hour Intake 1417 ml Output 2230 ml Net -813 ml  
 
 
Current Facility-Administered Medications Medication Dose Route Frequency  0.9% sodium chloride infusion 250 mL  250 mL IntraVENous PRN  
 furosemide (LASIX) injection 80 mg  80 mg IntraVENous ONCE  
 cefTRIAXone (ROCEPHIN) 1 g in 0.9% sodium chloride (MBP/ADV) 50 mL  1 g IntraVENous Q24H  
 azithromycin (ZITHROMAX) 500 mg in 0.9% sodium chloride (MBP/ADV) 250 mL  500 mg IntraVENous Q24H  
 heparin 25,000 units in dextrose 500 mL infusion  18-36 Units/kg/hr IntraVENous TITRATE  sucralfate (CARAFATE) tablet 1 g  1 g Oral AC&HS  famotidine (PEPCID) tablet 20 mg  20 mg Oral DAILY  allopurinol (ZYLOPRIM) tablet 50 mg  50 mg Oral DAILY  atorvastatin (LIPITOR) tablet 20 mg  20 mg Oral QHS  dilTIAZem CD (CARDIZEM CD) capsule 240 mg  240 mg Oral DAILY  sodium chloride (NS) flush 5-40 mL  5-40 mL IntraVENous Q8H  
 sodium chloride (NS) flush 5-40 mL  5-40 mL IntraVENous PRN  
 acetaminophen (TYLENOL) tablet 650 mg  650 mg Oral Q4H PRN  
 ondansetron (ZOFRAN) injection 4 mg  4 mg IntraVENous Q4H PRN  
 0.9% sodium chloride infusion  100 mL/hr IntraVENous CONTINUOUS  
  0.9% sodium chloride infusion 250 mL  250 mL IntraVENous PRN  
 diphenhydrAMINE (BENADRYL) capsule 25 mg  25 mg Oral Q6H PRN  
 
 
EXAM 
GEN - on high flow oxygen CV - S1, S2, RRR, no rub, murmur, or gallop Lung - clear to auscultation bilaterally Abd - soft, nontender, BS present Ext - no edema Recent Labs 10/16/19 
0018 10/15/19 
0340 10/14/19 
2042 10/14/19 
0340 WBC 12.3* 8.0  --  6.5 HGB 6.6* 7.1* 7.7* 8.1* HCT 21.4* 22.6*  --  26.0*  
 161  --  150 Recent Labs 10/16/19 
0018 10/15/19 
0340 10/14/19 
0340  140 139  
K 5.2* 4.7 4.5  
* 112* 109* CO2 21 21 21 BUN 40* 30* 27* CREA 5.01* 4.73* 4.63* CA 12.4* 11.5* 10.9*  
* 99 141* Assessment and Plan:  
 
1. ? 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 Started getting volume overload as well .stopped IVF  Received lasix Will need to initiate dialysis today , Discussed with Dr Alex Dangelo in regards to the need for dialysis catheter , will initiate hd after line placement . Hypoxia present -  Possible PE -  on heparin 2. Hypercalcemia - worsening Received calcitonin Cannot use bisphosphonate sec to MARCELA  
 
3. ? PE  
TO be started on heparin 4. Hyperuricemia Received rasburicase Explained the need to initiate dialysis today to family . Explained the pros and cons . Family agreed to the plan . Lieutenant Kathi MD

## 2019-10-16 NOTE — DIALYSIS
Consent verified for renal replacement therapy. HD initiated using Right Temp CVC after Xray verified placement per Dr. Kevin Mir. Machine settings per MD order. Pt resting quietly on BiPap. Will monitor during treatment.

## 2019-10-16 NOTE — PROGRESS NOTES
Spoke to Dr. Sangita Moscoso regarding IV, pt pulled one IV out. One remaining IV has heparin infusing. Attempts failed at new IV. Since needing new IV for blood, PICC team consulted for peripheral IV. Will notify day shift staff to see if doctor thinks PICC is more appropriate.

## 2019-10-16 NOTE — PROGRESS NOTES
[facility] Hematology & Oncology Inpatient Hematology / Oncology Progress Note Admission Date: 10/12/2019  9:36 PM 
Reason for Admission/Hospital Course: Hypercalcemia [E83.52] Anemia [D64.9] MARCELA (acute kidney injury) ([facility] [address] [N17.9] 24 Hour Events: 
Afebrile, tachycardic, on Optiflow @ 50L [place] FLC 13k SPEP with m-spike 4.89 Not stable enough for BMbx at this time Awaiting ICU bed Starting Dex Family at bedside ROS: 
Unable to assess - pt lethargic/somnolent 10 point review of systems is otherwise negative with the exception of the elements mentioned above in the HPI. No Known Allergies OBJECTIVE: 
Patient Vitals for the past 8 hrs: 
 BP Temp Pulse Resp SpO2  
10/16/19 0800   (!) 109    
10/16/19 0733     (!) 89 % 10/16/19 0728 192/84 99.3 °F (37.4 °C) 77 24 93 % 10/16/19 0316 150/87 97.9 °F (36.6 °C) 78 (!) 32 (!) 85 % Temp (24hrs), Av.1 °F (36.7 °C), Min:97.4 °F (36.3 °C), Max:99.3 °F (37.4 °C) No intake/output data recorded. Physical Exam: 
Constitutional: Ill-appearing elderly male in no acute distress, sitting in the hospital bed. HEENT: Normocephalic and atraumatic. Oropharynx is clear, mucous membranes are moist.  Extraocular muscles are intact. Sclerae anicteric. Neck supple without JVD. No thyromegaly present. L occipital mass. Skin Warm and dry. No bruising and no rash noted. No erythema. No pallor. Respiratory Lungs are clear to auscultation bilaterally. On Optiflow. CVS Tachycardic rate, irregular rhythm and normal S1 and S2. No murmurs, gallops, or rubs. Abdomen Soft, nontender and nondistended, normoactive bowel sounds. No palpable mass. No hepatosplenomegaly. Neuro Grossly nonfocal with no obvious sensory or motor deficits. MSK Normal range of motion in general.  No edema and no tenderness. Psych Calm, cooperative. Lethargic/somnolent Labs: 
   
Recent Labs 10/16/19 
0018 10/15/19 
0340 10/14/19 2042 10/14/19 
0340 WBC 12.3* 8.0  --  6.5  
RBC 2.07* 2.25*  --  2.61* HGB 6.6* 7.1* 7.7* 8.1* HCT 21.4* 22.6*  --  26.0*  
.4* 100.4*  --  99.6*  
MCH 31.9 31.6  --  31.0 MCHC 30.8* 31.4  --  31.2*  
RDW 17.8* 17.1*  --  15.9*  
 161  --  150 GRANS 62 62  --  63  
LYMPH 28 30  --  26 MONOS 5 5  --  8  
EOS 1 1  --  1 BASOS 0  --   --  1 IG 4  --   --  2  
DF AUTOMATED MANUAL  --  AUTOMATED ANEU 7.7 5.0  --  4.1 ABL 3.4 2.4  --  1.7 ABM 0.6 0.4  --  0.5 CHAVA 0.1 0.1  --  0.0 ABB 0.0  --   --  0.0 AIG 0.5  --   --  0.1 Recent Labs 10/16/19 
0018 10/15/19 
0340 10/14/19 
0340  140 139  
K 5.2* 4.7 4.5  
* 112* 109* CO2 21 21 21 AGAP 8 7 9 * 99 141* BUN 40* 30* 27* CREA 5.01* 4.73* 4.63* GFRAA 15* 16* 16* GFRNA 12* 13* 13* CA 12.4* 11.5* 10.9* SGOT  --   --  46* AP  --   --  95  
TP  --   --  11.3* ALB  --   --  2.0*  
GLOB  --   --  9.3* AGRAT  --   --  0.2* Imaging: 
Gann Wanda [[id number]] Collected: 10/14/19 2204 Order Status: Completed Updated: 10/14/19 2207 Narrative:    
Renal ultrasound. CLINICAL INDICATION:  Acute on chronic renal disease PROCEDURE: Realtime grayscale color Doppler evaluation of the kidneys and 
bladder. COMPARISON: No prior similar studies available for direct comparison. FINDINGS: The right kidney is normal in size but diffusely increased in 
echogenicity measuring 12.2 cm. A 2.4 cm simple cyst is noted off the midpole. The left kidney is normal in size and diffusely increased in echogenicity 
measuring 11.5 cm. A 1.2 cm simple cyst is noted within the midpole region. There is an indeterminate echogenic focus within the renal parenchyma likely a 
small stone. There is no hydronephrosis. The bladder is unremarkable. The aorta 
is normal in caliber. Impression:    
IMPRESSION:  
1. Bilateral diffuse increased renal cortical echogenicity can be seen with medical renal disease. 2. Indeterminate calcifications in the left renal parenchyma. Nephrolithiasis 
favored. 3. No hydronephrosis. 4. Bilateral simple renal cysts. IR BX BONE MARROW DIAGNOSTIC [[id number]] Order Status: No result XR BONE SURVEY LTD (METS) [[id number]] Collected: 10/13/19 1121 Order Status: Completed Updated: 10/13/19 1127 Narrative:    
History: Low back, lateral rib, and left posterior shoulder pain EXAM: Metastatic bone survey FINDINGS: Innumerable lytic lesions are present throughout the skull, including 
a large lesion within the occipital portion of the calvarium measuring 5 cm. Degenerative change of the cervical, thoracic, and lumbar spine noted without 
additional lytic foci. The included lungs are clear. Multiple lytic lesions seen 
within the left humerus. No definite lytic foci within the femoral bones are 
within the pelvis, though evaluation the pelvis is limited due to overlying 
bowel gas. Impression:    
IMPRESSION: 
 
Innumerable lytic foci within the skull as well as within the left humerus. Findings suggestive of multiple myeloma or metastatic disease. XR CHEST PA LAT [[id number]] Collected: 10/12/19 2328 Order Status: Completed Updated: 10/12/19 2330 Narrative:    
EXAM: Chest x-ray. INDICATION: Cough. COMPARISON: None. TECHNIQUE: Frontal and lateral view chest x-ray. FINDINGS: The lungs are clear. The cardiac size, mediastinal contour and 
pulmonary vasculature are normal. No pneumothorax or pleural effusion is seen. Impression:    
IMPRESSION: No acute process. CT HEAD WITHOUT CONTRAST [[id number]] Collected: 10/12/19 2323 Order Status: Completed Updated: 10/12/19 2330 Narrative:    
EXAM: Noncontrast CT head. INDICATION: Dizziness. COMPARISON: None. TECHNIQUE: Axial noncontrast CT images of the head were obtained.  Radiation 
dose reduction techniques were used for this study.  Our CT scanners use one or all of the following:  Automated exposure control, adjustment of the mA and/or 
kV according to patient size, iterative reconstruction. FINDINGS: There is a 3.3 x 2.9 cm lytic soft tissue mass in the left occipital 
bone, which minimally indents the underlying left occipital lobe. There is no 
midline shift or significant mass effect. There are also innumerable smaller 
subcentimeter round lytic lesions throughout the calvarium. Brain volume is 
appropriate for age. No acute infarct, hemorrhage or evidence of hydrocephalus 
is seen. The basal cisterns are preserved. The visualized paranasal sinuses and 
mastoid air cells are clear. There has been bilateral eye surgery. Impression:    
IMPRESSION:  
1. 3.3 cm lytic soft tissue mass in the left occipital bone, minimally indenting 
the underlying left occipital lobe. In addition, there are innumerable 
subcentimeter lytic lesions throughout the remainder of the calvarium. These 
could relate to metastatic disease or multiple myeloma. 2. No acute infarct or hemorrhage. Medications: 
Current Facility-Administered Medications Medication Dose Route Frequency  0.9% sodium chloride infusion 250 mL  250 mL IntraVENous PRN  
 furosemide (LASIX) injection 80 mg  80 mg IntraVENous ONCE  
 cefTRIAXone (ROCEPHIN) 1 g in 0.9% sodium chloride (MBP/ADV) 50 mL  1 g IntraVENous Q24H  
 azithromycin (ZITHROMAX) 500 mg in 0.9% sodium chloride (MBP/ADV) 250 mL  500 mg IntraVENous Q24H  
 heparin 25,000 units in dextrose 500 mL infusion  18-36 Units/kg/hr IntraVENous TITRATE  sucralfate (CARAFATE) tablet 1 g  1 g Oral AC&HS  famotidine (PEPCID) tablet 20 mg  20 mg Oral DAILY  allopurinol (ZYLOPRIM) tablet 50 mg  50 mg Oral DAILY  atorvastatin (LIPITOR) tablet 20 mg  20 mg Oral QHS  dilTIAZem CD (CARDIZEM CD) capsule 240 mg  240 mg Oral DAILY  sodium chloride (NS) flush 5-40 mL  5-40 mL IntraVENous Q8H  
  sodium chloride (NS) flush 5-40 mL  5-40 mL IntraVENous PRN  
 acetaminophen (TYLENOL) tablet 650 mg  650 mg Oral Q4H PRN  
 ondansetron (ZOFRAN) injection 4 mg  4 mg IntraVENous Q4H PRN  
 0.9% sodium chloride infusion 250 mL  250 mL IntraVENous PRN  
 diphenhydrAMINE (BENADRYL) capsule 25 mg  25 mg Oral Q6H PRN  
 
 
 
ASSESSMENT: 
 
Problem List  Never Reviewed Codes Class Noted Acute respiratory failure with hypoxia [facility]) ICD-10-CM: J96.01 
ICD-9-CM: 518.81  10/15/2019 Pulmonary hypertension (HCC) ICD-10-CM: I27.20 ICD-9-CM: 416.8  10/15/2019 Hyperproteinemia- with likely hyperviscosity ICD-10-CM: E88.09 
ICD-9-CM: 273.8  10/15/2019 Diabetes mellitus (HCC) (Chronic) ICD-10-CM: E11.9 ICD-9-CM: 250.00  10/13/2019 Essential hypertension (Chronic) ICD-10-CM: I10 
ICD-9-CM: 401.9  10/13/2019 Overview Signed 10/13/2019  1:04 AM by Edward Coleman MD  
  Last Assessment & Plan: Hypertension is unchanged. Continue current treatment regimen. Blood pressure will be reassessed at the next regular appointment. Paroxysmal atrial fibrillation (HCC) (Chronic) ICD-10-CM: I48.0 ICD-9-CM: 427.31  10/13/2019 Overview Signed 10/13/2019  1:04 AM by Edward Coleman MD  
  Last Assessment & Plan: He has had no recurrences. I still think he remains a bleeding risk. I would like to hold off of 4 [facility] for now--and check again on him in 3 months. Continue dilt--but change to 360 mg tabs. * (Principal) Hypercalcemia ICD-10-CM: G39.70 
ICD-9-CM: 275.42  10/12/2019 Anemia ICD-10-CM: D64.9 ICD-9-CM: 285.9  10/12/2019 MARCELA (acute kidney injury) [facility] ICD-10-CM: N17.9 ICD-9-CM: 584.9  10/12/2019 Mr. Farida Suero is a 75 y.o. male admitted on 10/12/2019 with a primary diagnosis of hypercalcemia and possible metastatic malignancy/myeloma.   Mr. Farida Suero is a gentleman who has received no formal medical care over the past several years. He stated that he had no primary care physician. Lab data from [facility] in 2017 show a creatinine that evelyn as high as 6.1 and was 2.80 at discharge. His chronic baseline is unknown. As noted, there is a history of diabetes mellitus. For two-three months leading to this admission, he has gradually deteriorated with low back discomfort, forgetfulness, anorexia and 35 pound weight loss. He has noted a growth on the back of his head. He was finally prevailed upon to come to the hospital for evaluation. Data are listed below but he was found to be in acute renal failure, hypercalcemic, anemic and with multiple lytic lesions involving his calvarium including a 3 cm destructive lesion involving the left occipital bone. PLAN: 
Concern for myeloma - We will initiate workup for myeloma based on strong presumption and if negative will pursue other alternatives. - Check skeletal survey, SPEP, serum free light chains, beta 2 microglobulin , LDH, urine IEP, marrow biopsy 10/14 Skeletal survey with innumerable lytic foci w/i the skull and L humerus. SPEP/UPEP/FLC/Beta 2 pending. Awaiting BMbx. 10/15 Kappa FLC 13k. Awaiting BMbx. 
10/16 SPEP with m-spike 4.89. Not stable enough for BMbx at this time. Will go ahead with pulse dose steroids - Dex 40mg IV x 4 days. Had code status discussion, wishes to remain full code, but wife states he would not want to be on vent for an extended period of time. Hypercalcemia - Treatment of his hypercalcemia may be problematic with elevated creatinine and elevated BNP. Continue fluids and I have added calcitonin. Use Lasix to balance I/O's. Hold of on Zometa for now given creatinine. 10/14 CCa++ down to 12.2. Con't IVF. 10/15 CCa++ 13.1. Con't calcitonin. 10/16 CCa++ up to 14. MARCELA 
- Nephrology should be on board to manage what is likely acute kidney injury from ? myeloma, hypercalcemia, etc. Superimposed on CRF.  Hopefully this will reverse but dialysis could be an equally potential outcome. 10/14 Cr 4.6. Renal US c/w medical renal disease and possible L nephrolithiasis. Neph consult pending. 10/16 Cr 5.01. Neph following. Hyperuricemia / TLS 
10/14 Uric acid 12.9. Rasburicase and renally dosed allopurinol ordered. 10/15 Uric acid down to 1.3 Dyspnea / hypoxia 10/15 On Optiflow. CXR/CT chest pending. Pulm following. On Azith/Roland. On empiric heparin gtt. . Echo with dilated RV and pulm HTN. 
10/16 CXR with volume overload. Plans for VQ scan when more stable. Pulm following. Anemia 10/16 Transfuse 1 unit PRBCs Goals and plan of care reviewed with the patient. All questions answered to the best of our ability. Thank you for allowing us to participate in the care of Mr. Mcmanus Courser. Yinka Pina NP [facility] Hematology & Oncology [id number] [place] [address] Office : [phone] Fax : [phone]

## 2019-10-16 NOTE — PROGRESS NOTES
Co-oxymetry done and noted methemoglobinemia level is 9 which is above the 1.5 upper limit of normal. Patient is confused and hypoxemia and may benefit from methylene blue dose. Will talk to Dr. Janeen Rogers, since ICU doctor today Per Up-to-date: MB, given intravenously in a dose of 1 to 2 mg/kg over five minutes, provides an artificial electron transporter for the ultimate reduction of methemoglobin via the NADPH-dependent pathway Bert Diaz MD

## 2019-10-17 PROBLEM — D74.9 METHEMOGLOBINEMIA: Status: ACTIVE | Noted: 2019-01-01

## 2019-10-17 NOTE — DIALYSIS
Consent verified for renal replacement therapy. HD initiated using RTPC. Machine settings per MD order. Pt on Heparin gtt bipap, drowsy. Will monitor during treatment.

## 2019-10-17 NOTE — PROGRESS NOTES
Bedside shift report received from [facility], [facility]. Pt resting in bed, responsive to verbal stimuli. Oriented to person only. Family at bedside, will continue care. Heparin gtt dually verified.

## 2019-10-17 NOTE — PROGRESS NOTES
[facility] PROGRESS NOTE Follow up for: Manju Ledezma Subjective:  
 
Patient seen and examined. Chart, notes, labs, imaging, results all reviewed. On high flow oxygen for hypoxia , On oxygen mask Received methylene blue for methemoglobinemia ROS: 
UTO Objective:  
Exam: 
Vitals:  
 10/17/19 2756 10/17/19 8498 10/17/19 0715 10/17/19 6917 BP: 164/77 171/83 Pulse: 67 (!) 57 Resp:  23 Temp:   97.7 °F (36.5 °C) SpO2: (!) 79% (!) 88%  (!) 88% Weight:      
Height:      
 
 
 
Intake/Output Summary (Last 24 hours) at 10/17/2019 5694 Last data filed at 10/17/2019 5850 Gross per 24 hour Intake 1599.9 ml Output 2100 ml Net -500.1 ml  
 
 
Current Facility-Administered Medications Medication Dose Route Frequency  0.9% sodium chloride infusion 250 mL  250 mL IntraVENous PRN  
 dexamethasone (DECADRON) 40 mg in 0.9% sodium chloride 50 mL IVPB  40 mg IntraVENous DAILY  famotidine (PF) (PEPCID) 20 mg in sodium chloride 0.9% 10 mL injection  20 mg IntraVENous DAILY  0.9% sodium chloride infusion 250 mL  250 mL IntraVENous PRN  
 0.9% sodium chloride infusion 250 mL  250 mL IntraVENous PRN  
 cefTRIAXone (ROCEPHIN) 1 g in 0.9% sodium chloride (MBP/ADV) 50 mL  1 g IntraVENous Q24H  
 azithromycin (ZITHROMAX) 500 mg in 0.9% sodium chloride (MBP/ADV) 250 mL  500 mg IntraVENous Q24H  
 heparin 25,000 units in dextrose 500 mL infusion  18-36 Units/kg/hr IntraVENous TITRATE  allopurinol (ZYLOPRIM) tablet 50 mg  50 mg Oral DAILY  atorvastatin (LIPITOR) tablet 20 mg  20 mg Oral QHS  dilTIAZem CD (CARDIZEM CD) capsule 240 mg  240 mg Oral DAILY  sodium chloride (NS) flush 5-40 mL  5-40 mL IntraVENous Q8H  
 sodium chloride (NS) flush 5-40 mL  5-40 mL IntraVENous PRN  
 acetaminophen (TYLENOL) tablet 650 mg  650 mg Oral Q4H PRN  
 ondansetron (ZOFRAN) injection 4 mg  4 mg IntraVENous Q4H PRN  
 diphenhydrAMINE (BENADRYL) capsule 25 mg  25 mg Oral Q6H PRN  
 
 
 EXAM 
GEN - on high flow oxygen CV - S1, S2, RRR, no rub, murmur, or gallop Lung - b/l crackles present Abd - soft, nontender, BS present Ext - no edema Recent Labs 10/17/19 
0401 10/16/19 
1847 10/16/19 
0018 10/15/19 
0340 WBC 13.5*  --  12.3* 8.0 HGB 6.7* 6.6* 6.6* 7.1*  
HCT 20.2* 20.2* 21.4* 22.6*  
*  --  159 161 Recent Labs 10/17/19 
3484 10/16/19 
0018 10/15/19 
0340 * 138 140  
K 4.7 5.2* 4.7  109* 112* CO2 24 21 21 BUN 47* 40* 30* CREA 4.72* 5.01* 4.73* CA 11.3* 12.4* 11.5*  
* 146* 99 MG 1.8  --   --   
 
 
Assessment and Plan:  
 
1. ? 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 Hypercalcemia Better with dialysis 3. ? PE  
TO be started on heparin 4. Hyperuricemia Received rasburicase 5. Methemoglobinemia - received methylene blue Explained POC to his wife bedside Shaheed Butcher MD

## 2019-10-17 NOTE — PROGRESS NOTES
Figueroa Bruce Admission Date: 10/12/2019 Daily Progress Note: 10/17/2019 The patient's chart is reviewed and the patient is discussed with the staff. 75 y.o.  male seen and evaluated at the request of Dr. Cristy Cheadle.  He was admitted with a several month history of weight loss (35 lbs) and progressive weakness. He has had some back pain and he feels nauseated. Upon admission, he was found to be in acute renal failure (creat 4.9, ? Baseline) and had an elevated calcium level (13.8). He has received IV fluids and calcitonin. Head CT showed multiple lytic lesions in the skull and the bone survey showed a lesion in the left humerus as well. His beta 2 microglobulin level, IgA levels are elevated and the [place] free light chain studies are abnormal.  Oncology has seen and suspects multiple myeloma - bone marrow biopsy planned. He was anemic on admission and has received 2 units of blood in addition to IV fluids. His calcium level has improved. Nephrology following for renal failure. Methemoglobinemia noted- given methylene blue 10/16/19 Subjective: On HD, continues to be stuporous on NIPPV Current Facility-Administered Medications Medication Dose Route Frequency  0.9% sodium chloride infusion 250 mL  250 mL IntraVENous PRN  
 dexamethasone (DECADRON) 40 mg in 0.9% sodium chloride 50 mL IVPB  40 mg IntraVENous DAILY  famotidine (PF) (PEPCID) 20 mg in sodium chloride 0.9% 10 mL injection  20 mg IntraVENous DAILY  0.9% sodium chloride infusion 250 mL  250 mL IntraVENous PRN  
 0.9% sodium chloride infusion 250 mL  250 mL IntraVENous PRN  
 cefTRIAXone (ROCEPHIN) 1 g in 0.9% sodium chloride (MBP/ADV) 50 mL  1 g IntraVENous Q24H  
 azithromycin (ZITHROMAX) 500 mg in 0.9% sodium chloride (MBP/ADV) 250 mL  500 mg IntraVENous Q24H  
 heparin 25,000 units in dextrose 500 mL infusion  18-36 Units/kg/hr IntraVENous TITRATE  allopurinol (ZYLOPRIM) tablet 50 mg  50 mg Oral DAILY  atorvastatin (LIPITOR) tablet 20 mg  20 mg Oral QHS  dilTIAZem CD (CARDIZEM CD) capsule 240 mg  240 mg Oral DAILY  sodium chloride (NS) flush 5-40 mL  5-40 mL IntraVENous Q8H  
 sodium chloride (NS) flush 5-40 mL  5-40 mL IntraVENous PRN  
 acetaminophen (TYLENOL) tablet 650 mg  650 mg Oral Q4H PRN  
 ondansetron (ZOFRAN) injection 4 mg  4 mg IntraVENous Q4H PRN  
 diphenhydrAMINE (BENADRYL) capsule 25 mg  25 mg Oral Q6H PRN Objective:  
 
Vitals:  
 10/17/19 0715 10/17/19 0719 10/17/19 1013 10/17/19 1030 BP:   171/72 156/83 Pulse:   (!) 54 (!) 45 Resp:      
Temp: 97.7 °F (36.5 °C) SpO2:  (!) 88% Weight:      
Height:      
 
Intake and Output:  
10/15 1901 - 10/17 0700 In: 2033.2 [P.O.:120; I.V.:1559] Out: 3740 [FXEA] 10/17 0701 - 10/17 190 In: 863.7 [I.V.:432] Out: - Physical Exam:  
Constitutional:  the patient is well developed and in no acute distress HEENT:  Sclera clear, pupils equal, oral mucosa moist 
Lungs: Coarse bilaterally. Currently wearing 12 liter high flow cannula. Respirations look more labored today than yesterday. Cardiovascular:  RRR without M,G,R 
Abd/GI: soft and non-tender; with positive bowel sounds. Ext: warm without cyanosis. There is no lower leg edema. Musculoskeletal: moves all four extremities with equal strength Skin:  no jaundice or rashes, no wounds Neuro: Less alert today. Mumbling to answer questions. Musculoskeletal: can't ambulate at present - not alert enough and oxygen levels low. No deformity Psychiatric: Calm. Review of Systems - Review of Systems Constitutional: Positive for malaise/fatigue. Respiratory: Positive for cough. Cardiovascular: Positive for chest pain. Gastrointestinal: Positive for nausea. Musculoskeletal: Positive for back pain. Lines: peripheral IV forearm CHEST XRAY: pending LAB Recent Labs 10/17/19 9837 10/16/19 
1847 10/16/19 
0018 10/15/19 
0340 WBC 13.5*  --  12.3* 8.0 HGB 6.7* 6.6* 6.6* 7.1*  
HCT 20.2* 20.2* 21.4* 22.6*  
*  --  159 161 Recent Labs 10/17/19 
1212 10/16/19 
0018 10/15/19 
0340 * 138 140  
K 4.7 5.2* 4.7  109* 112* CO2 24 21 21 * 146* 99 BUN 47* 40* 30* CREA 4.72* 5.01* 4.73* MG 1.8  --   --   
ALB 1.6*  --   --   
SGOT 314*  --   --   
 
Lab Results Component Value Date/Time Calcium 11.3 (H) 10/17/2019 03:59 AM  
 Phosphorus 5.7 (H) 10/12/2019 10:02 PM  
 
Assessment:  (Medical Decision Making) Patient Active Problem List  
Diagnosis Code  Hypercalcemia E83.52  
 Anemia D64.9  MARCELA (acute kidney injury) ([facility] [address] N17.9  Diabetes mellitus ([facility] [address] E11.9  
 Essential hypertension I10  
 Paroxysmal atrial fibrillation (HCC) I48.0  Acute respiratory failure with hypoxia (HCC) J96.01  
 Pulmonary hypertension (HCC) I27.20  Hyperproteinemia- with likely hyperviscosity E88.09  
 Acute metabolic encephalopathy M68.85 Plan:  (Medical Decision Making) Hospital Problems  Never Reviewed Codes Class Noted POA Acute respiratory failure with hypoxia [facility]) ICD-10-CM: J96.01 
ICD-9-CM: 518.81  10/15/2019 No  
 Oxygenation still a problem. Bedside oximetry has not been coorelating with ABG so were following blood gas. Had to increase flow last night and he had some chest pain. Remains on heparin infusion for suspected PE Pulmonary hypertension (HCC) ICD-10-CM: I27.20 ICD-9-CM: 416.8  10/15/2019 Yes RV dilated on echo - ? PE versus chronic. No previous echo for review Hyperproteinemia- with likely hyperviscosity ICD-10-CM: E88.09 
ICD-9-CM: 273.8  10/15/2019 Unknown Viscosity results pending * (Principal) Hypercalcemia ICD-10-CM: C83.99 
ICD-9-CM: 275.42  10/12/2019 Yes Calcium back up some today. Anemia ICD-10-CM: D64.9 ICD-9-CM: 285.9  10/12/2019 Yes Worse. MARCELA (acute kidney injury) ([facility] [address] ICD-10-CM: N17.9 ICD-9-CM: 584.9  10/12/2019 Yes Worse. Nephrology now involved. Urine dark but good output- on HD 2nd session today Hospital Problems  Never Reviewed Codes Class Noted POA Methemoglobinemia ICD-10-CM: D74.9 ICD-9-CM: 289.7  10/17/2019 Unknown S/p methylene blue administration- re check methemoglobin level. Discussed with wife, continue supportive care- redose methylene blue if MHb still high. TCCTS 32 min Juanjo Martin MD 
 
More than 50% of time documented was spent face-to-face contact with the patient and in the care of the patient on the floor/unit where the patient is located.

## 2019-10-17 NOTE — DIALYSIS
Hemodialysis treatment completed without complications. Patient w/o change and VS stable  BP   P 62   
 
 2Kgs removed. Flushed both ports with 10 mL of NS.  CVC dressing clean, dry, and intact, [place] caps intact, bilateral lumens wrapped with 4x4 gauze. Patient remains in room.

## 2019-10-17 NOTE — PROGRESS NOTES
Hospitalist Note Admit Date:  10/12/2019  9:36 PM  
Name:  Mu Pruitt Age:  75 y.o. 
:  1945 MRN:  [id number] PCP:  None Treatment Team: Attending Provider: Yoselin Daniels DO; Consulting Provider: Ardith Dakin, MD; Consulting Provider: Ashutosh Keene MD; Consulting Provider: Emmy Quintero MD; Consulting Provider: Hilda Pickens MD; Consulting Provider: Byron Brown NP; Care Manager: Alisha Frausto RN 
 
HPI/Subjective:  
 
 
 
Mr. Ginger Lombardo is a 76 yo male with PMH of AFIB, DM2, HLP admitted with malaise and weakness, found with hypercalcemia, anemia, MARCELA. CT head showed lytic lesions of skull concerning for multiple myeloma versus metastatic disease. He has been seen by oncology and managed with hydration, calcitonin, transfusion. He has been a new dialysis start this admit due to failure of renal recovery. On 10-16-19 he required ICU transfer due to acute hypoxia requiring BIPAP. There was a large discrepancy between pulse ox (low)  and ABG (high paO2). Co-oximeter showed elevated methemoglobin of 9. He received methylene blue on 10-16-19. Oncology was pending bone marrow biopsy not done to date. 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 Additionally there was some concern for PE contribution to respiratory failure thus he has been placed on IV heparin as too unstable for V/Q scan and unable to have CTA chest due to MARCELA. He has been on IV antibiotics for possible pneumonia. Palliative care following. Discharge plans pending 10-17-19 seen with family present, confused and lethargic on BIPAP Objective:  
 
Patient Vitals for the past 24 hrs: 
 Temp Pulse Resp BP SpO2  
10/17/19 1428  66 19 151/78 96 % 10/17/19 1414  69 15 150/70 95 % 10/17/19 1358  75 27 155/82 94 % 10/17/19 1343  67 23 159/72 90 % 10/17/19 1328  62 15 137/72 98 % 10/17/19 1319  65 15 155/76 95 % 10/17/19 1315  62 19 149/82 95 % 10/17/19 1300  71  148/72   
10/17/19 1258  75 16  97 % 10/17/19 1243  63 17 147/79 98 % 10/17/19 1230  72  138/73   
10/17/19 1228  69 15  96 % 10/17/19 1214  71 27 144/72 95 % 10/17/19 1200  72  148/73   
10/17/19 1159  (!) 51 24  (!) 85 % 10/17/19 1145 97.6 °F (36.4 °C)      
10/17/19 1144  62 24 119/83 (!) 78 % 10/17/19 1130  60  147/76   
10/17/19 1128  62 24  (!) 76 % 10/17/19 1114  (!) 56 16 149/71 94 % 10/17/19 1100  (!) 47 22 141/69 99 % 10/17/19 1058  (!) 45 18  97 % 10/17/19 1043  64 20 153/72 93 % 10/17/19 1030  (!) 45  156/83   
10/17/19 1028  (!) 54 20  95 % 10/17/19 1013  (!) 54  171/72   
10/17/19 0719     (!) 88 % 10/17/19 0715 97.7 °F (36.5 °C)      
10/17/19 0658  (!) 57 23 171/83 (!) 88 % 10/17/19 0628  67  164/77 (!) 79 % 10/17/19 0627 97.8 °F (36.6 °C) 71  164/77 (!) 31 % 10/17/19 0601 97.7 °F (36.5 °C) 65 21 173/84 91 % 10/17/19 0358  64 17 153/77 95 % 10/17/19 0344     93 % 10/17/19 0343  61 (!) 32 133/65 94 % 10/17/19 0258 98 °F (36.7 °C) (!) 59 20 165/79 95 % 10/17/19 0158  (!) 55 18 139/64 (!) 88 % 10/17/19 0058  (!) 46 24 153/71 98 % 10/16/19 2358  (!) 44 21 135/62 92 % 10/16/19 2345     90 % 10/16/19 2259     93 % 10/16/19 2258  (!) 46 27 134/57 93 % 10/16/19 2255 98 °F (36.7 °C) (!) 47 20 141/65   
10/16/19 2230 98 °F (36.7 °C) (!) 49 27 146/70   
10/16/19 2158  (!) 47 (!) 31 131/61 92 % 10/16/19 2058  (!) 50 22 135/62 (!) 69 % 10/16/19 1958  (!) 51 24 130/62 93 % 10/16/19 1928  (!) 54 25 134/63 94 % 10/16/19 1924     92 % 10/16/19 1913  (!) 52  129/59 92 % 10/16/19 1858  (!) 52 29 134/63 91 % 10/16/19 1843  (!) 52  142/64 90 % 10/16/19 1828  (!) 55 28 144/67 90 % 10/16/19 1813  (!) 53 19 141/63 (!) 85 % 10/16/19 1758  (!) 53 22 153/72 90 % 10/16/19 1756  (!) 53  151/70   
10/16/19 1743  (!) 55 23 151/70 92 % 10/16/19 1728  (!) 55 23 167/80 90 % 10/16/19 1726  (!) 56  172/81   
10/16/19 1713  (!) 56 27 172/81 (!) 86 % 10/16/19 1704  (!) 58 (!) 35 174/79 (!) 87 % 10/16/19 1659  (!) 58  174/79   
10/16/19 1643  (!) 56 28 165/79 (!) 81 % 10/16/19 1629  (!) 58 30 157/73 (!) 56 % 10/16/19 1625  (!) 56  144/64   
10/16/19 1613  (!) 56 26 144/64 91 % 10/16/19 1559  (!) 58  152/70   
10/16/19 1558  (!) 57 26 152/70 95 % 10/16/19 1543  (!) 54 29 157/71 91 % 10/16/19 1537  (!) 54  161/73   
10/16/19 1528  (!) 57 (!) 32 161/73 (!) 88 % 10/16/19 1513  (!) 58 (!) 31 160/68 91 % 10/16/19 1510 97.5 °F (36.4 °C)      
10/16/19 1458  (!) 59 28 164/76 95 % 10/16/19 1443  63 28 176/86 (!) 66 % Oxygen Therapy O2 Sat (%): 96 % (10/17/19 1428) Pulse via Oximetry: 60 beats per minute (10/17/19 1428) O2 Device: Heated; Hi flow nasal cannula;Humidifier(Opti flow) (10/17/19 1100) O2 Flow Rate (L/min): 50 l/min (10/17/19 1100) O2 Temperature: 87.8 °F (31 °C) (10/16/19 1222) FIO2 (%): 50 % (10/17/19 1100) Estimated body mass index is 29.99 kg/m² as calculated from the following: 
  Height as of this encounter: 6' (1.829 m). Weight as of this encounter: 100.3 kg (221 lb 1.9 oz). Intake/Output Summary (Last 24 hours) at 10/17/2019 1435 Last data filed at 10/17/2019 1432 Gross per 24 hour Intake 1727.38 ml Output 4100 ml Net -2372.62 ml *Note that automatically entered I/Os may not be accurate; dependent on patient compliance with collection and accurate  by techs. General:    Elderly, confused, no distress CV:   RRR. No murmur, rub, or gallop. No edema Lungs:   CTAB. No wheezing, rhonchi, or rales. Anterior Abdomen:   Soft, nontender, nondistended. Decreased BS Extremities: Warm and dry Skin:     No rashes or jaundice. Neuro:  Confused Data Review: 
I have reviewed all labs, meds, and studies from the last 24 hours: Recent Results (from the past 24 hour(s)) TROPONIN I Collection Time: 10/16/19  2:48 PM  
Result Value Ref Range Troponin-I, Qt. 0.33 (HH) 0.02 - 0.05 NG/ML  
CK WITH MB  
 Collection Time: 10/16/19  2:48 PM  
Result Value Ref Range  21 - 215 U/L  
 CK - MB 1.2 0.5 - 3.6 ng/ml CK-MB Index 1.2 <2.5 % MISC. LAB TEST Collection Time: 10/16/19  3:46 PM  
Result Value Ref Range Test Description: METHEMOGLOBIN LEVEL Reference Lab: CANCELLED BY LAB Results: PENDING   
HGB & HCT Collection Time: 10/16/19  6:47 PM  
Result Value Ref Range HGB 6.6 (LL) 13.6 - 17.2 g/dL HCT 20.2 (LL) 41.1 - 50.3 % PTT Collection Time: 10/16/19 10:26 PM  
Result Value Ref Range aPTT 115.0 (H) 24.7 - 39.8 SEC  
TYPE & SCREEN Collection Time: 10/17/19  2:12 AM  
Result Value Ref Range Crossmatch Expiration 10/20/2019 ABO/Rh(D) O POSITIVE Antibody screen NEG Unit number [id number] Blood component type RC LRIR Unit division 00 Status of unit ISSUED Crossmatch result Compatible POC G3 Collection Time: 10/17/19  3:51 AM  
Result Value Ref Range Device: BIPAP    
 FIO2 (POC) 100 % pH (POC) 7.512 (H) 7.35 - 7.45    
 pCO2 (POC) 32.3 (L) 35 - 45 MMHG  
 pO2 (POC) 281 (H) 75 - 100 MMHG  
 HCO3 (POC) 25.9 22 - 26 MMOL/L  
 sO2 (POC) 100 (H) 95 - 98 % Base excess (POC) 3 mmol/L Tidal volume 707 ml Set Rate 16 bpm  
 PEEP/CPAP (POC) 10 cmH2O  
 PIP (POC) 14 Allens test (POC) YES Inspiratory Time 0.9 sec Site RIGHT RADIAL Patient temp. 98.6 Specimen type (POC) ARTERIAL Performed by Letty   
 CO2, POC 27 MMOL/L Spontaneous timed 16 Pressure control 14 Critical value read back 03:53 Respiratory comment: NurseNotified Exhaled minute volume 19.50 L/min COLLECT TIME 345 METABOLIC PANEL, COMPREHENSIVE Collection Time: 10/17/19  3:59 AM  
Result Value Ref Range Sodium 135 (L) 136 - 145 mmol/L Potassium 4.7 3.5 - 5.1 mmol/L Chloride 102 98 - 107 mmol/L  
 CO2 24 21 - 32 mmol/L Anion gap 9 7 - 16 mmol/L Glucose 205 (H) 65 - 100 mg/dL BUN 47 (H) 8 - 23 MG/DL Creatinine 4.72 (H) 0.8 - 1.5 MG/DL  
 GFR est AA 16 (L) >60 ml/min/1.73m2 GFR est non-AA 13 (L) >60 ml/min/1.73m2 Calcium 11.3 (H) 8.3 - 10.4 MG/DL Bilirubin, total 10.9 (H) 0.2 - 1.1 MG/DL  
 ALT (SGPT) 37 12 - 65 U/L  
 AST (SGOT) 314 (H) 15 - 37 U/L Alk. phosphatase 81 50 - 136 U/L Protein, total 10.2 (H) 6.3 - 8.2 g/dL Albumin 1.6 (L) 3.2 - 4.6 g/dL Globulin 8.6 (H) 2.3 - 3.5 g/dL A-G Ratio 0.2 (L) 1.2 - 3.5 MAGNESIUM Collection Time: 10/17/19  3:59 AM  
Result Value Ref Range Magnesium 1.8 1.8 - 2.4 mg/dL TROPONIN I Collection Time: 10/17/19  3:59 AM  
Result Value Ref Range Troponin-I, Qt. 0.31 (HH) 0.02 - 0.05 NG/ML  
URIC ACID Collection Time: 10/17/19  3:59 AM  
Result Value Ref Range Uric acid 1.4 (L) 2.6 - 6.0 MG/DL  
LD Collection Time: 10/17/19  3:59 AM  
Result Value Ref Range LD 1,964 (H) 110 - 210 U/L  
RETICULOCYTE COUNT Collection Time: 10/17/19  3:59 AM  
Result Value Ref Range Reticulocyte count 2.6 (H) 0.3 - 2.0 % Absolute Retic Cnt. 0.0535 0.026 - 0.095 M/ul Immature Retic Fraction 27.6 (H) 2.3 - 13.4 % Retic Hgb Conc. 35 29 - 35 pg  
BILIRUBIN, FRACTIONATED Collection Time: 10/17/19  3:59 AM  
Result Value Ref Range Bilirubin, total 10.1 (H) 0.2 - 1.1 MG/DL Bilirubin, direct 7.3 (H) <0.4 MG/DL Bilirubin, indirect 2.8 (H) 0.0 - 1.1 MG/DL  
CBC WITH AUTOMATED DIFF Collection Time: 10/17/19  4:01 AM  
Result Value Ref Range WBC 13.5 (H) 4.3 - 11.1 K/uL  
 RBC 2.06 (L) 4.23 - 5.6 M/uL HGB 6.7 (LL) 13.6 - 17.2 g/dL HCT 20.2 (LL) 41.1 - 50.3 % MCV 98.1 (H) 79.6 - 97.8 FL  
 MCH 32.5 26.1 - 32.9 PG  
 MCHC 33.2 31.4 - 35.0 g/dL  
 RDW 17.7 (H) 11.9 - 14.6 % PLATELET 317 (L) 707 - 450 K/uL MPV 11.0 9.4 - 12.3 FL ABSOLUTE NRBC 1.74 (H) 0.0 - 0.2 K/uL NEUTROPHILS 72 43 - 78 % LYMPHOCYTES 20 13 - 44 % MONOCYTES 4 4.0 - 12.0 % EOSINOPHILS 0 (L) 0.5 - 7.8 % BASOPHILS 0 0.0 - 2.0 % IMMATURE GRANULOCYTES 4 0.0 - 5.0 %  
 ABS. NEUTROPHILS 9.8 (H) 1.7 - 8.2 K/UL  
 ABS. LYMPHOCYTES 2.7 0.5 - 4.6 K/UL  
 ABS. MONOCYTES 0.5 0.1 - 1.3 K/UL  
 ABS. EOSINOPHILS 0.0 0.0 - 0.8 K/UL  
 ABS. BASOPHILS 0.0 0.0 - 0.2 K/UL  
 ABS. IMM. GRANS. 0.5 0.0 - 0.5 K/UL  
 RBC COMMENTS SLIGHT 
ANISOCYTOSIS + POIKILOCYTOSIS 
    
 RBC COMMENTS SLIGHT 
HYPOCHROMIA 
    
 RBC COMMENTS MODERATE 
ROULEAUX 
    
 WBC COMMENTS Result Confirmed By Smear PLATELET COMMENTS ADEQUATE    
 DF AUTOMATED    
PTT Collection Time: 10/17/19  5:19 AM  
Result Value Ref Range aPTT 138.0 (H) 24.7 - 39.8 SEC  
PTT Collection Time: 10/17/19  1:51 PM  
Result Value Ref Range aPTT 166.6 (HH) 24.7 - 39.8 SEC  
HGB & HCT Collection Time: 10/17/19  1:51 PM  
Result Value Ref Range HGB 7.8 (L) 13.6 - 17.2 g/dL HCT 23.0 (L) 41.1 - 50.3 % POC G3 Collection Time: 10/17/19  2:17 PM  
Result Value Ref Range Device: High Flow Nasal Cannula FIO2 (POC) 50 % pH (POC) 7.525 (H) 7.35 - 7.45    
 pCO2 (POC) 35.0 35 - 45 MMHG  
 pO2 (POC) 132 (H) 75 - 100 MMHG  
 HCO3 (POC) 28.9 (H) 22 - 26 MMOL/L  
 sO2 (POC) 99 (H) 95 - 98 % Base excess (POC) 6 mmol/L Allens test (POC) YES Site LEFT RADIAL Patient temp. 98.6 Specimen type (POC) ARTERIAL Performed by Jennifer   
 CO2, POC 30 MMOL/L Flow rate (POC) 50.000 L/min Critical value read back 00:02 COLLECT TIME 1,403 All Micro Results Procedure Component Value Units Date/Time INFLUENZA A & B AG (RAPID TEST) [[id number]] Collected:  10/13/19 0028 Order Status:  Completed Specimen:  Nasopharyngeal from Nasal washing Updated:  10/13/19 5637   Influenza A Ag NEGATIVE      
 Comment: NEGATIVE FOR THE PRESENCE OF INFLUENZA A ANTIGEN 
INFECTION DUE TO INFLUENZA A CANNOT BE RULED OUT. BECAUSE THE ANTIGEN PRESENT IN THE SAMPLE MAY BE BELOW 
THE DETECTION LIMIT OF THE TEST. A NEGATIVE TEST IS PRESUMPTIVE AND IT IS RECOMMENDED THAT THESE RESULTS BE CONFIRMED BY VIRAL CULTURE OR AN FDA-CLEARED INFLUENZA A AND B MOLECULAR ASSAY. Influenza B Ag NEGATIVE Comment: NEGATIVE FOR THE PRESENCE OF INFLUENZA B ANTIGEN 
INFECTION DUE TO INFLUENZA B CANNOT BE RULED OUT. BECAUSE THE ANTIGEN PRESENT IN THE SAMPLE MAY BE BELOW 
THE DETECTION LIMIT OF THE TEST. A NEGATIVE TEST IS PRESUMPTIVE AND IT IS RECOMMENDED THAT THESE RESULTS BE CONFIRMED BY VIRAL CULTURE OR AN FDA-CLEARED INFLUENZA A AND B MOLECULAR ASSAY. Source NASOPHARYNGEAL Results for orders placed or performed during the hospital encounter of 10/12/19  
2D ECHO COMPLETE ADULT (TTE) W OR WO CONTR Narrative [address] Tariq, [address] [facility] 
[phone] Transthoracic Echocardiogram 
2D, M-mode, Doppler, and Color Doppler Ángel Aquino 
MR #: [id number] : [id number] Age: 75 years Gender: Male Study date: 14-Oct-2019 Account #: [de-identified] Height: 72 in 
Weight: 223.5 lb 
BSA: 2.24 mï¾² Status:Routine Location: [facility] BP: 131/ 69 Allergies: NO KNOWN ALLERGENS Sonographer:  Jaime Schwab, RCS Group:  [id number] Cardiology Referring Physician:  Mitzy Garcia. Trenton Salazar NP Reading Physician:  DR. Linda Rodriguez MD 
 
INDICATIONS: Likely new multiple myeloma diagnosis, will need chemotherapy PROCEDURE: This was a routine study. A transthoracic echocardiogram was 
performed. The study included complete 2D imaging, M-mode, complete spectral 
Doppler, and color Doppler. Intravenous contrast (Definity) was administered. Image quality was adequate. LEFT VENTRICLE: Size was normal. Systolic function was normal. Ejection fraction was estimated to be greater than 55 %. There were no regional wall 
motion abnormalities. Wall thickness was at the upper limits of normal.  
Doppler 
parameters were consistent with diastolic dysfunction. Avg E/e': 14.6. VENTRICULAR SEPTUM: There was \"bounce\" motion. These changes are consistent 
with a conduction abnormality or paced rhythm. RIGHT VENTRICLE: The ventricle was mildly dilated. Systolic function was  
mildly 
reduced. Estimated peak pressure was in the range of 45-50 mmHg. LEFT ATRIUM: The atrium was moderately dilated. RIGHT ATRIUM: The atrium was mildly dilated. SYSTEMIC VEINS: IVC: The inferior vena cava was normal in size and course. Respirophasic changes were normal. 
 
AORTIC VALVE: The valve was trileaflet. Leaflets exhibited good mobility. There 
was no evidence for stenosis. There was mild regurgitation. MITRAL VALVE: Valve structure was normal. There was no evidence for stenosis. There was trivial regurgitation. TRICUSPID VALVE: The valve structure was normal. There was no evidence for 
stenosis. There was mild regurgitation. PULMONIC VALVE: Not well visualized. There was no evidence for stenosis. There 
was trivial regurgitation. PERICARDIUM: There was no pericardial effusion. AORTA: The root exhibited dilatation. There was dilatation of the ascending 
aorta. PULMONARY ARTERY: The artery was mildly to moderately dilated. SUMMARY: 
 
-  Left ventricle: Systolic function was normal. Ejection fraction was 
estimated to be greater than 55 %. There were no regional wall motion 
abnormalities. Wall thickness was at the upper limits of normal. 
 
-  Ventricular septum: There was \"bounce\" motion. These changes are  
consistent 
with a conduction abnormality or paced rhythm. -  Right ventricle: The ventricle was mildly dilated. Systolic function was 
mildly reduced. Estimated peak pressure was in the range of 45-50 mmHg. -  Aorta, systemic arteries: There was dilatation of the ascending aorta. The 
aortic root diameter was 40 mm. The ascending aorta maximal AP dimension was 40 
mm. 
 
-  Pulmonary arteries: The artery was mildly to moderately dilated. -  Additional impressions: Consider imaging of aortic dimensions in 6 months 
with either repeat echocardiogram or CT/MRA if aortic size not previously 
documented. No comparison echocardiogram available for review. MEASUREMENT TABLES 
 
2D MEASUREMENTS Aorta   (Reference normals) Root diam   40 mm   (--) AAo max AP diam   40 mm   (--) SYSTEM MEASUREMENT TABLES 
 
2D mode AoR Diam (2D): 4 cm 
LA Dimension (2D): 4.5 cm Left Atrium Systolic Volume Index; Method of Disks, Biplane; 2D mode;: 46  
ml/m2 IVS/LVPW (2D): 1.2 IVSd (2D): 1.3 cm LVIDd (2D): 5.1 cm 
LVIDs (2D): 3.2 cm 
LVOT Area (2D): 3.5 cm2 LVPWd (2D): 1.1 cm RVIDd (2D): 2.7 cm Tissue Doppler Imaging LV Peak Early Villafuerte Tissue Thom; Lateral MA (TDI): 13.2 cm/s LV Peak Early Vill[place]te Tissue Thom; Medial MA (TDI): 5.3 cm/s Unspecified Scan Mode Peak Grad; Mean; Antegrade Flow: 4 mm[Hg] Vmax; Antegrade Flow: 105 cm/s LVOT Diam: 2.1 cm 
MV Peak Thom/LV Peak Tissue Thom E-Wave; Lateral MA: 8.4 MV Peak Thom/LV Peak Tissue Thom E-Wave; Medial MA: 20.8 Prepared and signed by 
 
DR. Juan Cano MD 
Signed 14-Oct-2019 13:43:41 Current Meds: 
Current Facility-Administered Medications Medication Dose Route Frequency  0.9% sodium chloride infusion 250 mL  250 mL IntraVENous PRN  
 dexamethasone (DECADRON) 40 mg in 0.9% sodium chloride 50 mL IVPB  40 mg IntraVENous DAILY  famotidine (PF) (PEPCID) 20 mg in sodium chloride 0.9% 10 mL injection  20 mg IntraVENous DAILY  0.9% sodium chloride infusion 250 mL  250 mL IntraVENous PRN  
 0.9% sodium chloride infusion 250 mL  250 mL IntraVENous PRN  
 cefTRIAXone (ROCEPHIN) 1 g in 0.9% sodium chloride (MBP/ADV) 50 mL  1 g IntraVENous Q24H  azithromycin (ZITHROMAX) 500 mg in 0.9% sodium chloride (MBP/ADV) 250 mL  500 mg IntraVENous Q24H  
 heparin 25,000 units in dextrose 500 mL infusion  18-36 Units/kg/hr IntraVENous TITRATE  allopurinol (ZYLOPRIM) tablet 50 mg  50 mg Oral DAILY  atorvastatin (LIPITOR) tablet 20 mg  20 mg Oral QHS  dilTIAZem CD (CARDIZEM CD) capsule 240 mg  240 mg Oral DAILY  sodium chloride (NS) flush 5-40 mL  5-40 mL IntraVENous Q8H  
 sodium chloride (NS) flush 5-40 mL  5-40 mL IntraVENous PRN  
 acetaminophen (TYLENOL) tablet 650 mg  650 mg Oral Q4H PRN  
 ondansetron (ZOFRAN) injection 4 mg  4 mg IntraVENous Q4H PRN  
 diphenhydrAMINE (BENADRYL) capsule 25 mg  25 mg Oral Q6H PRN Other Studies (last 24 hours): Xr Chest Sngl V Result Date: 10/16/2019 Portable chest x-ray CLINICAL INDICATION: Interval placement of a dialysis catheter FINDINGS: Single AP view the chest compared to a similar exam from earlier the same day shows interval placement of a right IJ multilumen catheter. No pneumothorax evident. The distal tip of the catheter terminates at the cavoatrial junction. The lung parenchyma is unchanged. IMPRESSION: Status post placement of a right IJ dialysis catheter in satisfactory position without pneumothorax. Assessment and Plan:  
 
Hospital Problems as of 10/17/2019 Never Reviewed Codes Class Noted - Resolved POA Methemoglobinemia ICD-10-CM: D74.9 ICD-9-CM: 289.7  10/17/2019 - Present Unknown Acute metabolic encephalopathy IYM-13-PJ: G93.41 
ICD-9-CM: 348.31  10/16/2019 - Present Unknown Acute respiratory failure with hypoxia [facility]) ICD-10-CM: J96.01 
ICD-9-CM: 518.81  10/15/2019 - Present No  
   
 Pulmonary hypertension (HCC) (Chronic) ICD-10-CM: P91.72 ICD-9-CM: 416.8  10/15/2019 - Present Yes Hyperproteinemia- with likely hyperviscosity ICD-10-CM: E88.09 
ICD-9-CM: 273.8  10/15/2019 - Present Unknown * (Principal) Hypercalcemia ICD-10-CM: S29.90 
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: suspected based on constellation of findings despite pending bone marrow biopsy, appreciate nephrology and hematology , currently on IV decadron · MARCELA on dialysis: per nephrology · Methemoglobinemia: s/p methylene blue with pending recheck · Acute hypoxic respiratory failure: on BIPAP, per pulmonary, on IV heparin for possible PE, also on antibiotics for possible pneumonia · Anemia and thrombocytopenia: per oncology, 2 units ordered for 10-17 with hemolysis workup · HTN: on cardizem and will add prn hydralazine · Bradycardia: noted on cardizem for AFIB · Hyperglycemia: due to steroids, on SSI · Elevated troponin: likely demand, ECHO with preserved EF · Elevated total bilirubin: checking hemolysis labs DC planning/Dispo:  Pending Diet:  DIET NUTRITIONAL SUPPLEMENTS 
DIET NPO 
DVT ppx:  IV heparin Signed: Najma Adams MD

## 2019-10-17 NOTE — PROGRESS NOTES
[employer] [facility] Inpatient Hematology / Oncology Progress Note Admission Date: 10/12/2019  9:36 PM 
Reason for Admission/Hospital Course: Hypercalcemia [E83.52] Anemia [D64.9] MARCELA (acute kidney injury) ([facility] [address] [N17.9] 24 Hour Events: 
Afebrile, bradycardic, on BiPAP Kappa FLC 13k SPEP with m-spike 4.89 Not stable enough for BMbx at this time Transferred to ICU for resp distress Dex D2 of 4 Started dialysis yesterday Bili up to 10.9 Family at bedside ROS: 
Unable to assess - pt lethargic/somnolent 10 point review of systems is otherwise negative with the exception of the elements mentioned above in the HPI. No Known Allergies OBJECTIVE: 
Patient Vitals for the past 8 hrs: 
 BP Temp Pulse Resp SpO2 Weight 10/17/19 1030 156/83  (!) 45     
10/17/19 1013 171/72  (!) 54     
10/17/19 0719     (!) 88 %   
10/17/19 0715  97.7 °F (36.5 °C)      
10/17/19 0658 171/83  (!) 57 23 (!) 88 %   
10/17/19 0628 164/77  67  (!) 79 %   
10/17/19 0627 164/77 97.8 °F (36.6 °C) 71  (!) 31 %   
10/17/19 0601 173/84 97.7 °F (36.5 °C) 65 21 91 %   
10/17/19 0524      221 lb 1.9 oz (100.3 kg) 10/17/19 0358 153/77  64 17 95 %   
10/17/19 0344     93 %   
10/17/19 0343 133/65  61 (!) 32 94 %   
10/17/19 0258 165/79 98 °F (36.7 °C) (!) 59 20 95 %  Temp (24hrs), Av.1 °F (36.7 °C), Min:97.5 °F (36.4 °C), Max:99.4 °F (37.4 °C) 
 
10/17 07 - 10/17 1900 In: 863.7 [I.V.:432] Out: - Physical Exam: 
Constitutional: Ill-appearing elderly male in no acute distress, lying comfortably in the hospital bed. HEENT: Normocephalic and atraumatic. Oropharynx is clear, mucous membranes are moist.  Extraocular muscles are intact. Sclerae icteric. Neck supple without JVD. No thyromegaly present. L occipital mass. Skin Warm and dry. No bruising and no rash noted. No erythema. No pallor. Respiratory Lungs are clear to auscultation bilaterally. On BiPAP. CVS Bradycardic rate, irregular rhythm and normal S1 and S2. No murmurs, gallops, or rubs. Abdomen Soft, nontender and nondistended, normoactive bowel sounds. No palpable mass. No hepatosplenomegaly. Neuro Grossly nonfocal with no obvious sensory or motor deficits. MSK Normal range of motion in general.  No edema and no tenderness. Psych Calm, cooperative. Lethargic/somnolent Labs: 
   
Recent Labs 10/17/19 
0401 10/16/19 
1847 10/16/19 
0018 10/15/19 
0340 WBC 13.5*  --  12.3* 8.0  
RBC 2.06*  --  2.07* 2.25* HGB 6.7* 6.6* 6.6* 7.1*  
HCT 20.2* 20.2* 21.4* 22.6* MCV 98.1*  --  103.4* 100.4* MCH 32.5  --  31.9 31.6 MCHC 33.2  --  30.8* 31.4  
RDW 17.7*  --  17.8* 17.1*  
*  --  159 161 GRANS 72  --  62 62 LYMPH 20  --  28 30 MONOS 4  --  5 5 EOS 0*  --  1 1  
BASOS 0  --  0  --   
IG 4  --  4  --   
DF AUTOMATED  --  AUTOMATED MANUAL ANEU 9.8*  --  7.7 5.0 ABL 2.7  --  3.4 2.4 ABM 0.5  --  0.6 0.4 CHAVA 0.0  --  0.1 0.1 ABB 0.0  --  0.0  --   
AIG 0.5  --  0.5  --   
 
  
Recent Labs 10/17/19 
2594 10/16/19 
0018 10/15/19 
0340 * 138 140  
K 4.7 5.2* 4.7  109* 112* CO2 24 21 21 AGAP 9 8 7 * 146* 99 BUN 47* 40* 30* CREA 4.72* 5.01* 4.73* GFRAA 16* 15* 16* GFRNA 13* 12* 13* CA 11.3* 12.4* 11.5* SGOT 314*  --   --   
AP 81  --   --   
TP 10.2*  --   --   
ALB 1.6*  --   --   
GLOB 8.6*  --   --   
AGRAT 0.2*  --   --   
MG 1.8  --   --   
 
 
 
Imaging: 
US RETROPERITONEUM COMP [[id number]] Collected: 10/14/19 6246 Order Status: Completed Updated: 10/14/19 1422 Narrative:    
Renal ultrasound. CLINICAL INDICATION:  Acute on chronic renal disease PROCEDURE: Realtime grayscale color Doppler evaluation of the kidneys and 
bladder. COMPARISON: No prior similar studies available for direct comparison. FINDINGS: The right kidney is normal in size but diffusely increased in 
echogenicity measuring 12.2 cm. A 2.4 cm simple cyst is noted off the midpole. The left kidney is normal in size and diffusely increased in echogenicity 
measuring 11.5 cm. A 1.2 cm simple cyst is noted within the midpole region. There is an indeterminate echogenic focus within the renal parenchyma likely a 
small stone. There is no hydronephrosis. The bladder is unremarkable. The aorta 
is normal in caliber. Impression:    
IMPRESSION:  
1. Bilateral diffuse increased renal cortical echogenicity can be seen with 
medical renal disease. 2. Indeterminate calcifications in the left renal parenchyma. Nephrolithiasis 
favored. 3. No hydronephrosis. 4. Bilateral simple renal cysts. IR BX BONE MARROW DIAGNOSTIC [[id number]] Order Status: No result XR BONE SURVEY LTD (METS) [[id number]] Collected: 10/13/19 1121 Order Status: Completed Updated: 10/13/19 1127 Narrative:    
History: Low back, lateral rib, and left posterior shoulder pain EXAM: Metastatic bone survey FINDINGS: Innumerable lytic lesions are present throughout the skull, including 
a large lesion within the occipital portion of the calvarium measuring 5 cm. Degenerative change of the cervical, thoracic, and lumbar spine noted without 
additional lytic foci. The included lungs are clear. Multiple lytic lesions seen 
within the left humerus. No definite lytic foci within the femoral bones are 
within the pelvis, though evaluation the pelvis is limited due to overlying 
bowel gas. Impression:    
IMPRESSION: 
 
Innumerable lytic foci within the skull as well as within the left humerus. Findings suggestive of multiple myeloma or metastatic disease. XR CHEST PA LAT [[id number]] Collected: 10/12/19 2328 Order Status: Completed Updated: 10/12/19 2330 Narrative:    
EXAM: Chest x-ray. INDICATION: Cough. COMPARISON: None. TECHNIQUE: Frontal and lateral view chest x-ray. FINDINGS: The lungs are clear. The cardiac size, mediastinal contour and 
pulmonary vasculature are normal. No pneumothorax or pleural effusion is seen. Impression:    
IMPRESSION: No acute process. CT HEAD WITHOUT CONTRAST [[id number]] Collected: 10/12/19 2323 Order Status: Completed Updated: 10/12/19 2330 Narrative:    
EXAM: Noncontrast CT head. INDICATION: Dizziness. COMPARISON: None. TECHNIQUE: Axial noncontrast CT images of the head were obtained.  Radiation 
dose reduction techniques were used for this study.  Our CT scanners use one or 
all of the following:  Automated exposure control, adjustment of the mA and/or 
kV according to patient size, iterative reconstruction. FINDINGS: There is a 3.3 x 2.9 cm lytic soft tissue mass in the left occipital 
bone, which minimally indents the underlying left occipital lobe. There is no 
midline shift or significant mass effect. There are also innumerable smaller 
subcentimeter round lytic lesions throughout the calvarium. Brain volume is 
appropriate for age. No acute infarct, hemorrhage or evidence of hydrocephalus 
is seen. The basal cisterns are preserved. The visualized paranasal sinuses and 
mastoid air cells are clear. There has been bilateral eye surgery. Impression:    
IMPRESSION:  
1. 3.3 cm lytic soft tissue mass in the left occipital bone, minimally indenting 
the underlying left occipital lobe. In addition, there are innumerable 
subcentimeter lytic lesions throughout the remainder of the calvarium. These 
could relate to metastatic disease or multiple myeloma. 2. No acute infarct or hemorrhage. Medications: 
Current Facility-Administered Medications Medication Dose Route Frequency  0.9% sodium chloride infusion 250 mL  250 mL IntraVENous PRN  
 dexamethasone (DECADRON) 40 mg in 0.9% sodium chloride 50 mL IVPB  40 mg IntraVENous DAILY  famotidine (PF) (PEPCID) 20 mg in sodium chloride 0.9% 10 mL injection  20 mg IntraVENous DAILY  0.9% sodium chloride infusion 250 mL  250 mL IntraVENous PRN  
 0.9% sodium chloride infusion 250 mL  250 mL IntraVENous PRN  
 cefTRIAXone (ROCEPHIN) 1 g in 0.9% sodium chloride (MBP/ADV) 50 mL  1 g IntraVENous Q24H  
 azithromycin (ZITHROMAX) 500 mg in 0.9% sodium chloride (MBP/ADV) 250 mL  500 mg IntraVENous Q24H  
 heparin 25,000 units in dextrose 500 mL infusion  18-36 Units/kg/hr IntraVENous TITRATE  allopurinol (ZYLOPRIM) tablet 50 mg  50 mg Oral DAILY  atorvastatin (LIPITOR) tablet 20 mg  20 mg Oral QHS  dilTIAZem CD (CARDIZEM CD) capsule 240 mg  240 mg Oral DAILY  sodium chloride (NS) flush 5-40 mL  5-40 mL IntraVENous Q8H  
 sodium chloride (NS) flush 5-40 mL  5-40 mL IntraVENous PRN  
 acetaminophen (TYLENOL) tablet 650 mg  650 mg Oral Q4H PRN  
 ondansetron (ZOFRAN) injection 4 mg  4 mg IntraVENous Q4H PRN  
 diphenhydrAMINE (BENADRYL) capsule 25 mg  25 mg Oral Q6H PRN  
 
 
 
ASSESSMENT: 
 
Problem List  Never Reviewed Codes Class Noted Methemoglobinemia ICD-10-CM: D74.9 ICD-9-CM: 289.7  10/17/2019 Acute metabolic encephalopathy ZAG-58-XN: G93.41 
ICD-9-CM: 348.31  10/16/2019 Acute respiratory failure with hypoxia [facility]) ICD-10-CM: J96.01 
ICD-9-CM: 518.81  10/15/2019 Pulmonary hypertension (HCC) (Chronic) ICD-10-CM: I27.20 ICD-9-CM: 416.8  10/15/2019 Hyperproteinemia- with likely hyperviscosity ICD-10-CM: E88.09 
ICD-9-CM: 273.8  10/15/2019 Diabetes mellitus (HCC) (Chronic) ICD-10-CM: E11.9 ICD-9-CM: 250.00  10/13/2019 Essential hypertension (Chronic) ICD-10-CM: I10 
ICD-9-CM: 401.9  10/13/2019 Overview Signed 10/13/2019  1:04 AM by Alisha Ramachandran MD  
  Last Assessment & Plan: Hypertension is unchanged. Continue current treatment regimen. Blood pressure will be reassessed at the next regular appointment. Paroxysmal atrial fibrillation (HCC) (Chronic) ICD-10-CM: I48.0 ICD-9-CM: 427.31  10/13/2019 Overview Signed 10/13/2019  1:04 AM by Shirley Sauer MD  
  Last Assessment & Plan: He has had no recurrences. I still think he remains a bleeding risk. I would like to hold off of [address] for now--and check again on him in 3 months. Continue dilt--but change to 360 mg tabs. * (Principal) Hypercalcemia ICD-10-CM: H63.26 
ICD-9-CM: 275.42  10/12/2019 Anemia ICD-10-CM: D64.9 ICD-9-CM: 285.9  10/12/2019 MARCELA (acute kidney injury) ([facility] [address] ICD-10-CM: N17.9 ICD-9-CM: 584.9  10/12/2019 Mr. Maci Little is a 75 y.o. male admitted on 10/12/2019 with a primary diagnosis of hypercalcemia and possible metastatic malignancy/myeloma. Mr. Maci Little is a gentleman who has received no formal medical care over the past several years. He stated that he had no primary care physician. Lab data from [facility] in 2017 show a creatinine that evelyn as high as 6.1 and was 2.80 at discharge. His chronic baseline is unknown. As noted, there is a history of diabetes mellitus. For two-three months leading to this admission, he has gradually deteriorated with low back discomfort, forgetfulness, anorexia and 35 pound weight loss. He has noted a growth on the back of his head. He was finally prevailed upon to come to the hospital for evaluation. Data are listed below but he was found to be in acute renal failure, hypercalcemic, anemic and with multiple lytic lesions involving his calvarium including a 3 cm destructive lesion involving the left occipital bone. PLAN: 
Concern for myeloma - We will initiate workup for myeloma based on strong presumption and if negative will pursue other alternatives. - Check skeletal survey, SPEP, serum free light chains, beta 2 microglobulin , LDH, urine IEP, marrow biopsy 10/14 Skeletal survey with innumerable lytic foci w/i the skull and L humerus. SPEP/UPEP/FLC/Beta 2 pending. Awaiting BMbx. 10/15 Kappa FLC 13k. Awaiting BMbx. 
10/16 SPEP with m-spike 4.89. Not stable enough for BMbx at this time. Will go ahead with pulse dose steroids - Dex 40mg IV x 4 days. Had code status discussion, wishes to remain full code, but wife states he would not want to be on vent for an extended period of time. 10/17 On pulse dose steroids, D2 of 4 Hypercalcemia - Treatment of his hypercalcemia may be problematic with elevated creatinine and elevated BNP. Continue fluids and I have added calcitonin. Use Lasix to balance I/O's. Hold of on Zometa for now given creatinine. 10/14 CCa++ down to 12.2. Con't IVF. 10/15 CCa++ 13.1. Con't calcitonin. 10/16 CCa++ up to 14. 
10/17 CCa++ 13.2 MARCELA 
- Nephrology should be on board to manage what is likely acute kidney injury from ? myeloma, hypercalcemia, etc. Superimposed on CRF. Hopefully this will reverse but dialysis could be an equally potential outcome. 10/14 Cr 4.6. Renal US c/w medical renal disease and possible L nephrolithiasis. Neph consult pending. 10/16 Cr 5.01. Neph following. 10/17 Started dialysis yesterday. Cr 4.72 Hyperuricemia / TLS 
10/14 Uric acid 12.9. Rasburicase and renally dosed allopurinol ordered. 10/15 Uric acid down to 1.3 Dyspnea / hypoxia 10/15 On Optiflow. CXR/CT chest pending. Pulm following. On Azith/Roland. On empiric heparin gtt. . Echo with dilated RV and pulm HTN. 
10/16 CXR with volume overload. Plans for VQ scan when more stable. Pulm following. 10/17 Transferred to ICU for resp distress, now on BiPAP. On Azith/Rocephin. Anemia 10/16 Transfuse 1 unit PRBCs 
10/17 Hgb 6.7 s/p 2 units. Check hemolysis labs. PBRCs ordered. Hyperbilirubinemia 10/17 Bili up to 10.9. Check hemolysis labs inc frac bili. Goals and plan of care reviewed with the family.   All questions answered to the best of our ability. Thank you for allowing us to participate in the care of Mr. Sampson Kenny. Yaquelin Ribeiro NP [facility] [address] [place] [address] Office : [phone] Fax : [phone]

## 2019-10-17 NOTE — PROGRESS NOTES
Interdisciplinary team rounds were held 10/17/2019 with the following team members:Care Management, Nursing, Nurse Practitioner, Nutrition, Palliative Care, Pastoral Care, Pharmacy, Physical Therapy, Physician, Respiratory Therapy and Clinical Coordinator and the patient and spouse. Plan of care discussed. See clinical pathway and/or care plan for interventions and desired outcomes.

## 2019-10-17 NOTE — PROGRESS NOTES
Palliative Care Progress Note Patient: Cristiane Shelby MRN: [id number]  SSN: [id number] YOB: 1945  Age: 75 y.o. Sex: male Assessment/Plan: Chief Complaint/Interval History: pt remains on bipap. Appears comfortable currently. Family at bedside Principal Diagnosis: · Dyspnea  R06.00 Additional Diagnoses: · Debility, Unspecified  R53.81 
· Frailty  R54 · Counseling, Encounter for Medical Advice  Z71.9 
· Encounter for Palliative Care  Z51.5 Palliative Performance Scale (PPS) PPS: 30 Medical Decision Making:  
Reviewed and summarized labs and imaging. Met with spouse, cousin, and nephew at bedside. Discussed severity of current medical problems and explained acute decline. Reviewed difficulties regarding treatment as pt now with multi organ failure. Wife has stated she wishes for full code at this time but would not wish for extensive time on life support. Will continue to follow and assist with goals of care pending course Will discuss findings with members of the interdisciplinary team.   
 
More than 50% of this 25 minute visit was spent counseling and coordination of care as outlined above. Subjective:  
 
Review of Systems unable to obtain due to mentation Objective:  
 
Visit Vitals /69 Pulse (!) 47 Temp 97.7 °F (36.5 °C) Resp 22 Ht 6' (1.829 m) Wt 221 lb 1.9 oz (100.3 kg) SpO2 99% BMI 29.99 kg/m² Physical Exam: 
 
General:  Calm. No acute distress. Eyes:  Conjunctivae/corneas clear Nose: Nares normal. Septum midline. Neck: Supple, symmetrical, trachea midline, no JVD Lungs:   Coarse bilateral bs Heart:  Regular rate and rhythm, no murmur Abdomen:   Soft, non-tender, non-distended Extremities: Normal, atraumatic, no cyanosis or edema Skin: Skin color, texture, turgor normal. No rash or lesions. Neurologic: Nonfocal  
Psych: calm Signed By: Bright Alford MD   
 October 17, 2019

## 2019-10-17 NOTE — PROGRESS NOTES
Problem: Falls - Risk of 
Goal: *Absence of Falls Description Document Ward Martinez Fall Risk and appropriate interventions in the flowsheet. Outcome: Progressing Towards Goal 
Note:  
Fall Risk Interventions: 
Mobility Interventions: Strengthening exercises (ROM-active/passive), Bed/chair exit alarm Mentation Interventions: Adequate sleep, hydration, pain control, Bed/chair exit alarm, Increase mobility, More frequent rounding, Reorient patient, Room close to nurse's station, Toileting rounds, Update white board Medication Interventions: Bed/chair exit alarm, Patient to call before getting OOB, Teach patient to arise slowly Elimination Interventions: Bed/chair exit alarm, Call light in reach, Patient to call for help with toileting needs, Stay With Me (per policy), Toileting schedule/hourly rounds Problem: Patient Education: Go to Patient Education Activity Goal: Patient/Family Education Outcome: Progressing Towards Goal 
  
Problem: Pressure Injury - Risk of 
Goal: *Prevention of pressure injury Description Document Adalid Scale and appropriate interventions in the flowsheet. Outcome: Progressing Towards Goal 
Note:  
Pressure Injury Interventions: 
Sensory Interventions: Assess changes in LOC, Check visual cues for pain, Discuss PT/OT consult with provider, Minimize linen layers, Maintain/enhance activity level, Keep linens dry and wrinkle-free, Turn and reposition approx. every two hours (pillows and wedges if needed) Moisture Interventions: Absorbent underpads, Apply protective barrier, creams and emollients, Assess need for specialty bed, Check for incontinence Q2 hours and as needed, Limit adult briefs, Maintain skin hydration (lotion/cream), Minimize layers, Moisture barrier, Offer toileting Q_hr Activity Interventions: Pressure redistribution bed/mattress(bed type) Mobility Interventions: Turn and reposition approx.  every two hours(pillow and wedges), Pressure redistribution bed/mattress (bed type) Nutrition Interventions: Document food/fluid/supplement intake Friction and Shear Interventions: Apply protective barrier, creams and emollients, Foam dressings/transparent film/skin sealants, Transferring/repositioning devices Problem: Patient Education: Go to Patient Education Activity Goal: Patient/Family Education Outcome: Progressing Towards Goal 
  
Problem: Gas Exchange - Impaired Goal: *Absence of hypoxia Outcome: Progressing Towards Goal 
  
Problem: Delirium Treatment Goal: *Level of consciousness restored to baseline Outcome: Progressing Towards Goal 
Goal: *Level of environmental perceptions restored to baseline Outcome: Progressing Towards Goal 
Goal: *Sensory perception restored to baseline Outcome: Progressing Towards Goal 
Goal: *Emotional stability restored to baseline Outcome: Progressing Towards Goal 
Goal: *Functional assessment restored to baseline Outcome: Progressing Towards Goal 
Goal: *Absence of falls Outcome: Progressing Towards Goal 
Goal: *Will remain free of delirium, CAM Score negative Outcome: Progressing Towards Goal 
Goal: *Cognitive status will be restored to baseline Outcome: Progressing Towards Goal 
Goal: Interventions Outcome: Progressing Towards Goal 
  
Problem: Patient Education: Go to Patient Education Activity Goal: Patient/Family Education Outcome: Progressing Towards Goal

## 2019-10-17 NOTE — PROGRESS NOTES
A follow up visit was made to the patient. Emotional support, spiritual presence and  
prayer were provided for the patient. His wife, Sharlynn Angelucci, and daughter in law, Jamir Crews were present. LAW Wright

## 2019-10-18 NOTE — PROGRESS NOTES
[facility] PROGRESS NOTE Follow up for: Nonda Speed Subjective:  
 
Patient seen and examined. Chart, notes, labs, imaging, results all reviewed. On high flow oxygen for hypoxia , On oxygen mask Received methylene blue for methemoglobinemia ROS: 
UTO Objective:  
Exam: 
Vitals:  
 10/18/19 8491 10/18/19 0804 10/18/19 0938 10/18/19 8284 BP: (!) 167/95   (!) 187/94 Pulse: 72   77 Resp: 22 Temp:  97.7 °F (36.5 °C) SpO2:   99% Weight:      
Height:      
 
 
 
Intake/Output Summary (Last 24 hours) at 10/18/2019 0369 Last data filed at 10/18/2019 0388 Gross per 24 hour Intake 364.27 ml Output 2000 ml Net -1635.73 ml Current Facility-Administered Medications Medication Dose Route Frequency  insulin lispro (HUMALOG) injection   SubCUTAneous Q6H  
 hydrALAZINE (APRESOLINE) 20 mg/mL injection 10 mg  10 mg IntraVENous Q6H PRN  
 0.9% sodium chloride infusion 250 mL  250 mL IntraVENous PRN  
 dexamethasone (DECADRON) 40 mg in 0.9% sodium chloride 50 mL IVPB  40 mg IntraVENous DAILY  famotidine (PF) (PEPCID) 20 mg in sodium chloride 0.9% 10 mL injection  20 mg IntraVENous DAILY  0.9% sodium chloride infusion 250 mL  250 mL IntraVENous PRN  
 0.9% sodium chloride infusion 250 mL  250 mL IntraVENous PRN  
 cefTRIAXone (ROCEPHIN) 1 g in 0.9% sodium chloride (MBP/ADV) 50 mL  1 g IntraVENous Q24H  
 azithromycin (ZITHROMAX) 500 mg in 0.9% sodium chloride (MBP/ADV) 250 mL  500 mg IntraVENous Q24H  
 heparin 25,000 units in dextrose 500 mL infusion  18-36 Units/kg/hr IntraVENous TITRATE  allopurinol (ZYLOPRIM) tablet 50 mg  50 mg Oral DAILY  dilTIAZem CD (CARDIZEM CD) capsule 240 mg  240 mg Oral DAILY  sodium chloride (NS) flush 5-40 mL  5-40 mL IntraVENous Q8H  
 sodium chloride (NS) flush 5-40 mL  5-40 mL IntraVENous PRN  
 acetaminophen (TYLENOL) tablet 650 mg  650 mg Oral Q4H PRN  
 ondansetron (ZOFRAN) injection 4 mg  4 mg IntraVENous Q4H PRN  
  diphenhydrAMINE (BENADRYL) capsule 25 mg  25 mg Oral Q6H PRN  
 
 
EXAM 
GEN - on high flow oxygen CV - S1, S2, RRR, no rub, murmur, or gallop Lung - b/l crackles present Abd - soft, nontender, BS present Ext - no edema Recent Labs 10/18/19 
0320 10/17/19 
1351 10/17/19 
0401  10/16/19 
0018 WBC 13.4*  --  13.5*  --  12.3* HGB 7.2* 7.8* 6.7*   < > 6.6* HCT 20.8* 23.0* 20.2*   < > 21.4*  
*  --  143*  --  159  
 < > = values in this interval not displayed. Recent Labs 10/18/19 
0320 10/17/19 
0359 10/16/19 
0018 * 135* 138  
K 4.5 4.7 5.2*  
CL 96* 102 109* CO2 26 24 21 BUN 70* 47* 40* CREA 5.07* 4.72* 5.01* CA 10.7* 11.3* 12.4*  
* 205* 146* MG 2.1 1.8  -- Assessment and Plan:  
 
1. ? 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 Hypercalcemia Better with dialysis 3. ? PE  
TO be started on heparin 4. Hyperuricemia Received rasburicase 5. Methemoglobinemia - received methylene blue Intravascular hemolysis noted with acute liver injury Explained POC to his wife bedside Dayna Howe MD

## 2019-10-18 NOTE — PROGRESS NOTES
Interdisciplinary team rounds were held 10/18/2019 with the following team members:Care Management, Nursing, Nurse Practitioner, Nutrition, Palliative Care, Pastoral Care, Pharmacy, Physical Therapy, Physician, Respiratory Therapy and Clinical Coordinator and the patient and spouse. Plan of care discussed. See clinical pathway and/or care plan for interventions and desired outcomes.

## 2019-10-18 NOTE — PROGRESS NOTES
Palliative Care Progress Note Patient: Ysabel Brownlee MRN: [id number]  SSN: [id number] YOB: 1945  Age: 75 y.o. Sex: male Assessment/Plan: Chief Complaint/Interval History: Patient now on Optiflow Principal Diagnosis: · Dyspnea  R06.00 Additional Diagnoses: · Debility, Unspecified  R53.81 
· Frailty  R54 · Counseling, Encounter for Medical Advice  Z71.9 
· Encounter for Palliative Care  Z51.5 Palliative Performance Scale (PPS) PPS: 30 Medical Decision Making:  
Reviewed and summarized labs and imaging. Discussed with ICU IDT; primary RN; Dr. Kevin Hines; Dr. John Coto Patient resting in bed on Optiflow. More alert today, oriented to person and knows he is in the hospital, but otherwise pleasantly confused. Discussion with wife and son at the bedside; Dr. John Coto joined visit as well and explained current interventions and expressed concern at the rate of patient's liver failure. Dr. John Coto relayed concern that patient may not survive current illness. Remained with wife and allowed opportunity for questions. Unsure of her understanding of patient's complex medical condition; she is reliant on her ulisses for God to heal patient. Abdominal ultrasound is pending. Will continue to follow and facilitate family discussions. Will discuss findings with members of the interdisciplinary team.   
 
More than 50% of this 25 minute visit was spent counseling and coordination of care as outlined above. Subjective:  
 
Review of Systems unable to obtain due to mentation. He does deny pain and dyspnea. Objective:  
 
Visit Vitals BP (!) 187/94 Pulse 77 Temp 97.7 °F (36.5 °C) Resp 22 Ht 6' (1.829 m) Wt 214 lb 8.1 oz (97.3 kg) SpO2 99% BMI 29.09 kg/m² Physical Exam: 
 
General:  Calm. No acute distress. Eyes:  Conjunctivae/corneas clear Nose: Nares normal. Septum midline. Neck: Supple, symmetrical, trachea midline.   
Lungs:   Coarse bilateral bs  
 Heart:  Regular rate and rhythm. Abdomen:   Soft, non-tender, non-distended. Extremities: Normal, atraumatic, no cyanosis. Skin: Skin color, texture, turgor normal. No rash. Neurologic: Mild confusion. Psych: Calm. Alert and oriented to person. Signed By: Trae Parkinson NP October 18, 2019

## 2019-10-18 NOTE — PROGRESS NOTES
Gene Dwyane Aase Admission Date: 10/12/2019 Daily Progress Note: 10/18/2019 The patient's chart is reviewed and the patient is discussed with the staff. 75 y.o.  male seen and evaluated at the request of Dr. Lopez .  He was admitted with a several month history of weight loss (35 lbs) and progressive weakness. He has had some back pain and he feels nauseated. Upon admission, he was found to be in acute renal failure (creat 4.9, ? Baseline) and had an elevated calcium level (13.8). He has received IV fluids and calcitonin. Head CT showed multiple lytic lesions in the skull and the bone survey showed a lesion in the left humerus as well. His beta 2 microglobulin level, IgA levels are elevated and the [place] free light chain studies are abnormal.  Oncology has seen and suspects multiple myeloma - bone marrow biopsy planned. He was anemic on admission and has received 2 units of blood in addition to IV fluids. His calcium level has improved. Nephrology following for renal failure. Methemoglobinemia noted- given methylene blue 10/16/19 Subjective: No events overnight, more alert  And awake, on Optiflow off NIPPV Current Facility-Administered Medications Medication Dose Route Frequency  hydrALAZINE (APRESOLINE) 20 mg/mL injection 10 mg  10 mg IntraVENous Q6H PRN  
 0.9% sodium chloride infusion 250 mL  250 mL IntraVENous PRN  
 dexamethasone (DECADRON) 40 mg in 0.9% sodium chloride 50 mL IVPB  40 mg IntraVENous DAILY  famotidine (PF) (PEPCID) 20 mg in sodium chloride 0.9% 10 mL injection  20 mg IntraVENous DAILY  0.9% sodium chloride infusion 250 mL  250 mL IntraVENous PRN  
 0.9% sodium chloride infusion 250 mL  250 mL IntraVENous PRN  
 cefTRIAXone (ROCEPHIN) 1 g in 0.9% sodium chloride (MBP/ADV) 50 mL  1 g IntraVENous Q24H  
 azithromycin (ZITHROMAX) 500 mg in 0.9% sodium chloride (MBP/ADV) 250 mL  500 mg IntraVENous Q24H  heparin 25,000 units in dextrose 500 mL infusion  18-36 Units/kg/hr IntraVENous TITRATE  allopurinol (ZYLOPRIM) tablet 50 mg  50 mg Oral DAILY  atorvastatin (LIPITOR) tablet 20 mg  20 mg Oral QHS  dilTIAZem CD (CARDIZEM CD) capsule 240 mg  240 mg Oral DAILY  sodium chloride (NS) flush 5-40 mL  5-40 mL IntraVENous Q8H  
 sodium chloride (NS) flush 5-40 mL  5-40 mL IntraVENous PRN  
 acetaminophen (TYLENOL) tablet 650 mg  650 mg Oral Q4H PRN  
 ondansetron (ZOFRAN) injection 4 mg  4 mg IntraVENous Q4H PRN  
 diphenhydrAMINE (BENADRYL) capsule 25 mg  25 mg Oral Q6H PRN Objective:  
 
Vitals:  
 10/18/19 9443 10/18/19 0632 10/18/19 7151 10/18/19 7929 BP: 165/90 154/75 160/79 169/79 Pulse: 72 77 71 69 Resp: 17 22 18 (!) 33 Temp:      
SpO2: 92% 94% 96% (!) 82% Weight: 214 lb 8.1 oz (97.3 kg) Height:      
 
Intake and Output:  
10/16 0701 - 10/17 1900 In: 8233 [I.V.:997.1] Out: 4100 [Urine:100] 10/17 1901 - 10/18 0700 In: 181.2 [I.V.:181.2] Out: 0 Physical Exam:  
Constitutional:  the patient is well developed and in no acute distress HEENT:  Sclera clear, pupils equal, oral mucosa moist 
Lungs: CTA. Currently wearing Opti flow cannula. Cardiovascular:  RRR without M,G,R 
Abd/GI: soft and non-tender; with positive bowel sounds. Ext: warm without cyanosis. There is no lower leg edema. Musculoskeletal: moves all four extremities with equal strength Skin:  no jaundice or rashes, no wounds Neuro: Less alert today. Mumbling to answer questions. Musculoskeletal: can't ambulate at present - not alert enough and oxygen levels low. No deformity Psychiatric: Calm. Review of Systems - Review of Systems Constitutional: Positive for malaise/fatigue. Respiratory: Positive for cough. Cardiovascular: Positive for chest pain. Gastrointestinal: Positive for nausea. Musculoskeletal: Positive for back pain. Lines: peripheral IV forearm CHEST XRAY: pending LAB 
Recent Labs 10/18/19 
0320 10/17/19 
1351 10/17/19 
0401 10/16/19 
1847 10/16/19 
0018 WBC 13.4*  --  13.5*  --  12.3* HGB 7.2* 7.8* 6.7* 6.6* 6.6* HCT 20.8* 23.0* 20.2* 20.2* 21.4*  
*  --  143*  --  159 Recent Labs 10/18/19 
0320 10/17/19 
0359 10/16/19 
0018 * 135* 138  
K 4.5 4.7 5.2*  
CL 96* 102 109* CO2 26 24 21 * 205* 146* BUN 70* 47* 40* CREA 5.07* 4.72* 5.01* MG 2.1 1.8  --   
ALB 1.7* 1.6*  --   
SGOT 609* 314*  --   
 
Lab Results Component Value Date/Time Calcium 10.7 (H) 10/18/2019 03:20 AM  
 Phosphorus 5.7 (H) 10/12/2019 10:02 PM  
 
Assessment:  (Medical Decision Making) Patient Active Problem List  
Diagnosis Code  Hypercalcemia E83.52  
 Anemia D64.9  MARCELA (acute kidney injury) ([facility] [address] N17.9  Diabetes mellitus ([facility] [address] E11.9  
 Essential hypertension I10  
 Paroxysmal atrial fibrillation (HCC) I48.0  Acute respiratory failure with hypoxia (HCC) J96.01  
 Pulmonary hypertension (HCC) I27.20  Hyperproteinemia- with likely hyperviscosity E88.09  
 Acute metabolic encephalopathy J83.78  
 Methemoglobinemia D74.9 Plan:  (Medical Decision Making) Hospital Problems  Never Reviewed Codes Class Noted POA Acute respiratory failure with hypoxia [facility]) ICD-10-CM: J96.01 
ICD-9-CM: 518.81  10/15/2019 No  
 Oxygenation imperoved Pulmonary hypertension (HCC) ICD-10-CM: I27.20 ICD-9-CM: 416.8  10/15/2019 Yes RV dilated on echo - ? PE versus chronic. No previous echo for review Hyperproteinemia- with likely hyperviscosity ICD-10-CM: E88.09 
ICD-9-CM: 273.8  10/15/2019 Unknown Viscosity results pending * (Principal) Hypercalcemia ICD-10-CM: P18.75 
ICD-9-CM: 275.42  10/12/2019 Yes Calcium back up some today. Anemia ICD-10-CM: D64.9 ICD-9-CM: 285.9  10/12/2019 Yes Worse. MARCELA (acute kidney [facility]) ([facility] ICD-10-CM: N17.9 ICD-9-CM: 584.9  10/12/2019 Yes Worse. Nephrology now involved. Urine dark but good output- on HD 2nd session today Hospital Problems  Never Reviewed Codes Class Noted POA Methemoglobinemia ICD-10-CM: D74.9 ICD-9-CM: 289.7  10/17/2019 Unknown S/p methylene blue administration- re check methemoglobin level. Discussed with wife, continue supportive care- redose methylene blue if MHb still high and patient symptomatic- levels signifficantly decreased yesterday. Likely will not be necessary. Michele Sawyer MD 
 
More than 50% of time documented was spent face-to-face contact with the patient and in the care of the patient on the floor/unit where the patient is located.

## 2019-10-18 NOTE — PROGRESS NOTES
A follow up visit was made to the patient. Emotional support, spiritual presence and  
prayer were provided for the patient, his brother, and his wife, Brigitte Herrera. LAW Holloway

## 2019-10-18 NOTE — DIALYSIS
Hemodialysis treatment initiated using Right Temp CVC. Aspirated and flushed both ports without problem. Dressing clean, dry and intact. Pt alert and VS stable. Machine settings per MD order. Will monitor during treatment.

## 2019-10-18 NOTE — PROGRESS NOTES
Bedside shift report received from [facility] and Heparin gtt dual verified. Patient is pleasantly confused with wife at bedside. Patient remains on optiflow with sats >95%.

## 2019-10-18 NOTE — PROGRESS NOTES
Bedside shift report received from [facility]. Pt resting in bed, drowsy and oriented to person only. Vitals stable. Family at bedside.

## 2019-10-18 NOTE — DIALYSIS
Hemodialysis treatment completed without complications. Patient responds to voice and VS stable  /91  P 67   
 
 2 Kgs removed. Flushed both ports with 10 mL of NS.  CVC dressing clean, dry, and intact, [place] caps intact, bilateral lumens wrapped with 4x4 gauze. Patient remains in 3102.

## 2019-10-18 NOTE — PROGRESS NOTES
Nutrition: 
Assessment based on early LOS. Assessment: Anthropometrics: Ht - 6'0\", wgt - 97.3 kg (ICU bed 10/18/19), BMI - 29.1 c/w overweight, edema - 1+ BLEs. Macronutrient Needs (81 kg - IBW): Estimated calorie needs - 7391-2449 ada/day (30-35 ada/kgIBW/day) Estimated protein needs -  gm pro/day (1.2-1.3 gm pro/kgIBW/day) Max CHO/day - 354 gm CHO/day (50% ada/day) Fluid/day - minimal volume (renal guidelines) Intake/Comparative Standards: This patient's average intake of CCHO diet for the past 4 days/4 meals: 40%. This potentially meets 30% of calorie and 35% of protein goals. Pertinent Labs: BUN 70, creatinine 5.07, t bili 16.6, AM glucose 230; POC glucose 805,837,119. Pertinent Medications: 
 Decadron, Zithromax, Rocephin. Diet: 
 NPO. ST evaluation today - pureed diet, thin liquids. Food/Nutrition History: 
 75year old gentleman with a h/o diabetes and HTN admitted with severe malaise and poor po intake over the last few months resulting in a reported, but not documented 35 pound weight loss. Staff reports that the patient is sleeping a lot today and is lethargic when awakened. His wife is not present to provide any information. Diagnosis (Nutrition): Inadequate oral intake related to decreased ability to consume sufficient oral intake as evidenced by reported weight loss PTA and currently unsafe for any substantial oral intake. Intervention: 
Meals and Snacks: SLP suggests a pureed diet with thin liquids as the safest texture and consistency for this patient at this time. Enteral Nutrition: Based on his history and current condition, suggest starting a TF with Nepro @ 13 ml/hr with a 17 ml/hr water flush and advance as tolerated to the goal rate of 53 ml/hr - 2286 calories/day (94% calorie goal), 103 grams protein/day (100% protein goal), 212 grams CHO/day (does not exceed max CHO limit) and 1337 ml water/day (renal guidelines). Coordination of Nutrition Care by a Nutrition Professional: AM ICU rounds, collaboration with Alesia Orr, [facility]. Nutrition Discharge Plan: Too soon to determine. Lori Childs. [facility] 
[phone]

## 2019-10-18 NOTE — PROGRESS NOTES
Hospitalist Note Admit Date:  10/12/2019  9:36 PM  
Name:  Callie Lopez Age:  75 y.o. 
:  1945 MRN:  [id number] PCP:  None Treatment Team: Attending Provider: Natali Beckham DO; Consulting Provider: Will Blankenship MD; Consulting Provider: Haile Maxwell MD; Consulting Provider: [place]ia Flaherty MD; Consulting Provider: Ara Dandy, MD; Consulting Provider: Jinny Montgomery NP; Care Manager: Dia Kinsey RN 
 
HPI/Subjective:  
 
 
 
Mr. Avani Olvera is a 76 yo male with PMH of AFIB, DM2, HLP admitted with malaise and weakness, found with hypercalcemia, anemia, MARCELA. CT head showed lytic lesions of skull concerning for multiple myeloma versus metastatic disease. He has been seen by oncology and managed with hydration, calcitonin, transfusion. He has been a new dialysis start this admit due to failure of renal recovery. On 10-16-19 he required ICU transfer due to acute hypoxia requiring BIPAP. There was a large discrepancy between pulse ox (low)  and ABG (high paO2). Co-oximeter showed elevated methemoglobin of 9. He received methylene blue on 10-16-19. Oncology was pending bone marrow biopsy not done to date. 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 Additionally there was some concern for PE contribution to respiratory failure thus he has been placed on IV heparin as too unstable for V/Q scan and unable to have CTA chest due to MARCELA. He has been on IV antibiotics for possible pneumonia. LFTS elevated. Palliative care following. Discharge plans pending 10-18-19 still confused, wife present, seems like he might be ok for diet Nursing notes facial droop after family mentioned this , moving all extremities and follows commands, mentally improved today Objective:  
 
Patient Vitals for the past 24 hrs: 
 Temp Pulse Resp BP SpO2  
10/18/19 1522 97.6 °F (36.4 °C)      
10/18/19 1519  85  168/61   
10/18/19 1503  71  158/80  10/18/19 1443  74 (!) 32 (!) 184/91 100 % 10/18/19 1433  68  (!) 174/102   
10/18/19 1429  69 18 (!) 174/102 100 % 10/18/19 1413  79 23 (!) 163/95 100 % 10/18/19 1400  60  (!) 177/102   
10/18/19 1358  72 25 (!) 177/102 100 % 10/18/19 1343  79 30 (!) 182/99 99 % 10/18/19 1329  74  191/84   
10/18/19 1328  76 28 191/84 100 % 10/18/19 1313  76 (!) 31 182/83 100 % 10/18/19 1300 97.5 °F (36.4 °C)      
10/18/19 1258  69 24 (!) 169/104 100 % 10/18/19 1257  75  157/90   
10/18/19 1243  91 25 157/90 98 % 10/18/19 1228  81 27 147/90 100 % 10/18/19 1225 97.6 °F (36.4 °C) 77 21 156/83 100 % 10/18/19 1213  70 (!) 32 156/83 100 % 10/18/19 1208 97.6 °F (36.4 °C)      
10/18/19 1158  74 22 161/72 100 % 10/18/19 1157  91  164/82   
10/18/19 1143  80 14 164/82 99 % 10/18/19 1137  71  166/75   
10/18/19 1128  79 17 166/75 97 % 10/18/19 1121 97.6 °F (36.4 °C)      
10/18/19 1113  70 23 149/70 93 % 10/18/19 1058  72 18 159/73 98 % 10/18/19 1047  83 17 154/78 97 % 10/18/19 0958  78 24 143/77 94 % 10/18/19 0928  75 17 157/89 99 % 10/18/19 0904  77  (!) 187/94   
10/18/19 0858  76 18 (!) 187/94 95 % 10/18/19 0829  66 23 (!) 171/93 98 % 10/18/19 0813  80 29 166/86 97 % 10/18/19 0807     99 % 10/18/19 0758 97.7 °F (36.5 °C) 77 15 155/80 97 % 10/18/19 0743  76 30 178/79 97 % 10/18/19 0728  78 15 172/88 96 % 10/18/19 0713  78 20 172/87 96 % 10/18/19 0658  72 22 (!) 167/95   
10/18/19 0628  69  169/79   
10/18/19 0558  71 18 160/79 96 % 10/18/19 0528  77 22 154/75 94 % 10/18/19 0458  72 17 165/90 92 % 10/18/19 0428  78 20 135/74 95 % 10/18/19 0358  73 16 134/68 95 % 10/18/19 0343  80 14 145/71 95 % 10/18/19 0328  69 18 154/87 95 % 10/18/19 0313  78 23 159/84 94 % 10/18/19 0304 97.6 °F (36.4 °C)      
10/18/19 0300     95 % 10/18/19 0258  66 22 174/85 90 % 10/18/19 0228  74 18 143/71 95 % 10/18/19 0158  75 21 143/69 97 % 10/18/19 0128  73 25 155/80 95 % 10/18/19 0058  67 22 154/82 94 % 10/18/19 0028  66 19 147/78 94 % 10/17/19 2358  79 17 135/71 96 % 10/17/19 2343  79 17 152/82 95 % 10/17/19 2328  67 17 147/81 98 % 10/17/19 2325     96 % 10/17/19 2313 97.5 °F (36.4 °C)  17 146/72 99 % 10/17/19 2258  60 20 166/85 95 % 10/17/19 2228  68 23 161/88 98 % 10/17/19 2213  82 20 165/84 99 % 10/17/19 2158  71 17 156/81 98 % 10/17/19 2128  70 28 146/86 92 % 10/17/19 2058  73 19 144/77 97 % 10/17/19 2028  67 21 155/76 97 % 10/17/19 1958  79 21 152/76 95 % 10/17/19 1928  77 17 148/79 96 % 10/17/19 1914 96.8 °F (36 °C) 72 15 157/75 96 % 10/17/19 1910     96 % 10/17/19 1858  72 17 157/75 90 % 10/17/19 1843  71 19 164/77 95 % 10/17/19 1828  67 19 155/74 95 % 10/17/19 1814  75 18 163/75 95 % 10/17/19 1758  64 22 143/78 93 % 10/17/19 1743  69 22 146/70 90 % 10/17/19 1728  75 19 138/65 95 % 10/17/19 1714  62 18 131/64 94 % 10/17/19 1658  63 17 133/67 96 % 10/17/19 1643  74 17 135/68 95 % 10/17/19 1628  75 22 146/71 95 % 10/17/19 1614  71 18 143/71 96 % Oxygen Therapy O2 Sat (%): 100 % (10/18/19 1443) Pulse via Oximetry: 75 beats per minute (10/18/19 1443) O2 Device: Nasal cannula (10/18/19 1100) O2 Flow Rate (L/min): 4 l/min (10/18/19 1100) O2 Temperature: 87.8 °F (31 °C) (10/18/19 0807) FIO2 (%): 35 % (10/18/19 0808) Estimated body mass index is 29.09 kg/m² as calculated from the following: 
  Height as of this encounter: 6' (1.829 m). Weight as of this encounter: 97.3 kg (214 lb 8.1 oz). Intake/Output Summary (Last 24 hours) at 10/18/2019 0087 Last data filed at 10/18/2019 1519 Gross per 24 hour Intake 1226.19 ml Output 2000 ml Net -773.81 ml  
   
*Note that automatically entered I/Os may not be accurate; dependent on patient compliance with collection and accurate  by [employer]. 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 No murmur, rub, or gallop. No edema Lungs:   CTAB. No wheezing, rhonchi, or rales. Anterior Abdomen:   Soft, nontender, nondistended. Decreased BS Extremities: Warm and dry Skin:     No rashes or jaundice. Neuro:  Confused , has very slight droop to right lower mouth, moves all extremities and follows commands Data Review: 
I have reviewed all labs, meds, and studies from the last 24 hours: 
 
Recent Results (from the past 24 hour(s)) PTT Collection Time: 10/17/19  9:18 PM  
Result Value Ref Range aPTT 101.6 (H) 24.7 - 39.8 SEC DIRECT SANTOSH Collection Time: 10/18/19  3:18 AM  
Result Value Ref Range LIBERTY Poly NEG   
CBC WITH AUTOMATED DIFF Collection Time: 10/18/19  3:20 AM  
Result Value Ref Range WBC 13.4 (H) 4.3 - 11.1 K/uL  
 RBC 2.24 (L) 4.23 - 5.6 M/uL HGB 7.2 (L) 13.6 - 17.2 g/dL HCT 20.8 (LL) 41.1 - 50.3 % MCV 92.9 79.6 - 97.8 FL  
 MCH 32.1 26.1 - 32.9 PG  
 MCHC 34.6 31.4 - 35.0 g/dL  
 RDW 17.4 (H) 11.9 - 14.6 % PLATELET 626 (L) 947 - 450 K/uL MPV 11.2 9.4 - 12.3 FL ABSOLUTE NRBC 1.93 (H) 0.0 - 0.2 K/uL NEUTROPHILS 78 43 - 78 % LYMPHOCYTES 13 13 - 44 % MONOCYTES 6 4.0 - 12.0 % EOSINOPHILS 0 (L) 0.5 - 7.8 % BASOPHILS 0 0.0 - 2.0 % IMMATURE GRANULOCYTES 3 0.0 - 5.0 %  
 ABS. NEUTROPHILS 10.5 (H) 1.7 - 8.2 K/UL  
 ABS. LYMPHOCYTES 1.7 0.5 - 4.6 K/UL  
 ABS. MONOCYTES 0.8 0.1 - 1.3 K/UL  
 ABS. EOSINOPHILS 0.0 0.0 - 0.8 K/UL  
 ABS. BASOPHILS 0.0 0.0 - 0.2 K/UL  
 ABS. IMM. GRANS. 0.4 0.0 - 0.5 K/UL  
 RBC COMMENTS NORMOCYTIC/NORMOCHROMIC    
 RBC COMMENTS MODERATE 
ROULEAUX PLATELET COMMENTS ADEQUATE    
 DF MANUAL METABOLIC PANEL, COMPREHENSIVE Collection Time: 10/18/19  3:20 AM  
Result Value Ref Range Sodium 133 (L) 136 - 145 mmol/L Potassium 4.5 3.5 - 5.1 mmol/L Chloride 96 (L) 98 - 107 mmol/L  
 CO2 26 21 - 32 mmol/L Anion gap 11 7 - 16 mmol/L Glucose 230 (H) 65 - 100 mg/dL BUN 70 (H) 8 - 23 MG/DL Creatinine 5.07 (H) 0.8 - 1.5 MG/DL  
 GFR est AA 14 (L) >60 ml/min/1.73m2 GFR est non-AA 12 (L) >60 ml/min/1.73m2 Calcium 10.7 (H) 8.3 - 10.4 MG/DL Bilirubin, total 16.6 (H) 0.2 - 1.1 MG/DL  
 ALT (SGPT) 144 (H) 12 - 65 U/L  
 AST (SGOT) 609 (H) 15 - 37 U/L Alk. phosphatase 81 50 - 136 U/L Protein, total 10.4 (H) 6.3 - 8.2 g/dL Albumin 1.7 (L) 3.2 - 4.6 g/dL Globulin 8.7 (H) 2.3 - 3.5 g/dL A-G Ratio 0.2 (L) 1.2 - 3.5 PTT Collection Time: 10/18/19  3:20 AM  
Result Value Ref Range aPTT 101.9 (H) 24.7 - 39.8 SEC MAGNESIUM Collection Time: 10/18/19  3:20 AM  
Result Value Ref Range Magnesium 2.1 1.8 - 2.4 mg/dL URIC ACID Collection Time: 10/18/19  3:20 AM  
Result Value Ref Range Uric acid <0.2 (L) 2.6 - 6.0 MG/DL  
GLUCOSE, POC Collection Time: 10/18/19  9:26 AM  
Result Value Ref Range Glucose (POC) 275 (H) 65 - 100 mg/dL GLUCOSE, POC Collection Time: 10/18/19 11:28 AM  
Result Value Ref Range Glucose (POC) 222 (H) 65 - 100 mg/dL All Micro Results Procedure Component Value Units Date/Time INFLUENZA A & B AG (RAPID TEST) [[id number]] Collected:  10/13/19 0028 Order Status:  Completed Specimen:  Nasopharyngeal from Nasal washing Updated:  10/13/19 7789 Influenza A Ag NEGATIVE Comment: NEGATIVE FOR THE PRESENCE OF INFLUENZA A ANTIGEN 
INFECTION DUE TO INFLUENZA A CANNOT BE RULED OUT. BECAUSE THE ANTIGEN PRESENT IN THE SAMPLE MAY BE BELOW 
THE DETECTION LIMIT OF THE TEST. A NEGATIVE TEST IS PRESUMPTIVE AND IT IS RECOMMENDED THAT THESE RESULTS BE CONFIRMED BY VIRAL CULTURE OR AN FDA-CLEARED INFLUENZA A AND B MOLECULAR ASSAY.  
  
  
  Influenza B Ag NEGATIVE      
 Comment: NEGATIVE FOR THE PRESENCE OF INFLUENZA B ANTIGEN 
INFECTION DUE TO INFLUENZA B CANNOT BE RULED OUT. BECAUSE THE ANTIGEN PRESENT IN THE SAMPLE MAY BE BELOW 
THE DETECTION LIMIT OF THE TEST. A NEGATIVE TEST IS PRESUMPTIVE AND IT IS RECOMMENDED THAT THESE RESULTS BE CONFIRMED BY VIRAL CULTURE OR AN FDA-CLEARED INFLUENZA A AND B MOLECULAR ASSAY. Source NASOPHARYNGEAL Results for orders placed or performed during the hospital encounter of 10/12/19  
2D ECHO COMPLETE ADULT (TTE) W OR WO CONTR Narrative [place] [facility] [address] [place], [address] [facility] [place] 
[phone] Transthoracic Echocardiogram 
2D, M-mode, Doppler, and Color Doppler [place] 
MR #: [id number] : [id number] Age: 75 years Gender: Male Study date: 14-Oct-2019 Account #: [de-identified] Height: 72 in 
Weight: 223.5 lb 
BSA: 2.24 mï¾² Status:Routine Location: [facility] BP: 131/ 69 Allergies: NO KNOWN ALLERGENS Sonographer:  Rekha Shah [facility] Group:  [facility] Referring Physician:  Ana Lilia Medina. Jadon Martinez NP Reading Physician:  DR. Josefa Dolan MD 
 
INDICATIONS: Likely new multiple myeloma diagnosis, will need chemotherapy PROCEDURE: This was a routine study. A transthoracic echocardiogram was 
performed. The study included complete 2D imaging, M-mode, complete spectral 
Doppler, and color Doppler. Intravenous contrast (Definity) was administered. Image quality was adequate. LEFT VENTRICLE: Size was normal. Systolic function was normal. Ejection 
fraction was estimated to be greater than 55 %. There were no regional wall 
motion abnormalities. Wall thickness was at the upper limits of normal.  
Doppler 
parameters were consistent with diastolic dysfunction. Avg E/e': 14.6. VENTRICULAR SEPTUM: There was \"bounce\" motion. These changes are consistent 
with a conduction abnormality or paced rhythm. RIGHT VENTRICLE: The ventricle was mildly dilated. Systolic function was  
mildly 
reduced. Estimated peak pressure was in the range of 45-50 mmHg. LEFT ATRIUM: The atrium was moderately dilated. RIGHT ATRIUM: The atrium was mildly dilated. SYSTEMIC VEINS: IVC: The inferior vena cava was normal in size and course. Respirophasic changes were normal. 
 
AORTIC VALVE: The valve was trileaflet. Leaflets exhibited good mobility. There 
was no evidence for stenosis. There was mild regurgitation. MITRAL VALVE: Valve structure was normal. There was no evidence for stenosis. There was trivial regurgitation. TRICUSPID VALVE: The valve structure was normal. There was no evidence for 
stenosis. There was mild regurgitation. PULMONIC VALVE: Not well visualized. There was no evidence for stenosis. There 
was trivial regurgitation. PERICARDIUM: There was no pericardial effusion. AORTA: The root exhibited dilatation. There was dilatation of the ascending 
aorta. PULMONARY ARTERY: The artery was mildly to moderately dilated. SUMMARY: 
 
-  Left ventricle: Systolic function was normal. Ejection fraction was 
estimated to be greater than 55 %. There were no regional wall motion 
abnormalities. Wall thickness was at the upper limits of normal. 
 
-  Ventricular septum: There was \"bounce\" motion. These changes are  
consistent 
with a conduction abnormality or paced rhythm. -  Right ventricle: The ventricle was mildly dilated. Systolic function was 
mildly reduced. Estimated peak pressure was in the range of 45-50 mmHg. -  Aorta, systemic arteries: There was dilatation of the ascending aorta. The 
aortic root diameter was 40 mm. The ascending aorta maximal AP dimension was 40 
mm. 
 
-  Pulmonary arteries: The artery was mildly to moderately dilated. -  Additional impressions: Consider imaging of aortic dimensions in 6 months with either repeat echocardiogram or CT/MRA if aortic size not previously 
documented. No comparison echocardiogram available for review. MEASUREMENT TABLES 
 
2D MEASUREMENTS Aorta   (Reference normals) Root diam   40 mm   (--) AAo max AP diam   40 mm   (--) SYSTEM MEASUREMENT TABLES 
 
2D mode AoR Diam (2D): 4 cm 
LA Dimension (2D): 4.5 cm Left Atrium Systolic Volume Index; Method of Disks, Biplane; 2D mode;: 46  
ml/m2 IVS/LVPW (2D): 1.2 IVSd (2D): 1.3 cm LVIDd (2D): 5.1 cm 
LVIDs (2D): 3.2 cm 
LVOT Area (2D): 3.5 cm2 LVPWd (2D): 1.1 cm RVIDd (2D): 2.7 cm Tissue Doppler Imaging LV Peak Early Villafuerte Tissue Thom; Lateral MA (TDI): 13.2 cm/s LV Peak Early Villafuerte Tissue Thom; Medial MA (TDI): 5.3 cm/s Unspecified Scan Mode Peak Grad; Mean; Antegrade Flow: 4 mm[Hg] Vmax; Antegrade Flow: 105 cm/s LVOT Diam: 2.1 cm 
MV Peak Thom/LV Peak Tissue Thom E-Wave; Lateral MA: 8.4 MV Peak Thom/LV Peak Tissue Thom E-Wave; Medial MA: 20.8 Prepared and signed by 
 
DR. Michelle Jensen MD 
Signed 14-Oct-2019 13:43:41 Current Meds: 
Current Facility-Administered Medications Medication Dose Route Frequency  insulin lispro (HUMALOG) injection   SubCUTAneous Q6H  
 0.9% sodium chloride infusion 250 mL  250 mL IntraVENous PRN  
 [START ON 10/19/2019] dilTIAZem (CARDIZEM) IR tablet 60 mg  60 mg Oral AC&HS  hydrALAZINE (APRESOLINE) 20 mg/mL injection 10 mg  10 mg IntraVENous Q6H PRN  
 0.9% sodium chloride infusion 250 mL  250 mL IntraVENous PRN  
 dexamethasone (DECADRON) 40 mg in 0.9% sodium chloride 50 mL IVPB  40 mg IntraVENous DAILY  famotidine (PF) (PEPCID) 20 mg in sodium chloride 0.9% 10 mL injection  20 mg IntraVENous DAILY  0.9% sodium chloride infusion 250 mL  250 mL IntraVENous PRN  
 0.9% sodium chloride infusion 250 mL  250 mL IntraVENous PRN  
 cefTRIAXone (ROCEPHIN) 1 g in 0.9% sodium chloride (MBP/ADV) 50 mL  1 g IntraVENous Q24H  azithromycin (ZITHROMAX) 500 mg in 0.9% sodium chloride (MBP/ADV) 250 mL  500 mg IntraVENous Q24H  
 heparin 25,000 units in dextrose 500 mL infusion  18-36 Units/kg/hr IntraVENous TITRATE  allopurinol (ZYLOPRIM) tablet 50 mg  50 mg Oral DAILY  sodium chloride (NS) flush 5-40 mL  5-40 mL IntraVENous Q8H  
 sodium chloride (NS) flush 5-40 mL  5-40 mL IntraVENous PRN  
 acetaminophen (TYLENOL) tablet 650 mg  650 mg Oral Q4H PRN  
 ondansetron (ZOFRAN) injection 4 mg  4 mg IntraVENous Q4H PRN  
 diphenhydrAMINE (BENADRYL) capsule 25 mg  25 mg Oral Q6H PRN Other Studies (last 24 hours): [address] [place] [address] Result Date: 10/18/2019 RIGHT UPPER QUADRANT SONOGRAPHY: CLINICAL HISTORY: Abnormal liver function tests and leukocytosis. COMPARISON:  Report of CT torso of February 6, 2017 from [facility], although the images are not available for direct comparison. FINDINGS: Multiple images from real time ultrasound evaluation of the RUQ demonstrate no shadowing stone in the gallbladder. The gallbladder wall is thickened at 10 mm. The patient's decreased level of consciousness presented evaluation of the \"sonographic Hess's sign. \"  The common duct is normal in caliber at 6 mm, and no significant intrahepatic bilary ductal dilatation is evident. The liver is enlarged at 26.1 cm, increased from 20 cm reported by CT in 2017. No focal liver lesion is seen. The pancreas is normal in contour and echogenicity where seen. The right kidney is not obstructed. There is a 2 cm right mid pole renal cyst.  The right renal parenchyma is hyperechoic compared to this liver. IMPRESSION: 1.  Gallbladder wall thickening without cholelithiasis or pericholecystic fluid is a nonspecific finding. The possibility of acalculous cholecystitis is not excluded, and clinical correlation is recommended. 2.  Hepatomegaly has worsened since outside CT in 2017, but no focal liver lesion or biliary ductal dilatation is identified.  3. Right renal parenchymal relative hyperechogenicity is a nonspecific finding which may be associated with a number of medical renal diseases. No right hydronephrosis. Assessment and Plan:  
 
Hospital Problems as of 10/18/2019 Never Reviewed Codes Class Noted - Resolved POA Methemoglobinemia ICD-10-CM: D74.9 ICD-9-CM: 289.7  10/17/2019 - Present Unknown Acute metabolic encephalopathy U-45-LW: G93.41 
ICD-9-CM: 348.31  10/16/2019 - Present Unknown Acute respiratory failure with hypoxia [facility]) ICD-10-CM: J96.01 
ICD-9-CM: 518.81  10/15/2019 - Present No  
   
 Pulmonary hypertension (HCC) (Chronic) ICD-10-CM: C80.95 ICD-9-CM: 416.8  10/15/2019 - Present Yes Hyperproteinemia- with likely hyperviscosity ICD-10-CM: E88.09 
ICD-9-CM: 273.8  10/15/2019 - Present Unknown * (Principal) Hypercalcemia ICD-10-CM: E67.26 
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 Yovanny Cristina · Methemoglobinemia: s/p methylene blue · Acute hypoxic respiratory failure: on optiflow, per pulmonary, on IV heparin for possible PE, also on antibiotics for possible pneumonia, will try for V/Q scan later today · Anemia and thrombocytopenia: transfuse per oncology,hemolysis workup positive · Elevated LFTS: will check ABD US, some hemolysis contribution · HTN: on cardizem and will add prn hydralazine · Bradycardia: noted on cardizem for AFIB · Hyperglycemia: due to steroids, on SSI · Elevated troponin: likely demand, ECHO with preserved EF · Acute metabolic encephalopathy: improving , still confused, consult SLP   
 
 
DC planning/Dispo:  Pending Diet:  DIET NUTRITIONAL SUPPLEMENTS 
DIET NPO 
DVT ppx:  IV heparin Signed: Guillermo Davsion MD

## 2019-10-18 NOTE — CONSULTS
Subjective:  
  
Referring Physician : Essence Arizmendi Chief Complaint : jaundice History of Present Illness :Mr. Ary Appiah is a 76 yo male with PMH of AFIB, DM2, HLP admitted with malaise and weakness, found with hypercalcemia, anemia, MARCELA. CT head showed lytic lesions of skull concerning for multiple myeloma versus metastatic disease. He has been seen by oncology and managed with hydration, calcitonin, transfusion. He has been a new dialysis start this admit due to failure of renal recovery. On 10-16-19 he required ICU transfer due to acute hypoxia requiring BIPAP. There was a large discrepancy between pulse ox (low)  and ABG (high paO2). Co-oximeter showed elevated methemoglobin of 9. He received methylene blue on 10-16-19. Oncology was pending bone marrow biopsy not done to date. 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 Additionally there was some concern for PE contribution to respiratory failure thus he has been placed on IV heparin as too unstable for V/Q scan and unable to have CTA chest due to MARCELA. He has been on IV antibiotics for possible pneumonia. LFTS elevated. Patient now has a progressive jaundice. He has persistent anemia which appears to be hemolytic. He had an episode of hypoxia related to methemoglobinemia . History reviewed. Chronic renal insufficiency History reviewed. No pertinent surgical history. Family History Problem Relation Age of Onset  Heart Disease Mother  Diabetes Father  Stroke Father  Heart Disease Father  No Known Problems Sister Current Facility-Administered Medications Medication Dose Route Frequency  insulin lispro (HUMALOG) injection   SubCUTAneous Q6H  
 0.9% sodium chloride infusion 250 mL  250 mL IntraVENous PRN  
 [START ON 10/19/2019] dilTIAZem (CARDIZEM) IR tablet 60 mg  60 mg Oral AC&HS  hydrALAZINE (APRESOLINE) 20 mg/mL injection 10 mg  10 mg IntraVENous Q6H PRN  
  0.9% sodium chloride infusion 250 mL  250 mL IntraVENous PRN  
 dexamethasone (DECADRON) 40 mg in 0.9% sodium chloride 50 mL IVPB  40 mg IntraVENous DAILY  famotidine (PF) (PEPCID) 20 mg in sodium chloride 0.9% 10 mL injection  20 mg IntraVENous DAILY  0.9% sodium chloride infusion 250 mL  250 mL IntraVENous PRN  
 0.9% sodium chloride infusion 250 mL  250 mL IntraVENous PRN  
 cefTRIAXone (ROCEPHIN) 1 g in 0.9% sodium chloride (MBP/ADV) 50 mL  1 g IntraVENous Q24H  
 azithromycin (ZITHROMAX) 500 mg in 0.9% sodium chloride (MBP/ADV) 250 mL  500 mg IntraVENous Q24H  
 heparin 25,000 units in dextrose 500 mL infusion  18-36 Units/kg/hr IntraVENous TITRATE  allopurinol (ZYLOPRIM) tablet 50 mg  50 mg Oral DAILY  sodium chloride (NS) flush 5-40 mL  5-40 mL IntraVENous Q8H  
 sodium chloride (NS) flush 5-40 mL  5-40 mL IntraVENous PRN  
 acetaminophen (TYLENOL) tablet 650 mg  650 mg Oral Q4H PRN  
 ondansetron (ZOFRAN) injection 4 mg  4 mg IntraVENous Q4H PRN  
 diphenhydrAMINE (BENADRYL) capsule 25 mg  25 mg Oral Q6H PRN Prior to Admission medications Medication Sig Start Date End Date Taking? Authorizing Provider  
atorvastatin (LIPITOR) 20 mg tablet Take 20 mg by mouth. Provider, Historical  
dilTIAZem XR (DILACOR XR) 240 mg XR capsule Take 240 mg by mouth. 3/16/17   Provider, Historical  
glipiZIDE (GLUCOTROL) 10 mg tablet Take 10 mg by mouth. Provider, Historical  
 
No Known Allergies Social History Occupational History  Occupation: retired police/ Tobacco Use  Smoking status: Current Every Day Smoker Packs/day: 1.00 Years: 21.00 Pack years: 21.00  Tobacco comment: quit in 1979 Substance and Sexual Activity  Alcohol use: Not Currently  Drug use: Not on file  Sexual activity: Not on file Review of Systems: The rest of 10 organ review of systems is negative or as per HPI and PMH. Objective:  
 
Physical Exam: Patient Vitals for the past 8 hrs: 
 BP Temp Pulse Resp SpO2  
10/18/19 1928 164/82  72 19 99 % 10/18/19 1913 (!) 155/96  72 25 98 % 10/18/19 1858 148/78  73 16 100 % 10/18/19 1843 171/77  82 16 100 % 10/18/19 1829 182/80  75 22 100 % 10/18/19 1643 (!) 165/99  70 22 100 % 10/18/19 1628 148/90  80 17 100 % 10/18/19 1613 (!) 166/91  66 23 99 % 10/18/19 1558 168/81  80 20 97 % 10/18/19 1543 150/85  66 23 94 % 10/18/19 1528 154/72  65 17 100 % 10/18/19 1522  97.6 °F (36.4 °C)     
10/18/19 1519 168/61  85    
10/18/19 1513 (!) 168/91  76 17 100 % 10/18/19 1503 158/80  71    
10/18/19 1458 158/80  73 21 100 % 10/18/19 1443 (!) 184/91  74 (!) 32 100 % 10/18/19 1433 (!) 174/102  68    
10/18/19 1429 (!) 174/102  69 18 100 % 10/18/19 1413 (!) 163/95  79 23 100 % 10/18/19 1400 (!) 177/102  60    
10/18/19 1358 (!) 177/102  72 25 100 % 10/18/19 1343 (!) 182/99  79 30 99 % 10/18/19 1329 191/84  74    
10/18/19 1328 191/84  76 28 100 % 10/18/19 1313 182/83  76 (!) 31 100 % 10/18/19 1300  97.5 °F (36.4 °C)     
10/18/19 1258 (!) 169/104  69 24 100 % 10/18/19 1257 157/90  75    
10/18/19 1243 157/90  91 25 98 % 10/18/19 1228 147/90  81 27 100 % 10/18/19 1225 156/83 97.6 °F (36.4 °C) 77 21 100 % 10/18/19 1213 156/83  70 (!) 32 100 % 10/18/19 1208  97.6 °F (36.4 °C)     
10/18/19 1158 161/72  74 22 100 % 10/18/19 1157 164/82  91   General: 
Skin:  Extremities and face reveal no rashes. No walls erythema. No telangiectasias on the chest wall. HEENT: Sclerae icteric. No oral ulcers. No abnormal pigmentation of the lips. Cardiovascular: Regular rate and rhythm. No murmurs, gallops, or rubs. PMI nondisplaced. Carotids without bruits. Respiratory:  Comfortable breathing  With no accessory muscle use. Clear breath sounds with no rales bruits. GI:  Abdomen nondistended, soft, and nontender.   Normal active bowel sounds. No enlargement of the liver or spleen. No masses palpable. Rectal:  Deferred Musculoskeletal:  No pitting edema of the lower legs. The neck is supple and extremities have good range of motion. No costovertebral tenderness. Neurological:  confused Lymphatic:  No cervical or supraclavicular adenopathy. Laboratory:   
Lab Results Component Value Date/Time WBC 13.4 (H) 10/18/2019 03:20 AM  
 RBC 2.24 (L) 10/18/2019 03:20 AM  
 HGB 7.2 (L) 10/18/2019 03:20 AM  
 HCT 20.8 (LL) 10/18/2019 03:20 AM  
 PLATELET 458 (L) 61/32/1244 03:20 AM  
 
Lab Results Component Value Date/Time Sodium 133 (L) 10/18/2019 03:20 AM  
 Potassium 4.5 10/18/2019 03:20 AM  
 Chloride 96 (L) 10/18/2019 03:20 AM  
 CO2 26 10/18/2019 03:20 AM  
 Anion gap 11 10/18/2019 03:20 AM  
 Glucose 230 (H) 10/18/2019 03:20 AM  
 BUN 70 (H) 10/18/2019 03:20 AM  
 Creatinine 5.07 (H) 10/18/2019 03:20 AM  
 GFR est AA 14 (L) 10/18/2019 03:20 AM  
 GFR est non-AA 12 (L) 10/18/2019 03:20 AM  
 Calcium 10.7 (H) 10/18/2019 03:20 AM  
 Magnesium 2.1 10/18/2019 03:20 AM  
 Phosphorus 5.7 (H) 10/12/2019 10:02 PM  
 Albumin 1.7 (L) 10/18/2019 03:20 AM  
 Bilirubin, total 16.6 (H) 10/18/2019 03:20 AM  
 Protein, total 10.4 (H) 10/18/2019 03:20 AM  
 Globulin 8.7 (H) 10/18/2019 03:20 AM  
 A-G Ratio 0.2 (L) 10/18/2019 03:20 AM  
 AST (SGOT) 609 (H) 10/18/2019 03:20 AM  
 ALT (SGPT) 144 (H) 10/18/2019 03:20 AM  
 
 Has received 6 units of packed cells. Assessment/Plan:  
 
 
Hospital Problems  Never Reviewed Codes Class Noted POA Methemoglobinemia ICD-10-CM: D74.9 ICD-9-CM: 289.7  10/17/2019 Unknown Acute metabolic encephalopathy EOM-59-TC: G93.41 
ICD-9-CM: 348.31  10/16/2019 Unknown Acute respiratory failure with hypoxia [facility]) ICD-10-CM: J96.01 
ICD-9-CM: 518.81  10/15/2019 No  
   
 Pulmonary hypertension (HCC) (Chronic) ICD-10-CM: M25.07 ICD-9-CM: 416.8  10/15/2019 Yes Hyperproteinemia- with likely hyperviscosity ICD-10-CM: E88.09 
ICD-9-CM: 273.8  10/15/2019 Unknown * (Principal) Hypercalcemia ICD-10-CM: K80.56 
ICD-9-CM: 275.42  10/12/2019 Yes Anemia ICD-10-CM: D64.9 ICD-9-CM: 285.9  10/12/2019 Yes [facility] ICD-10-CM: N17.9 ICD-9-CM: 584.9  10/12/2019 Yes The etiology of his progressively worsening liver chemistries is unclear. He has elevation of unconjugated and conjugated bilirubin. He has evidence of hemolysis. He had an episode of hypoxia with methemoglobinemia. There is no evidence of obstructive jaundice. These labs are not likely to be related to his gallbladder findings. There may be a degree of shock liver related to his hypoxic episode. The dramatic rise in his AST over his ALT maybe more indicative of hemolysis. not likely Shan's disease. There is no role at this time for ERCP or IR drainage. Will discuss with his family.

## 2019-10-18 NOTE — PROGRESS NOTES
Problem: Dysphagia (Adult) Goal: *Speech Goal: (INSERT TEXT) Description LTG: Patient will tolerate least restrictive diet without overt signs or symptoms of airway compromise. STG: Patient will tolerate puree diet and thin liquids without overt signs or symptoms of airway compromise. STG: Patient will participate po trials of chewable textures for possible diet upgrade. Outcome: Progressing Towards Goal 
 SPEECH LANGUAGE PATHOLOGY: DYSPHAGIA- Initial Assessment NAME/AGE/GENDER: Calvin Lazo is a 75 y.o. male DATE: 10/18/2019 PRIMARY DIAGNOSIS: Hypercalcemia [E83.52] Anemia [D64.9] MARCELA (acute kidney injury) ([facility] [address] [N17.9] ICD-10: Treatment Diagnosis: R13.12 Dysphagia, Oropharyngeal Phase INTERDISCIPLINARY COLLABORATION: Registered Nurse PRECAUTIONS/ALLERGIES: Patient has no known allergies. SUBJECTIVE Confused and drowsy. Speech dysarthric and limited intelligibility. Wife reports he has been having difficulty with solid foods at home prior to admission. RN at bedside. Diet Prior to Evaluation: NPO History of Present Injury/Illness: Mr. Maynor Nuñez  has no past medical history on file. Johnathan Ohara He also  has no past surgical history on file. Previous Dysphagia: YES Difficulty with masticating solids Problem List:  (Impairments causing functional limitations): Oropharyngeal dysphagia Orientation:  
Person Pain: Pain Scale 1: Numeric (0 - 10) Pain Intensity 1: 0 OBJECTIVE Oral Motor Assessment: 
Labial: Right droop Dentition: Upper Dentures and Lower Dentures Oral Hygiene: Adequate Lingual: Decreased rate and Decreased strength Swallow assessment: 
 Patient presented with thin liquids via tsp, cup, and straw; puree; and mixed consistencies. Right facial droop observed which wife states is not typical- RN notified.   
Thin liquids via tsp, cup, and straw tolerated without overt s/sx of airway compromise. Intermittent difficulty with drawing liquid from straw, but improved ability with left sided straw placement. Brief bolus holding with puree. Able to initiate swallow with minimal verbal cues. Prolonged mastication and poor oral clearance with soft chewables. Liquid wash beneficial in clearing oral residue. ASSESSMENT Patient presents with mild-moderate oropharyngeal dysphagia characterized by bolus holding with puree and mixed consistencies. Poor oral clearing with chewables. Suspect alertness impacting oral stage of swallow. Right facial droop also noted during oral motor assessment- RN notified Recommend puree diet/thin liquids. Medications crushed in puree. Speech to follow regarding diet tolerance and po trials for potential diet upgrade Tool Used: Dysphagia Outcome and Severity Scale (EVA) Score Comments Normal Diet  ? 7 With no strategies or extra time needed Functional Swallow  ? 6 May have mild oral or pharyngeal delay Mild Dysphagia  ? 5 Which may require one diet consistency restricted Mild-Moderate Dysphagia  ? 4 With 1-2 diet consistencies restricted Moderate Dysphagia  ? 3 With 2 or more diet consistencies restricted Moderate-Severe Dysphagia  ? 2 With partial PO strategies (trials with ST only) Severe Dysphagia  ? 1 With inability to tolerate any PO safely Score:  Initial: 4 Most Recent: x (Date 10/18/19 ) Interpretation of Tool: The Dysphagia Outcome and Severity Scale (EVA) is a simple, easy-to-use, 7-point scale developed to systematically rate the functional severity of dysphagia based on objective assessment and make recommendations for diet level, independence level, and type of nutrition. Current Medications: No current facility-administered medications on file prior to encounter. Current Outpatient Medications on File Prior to Encounter Medication Sig Dispense Refill atorvastatin (LIPITOR) 20 mg tablet Take 20 mg by mouth. dilTIAZem XR (DILACOR XR) 240 mg XR capsule Take 240 mg by mouth. glipiZIDE (GLUCOTROL) 10 mg tablet Take 10 mg by mouth. PLAN   
FREQUENCY/DURATION: Continue to follow patient 3 times a week for duration of hospital stay to address above goals. - Recommendations for next treatment session: Next treatment will address diet tolerance, po trials for possible upgrade. ? Need for cognitive-linguistic evaluation give dysarthria and confusion REHABILITATION POTENTIAL FOR STATED GOALS: Good COMPLIANCE WITH PROGRAM/EXERCISES: Will assess as treatment progresses CONTINUATION OF SKILLED SERVICES/MEDICAL NECESSITY: 
Patient is expected to demonstrate progress in  swallow strength, swallow timeliness, swallow function, diet tolerance and swallow safety in order to  improve swallow safety, work toward diet advancement and decrease aspiration risk. Patient continues to require skilled intervention due to dysphagia. RECOMMENDATIONS  
DIET:  
PO:  Pureed Liquids:  regular thin MEDICATIONS: Crushed in puree ASPIRATION PRECAUTIONS Slow rate of intake Small bites/sips Upright at 90 degrees during meal 
  
COMPENSATORY STRATEGIES/MODIFICATIONS None EDUCATION: 
Recommendations discussed with Nursing Family Patient RECOMMENDATIONS for CONTINUED SPEECH THERAPY:  
YES: Anticipate need for ongoing speech therapy during this hospitalization. SAFETY: 
After treatment position/precautions: 
Upright in bed RN notified Family at bedside Total Treatment Duration:  
Time In: 0840 Time Out: [address] CCC-SLP

## 2019-10-18 NOTE — PROGRESS NOTES
Spoke with Dr. Destni Doshi regarding patients V/Q scan results. Will leave heparin gtt on tonight and re-evaluate tomorrow. No new orders at this time.

## 2019-10-18 NOTE — PROGRESS NOTES
Pt discussed in am IDR. Pt remains on optiflow but has improved per MD notes. More awake and alert. Has had dialysis past 3 days which is new for pt per nursing. Followed by Oncology/Hematology, Pulmonary, Nephrology and Palliative care. CM will continue to follow for any assist and d/c POC when stable.

## 2019-10-18 NOTE — PROGRESS NOTES
[address] [place] [address] Hematology & Oncology Inpatient Hematology / Oncology Progress Note Admission Date: 10/12/2019  9:36 PM 
Reason for Admission/Hospital Course: Hypercalcemia [E83.52] Anemia [D64.9] MARCELA (acute kidney injury) ([facility] [address] [N17.9] 24 Hour Events: 
Afebrile, hypertensive at times, on Hiflow @ 40L [place] FLC 13k SPEP with m-spike 4.89 Not stable enough for BMbx at this time Dex D3 of 4 On dialysis Bili up to 16.6 Family at bedside ROS: 
Unable to assess - pt somnolent 10 point review of systems is otherwise negative with the exception of the elements mentioned above in the HPI. No Known Allergies OBJECTIVE: 
Patient Vitals for the past 8 hrs: 
 BP Temp Pulse Resp SpO2 Weight 10/18/19 1121  97.6 °F (36.4 °C)      
10/18/19 1058 159/73  69     
10/18/19 0958 143/77  78 24 94 %   
10/18/19 0928 157/89  75 17 99 %   
10/18/19 0904 (!) 187/94  77     
10/18/19 0858 (!) 187/94  76 18 95 %   
10/18/19 0829 (!) 171/93  66 23 98 %   
10/18/19 0813 166/86  80 29 97 %   
10/18/19 0807     99 %   
10/18/19 0804  97.7 °F (36.5 °C)      
10/18/19 0758 155/80  77 15 97 %   
10/18/19 0743 178/79  76 30 97 %   
10/18/19 0728 172/88  78 15 96 %   
10/18/19 0713 172/87  78 20 96 %   
10/18/19 0658 (!) 167/95  72 22    
10/18/19 0628 169/79  69     
10/18/19 0558 160/79  71 18 96 %   
10/18/19 0528 154/75  77 22 94 %   
10/18/19 0458 165/90  72 17 92 % 214 lb 8.1 oz (97.3 kg) 10/18/19 0428 135/74  78 20 95 %   
10/18/19 0358 134/68  73 16 95 %   
10/18/19 0343 145/71  80 14 95 %   
10/18/19 0328 154/87  69 18 95 %  Temp (24hrs), Av.5 °F (36.4 °C), Min:96.8 °F (36 °C), Max:97.8 °F (36.6 °C) 
 
10/18 07 - 10/18 190 In: 405.7 [I.V.:405.7] Out: - Physical Exam: 
Constitutional: Ill-appearing elderly male in no acute distress, lying comfortably in the hospital bed. HEENT: Normocephalic and atraumatic. Oropharynx is clear, mucous membranes are moist.  Neck supple without JVD. No thyromegaly present. L occipital mass. Skin Warm and dry. No bruising and no rash noted. No erythema. No pallor. Respiratory Lungs are clear to auscultation bilaterally. On Hiflow O2.   
CVS Normal rate, irregular rhythm and normal S1 and S2. No murmurs, gallops, or rubs. Abdomen Soft, nontender and nondistended, normoactive bowel sounds. No palpable mass. No hepatosplenomegaly. Neuro Grossly nonfocal with no obvious sensory or motor deficits. MSK Normal range of motion in general.  No edema and no tenderness. Psych Calm, cooperative. Somnolent Labs: 
   
Recent Labs 10/18/19 
0320 10/17/19 
1351 10/17/19 
0401  10/16/19 
0018 WBC 13.4*  --  13.5*  --  12.3*  
RBC 2.24*  --  2.06*  --  2.07* HGB 7.2* 7.8* 6.7*   < > 6.6* HCT 20.8* 23.0* 20.2*   < > 21.4* MCV 92.9  --  98.1*  --  103.4*  
MCH 32.1  --  32.5  --  31.9 MCHC 34.6  --  33.2  --  30.8*  
RDW 17.4*  --  17.7*  --  17.8*  
*  --  143*  --  159 GRANS 78  --  72  --  62  
LYMPH 13  --  20  --  28  
MONOS 6  --  4  --  5  
EOS 0*  --  0*  --  1 BASOS 0  --  0  --  0 IG 3  --  4  --  4  
DF MANUAL  --  AUTOMATED  --  AUTOMATED ANEU 10.5*  --  9.8*  --  7.7 ABL 1.7  --  2.7  --  3.4 ABM 0.8  --  0.5  --  0.6 CHAVA 0.0  --  0.0  --  0.1 ABB 0.0  --  0.0  --  0.0 AIG 0.4  --  0.5  --  0.5  
 < > = values in this interval not displayed. Recent Labs 10/18/19 
0320 10/17/19 
0359 10/16/19 
0018 * 135* 138  
K 4.5 4.7 5.2*  
CL 96* 102 109* CO2 26 24 21 AGAP 11 9 8 * 205* 146* BUN 70* 47* 40* CREA 5.07* 4.72* 5.01* GFRAA 14* 16* 15* GFRNA 12* 13* 12* CA 10.7* 11.3* 12.4* SGOT 609* 314*  --   
AP 81 81  --   
TP 10.4* 10.2*  --   
ALB 1.7* 1.6*  --   
GLOB 8.7* 8.6*  --   
AGRAT 0.2* 0.2*  --   
MG 2.1 1.8  --   
 
 
 
Imaging: Sanjuanita Muñoz [[id number]] Collected: 10/14/19 6063 Order Status: Completed Updated: 10/14/19 8755 Narrative:    
Renal ultrasound. CLINICAL INDICATION:  Acute on chronic renal disease PROCEDURE: Realtime grayscale color Doppler evaluation of the kidneys and 
bladder. COMPARISON: No prior similar studies available for direct comparison. FINDINGS: The right kidney is normal in size but diffusely increased in 
echogenicity measuring 12.2 cm. A 2.4 cm simple cyst is noted off the midpole. The left kidney is normal in size and diffusely increased in echogenicity 
measuring 11.5 cm. A 1.2 cm simple cyst is noted within the midpole region. There is an indeterminate echogenic focus within the renal parenchyma likely a 
small stone. There is no hydronephrosis. The bladder is unremarkable. The aorta 
is normal in caliber. Impression:    
IMPRESSION:  
1. Bilateral diffuse increased renal cortical echogenicity can be seen with 
medical renal disease. 2. Indeterminate calcifications in the left renal parenchyma. Nephrolithiasis 
favored. 3. No hydronephrosis. 4. Bilateral simple renal cysts. IR BX BONE MARROW DIAGNOSTIC [[id number]] Order Status: No result XR BONE SURVEY LTD (METS) [[id number]] Collected: 10/13/19 1121 Order Status: Completed Updated: 10/13/19 1127 Narrative:    
History: Low back, lateral rib, and left posterior shoulder pain EXAM: Metastatic bone survey FINDINGS: Innumerable lytic lesions are present throughout the skull, including 
a large lesion within the occipital portion of the calvarium measuring 5 cm. Degenerative change of the cervical, thoracic, and lumbar spine noted without 
additional lytic foci. The included lungs are clear. Multiple lytic lesions seen 
within the left humerus. No definite lytic foci within the femoral bones are 
within the pelvis, though evaluation the pelvis is limited due to overlying 
bowel gas.   
Impression:    
 IMPRESSION: 
 
Innumerable lytic foci within the skull as well as within the left humerus. Findings suggestive of multiple myeloma or metastatic disease. XR CHEST PA LAT [[id number]] Collected: 10/12/19 2328 Order Status: Completed Updated: 10/12/19 2330 Narrative:    
EXAM: Chest x-ray. INDICATION: Cough. COMPARISON: None. TECHNIQUE: Frontal and lateral view chest x-ray. FINDINGS: The lungs are clear. The cardiac size, mediastinal contour and 
pulmonary vasculature are normal. No pneumothorax or pleural effusion is seen. Impression:    
IMPRESSION: No acute process. CT HEAD WITHOUT CONTRAST [[id number]] Collected: 10/12/19 2323 Order Status: Completed Updated: 10/12/19 2330 Narrative:    
EXAM: Noncontrast CT head. INDICATION: Dizziness. COMPARISON: None. TECHNIQUE: Axial noncontrast CT images of the head were obtained.  Radiation 
dose reduction techniques were used for this study.  Our CT scanners use one or 
all of the following:  Automated exposure control, adjustment of the mA and/or 
kV according to patient size, iterative reconstruction. FINDINGS: There is a 3.3 x 2.9 cm lytic soft tissue mass in the left occipital 
bone, which minimally indents the underlying left occipital lobe. There is no 
midline shift or significant mass effect. There are also innumerable smaller 
subcentimeter round lytic lesions throughout the calvarium. Brain volume is 
appropriate for age. No acute infarct, hemorrhage or evidence of hydrocephalus 
is seen. The basal cisterns are preserved. The visualized paranasal sinuses and 
mastoid air cells are clear. There has been bilateral eye surgery. Impression:    
IMPRESSION:  
1. 3.3 cm lytic soft tissue mass in the left occipital bone, minimally indenting 
the underlying left occipital lobe. In addition, there are innumerable 
subcentimeter lytic lesions throughout the remainder of the calvarium. These could relate to metastatic disease or multiple myeloma. 2. No acute infarct or hemorrhage. Medications: 
Current Facility-Administered Medications Medication Dose Route Frequency  insulin lispro (HUMALOG) injection   SubCUTAneous Q6H  
 0.9% sodium chloride infusion 250 mL  250 mL IntraVENous PRN  
 hydrALAZINE (APRESOLINE) 20 mg/mL injection 10 mg  10 mg IntraVENous Q6H PRN  
 0.9% sodium chloride infusion 250 mL  250 mL IntraVENous PRN  
 dexamethasone (DECADRON) 40 mg in 0.9% sodium chloride 50 mL IVPB  40 mg IntraVENous DAILY  famotidine (PF) (PEPCID) 20 mg in sodium chloride 0.9% 10 mL injection  20 mg IntraVENous DAILY  0.9% sodium chloride infusion 250 mL  250 mL IntraVENous PRN  
 0.9% sodium chloride infusion 250 mL  250 mL IntraVENous PRN  
 cefTRIAXone (ROCEPHIN) 1 g in 0.9% sodium chloride (MBP/ADV) 50 mL  1 g IntraVENous Q24H  
 azithromycin (ZITHROMAX) 500 mg in 0.9% sodium chloride (MBP/ADV) 250 mL  500 mg IntraVENous Q24H  
 heparin 25,000 units in dextrose 500 mL infusion  18-36 Units/kg/hr IntraVENous TITRATE  allopurinol (ZYLOPRIM) tablet 50 mg  50 mg Oral DAILY  dilTIAZem CD (CARDIZEM CD) capsule 240 mg  240 mg Oral DAILY  sodium chloride (NS) flush 5-40 mL  5-40 mL IntraVENous Q8H  
 sodium chloride (NS) flush 5-40 mL  5-40 mL IntraVENous PRN  
 acetaminophen (TYLENOL) tablet 650 mg  650 mg Oral Q4H PRN  
 ondansetron (ZOFRAN) injection 4 mg  4 mg IntraVENous Q4H PRN  
 diphenhydrAMINE (BENADRYL) capsule 25 mg  25 mg Oral Q6H PRN  
 
 
 
ASSESSMENT: 
 
Problem List  Never Reviewed Codes Class Noted Methemoglobinemia ICD-10-CM: D74.9 ICD-9-CM: 289.7  10/17/2019 Acute metabolic encephalopathy SHAHEED-36-OB: G93.41 
ICD-9-CM: 348.31  10/16/2019 Acute respiratory failure with hypoxia [facility]) ICD-10-CM: J96.01 
ICD-9-CM: 518.81  10/15/2019 Pulmonary hypertension (HCC) (Chronic) ICD-10-CM: I27.20 ICD-9-CM: 416.8  10/15/2019 Hyperproteinemia- with likely hyperviscosity ICD-10-CM: E88.09 
ICD-9-CM: 273.8  10/15/2019 Diabetes mellitus (HCC) (Chronic) ICD-10-CM: E11.9 ICD-9-CM: 250.00  10/13/2019 Essential hypertension (Chronic) ICD-10-CM: I10 
ICD-9-CM: 401.9  10/13/2019 Overview Signed 10/13/2019  1:04 AM by Zack Marroquin MD  
  Last Assessment & Plan: Hypertension is unchanged. Continue current treatment regimen. Blood pressure will be reassessed at the next regular appointment. Paroxysmal atrial fibrillation (HCC) (Chronic) ICD-10-CM: I48.0 ICD-9-CM: 427.31  10/13/2019 Overview Signed 10/13/2019  1:04 AM by Zack Marroquin MD  
  Last Assessment & Plan: He has had no recurrences. I still think he remains a bleeding risk. I would like to hold off of [address] [place] [address] Road for now--and check again on him in 3 months. Continue dilt--but change to 360 mg tabs. * (Principal) Hypercalcemia ICD-10-CM: O23.35 
ICD-9-CM: 275.42  10/12/2019 Anemia ICD-10-CM: D64.9 ICD-9-CM: 285.9  10/12/2019 [facility] ICD-10-CM: N17.9 ICD-9-CM: 584.9  10/12/2019 Mr. Myrtle Severe is a 75 y.o. male admitted on 10/12/2019 with a primary diagnosis of hypercalcemia and possible metastatic malignancy/myeloma. Mr. Myrtle Severe is a gentleman who has received no formal medical care over the past several years. He stated that he had no primary care physician. Lab [facility] from [facility] in 2017 show a creatinine that evelyn as high as 6.1 and was 2.80 at discharge. His chronic baseline is unknown. As noted, there is a history of diabetes mellitus. For two-three months leading to this admission, he has gradually deteriorated with low back discomfort, forgetfulness, anorexia and 35 pound weight loss. He has noted a growth on the back of his head. He was finally prevailed upon to come to the hospital for evaluation.  Data are listed below but he was found to be in acute renal failure, hypercalcemic, anemic and with multiple lytic lesions involving his calvarium including a 3 cm destructive lesion involving the left occipital bone. PLAN: 
Concern for myeloma - We will initiate workup for myeloma based on strong presumption and if negative will pursue other alternatives. - Check skeletal survey, SPEP, serum free light chains, beta 2 microglobulin , LDH, urine IEP, marrow biopsy 10/14 Skeletal survey with innumerable lytic foci w/i the skull and L humerus. SPEP/UPEP/FLC/Beta 2 pending. Awaiting BMbx. 10/15 [place] FLC 13k. Awaiting BMbx. 
10/16 SPEP with m-spike 4.89. Not stable enough for BMbx at this time. Will go ahead with pulse dose steroids - Dex 40mg IV x 4 days. Had code status discussion, wishes to remain full code, but wife states he would not want to be on vent for an extended period of time. 10/18 On pulse dose steroids, D3 of 4. Hypercalcemia - Treatment of his hypercalcemia may be problematic with elevated creatinine and elevated BNP. Continue fluids and I have added calcitonin. Use Lasix to balance I/O's. Hold of on Zometa for now given creatinine. 10/14 CCa++ down to 12.2. Con't IVF. 10/15 CCa++ 13.1. Con't calcitonin. 10/16 CCa++ up to 14. 
10/18 CCa++ down to 12.5 MARCELA 
- Nephrology should be on board to manage what is likely acute kidney injury from ? myeloma, hypercalcemia, etc. Superimposed on CRF. Hopefully this will reverse but dialysis could be an equally potential outcome. 10/14 Cr 4.6. Renal US c/w medical renal disease and possible L nephrolithiasis. Neph consult pending. 10/16 Cr 5.01. Neph following. 10/17 Started dialysis yesterday. Cr 4.72 
10/18 Cr 5.07. Continues on dialysis. Hyperuricemia / TLS 
10/14 Uric acid 12.9. Rasburicase and renally dosed allopurinol ordered. 10/15 Uric acid down to 1.3 Dyspnea / hypoxia 10/15 On Optiflow. CXR/CT chest pending. Pulm following. On Azith/Roland. On empiric heparin gtt. . Echo with dilated RV and pulm HTN. 
10/16 CXR with volume overload. Plans for VQ scan when more stable. Pulm following. 10/17 Transferred to ICU for resp distress, now on BiPAP. On Azith/Rocephin. 10/18 on Hiflow O2. On Azith/Rocephin. Anemia 10/16 Transfuse 1 unit PRBCs 
10/17 Hgb 6.7 s/p 2 units. Check hemolysis labs. PBRCs ordered. 10/18 Hgb 7.2 s/p tx. OK to give PRBCs today with dialysis. Hemolytic anemia noted. Hapto <8. Check ricardo. Hyperbilirubinemia 10/17 Bili up to 10.9. Check hemolysis labs inc frac bili. 10/18 Bili 16.6. Frac bili mostly direct. RUQ US pending. Goals and plan of care reviewed with the family. All questions answered to the best of our ability. Thank you for allowing us to participate in the care of Mr. Nestor Downs. Poor prognosis. Jeff aL NP [facility] [id number] [address] Office : [phone] Fax : [phone]

## 2019-10-19 PROBLEM — D59.9 ACQUIRED HEMOLYTIC ANEMIA (HCC): Status: ACTIVE | Noted: 2019-01-01

## 2019-10-19 PROBLEM — R17 JAUNDICE: Status: ACTIVE | Noted: 2019-01-01

## 2019-10-19 NOTE — DIALYSIS
Consent verified for renal replacement therapy. HD initiated using [address]. Machine settings per MD order. Pt drowsy. Will monitor during treatment. Heparin 500 units/hr

## 2019-10-19 NOTE — PROGRESS NOTES
[facility] Inpatient Hematology / Oncology Progress Note Admission Date: 10/12/2019  9:36 PM 
Reason for Admission/Hospital Course: Hypercalcemia [E83.52] Anemia [D64.9] MARCELA (acute kidney injury) ([address] [N17.9] 24 Hour Events: 
Afebrile, hypertensive at times, on nasal cannula Kappa FLC 13k SPEP with m-spike 4.89 Not stable enough for BMbx at this time Dex D4 of 4 On dialysis Bili up to 20 Family at bedside ROS: 
Unable to assess - pt somnolent 10 point review of systems is otherwise negative with the exception of the elements mentioned above in the HPI. No Known Allergies OBJECTIVE: 
Patient Vitals for the past 8 hrs: 
 BP Temp Pulse Resp SpO2  
10/19/19 1330 164/78  76    
10/19/19 1300 180/79  62    
10/19/19 1230 (!) 173/98  71    
10/19/19 1228   78 22 100 % 10/19/19 1200 172/80  63    
10/19/19 1158   74 17 100 % 10/19/19 1130 (!) 166/112  66    
10/19/19 1128   76 12 98 % 10/19/19 1123  97.6 °F (36.4 °C)     
10/19/19 1100 152/89  69    
10/19/19 1058   68 16 100 % 10/19/19 1030 154/70  85    
10/19/19 1029   70 24 98 % 10/19/19 1000 180/79  66    
10/19/19 0958   75 17 100 % 10/19/19 0930 154/80  71    
10/19/19 0928 154/80  69 26 100 % 10/19/19 0833 140/71  70    
10/19/19 0828 140/71  71 (!) 31 100 % 10/19/19 0808     93 % 10/19/19 0758 (!) 154/99  72 17 98 % 10/19/19 0728 142/73 97.7 °F (36.5 °C) 71 14 100 % 10/19/19 0713 156/72  69 24 99 % 10/19/19 0658 170/76  64 24 (!) 77 % 10/19/19 0643 154/63  64 (!) 47 (!) 85 % 10/19/19 0628 159/72  79 17 100 % Temp (24hrs), Av °F (36.7 °C), Min:97.6 °F (36.4 °C), Max:98.5 °F (36.9 °C) 
 
10/19 0701 - 10/19 1900 In: 395.6 [I.V.:395.6] Out: 4369 Physical Exam: 
Constitutional: Ill-appearing elderly male in no acute distress, lying comfortably in the hospital bed. HEENT: Normocephalic and atraumatic. Oropharynx is clear, mucous membranes are moist.  Neck supple without JVD. No thyromegaly present. L occipital mass. Skin Warm and dry. No bruising and no rash noted. No erythema. No pallor. Respiratory Lungs are clear to auscultation bilaterally. O2.   
CVS Normal rate, irregular rhythm and normal S1 and S2. No murmurs, gallops, or rubs. Abdomen Soft, nontender and nondistended, normoactive bowel sounds. No palpable mass. No hepatosplenomegaly. Neuro Grossly nonfocal with no obvious sensory or motor deficits. MSK Normal range of motion in general.  No edema and no tenderness. Psych Calm, cooperative. Somnolent Labs: 
   
Recent Labs 10/19/19 
0400 10/18/19 
0320 10/17/19 
1351 10/17/19 
0401 WBC 11.3* 13.4*  --  13.5*  
RBC 2.64* 2.24*  --  2.06* HGB 8.1* 7.2* 7.8* 6.7* HCT 23.3* 20.8* 23.0* 20.2* MCV 88.3 92.9  --  98.1*  
MCH 30.7 32.1  --  32.5 MCHC 34.8 34.6  --  33.2 RDW 16.2* 17.4*  --  17.7* * 147*  --  143* GRANS 79* 78  --  72  
LYMPH 10* 13  --  20  
MONOS 6 6  --  4  
EOS 0* 0*  --  0* BASOS 0 0  --  0 IG 5 3  --  4  
DF AUTOMATED MANUAL  --  AUTOMATED ANEU 8.9* 10.5*  --  9.8* ABL 1.1 1.7  --  2.7 ABM 0.7 0.8  --  0.5 CHAVA 0.0 0.0  --  0.0 ABB 0.0 0.0  --  0.0 AIG 0.6* 0.4  --  0.5 Recent Labs 10/19/19 
0400 10/18/19 
0320 10/17/19 
0359 * 133* 135* K 3.7 4.5 4.7 CL 93* 96* 102 CO2 28 26 24 AGAP 11 11 9 * 230* 205* BUN 65* 70* 47* CREA 4.06* 5.07* 4.72* GFRAA 19* 14* 16* GFRNA 15* 12* 13* CA 10.0 10.7* 11.3*  
SGOT 600* 609* 314* AP 83 81 81 TP 10.8* 10.4* 10.2* ALB 1.8* 1.7* 1.6*  
GLOB 9.0* 8.7* 8.6* AGRAT 0.2* 0.2* 0.2* MG 2.3 2.1 1.8 Imaging: 
Sravan Verduzco [[id number]] Collected: 10/14/19 6593 Order Status: Completed Updated: 10/14/19 7088 Narrative:    
Renal ultrasound. CLINICAL INDICATION:  Acute on chronic renal disease PROCEDURE: Realtime grayscale color Doppler evaluation of the kidneys and 
bladder. COMPARISON: No prior similar studies available for direct comparison. FINDINGS: The right kidney is normal in size but diffusely increased in 
echogenicity measuring 12.2 cm. A 2.4 cm simple cyst is noted off the midpole. The left kidney is normal in size and diffusely increased in echogenicity 
measuring 11.5 cm. A 1.2 cm simple cyst is noted within the midpole region. There is an indeterminate echogenic focus within the renal parenchyma likely a 
small stone. There is no hydronephrosis. The bladder is unremarkable. The aorta 
is normal in caliber. Impression:    
IMPRESSION:  
1. Bilateral diffuse increased renal cortical echogenicity can be seen with 
medical renal disease. 2. Indeterminate calcifications in the left renal parenchyma. Nephrolithiasis 
favored. 3. No hydronephrosis. 4. Bilateral simple renal cysts. IR BX BONE MARROW DIAGNOSTIC [[id number]] Order Status: No result XR BONE SURVEY LTD ([facility]S) [[id number]] Collected: 10/13/19 1121 Order Status: Completed Updated: 10/13/19 1127 Narrative:    
History: Low back, lateral rib, and left posterior shoulder pain EXAM: Metastatic bone survey FINDINGS: Innumerable lytic lesions are present throughout the skull, including 
a large lesion within the occipital portion of the calvarium measuring 5 cm. Degenerative change of the cervical, thoracic, and lumbar spine noted without 
additional lytic foci. The included lungs are clear. Multiple lytic lesions seen 
within the left humerus. No definite lytic foci within the femoral bones are 
within the pelvis, though evaluation the pelvis is limited due to overlying 
bowel gas. Impression:    
IMPRESSION: 
 
Innumerable lytic foci within the skull as well as within the left humerus. Findings suggestive of multiple myeloma or metastatic disease. XR CHEST PA LAT [[id number]] Collected: 10/12/19 2328 Order Status: Completed Updated: 10/12/19 2330 Narrative:    
EXAM: Chest x-ray. INDICATION: Cough. COMPARISON: None. TECHNIQUE: Frontal and lateral view chest x-ray. FINDINGS: The lungs are clear. The cardiac size, mediastinal contour and 
pulmonary vasculature are normal. No pneumothorax or pleural effusion is seen. Impression:    
IMPRESSION: No acute process. CT HEAD WITHOUT CONTRAST [[id number]] Collected: 10/12/19 2323 Order Status: Completed Updated: 10/12/19 2330 Narrative:    
EXAM: Noncontrast CT head. INDICATION: Dizziness. COMPARISON: None. TECHNIQUE: Axial noncontrast CT images of the head were obtained.  Radiation 
dose reduction techniques were used for this study.  Our CT scanners use one or 
all of the following:  Automated exposure control, adjustment of the mA and/or 
kV according to patient size, iterative reconstruction. FINDINGS: There is a 3.3 x 2.9 cm lytic soft tissue mass in the left occipital 
bone, which minimally indents the underlying left occipital lobe. There is no 
midline shift or significant mass effect. There are also innumerable smaller 
subcentimeter round lytic lesions throughout the calvarium. Brain volume is 
appropriate for age. No acute infarct, hemorrhage or evidence of hydrocephalus 
is seen. The basal cisterns are preserved. The visualized paranasal sinuses and 
mastoid air cells are clear. There has been bilateral eye surgery. Impression:    
IMPRESSION:  
1. 3.3 cm lytic soft tissue mass in the left occipital bone, minimally indenting 
the underlying left occipital lobe. In addition, there are innumerable 
subcentimeter lytic lesions throughout the remainder of the calvarium. These 
could relate to metastatic disease or multiple myeloma. 2. No acute infarct or hemorrhage. Medications: 
Current Facility-Administered Medications Medication Dose Route Frequency  lactulose (CHRONULAC) 10 gram/15 mL solution 30 mL  20 g Oral BID  insulin lispro (HUMALOG) injection   SubCUTAneous Q6H  
 0.9% sodium chloride infusion 250 mL  250 mL IntraVENous PRN  
 dilTIAZem (CARDIZEM) IR tablet 60 mg  60 mg Oral AC&HS  hydrALAZINE (APRESOLINE) 20 mg/mL injection 10 mg  10 mg IntraVENous Q6H PRN  
 0.9% sodium chloride infusion 250 mL  250 mL IntraVENous PRN  
 famotidine (PF) (PEPCID) 20 mg in sodium chloride 0.9% 10 mL injection  20 mg IntraVENous DAILY  0.9% sodium chloride infusion 250 mL  250 mL IntraVENous PRN  
 0.9% sodium chloride infusion 250 mL  250 mL IntraVENous PRN  
 cefTRIAXone (ROCEPHIN) 1 g in 0.9% sodium chloride (MBP/ADV) 50 mL  1 g IntraVENous Q24H  
 azithromycin (ZITHROMAX) 500 mg in 0.9% sodium chloride (MBP/ADV) 250 mL  500 mg IntraVENous Q24H  
 allopurinol (ZYLOPRIM) tablet 50 mg  50 mg Oral DAILY  sodium chloride (NS) flush 5-40 mL  5-40 mL IntraVENous Q8H  
 sodium chloride (NS) flush 5-40 mL  5-40 mL IntraVENous PRN  
 acetaminophen (TYLENOL) tablet 650 mg  650 mg Oral Q4H PRN  
 ondansetron (ZOFRAN) injection 4 mg  4 mg IntraVENous Q4H PRN  
 diphenhydrAMINE (BENADRYL) capsule 25 mg  25 mg Oral Q6H PRN  
 
 
 
ASSESSMENT: 
 
Problem List  Never Reviewed Codes Class Noted Jaundice ICD-10-CM: R17 
ICD-9-CM: 782.4  10/19/2019 Acquired hemolytic anemia (HCC) ICD-10-CM: D59.9 ICD-9-CM: 283.9  10/19/2019 Methemoglobinemia ICD-10-CM: D74.9 ICD-9-CM: 289.7  10/17/2019 Acute metabolic encephalopathy [id number]-96-KT: G93.41 
ICD-9-CM: 348.31  10/16/2019 Acute respiratory failure with hypoxia [facility]) ICD-10-CM: J96.01 
ICD-9-CM: 518.81  10/15/2019 Pulmonary hypertension (HCC) (Chronic) ICD-10-CM: I27.20 ICD-9-CM: 416.8  10/15/2019 Hyperproteinemia- with likely hyperviscosity ICD-10-CM: E88.09 
ICD-9-CM: 273.8  10/15/2019 Diabetes mellitus (HCC) (Chronic) ICD-10-CM: E11.9 ICD-9-CM: 250.00  10/13/2019 Essential hypertension (Chronic) ICD-10-CM: I10 
ICD-9-CM: 401.9  10/13/2019 Overview Signed 10/13/2019  1:04 AM by Brian Layne MD  
  Last Assessment & Plan: Hypertension is unchanged. Continue current treatment regimen. Blood pressure will be reassessed at the next regular appointment. Paroxysmal atrial fibrillation (HCC) (Chronic) ICD-10-CM: I48.0 ICD-9-CM: 427.31  10/13/2019 Overview Signed 10/13/2019  1:04 AM by Brian Layne MD  
  Last Assessment & Plan: He has had no recurrences. I still think he remains a bleeding risk. I would like to hold off of [address] for now--and check again on him in 3 months. Continue dilt--but change to 360 mg tabs. * (Principal) Hypercalcemia ICD-10-CM: P19.29 
ICD-9-CM: 275.42  10/12/2019 Anemia ICD-10-CM: D64.9 ICD-9-CM: 285.9  10/12/2019 MARCELA (acute kidney injury) ([facility] [address] ICD-10-CM: N17.9 ICD-9-CM: 584.9  10/12/2019 Mr. Sakshi Meng is a 75 y.o. male admitted on 10/12/2019 with a primary diagnosis of hypercalcemia and possible metastatic malignancy/myeloma. Mr. Sakshi Meng is a gentleman who has received no formal medical care over the past several years. He stated that he had no primary care physician. Lab data from [facility] in 2017 show a creatinine that evelyn as high as 6.1 and was 2.80 at discharge. His chronic baseline is unknown. As noted, there is a history of diabetes mellitus. For two-three months leading to this admission, he has gradually deteriorated with low back discomfort, forgetfulness, anorexia and 35 pound weight loss. He has noted a growth on the back of his head. He was finally prevailed upon to come to the hospital for evaluation. Data are listed below but he was found to be in acute renal failure, hypercalcemic, anemic and with multiple lytic lesions involving his calvarium including a 3 cm destructive lesion involving the left occipital bone.   
 
PLAN: 
 Concern for myeloma - We will initiate workup for myeloma based on strong presumption and if negative will pursue other alternatives. - Check skeletal survey, SPEP, serum free light chains, beta 2 microglobulin , LDH, urine IEP, marrow biopsy 10/14 Skeletal survey with innumerable lytic foci w/i the skull and L humerus. SPEP/UPEP/FLC/Beta 2 pending. Awaiting BMbx. 10/15 Kappa FLC 13k. Awaiting BMbx. 
10/16 SPEP with m-spike 4.89. Not stable enough for BMbx at this time. Will go ahead with pulse dose steroids - Dex 40mg IV x 4 days. Had code status discussion, wishes to remain full code, but wife states he would not want to be on vent for an extended period of time. 10/18 On pulse dose steroids, D3 of 4.  
10/19 D4/ 4 pulse dex. Hypercalcemia - Treatment of his hypercalcemia may be problematic with elevated creatinine and elevated BNP. Continue fluids and I have added calcitonin. Use Lasix to balance I/O's. Hold of on Zometa for now given creatinine. 10/14 CCa++ down to 12.2. Con't IVF. 10/15 CCa++ 13.1. Con't calcitonin. 10/16 CCa++ up to 14. 
10/18 CCa++ down to 12.5 
10/19 CCa ++ down to 11.8 MARCELA 
- Nephrology should be on board to manage what is likely acute kidney injury from ? myeloma, hypercalcemia, etc. Superimposed on CRF. Hopefully this will reverse but dialysis could be an equally potential outcome. 10/14 Cr 4.6. Renal US c/w medical renal disease and possible L nephrolithiasis. Neph consult pending. 10/16 Cr 5.01. Neph following. 10/17 Started dialysis yesterday. Cr 4.72 
10/18 Cr 5.07. Continues on dialysis. 10/19 Cr. 4.06 today. Dialysis today. Hyperuricemia / TLS 
10/14 Uric acid 12.9. Rasburicase and renally dosed allopurinol ordered. 10/15 Uric acid down to 1.3 Dyspnea / hypoxia 10/15 On Optiflow. CXR/CT chest pending. Pulm following. On Azith/Roland. On empiric heparin gtt. . Echo with dilated RV and pulm HTN. 10/16 CXR with volume overload. Plans for VQ scan when more stable. Pulm following. 10/17 Transferred to ICU for resp distress, now on BiPAP. On Azith/Rocephin. 10/18 on Hiflow O2. On Azith/Rocephin. 10/19 on nasal cannula. Continues Azith/Rocephin Anemia 10/16 Transfuse 1 unit PRBCs 
10/17 Hgb 6.7 s/p 2 units. Check hemolysis labs. PBRCs ordered. 10/18 Hgb 7.2 s/p tx. OK to give PRBCs today with dialysis. Hemolytic anemia noted. Hapto <8. Check ricardo. 10/19 Hgb up to 8.1 today. Ricardo neg. Hyperbilirubinemia 10/17 Bili up to 10.9. Check hemolysis labs inc frac bili. 10/18 Bili 16.6. Frac bili mostly direct. RUQ US pending. 10/19 Bili up to 20. ALT worse today at 210,  down from 609. US showed gallbladder wall thickening, hepatomegaly which has worsened since 2017, and right renal parenchymal hyperechogenicity. Ammonia elevated this AM at 32- GI added lactulose Goals and plan of care reviewed with the family. All questions answered to the best of our ability. Thank you for allowing us to participate in the care of Mr. Nicolasa Garcia. Poor prognosis. Osmel Taylor NP [facility] Hematology & Oncology [address] [address] Office : [phone] Fax : [phone]

## 2019-10-19 NOTE — PROGRESS NOTES
[facility] PROGRESS NOTE Follow up for: Jyoti Booth Subjective:  
 
Patient seen and examined. Chart, notes, labs, imaging, results all reviewed. On NC oxygen , more alert  
icterus seen S/p 3 sessions of hd in a row Received methylene blue for methemoglobinemia ROS: 
UTO Objective:  
Exam: 
Vitals:  
 10/19/19 4741 10/19/19 7311 10/19/19 4468 10/19/19 2233 BP: 170/76   140/71 Pulse: 64   70 Resp: 24 Temp:  97.7 °F (36.5 °C) SpO2: (!) 77%  93% Weight:      
Height:      
 
 
 
Intake/Output Summary (Last 24 hours) at 10/19/2019 9260 Last data filed at 10/19/2019 8212 Gross per 24 hour Intake 1275.33 ml Output 2025 ml Net -749.67 ml  
 
 
Current Facility-Administered Medications Medication Dose Route Frequency  lactulose (CHRONULAC) 10 gram/15 mL solution 30 mL  20 g Oral BID  insulin lispro (HUMALOG) injection   SubCUTAneous Q6H  
 0.9% sodium chloride infusion 250 mL  250 mL IntraVENous PRN  
 dilTIAZem (CARDIZEM) IR tablet 60 mg  60 mg Oral AC&HS  hydrALAZINE (APRESOLINE) 20 mg/mL injection 10 mg  10 mg IntraVENous Q6H PRN  
 0.9% sodium chloride infusion 250 mL  250 mL IntraVENous PRN  
 dexamethasone (DECADRON) 40 mg in 0.9% sodium chloride 50 mL IVPB  40 mg IntraVENous DAILY  famotidine (PF) (PEPCID) 20 mg in sodium chloride 0.9% 10 mL injection  20 mg IntraVENous DAILY  0.9% sodium chloride infusion 250 mL  250 mL IntraVENous PRN  
 0.9% sodium chloride infusion 250 mL  250 mL IntraVENous PRN  
 cefTRIAXone (ROCEPHIN) 1 g in 0.9% sodium chloride (MBP/ADV) 50 mL  1 g IntraVENous Q24H  
 azithromycin (ZITHROMAX) 500 mg in 0.9% sodium chloride (MBP/ADV) 250 mL  500 mg IntraVENous Q24H  
 allopurinol (ZYLOPRIM) tablet 50 mg  50 mg Oral DAILY  sodium chloride (NS) flush 5-40 mL  5-40 mL IntraVENous Q8H  
 sodium chloride (NS) flush 5-40 mL  5-40 mL IntraVENous PRN  
 acetaminophen (TYLENOL) tablet 650 mg  650 mg Oral Q4H PRN  
  ondansetron (ZOFRAN) injection 4 mg  4 mg IntraVENous Q4H PRN  
 diphenhydrAMINE (BENADRYL) capsule 25 mg  25 mg Oral Q6H PRN  
 
 
EXAM 
GEN - on high flow oxygen CV - S1, S2, RRR, no rub, murmur, or gallop Lung - b/l crackles present Abd - soft, nontender, BS present Ext - no edema Recent Labs 10/19/19 
0400 10/18/19 
0320 10/17/19 
1351 10/17/19 
0401 WBC 11.3* 13.4*  --  13.5* HGB 8.1* 7.2* 7.8* 6.7* HCT 23.3* 20.8* 23.0* 20.2* * 147*  --  143* Recent Labs 10/19/19 
0400 10/18/19 
0320 10/17/19 
0359 * 133* 135* K 3.7 4.5 4.7 CL 93* 96* 102 CO2 28 26 24 BUN 65* 70* 47* CREA 4.06* 5.07* 4.72* CA 10.0 10.7* 11.3*  
* 230* 205* MG 2.3 2.1 1.8 Assessment and Plan:  
 
1. ? 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 Hypercalcemia Better with dialysis 3. ? PE  
TO be started on heparin 4. Hyperuricemia Received rasburicase 5. Methemoglobinemia - received methylene blue Intravascular hemolysis noted with acute liver injury Explained POC to his wife bedside Verito Nuñez MD

## 2019-10-19 NOTE — DIALYSIS
Hemodialysis treatment completed without complications. Patient w/o change and VS stable  /78  P 67   
 
 1.8 Kgs removed. Flushed both ports with 10 mL of NS.  CVC dressing clean, dry, and intact, tego caps intact, bilateral lumens wrapped with 4x4 gauze. Patient remains in room.

## 2019-10-19 NOTE — PROGRESS NOTES
Patient was resting peacefully [place] family at bedside Provided supportive presence to all They were thankful Anirudh Thompson, staff [address]aterine [address], [phone] [facility] Bhavik  /   [email or web address]

## 2019-10-19 NOTE — PROGRESS NOTES
Hospitalist Note Admit Date:  10/12/2019  9:36 PM  
Name:  Ysabel Brownlee Age:  75 y.o. 
:  1945 MRN:  [id number] PCP:  None Treatment Team: Attending Provider: Vance Berg DO; Consulting Provider: Khushbu Salazar MD; Consulting Provider: Cj Vieira MD; Consulting Provider: Nicola Crowder MD; Consulting Provider: Steve Buck MD; Consulting Provider: Michel Stone NP; Care Manager: Patsy Guzman RN; Consulting Provider: Kulwinder Lazaro MD 
 
HPI/Subjective:  
 
 
Mr. Tucker Bruce is a 74 yo male with PMH of AFIB (not anticoagulated) , DM2, HLP admitted with malaise and weakness, found with hypercalcemia, anemia, MARCELA. CT head showed lytic lesions of skull concerning for multiple myeloma versus metastatic disease. He has been seen by oncology and managed with hydration, calcitonin, transfusion. He has been a new dialysis start this admit due to failure of renal recovery. On 10-16-19 he required ICU transfer due to acute hypoxia requiring BIPAP. There was a large discrepancy between pulse ox (low)  and ABG (high paO2). Co-oximeter showed elevated methemoglobin of 9. He received methylene blue on 10-16-19. Oncology was pending bone marrow biopsy not done to date. 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 Additionally there was some concern for PE contribution to respiratory failure thus he has been placed on IV heparin as too unstable for V/Q scan and unable to have CTA chest due to MARCELA. Eventually V/Q scan completed and low probability thus heparin stopped. He has been on IV antibiotics for possible pneumonia. LFTS elevated. ABD US shows thickended gallbladder wall without biliary dilatation. GI has evaluated and recommends lactulose due to mildly elevated ammonia level. He has developed a hemolytic anemia as well that contributes to elevated liver testing. Repeat CT head done for possible facial droop shows skull lytic lesions. Palliative care following. Discharge plans pending 10-19-19 confused but appears comfortable,  
 
 
Objective:  
 
Patient Vitals for the past 24 hrs: 
 Temp Pulse Resp BP SpO2  
10/19/19 1200  63  172/80   
10/19/19 1130  66  (!) 166/112   
10/19/19 1123 97.6 °F (36.4 °C)      
10/19/19 1100  69  152/89   
10/19/19 1030  85  154/70   
10/19/19 1029  70 24  98 % 10/19/19 1000  66  180/79   
10/19/19 0958  75 17  100 % 10/19/19 0930  71  154/80   
10/19/19 0928  69 26 154/80 100 % 10/19/19 0833  70  140/71   
10/19/19 0828  71 (!) 31 140/71 100 % 10/19/19 0808     93 % 10/19/19 0758  72 17 (!) 154/99 98 % 10/19/19 0728 97.7 °F (36.5 °C) 71 14 142/73 100 % 10/19/19 0713  69 24 156/72 99 % 10/19/19 0658  64 24 170/76 (!) 77 % 10/19/19 0643  64 (!) 47 154/63 (!) 85 % 10/19/19 0628  79 17 159/72 100 % 10/19/19 0613  75 18 161/72 100 % 10/19/19 0558  78 (!) 48 143/70 92 % 10/19/19 0528  79 20 155/74 95 % 10/19/19 0458  67 21 138/65 100 % 10/19/19 0428  83 27 140/70 100 % 10/19/19 0401  87 18 141/64 100 % 10/19/19 0328  67 25 160/72 95 % 10/19/19 0313 98.5 °F (36.9 °C) 74 18 163/72 91 % 10/19/19 0258  (!) 58 19 142/72 93 % 10/19/19 0228  67 24 158/66 100 % 10/19/19 0158  88 30 159/77 100 % 10/19/19 0128  76 28 157/75 100 % 10/19/19 0058  78 19 167/82 100 % 10/19/19 0028  70 22 165/77 100 % 10/18/19 2358  69 23 150/70 100 % 10/18/19 2328  75 20 147/71 100 % 10/18/19 2313 98.5 °F (36.9 °C) 89 29 148/69 100 % 10/18/19 2258  82 19 143/72 100 % 10/18/19 2228  80 25 138/64 96 % 10/18/19 2158  80 26 133/61 100 % 10/18/19 2143  84 28 151/70 98 % 10/18/19 2128  81 24 147/67 100 % 10/18/19 2113  92 26 157/73 99 % 10/18/19 2104  90  (!) 201/86   
10/18/19 2103  91 30 (!) 201/86 92 % 10/18/19 2058  76 (!) 32 190/89 98 % 10/18/19 2028  80 15 (!) 154/93 100 % 10/18/19 1958  67 24 (!) 176/96 (!) 88 % 10/18/19 1928  72 19 164/82 99 % 10/18/19 1913 98 °F (36.7 °C) 72 25 (!) 155/96 98 % 10/18/19 1858  73 16 148/78 100 % 10/18/19 1843  82 16 171/77 100 % 10/18/19 1829  75 22 182/80 100 % 10/18/19 1643  70 22 (!) 165/99 100 % 10/18/19 1628  80 17 148/90 100 % 10/18/19 1613  66 23 (!) 166/91 99 % 10/18/19 1558  80 20 168/81 97 % 10/18/19 1543  66 23 150/85 94 % 10/18/19 1528  65 17 154/72 100 % 10/18/19 1522 97.6 °F (36.4 °C)      
10/18/19 1519  85  168/61   
10/18/19 1513  76 17 (!) 168/91 100 % 10/18/19 1503  71  158/80   
10/18/19 1458  73 21 158/80 100 % 10/18/19 1443  74 (!) 32 (!) 184/91 100 % 10/18/19 1433  68  (!) 174/102   
10/18/19 1429  69 18 (!) 174/102 100 % 10/18/19 1413  79 23 (!) 163/95 100 % 10/18/19 1400  60  (!) 177/102   
10/18/19 1358  72 25 (!) 177/102 100 % 10/18/19 1343  79 30 (!) 182/99 99 % 10/18/19 1329  74  191/84   
10/18/19 1328  76 28 191/84 100 % 10/18/19 1313  76 (!) 31 182/83 100 % 10/18/19 1300 97.5 °F (36.4 °C)      
10/18/19 1258  69 24 (!) 169/104 100 % 10/18/19 1257  75  157/90   
10/18/19 1243  91 25 157/90 98 % 10/18/19 1228  81 27 147/90 100 % 10/18/19 1225 97.6 °F (36.4 °C) 77 21 156/83 100 % 10/18/19 1213  70 (!) 32 156/83 100 % Oxygen Therapy O2 Sat (%): 98 % (10/19/19 1029) Pulse via Oximetry: 75 beats per minute (10/19/19 1029) O2 Device: Nasal cannula (10/19/19 5106) O2 Flow Rate (L/min): 2 l/min (10/19/19 4824) O2 Temperature: 87.8 °F (31 °C) (10/18/19 0807) FIO2 (%): 35 % (10/18/19 0808) Estimated body mass index is 29.21 kg/m² as calculated from the following: 
  Height as of this encounter: 6' (1.829 m). Weight as of this encounter: 97.7 kg (215 lb 6.2 oz). Intake/Output Summary (Last 24 hours) at 10/19/2019 1209 Last data filed at 10/19/2019 8179 Gross per 24 hour Intake 1219.63 ml Output 2025 ml  
 Net -805.37 ml  
   
*Note that automatically entered I/Os may not be accurate; dependent on patient compliance with collection and accurate  by techs. 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 No murmur, rub, or gallop. No edema Lungs:   CTAB. No wheezing, rhonchi, or rales. Anterior Abdomen:   Soft, nontender, nondistended. Decreased BS Extremities: Warm and dry Skin:     No rashes or jaundice. Neuro:  Confused Data Review: 
I have reviewed all labs, meds, and studies from the last 24 hours: 
 
Recent Results (from the past 24 hour(s)) GLUCOSE, POC Collection Time: 10/18/19  4:26 PM  
Result Value Ref Range Glucose (POC) 166 (H) 65 - 100 mg/dL GLUCOSE, POC Collection Time: 10/18/19  6:23 PM  
Result Value Ref Range Glucose (POC) 207 (H) 65 - 100 mg/dL GLUCOSE, POC Collection Time: 10/19/19 12:07 AM  
Result Value Ref Range Glucose (POC) 251 (H) 65 - 100 mg/dL CBC WITH AUTOMATED DIFF Collection Time: 10/19/19  4:00 AM  
Result Value Ref Range WBC 11.3 (H) 4.3 - 11.1 K/uL  
 RBC 2.64 (L) 4.23 - 5.6 M/uL HGB 8.1 (L) 13.6 - 17.2 g/dL HCT 23.3 (L) 41.1 - 50.3 % MCV 88.3 79.6 - 97.8 FL  
 MCH 30.7 26.1 - 32.9 PG  
 MCHC 34.8 31.4 - 35.0 g/dL  
 RDW 16.2 (H) 11.9 - 14.6 % PLATELET 700 (L) 947 - 450 K/uL MPV 11.0 9.4 - 12.3 FL ABSOLUTE NRBC 2.00 (H) 0.0 - 0.2 K/uL NEUTROPHILS 79 (H) 43 - 78 % LYMPHOCYTES 10 (L) 13 - 44 % MONOCYTES 6 4.0 - 12.0 % EOSINOPHILS 0 (L) 0.5 - 7.8 % BASOPHILS 0 0.0 - 2.0 % IMMATURE GRANULOCYTES 5 0.0 - 5.0 %  
 ABS. NEUTROPHILS 8.9 (H) 1.7 - 8.2 K/UL  
 ABS. LYMPHOCYTES 1.1 0.5 - 4.6 K/UL  
 ABS. MONOCYTES 0.7 0.1 - 1.3 K/UL  
 ABS. EOSINOPHILS 0.0 0.0 - 0.8 K/UL  
 ABS. BASOPHILS 0.0 0.0 - 0.2 K/UL  
 ABS. IMM. GRANS. 0.6 (H) 0.0 - 0.5 K/UL  
 RBC COMMENTS OCCASIONAL 
HYPOCHROMIA RBC COMMENTS MODERATE 
ROULEAUX 
    
 WBC COMMENTS Result Confirmed By Smear PLATELET COMMENTS ADEQUATE    
 DF AUTOMATED METABOLIC PANEL, COMPREHENSIVE Collection Time: 10/19/19  4:00 AM  
Result Value Ref Range Sodium 132 (L) 136 - 145 mmol/L Potassium 3.7 3.5 - 5.1 mmol/L Chloride 93 (L) 98 - 107 mmol/L  
 CO2 28 21 - 32 mmol/L Anion gap 11 7 - 16 mmol/L Glucose 239 (H) 65 - 100 mg/dL BUN 65 (H) 8 - 23 MG/DL Creatinine 4.06 (H) 0.8 - 1.5 MG/DL  
 GFR est AA 19 (L) >60 ml/min/1.73m2 GFR est non-AA 15 (L) >60 ml/min/1.73m2 Calcium 10.0 8.3 - 10.4 MG/DL Bilirubin, total 20.0 (H) 0.2 - 1.1 MG/DL  
 ALT (SGPT) 210 (H) 12 - 65 U/L  
 AST (SGOT) 600 (H) 15 - 37 U/L Alk. phosphatase 83 50 - 136 U/L Protein, total 10.8 (H) 6.3 - 8.2 g/dL Albumin 1.8 (L) 3.2 - 4.6 g/dL Globulin 9.0 (H) 2.3 - 3.5 g/dL A-G Ratio 0.2 (L) 1.2 - 3.5 PTT Collection Time: 10/19/19  4:00 AM  
Result Value Ref Range aPTT 77.2 (H) 24.7 - 39.8 SEC PROTHROMBIN TIME + INR Collection Time: 10/19/19  4:00 AM  
Result Value Ref Range Prothrombin time 21.6 (H) 11.7 - 14.5 sec INR 1.8 AMMONIA Collection Time: 10/19/19  4:00 AM  
Result Value Ref Range Ammonia 36 (H) 11 - 32 UMOL/L  
MAGNESIUM Collection Time: 10/19/19  4:00 AM  
Result Value Ref Range Magnesium 2.3 1.8 - 2.4 mg/dL URIC ACID Collection Time: 10/19/19  4:00 AM  
Result Value Ref Range Uric acid <2.6 (L) 2.6 - 6.0 MG/DL  
GLUCOSE, POC Collection Time: 10/19/19 11:08 AM  
Result Value Ref Range Glucose (POC) 226 (H) 65 - 100 mg/dL All Micro Results Procedure Component Value Units Date/Time INFLUENZA A & B AG (RAPID TEST) [[id number]] Collected:  10/13/19 0028 Order Status:  Completed Specimen:  Nasopharyngeal from Nasal washing Updated:  10/13/19 0037 Influenza A Ag NEGATIVE   Comment: NEGATIVE FOR THE PRESENCE OF INFLUENZA A ANTIGEN 
 INFECTION DUE TO INFLUENZA A CANNOT BE RULED OUT. BECAUSE THE ANTIGEN PRESENT IN THE SAMPLE MAY BE BELOW 
THE DETECTION LIMIT OF THE TEST. A NEGATIVE TEST IS PRESUMPTIVE AND IT IS RECOMMENDED THAT THESE RESULTS BE CONFIRMED BY VIRAL CULTURE OR AN FDA-CLEARED INFLUENZA A AND B MOLECULAR ASSAY. Influenza B Ag NEGATIVE Comment: NEGATIVE FOR THE PRESENCE OF INFLUENZA B ANTIGEN 
INFECTION DUE TO INFLUENZA B CANNOT BE RULED OUT. BECAUSE THE ANTIGEN PRESENT IN THE SAMPLE MAY BE BELOW 
THE DETECTION LIMIT OF THE TEST. A NEGATIVE TEST IS PRESUMPTIVE AND IT IS RECOMMENDED THAT THESE RESULTS BE CONFIRMED BY VIRAL CULTURE OR AN FDA-CLEARED INFLUENZA A AND B MOLECULAR ASSAY. Source NASOPHARYNGEAL Results for orders placed or performed during the hospital encounter of 10/12/19  
2D ECHO COMPLETE ADULT (TTE) W OR WO CONTR Narrative [facility] [address] [place], [address] [facility] 
[phone] Transthoracic Echocardiogram 
2D, M-mode, Doppler, and Color Doppler Allegra Cornejo 
MR #: [id number] : [id number] Age: 75 years Gender: Male Study date: 14-Oct-2019 Account #: [de-identified] Height: 72 in 
Weight: 223.5 lb 
BSA: 2.24 mï¾² Status:Routine Location: [facility] BP: 131/ 69 Allergies: NO KNOWN ALLERGENS Sonographer:  Yas Gentile [facility] Group:  [facility] Referring Physician:  Carmen Saab. Oneal Gaytan NP Reading Physician:  DR. Rosalina Ash MD 
 
INDICATIONS: Likely new multiple myeloma diagnosis, will need chemotherapy PROCEDURE: This was a routine study. A transthoracic echocardiogram was 
performed. The study included complete 2D imaging, M-mode, complete spectral 
Doppler, and color Doppler. Intravenous contrast (Definity) was administered. Image quality was adequate. LEFT VENTRICLE: Size was normal. Systolic function was normal. Ejection fraction was estimated to be greater than 55 %. There were no regional wall 
motion abnormalities. Wall thickness was at the upper limits of normal.  
Doppler 
parameters were consistent with diastolic dysfunction. Avg E/e': 14.6. VENTRICULAR SEPTUM: There was \"bounce\" motion. These changes are consistent 
with a conduction abnormality or paced rhythm. RIGHT VENTRICLE: The ventricle was mildly dilated. Systolic function was  
mildly 
reduced. Estimated peak pressure was in the range of 45-50 mmHg. LEFT ATRIUM: The atrium was moderately dilated. RIGHT ATRIUM: The atrium was mildly dilated. SYSTEMIC VEINS: IVC: The inferior vena cava was normal in size and course. Respirophasic changes were normal. 
 
AORTIC VALVE: The valve was trileaflet. Leaflets exhibited good mobility. There 
was no evidence for stenosis. There was mild regurgitation. MITRAL VALVE: Valve structure was normal. There was no evidence for stenosis. There was trivial regurgitation. TRICUSPID VALVE: The valve structure was normal. There was no evidence for 
stenosis. There was mild regurgitation. PULMONIC VALVE: Not well visualized. There was no evidence for stenosis. There 
was trivial regurgitation. PERICARDIUM: There was no pericardial effusion. AORTA: The root exhibited dilatation. There was dilatation of the ascending 
aorta. PULMONARY ARTERY: The artery was mildly to moderately dilated. SUMMARY: 
 
-  Left ventricle: Systolic function was normal. Ejection fraction was 
estimated to be greater than 55 %. There were no regional wall motion 
abnormalities. Wall thickness was at the upper limits of normal. 
 
-  Ventricular septum: There was \"bounce\" motion. These changes are  
consistent 
with a conduction abnormality or paced rhythm. -  Right ventricle: The ventricle was mildly dilated. Systolic function was 
mildly reduced. Estimated peak pressure was in the range of 45-50 mmHg. -  Aorta, systemic arteries: There was dilatation of the ascending aorta. The 
aortic root diameter was 40 mm. The ascending aorta maximal AP dimension was 40 
mm. 
 
-  Pulmonary arteries: The artery was mildly to moderately dilated. -  Additional impressions: Consider imaging of aortic dimensions in 6 months 
with either repeat echocardiogram or CT/MRA if aortic size not previously 
documented. No comparison echocardiogram available for review. MEASUREMENT TABLES 
 
2D MEASUREMENTS Aorta   (Reference normals) Root diam   40 mm   (--) AAo max AP diam   40 mm   (--) SYSTEM MEASUREMENT TABLES 
 
2D mode AoR Diam (2D): 4 cm 
LA Dimension (2D): 4.5 cm Left Atrium Systolic Volume Index; Method of Disks, Biplane; 2D mode;: 46  
ml/m2 IVS/LVPW (2D): 1.2 IVSd (2D): 1.3 cm LVIDd (2D): 5.1 cm 
LVIDs (2D): 3.2 cm 
LVOT Area (2D): 3.5 cm2 LVPWd (2D): 1.1 cm RVIDd (2D): 2.7 cm Tissue Doppler Imaging LV Peak Early Villafuerte Tissue Thom; Lateral MA (TDI): 13.2 cm/s LV Peak Early Villafuerte Tissue Thom; Medial MA (TDI): 5.3 cm/s Unspecified Scan Mode Peak Grad; Mean; Antegrade Flow: 4 mm[Hg] Vmax; Antegrade Flow: 105 cm/s LVOT Diam: 2.1 cm 
MV Peak Thom/LV Peak Tissue Thom E-Wave; Lateral MA: 8.4 MV Peak Thom/LV Peak Tissue Thom E-Wave; Medial MA: 20.8 Prepared and signed by 
 
DR. Kelvin Martin MD 
Signed 14-Oct-2019 13:43:41 Current Meds: 
Current Facility-Administered Medications Medication Dose Route Frequency  lactulose (CHRONULAC) 10 gram/15 mL solution 30 mL  20 g Oral BID  insulin lispro (HUMALOG) injection   SubCUTAneous Q6H  
 0.9% sodium chloride infusion 250 mL  250 mL IntraVENous PRN  
 dilTIAZem (CARDIZEM) IR tablet 60 mg  60 mg Oral AC&HS  hydrALAZINE (APRESOLINE) 20 mg/mL injection 10 mg  10 mg IntraVENous Q6H PRN  
 0.9% sodium chloride infusion 250 mL  250 mL IntraVENous PRN  
 famotidine (PF) (PEPCID) 20 mg in sodium chloride 0.9% 10 mL injection 20 mg IntraVENous DAILY  0.9% sodium chloride infusion 250 mL  250 mL IntraVENous PRN  
 0.9% sodium chloride infusion 250 mL  250 mL IntraVENous PRN  
 cefTRIAXone (ROCEPHIN) 1 g in 0.9% sodium chloride (MBP/ADV) 50 mL  1 g IntraVENous Q24H  
 azithromycin (ZITHROMAX) 500 mg in 0.9% sodium chloride (MBP/ADV) 250 mL  500 mg IntraVENous Q24H  
 allopurinol (ZYLOPRIM) tablet 50 mg  50 mg Oral DAILY  sodium chloride (NS) flush 5-40 mL  5-40 mL IntraVENous Q8H  
 sodium chloride (NS) flush 5-40 mL  5-40 mL IntraVENous PRN  
 acetaminophen (TYLENOL) tablet 650 mg  650 mg Oral Q4H PRN  
 ondansetron (ZOFRAN) injection 4 mg  4 mg IntraVENous Q4H PRN  
 diphenhydrAMINE (BENADRYL) capsule 25 mg  25 mg Oral Q6H PRN Other Studies (last 24 hours): 
Ct Head Wo Cont Result Date: 10/18/2019 CT HEAD WITHOUT CONTRAST. INDICATION: Leg weakness and drooping. COMPARISON: Exam 6 days prior. TECHNIQUE:   5 mm axial scans from the skull base to the vertex. Our CT scanners use one or more of the following:  Automated exposure control, adjustment of the mA and or kV according to patient size, iterative reconstruction. FINDINGS:  No acute intraparenchymal hemorrhage or abnormal extra-axial fluid collection. The ventricles are normal size. Large lytic lesion in the left occipital bone remains with minimal mass effect on the underlying left occipital lobe. Included portion of the paranasal sinuses and orbits grossly unremarkable. Innumerable small lytic foci throughout the calvarium. IMPRESSION:  Negative for new intracranial abnormality. Innumerable skull lesions, largest left occipital lobe. [facility] Lung Perfusion W Vent Result Date: 10/18/2019 EXAM: NUCLEAR MEDICINE LUNG VENTILATION/PERFUSION SCAN. INDICATION: Patient with hypoxia and elevated d-dimer. Altered mental status. COMPARISON: Recent chest X-ray reviewed.  RADIOPHARMACEUTICAL: 43.8 mCi DTPA aerosol inhaled, followed by 6.52 mCi Tc99-m MAA IV. FINDINGS: Ventilation defect of the posterior lateral segment of the left lower lobe without a corresponding perfusion defect. Small matched ventilation/perfusion defect of the posterior basal segment of the right lower lobe. IMPRESSION: 1. Low probability for pulmonary embolism. Assessment and Plan:  
 
Hospital Problems as of 10/19/2019 Never Reviewed Codes Class Noted - Resolved POA Jaundice ICD-10-CM: R17 
ICD-9-CM: 782.4  10/19/2019 - Present No  
   
 Acquired hemolytic anemia (HCC) ICD-10-CM: D59.9 ICD-9-CM: 283.9  10/19/2019 - Present Unknown Methemoglobinemia ICD-10-CM: D74.9 ICD-9-CM: 289.7  10/17/2019 - Present Unknown Acute metabolic encephalopathy BTD-01-JE: G93.41 
ICD-9-CM: 348.31  10/16/2019 - Present Unknown Acute respiratory failure with hypoxia [facility]) ICD-10-CM: J96.01 
ICD-9-CM: 518.81  10/15/2019 - Present No  
   
 Pulmonary hypertension (HCC) (Chronic) ICD-10-CM: B30.03 ICD-9-CM: 416.8  10/15/2019 - Present Yes Hyperproteinemia- with likely hyperviscosity ICD-10-CM: E88.09 
ICD-9-CM: 273.8  10/15/2019 - Present Unknown * (Principal) Hypercalcemia ICD-10-CM: V86.66 
ICD-9-CM: 275.42  10/12/2019 - Present Yes Anemia ICD-10-CM: D64.9 ICD-9-CM: 285.9  10/12/2019 - Present Yes MARCELA (acute kidney injury) (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: suspected based on constellation of findings despite pending bone marrow biopsy, appreciate nephrology and hematology , currently on IV decadron · MARCEAL on dialysis: per nephrology · Methemoglobinemia: s/p methylene blue once · Acute hypoxic respiratory failure: on NC , per pulmonary, stopped IV heparin for possible PE as V/Q scan negative, also on antibiotics for possible pneumonia · Anemia and thrombocytopenia: transfuse per oncology, hemolysis workup positive · Elevated LFTS: some hemolysis and intrahepatic contribution, appreciated GI, started lactulose for mildly elevated ammonia and encephalopathy · HTN: on cardizem/ prn hydralazine · AFIB: taking cardizem · Hyperglycemia: due to steroids, on SSI · Elevated troponin: likely demand, ECHO with preserved EF · Acute metabolic encephalopathy: improving , still confused, consulted SLP , trial of lactulose DC planning/Dispo:  Pending Diet:  DIET NUTRITIONAL SUPPLEMENTS 
DIET NPO 
DVT ppx:  SCD Signed: Tanna Garza MD

## 2019-10-19 NOTE — PROGRESS NOTES
10/19/2019 Admit Date: 10/12/2019 Subjective: CHIEF COMPLAINT- confused HPI Overall- worsening Diet-npo Appetite-na [place] Vomiting-no Pain-min abd - poorly described BM-none Bleeding-no Medications-reviewed and adjusted as appropriate IV FLUIDS-reviewed FAM HX-per H&P SH-per H&P 
 tob-yes 
          etoh-no History reviewed. No pertinent past medical history. History reviewed. No pertinent surgical history. ROS-- 
               RESP-neg CARDIAC-neg -neg Further ROS as per PMH and PSH- see problem list     
                   
 
Objective:  
 
Visit Vitals /76 Pulse 64 Temp 97.7 °F (36.5 °C) Resp 24 Ht 6' (1.829 m) Wt 97.7 kg (215 lb 6.2 oz) SpO2 (!) 77% BMI 29.21 kg/m² Intake/Output Summary (Last 24 hours) at 10/19/2019 0746 Last data filed at 10/19/2019 8005 Gross per 24 hour Intake 1229.7 ml Output 2025 ml Net -795.3 ml EXAM:   
 NEURO-confused HEENT-icteric LUNGS-clear [place] ABD-soft , min tenderness, no rebound, good bs EXT-no edema LABS- 
Lab Results Component Value Date/Time WBC 11.3 (H) 10/19/2019 04:00 AM  
 RBC 2.64 (L) 10/19/2019 04:00 AM  
 HGB 8.1 (L) 10/19/2019 04:00 AM  
 HCT 23.3 (L) 10/19/2019 04:00 AM  
 PLATELET 729 (L) 99/03/5048 04:00 AM  
 
Lab Results Component Value Date/Time  Sodium 132 (L) 10/19/2019 04:00 AM  
 Potassium 3.7 10/19/2019 04:00 AM  
 Chloride 93 (L) 10/19/2019 04:00 AM  
 CO2 28 10/19/2019 04:00 AM  
 Anion gap 11 10/19/2019 04:00 AM  
 Glucose 239 (H) 10/19/2019 04:00 AM  
 BUN 65 (H) 10/19/2019 04:00 AM  
 Creatinine 4.06 (H) 10/19/2019 04:00 AM  
 GFR est AA 19 (L) 10/19/2019 04:00 AM  
 GFR est non-AA 15 (L) 10/19/2019 04:00 AM  
 Calcium 10.0 10/19/2019 04:00 AM  
 Magnesium 2.3 10/19/2019 04:00 AM  
 Phosphorus 5.7 (H) 10/12/2019 10:02 PM  
 Albumin 1.8 (L) 10/19/2019 04:00 AM  
 Bilirubin, total 20.0 (H) 10/19/2019 04:00 AM  
 Protein, total 10.8 (H) 10/19/2019 04:00 AM  
 Globulin 9.0 (H) 10/19/2019 04:00 AM  
 A-G Ratio 0.2 (L) 10/19/2019 04:00 AM  
 AST (SGOT) 600 (H) 10/19/2019 04:00 AM  
 ALT (SGPT) 210 (H) 10/19/2019 04:00 AM  
 
 
   
 
Assessment:  
 
Principal Problem: Hypercalcemia (10/12/2019) Active Problems: 
  Anemia (10/12/2019) MARCELA (acute kidney injury) ([facility] [address] (10/12/2019) Acute respiratory failure with hypoxia ([address] (10/15/2019) Pulmonary hypertension ([facility] [address] (10/15/2019) Hyperproteinemia- with likely hyperviscosity (10/15/2019) Acute metabolic encephalopathy (11/70/5616) Methemoglobinemia (10/17/2019) Jaundice (10/19/2019) 
 
progressive jaundice of unclear etiology as per consult ? Cholestasis? There are no interventional options At this point he is not in overt liver failure- alb up a little and mild coagulopathy Plan:  
 
Unfortunately we have nothing to offer as this is multifactorial 
Ammonia slightly up and could contribute but doubt this is true HE Will add lactulose PT SEEN AND EXAMINED AND PLAN DISCUSSED AND IMPLEMENTED.  
Aundrea Slater MD

## 2019-10-19 NOTE — PROGRESS NOTES
Figueroa Childress Admission Date: 10/12/2019 Daily Progress Note: 10/19/2019 The patient's chart is reviewed and the patient is discussed with the staff. 75 y.o.  male seen and evaluated at the request of Dr. Zofia Carney.  He was admitted with a several month history of weight loss (35 lbs) and progressive weakness. He has had some back pain and he feels nauseated. Upon admission, he was found to be in acute renal failure (creat 4.9, ? Baseline) and had an elevated calcium level (13.8). He has received IV fluids and calcitonin. Head CT showed multiple lytic lesions in the skull and the bone survey showed a lesion in the left humerus as well. His beta 2 microglobulin level, IgA levels are elevated and the [place] free light chain studies are abnormal.  Oncology has seen and suspects multiple myeloma - bone marrow biopsy planned. He was anemic on admission and has received 2 units of blood in addition to IV fluids. His calcium level has improved. Nephrology following for renal failure. Methemoglobinemia noted- given methylene blue 10/16/19 Subjective: No events overnight, more alert. VQ low prob for PE Bilirubin elevated and AST ALT elevated- worse. Current Facility-Administered Medications Medication Dose Route Frequency  lactulose (CHRONULAC) 10 gram/15 mL solution 30 mL  20 g Oral BID  insulin lispro (HUMALOG) injection   SubCUTAneous Q6H  
 0.9% sodium chloride infusion 250 mL  250 mL IntraVENous PRN  
 dilTIAZem (CARDIZEM) IR tablet 60 mg  60 mg Oral AC&HS  hydrALAZINE (APRESOLINE) 20 mg/mL injection 10 mg  10 mg IntraVENous Q6H PRN  
 0.9% sodium chloride infusion 250 mL  250 mL IntraVENous PRN  
 dexamethasone (DECADRON) 40 mg in 0.9% sodium chloride 50 mL IVPB  40 mg IntraVENous DAILY  famotidine (PF) (PEPCID) 20 mg in sodium chloride 0.9% 10 mL injection  20 mg IntraVENous DAILY  0.9% sodium chloride infusion 250 mL  250 mL IntraVENous PRN  
 0.9% sodium chloride infusion 250 mL  250 mL IntraVENous PRN  
 cefTRIAXone (ROCEPHIN) 1 g in 0.9% sodium chloride (MBP/ADV) 50 mL  1 g IntraVENous Q24H  
 azithromycin (ZITHROMAX) 500 mg in 0.9% sodium chloride (MBP/ADV) 250 mL  500 mg IntraVENous Q24H  
 heparin 25,000 units in dextrose 500 mL infusion  18-36 Units/kg/hr IntraVENous TITRATE  allopurinol (ZYLOPRIM) tablet 50 mg  50 mg Oral DAILY  sodium chloride (NS) flush 5-40 mL  5-40 mL IntraVENous Q8H  
 sodium chloride (NS) flush 5-40 mL  5-40 mL IntraVENous PRN  
 acetaminophen (TYLENOL) tablet 650 mg  650 mg Oral Q4H PRN  
 ondansetron (ZOFRAN) injection 4 mg  4 mg IntraVENous Q4H PRN  
 diphenhydrAMINE (BENADRYL) capsule 25 mg  25 mg Oral Q6H PRN Objective:  
 
Vitals:  
 10/19/19 3747 10/19/19 3969 10/19/19 3744 10/19/19 8130 BP: 159/72 154/63 170/76 Pulse: 79 64 64 Resp: 17 (!) 47 24 Temp:    97.7 °F (36.5 °C) SpO2: 100% (!) 85% (!) 77% Weight:      
Height:      
 
Intake and Output:  
10/17 1901 - 10/19 0700 In: 1410.9 [I.V.:910.9] Out: 2025 [Urine:25] No intake/output data recorded. Physical Exam:  
Constitutional:  the patient is well developed and in no acute distress HEENT:  Sclera clear, pupils equal, oral mucosa moist 
Lungs: CTA. Currently wearing Opti flow cannula. Cardiovascular:  RRR without M,G,R 
Abd/GI: soft and non-tender; with positive bowel sounds. Ext: warm without cyanosis. There is no lower leg edema. Musculoskeletal: moves all four extremities with equal strength Skin:  no jaundice or rashes, no wounds Neuro: Less alert today. Mumbling to answer questions. Musculoskeletal: can't ambulate at present - not alert enough and oxygen levels low. No deformity Psychiatric: Calm. Review of Systems - Review of Systems Constitutional: Positive for malaise/fatigue. Respiratory: Positive for cough. Cardiovascular: Positive for chest pain. Gastrointestinal: Positive for nausea. Musculoskeletal: Positive for back pain. Lines: peripheral IV forearm CHEST XRAY: pending LAB Recent Labs 10/19/19 
0400 10/18/19 
0320 10/17/19 
1351 10/17/19 
0401 WBC 11.3* 13.4*  --  13.5* HGB 8.1* 7.2* 7.8* 6.7* HCT 23.3* 20.8* 23.0* 20.2* * 147*  --  143* INR 1.8  --   --   --   
 
Recent Labs 10/19/19 
0400 10/18/19 
0320 10/17/19 
0359 * 133* 135* K 3.7 4.5 4.7 CL 93* 96* 102 CO2 28 26 24 * 230* 205* BUN 65* 70* 47* CREA 4.06* 5.07* 4.72* MG 2.3 2.1 1.8 ALB 1.8* 1.7* 1.6* SGOT 600* 609* 314* Lab Results Component Value Date/Time Calcium 10.0 10/19/2019 04:00 AM  
 Phosphorus 5.7 (H) 10/12/2019 10:02 PM  
 
Assessment:  (Medical Decision Making) Patient Active Problem List  
Diagnosis Code  Hypercalcemia E83.52  
 Anemia D64.9  MARCELA (acute kidney injury) ([facility] N17.9  Diabetes mellitus ([facility] E11.9  
 Essential hypertension I10  
 Paroxysmal atrial fibrillation (HCC) I48.0  Acute respiratory failure with hypoxia (HCC) J96.01  
 Pulmonary hypertension (HCC) I27.20  Hyperproteinemia- with likely hyperviscosity E88.09  
 Acute metabolic encephalopathy O34.21  
 Methemoglobinemia D74.9  Jaundice R17 Plan:  (Medical Decision Making) Hospital Problems  Never Reviewed Codes Class Noted POA Acute respiratory failure with hypoxia [facility]) ICD-10-CM: J96.01 
ICD-9-CM: 518.81  10/15/2019 No  
 Oxygenation imperoved Pulmonary hypertension (HCC) ICD-10-CM: I27.20 ICD-9-CM: 416.8  10/15/2019 Yes RV dilated on echo - ? PE versus chronic. No previous echo for review Hyperproteinemia- with likely hyperviscosity ICD-10-CM: E88.09 
ICD-9-CM: 273.8  10/15/2019 Unknown Viscosity results pending * (Principal) Hypercalcemia ICD-10-CM: R30.25 
ICD-9-CM: 275.42  10/12/2019 Yes Calcium back up some today. Anemia ICD-10-CM: D64.9 ICD-9-CM: 285.9  10/12/2019 Yes Worse. MARCELA (acute kidney injury) ([facility] ICD-10-CM: N17.9 ICD-9-CM: 584.9  10/12/2019 Yes Worse. Nephrology now involved. Urine dark but good output- on HD 2nd session today Hospital Problems  Never Reviewed Codes Class Noted POA Methemoglobinemia ICD-10-CM: D74.9 ICD-9-CM: 289.7  10/17/2019 Unknown S/p methylene blue administration- re check methemoglobin level. Hemolysis, ? G6PD deficiency- Hematology following. Discussed with wife, continue supportive care- redose methylene blue if MHb still high and patient symptomatic- levels signifficantly decreased yesterday. Likely will not be necessary. Progressive hyper bilirubinema- hemolysis +/- A liver injury- continue to monitor GI on board- no evidence off biliary obstruction. D/C heparin drip VQ- low prob Total CC time 30 min Danae Presley MD 
 
More than 50% of time documented was spent face-to-face contact with the patient and in the care of the patient on the floor/unit where the patient is located.

## 2019-10-20 NOTE — PROGRESS NOTES
Problem: Mobility Impaired (Adult and Pediatric) Goal: *Acute Goals and Plan of Care (Insert Text) Description LTG: 
(1.)Mr. Huseyin Saab will move from supine to sit and sit to supine , scoot up and down and roll side to side in bed with SUPERVISION within 7 treatment day(s). (2.)Mr. Huseyin Saab will transfer from bed to chair and chair to bed with CONTACT GUARD ASSIST using the least restrictive device within 7 treatment day(s). (3.)Mr. Huseyin Saab will ambulate with CONTACT GUARD ASSIST for 80 feet with the least restrictive device within 7 treatment day(s). (4.)Mr. Huseyin Saab will participate in therapeutic activity/exercises x 23 minutes for increased strength within 7 treatment days. ________________________________________________________________________________________________ Outcome: Progressing Towards Goal 
  
PHYSICAL THERAPY: Initial Assessment and AM 10/20/2019 INPATIENT: PT Visit Days : 1 [place] / [place] / Product Type: Medicare /   
  
NAME/AGE/GENDER: Denise Vaca is a 75 y.o. male PRIMARY DIAGNOSIS: Hypercalcemia [E83.52] Anemia [D64.9] [facility] [N17.9] Hypercalcemia Hypercalcemia ICD-10: Treatment Diagnosis:  
 · Generalized Muscle Weakness (M62.81) · Difficulty in walking, Not elsewhere classified (R26.2) Precaution/Allergies: 
Patient has no known allergies. ASSESSMENT:  
 
Mr. Huseyin Saab is a 75 y.o. male in the hospital for the above who was supine in bed upon arrival.  History provided by pt's wife who was at bedside given pt's confusion/lethargy. She reported that they live together in a one story house and PTA pt was independent with ambulation. Pt co-eval/treated today given decreased activity tolerance and level of debility. Mr. Huseyin Saab presents to PT with generally decreased AROM and strength in B LEs.   Pt appeared confused/drowsy but became more alert with mobility. He came to sitting on EOB with modA x 2 and fair to poor sitting balance. Pt very impulsive and required cues for safety awareness. Pt stood with Jose D x 2 and RW demonstrating  poor standing balance. He was able to initiate pre gait training at EOB with min-modA x 2. Pt given verbal and manual cuing for weight shifting and intimating steps. He then was able to get back in bed with modA x 2. Mr. Carmen Almodovar could benefit from skilled PT as he is currently functioning below his baseline. At this time, patient is appropriate for Co-treatment with occupational therapy due to patient's decreased overall endurance/tolerance levels, as well as need for high level skilled assistance to complete functional transfers/mobility and functional tasks. Deborah Higuera is appropriate for a multidisciplinary co-treatment of PT and OT to address goals of both disciplines. This section established at most recent assessment PROBLEM LIST (Impairments causing functional limitations): 1. Decreased Strength 2. Decreased ADL/Functional Activities 3. Decreased Transfer Abilities 4. Decreased Ambulation Ability/Technique 5. Decreased Balance 6. Decreased Activity Tolerance 7. Increased Fatigue 8. Decreased Cognition INTERVENTIONS PLANNED: (Benefits and precautions of physical therapy have been discussed with the patient.) 1. Balance Exercise 2. Bed Mobility 3. Family Education 4. Gait Training 5. Neuromuscular Re-education/Strengthening 6. Therapeutic Activites 7. Therapeutic Exercise/Strengthening 8. Transfer Training TREATMENT PLAN: Frequency/Duration: 3 times a week for duration of hospital stay Rehabilitation Potential For Stated Goals: Good REHAB RECOMMENDATIONS (at time of discharge pending progress):   
Placement: It is my opinion, based on this patient's performance to date, that Mr. Carmen Almodovar may benefit from intensive therapy at a [address] [place] [address] after discharge due to the functional deficits listed above that are likely to improve with skilled rehabilitation and concerns that he/she may be unsafe to be unsupervised at home due to decreased functional mobility . Equipment:  
? None at this time HISTORY:  
History of Present Injury/Illness (Reason for Referral): 
See H&P Past Medical History/Comorbidities: Mr. Yecenia Bolivar  has no past medical history on file. Mr. Yecenia Bolivar  has no past surgical history on file. Social History/Living Environment:  
Home Environment: Private residence # Steps to Enter: 0 One/Two Story Residence: One story Living Alone: No 
Support Systems: Spouse/Significant Other/Partner Patient Expects to be Discharged to[de-identified] Unknown Current DME Used/Available at Home: Cane, straight Tub or Shower Type: Tub/Shower combination Prior Level of Function/Work/Activity: 
Lives with wife and PTA PTA pt was independent with ADLs and ambulation. Number of Personal Factors/Comorbidities that affect the Plan of Care: 0: LOW COMPLEXITY EXAMINATION:  
Most Recent Physical Functioning:  
Gross Assessment: 
AROM: Generally decreased, functional 
Strength: Generally decreased, functional 
Coordination: Generally decreased, functional 
         
  
Posture: 
Posture (WDL): Exceptions to [facility] Posture Assessment: Rounded shoulders Balance: 
Sitting: Impaired Sitting - Static: Fair (occasional) Sitting - Dynamic: Poor (constant support) Standing: Impaired Standing - Static: Poor Standing - Dynamic : Poor Bed Mobility: 
Rolling: Moderate assistance;Assist x2 Supine to Sit: Moderate assistance;Assist x2 Scooting: Maximum assistance;Assist x2 Wheelchair Mobility: 
  
Transfers: 
Sit to Stand: Minimum assistance;Assist x2 Stand to Sit: Minimum assistance;Assist x2 Gait: 
  
   
  
Body Structures Involved: 1. Nerves 2. Bones 3. Muscles Body Functions Affected: 1. Mental 
2. Sensory/Pain 3.  Neuromusculoskeletal 
 4. Movement Related Activities and Participation Affected: 1. Learning and Applying Knowledge 2. General Tasks and Demands 3. Communication 4. Mobility 5. Self Care 6. Domestic Life 7. Community, Social and [place] Number of elements that affect the Plan of Care: 4+: HIGH COMPLEXITY CLINICAL PRESENTATION:  
Presentation: Evolving clinical presentation with changing clinical characteristics: MODERATE COMPLEXITY CLINICAL DECISION MAKIN[address] [place] [address] AM-PAC 6 Clicks Basic Mobility Inpatient Short Form How much difficulty does the patient currently have. .. Unable A Lot A Little None 1. Turning over in bed (including adjusting bedclothes, sheets and blankets)? ? 1   ? 2   ? 3   ? 4  
2. Sitting down on and standing up from a chair with arms ( e.g., wheelchair, bedside commode, etc.)   ? 1   ? 2   ? 3   ? 4  
3. Moving from lying on back to sitting on the side of the bed?   ? 1   ? 2   ? 3   ? 4 How much help from another person does the patient currently need. .. Total A Lot A Little None 4. Moving to and from a bed to a chair (including a wheelchair)? ? 1   ? 2   ? 3   ? 4  
5. Need to walk in hospital room? ? 1   ? 2   ? 3   ? 4  
6. Climbing 3-5 steps with a railing? ? 1   ? 2   ? 3   ? 4  
© , Trustees of [address] [place] [address], under license to [employer]. All rights reserved Score:  Initial: 12 Most Recent: X (Date: -- ) Interpretation of Tool:  Represents activities that are increasingly more difficult (i.e. Bed mobility, Transfers, Gait). Medical Necessity:    
· Patient demonstrates good ·  rehab potential due to higher previous functional level. Reason for Services/Other Comments: 
· Patient continues to require skilled intervention due to decreased balance and functional mobility · . Use of outcome tool(s) and clinical judgement create a POC that gives a: Questionable prediction of patient's progress: MODERATE COMPLEXITY  
  
 
 
 
TREATMENT:  
 (In addition to Assessment/Re-Assessment sessions the following treatments were rendered) Pre-treatment Symptoms/Complaints:  Lethargic Pain: Initial:  
Pain Intensity 1: 0  Post Session:  0 Therapeutic Activity: (    9 minutes): Therapeutic activities including Bed transfers, sitting EOB activities, STS transfer and pre-gait training  to improve mobility, strength, balance and coordination. Required moderate   to promote static and dynamic balance in standing and promote coordination of bilateral, lower extremity(s). Today's treatment session addressed Decreased Strength, Decreased Transfer Abilities, Decreased Ambulation Ability/Technique, Decreased Balance, Decreased Activity Tolerance, Increased Fatigue and Decreased Cognition to progress towards achieving goal(s) 1, 2 and 3. During this session, Occupational Therapy addressed neuromuscular re-education to progress towards their discipline specific goal(s). Co-treatment was necessary to improve patient's cognitive participation, ability to follow higher level commands, ability to increase activity demands and ability to return to normal functional activity. Braces/Orthotics/Lines/Etc:  
· IV 
· O2 Device: Nasal cannula Treatment/Session Assessment:   
· Response to Treatment:  Fairly given some lethargy and decreased safety awareness. · Interdisciplinary Collaboration:  
o Physical Therapist 
o Occupational Therapist 
o Registered Nurse · After treatment position/precautions:  
o Supine in bed 
o Bed alarm/tab alert on 
o Bed/Chair-wheels locked 
o Bed in low position 
o Call light within reach 
o RN notified 
o Family at bedside · Compliance with Program/Exercises: Will assess as treatment progresses · Recommendations/Intent for next treatment session: \"Next visit will focus on advancements to more challenging activities and reduction in assistance provided\". Total Treatment Duration: PT Patient Time In/Time Out Time In: 0825 Time Out: 0060 Marylene Larve, PT, DPT

## 2019-10-20 NOTE — PROGRESS NOTES
Figueroa Ibqal Query Admission Date: 10/12/2019 Daily Progress Note: 10/20/2019 The patient's chart is reviewed and the patient is discussed with the staff. 75 y.o.  male seen and evaluated at the request of Dr. Kari Arevalo.  He was admitted with a several month history of weight loss (35 lbs) and progressive weakness. He has had some back pain and he feels nauseated. Upon admission, he was found to be in acute renal failure (creat 4.9, ? Baseline) and had an elevated calcium level (13.8). He has received IV fluids and calcitonin. Head CT showed multiple lytic lesions in the skull and the bone survey showed a lesion in the left humerus as well. His beta 2 microglobulin level, IgA levels are elevated and the [place] free light chain studies are abnormal.  Oncology has seen and suspects multiple myeloma - bone marrow biopsy planned. He was anemic on admission and has received 2 units of blood in addition to IV fluids. His calcium level has improved. Nephrology following for renal failure. Methemoglobinemia noted- given methylene blue 10/16/19 Subjective: No events overnight, more alert. VQ low prob for PE Bilirubin elevated and AST ALT elevated- worse. Current Facility-Administered Medications Medication Dose Route Frequency  lactulose (CHRONULAC) 10 gram/15 mL solution 30 mL  20 g Oral TID  insulin lispro (HUMALOG) injection   SubCUTAneous Q6H  
 0.9% sodium chloride infusion 250 mL  250 mL IntraVENous PRN  
 dilTIAZem (CARDIZEM) IR tablet 60 mg  60 mg Oral AC&HS  hydrALAZINE (APRESOLINE) 20 mg/mL injection 10 mg  10 mg IntraVENous Q6H PRN  
 0.9% sodium chloride infusion 250 mL  250 mL IntraVENous PRN  
 famotidine (PF) (PEPCID) 20 mg in sodium chloride 0.9% 10 mL injection  20 mg IntraVENous DAILY  0.9% sodium chloride infusion 250 mL  250 mL IntraVENous PRN  
 0.9% sodium chloride infusion 250 mL  250 mL IntraVENous PRN  
  cefTRIAXone (ROCEPHIN) 1 g in 0.9% sodium chloride (MBP/ADV) 50 mL  1 g IntraVENous Q24H  
 azithromycin (ZITHROMAX) 500 mg in 0.9% sodium chloride (MBP/ADV) 250 mL  500 mg IntraVENous Q24H  
 allopurinol (ZYLOPRIM) tablet 50 mg  50 mg Oral DAILY  sodium chloride (NS) flush 5-40 mL  5-40 mL IntraVENous Q8H  
 sodium chloride (NS) flush 5-40 mL  5-40 mL IntraVENous PRN  
 acetaminophen (TYLENOL) tablet 650 mg  650 mg Oral Q4H PRN  
 ondansetron (ZOFRAN) injection 4 mg  4 mg IntraVENous Q4H PRN  
 diphenhydrAMINE (BENADRYL) capsule 25 mg  25 mg Oral Q6H PRN Objective:  
 
Vitals:  
 10/20/19 8897 10/20/19 1954 10/20/19 6855 10/20/19 2884 BP: 135/73 148/83 166/87 Pulse: 77 75 75 Resp: 20 19 20 Temp:   97.7 °F (36.5 °C) SpO2: 100% 100% 98% 100% Weight:      
Height:      
 
Intake and Output:  
10/18 1901 - 10/20 0700 In: 625.5 [I.V.:625.5] Out: 1853 [Urine:25] No intake/output data recorded. Physical Exam:  
Constitutional:  the patient is well developed and in no acute distress HEENT:  Sclera clear, pupils equal, oral mucosa moist 
Lungs: CTA. Currently wearing Opti flow cannula. Cardiovascular:  RRR without M,G,R 
Abd/GI: soft and non-tender; with positive bowel sounds. Ext: warm without cyanosis. There is no lower leg edema. Musculoskeletal: moves all four extremities with equal strength Skin:  no jaundice or rashes, no wounds Neuro: Less alert today. Mumbling to answer questions. Musculoskeletal: can't ambulate at present - not alert enough and oxygen levels low. No deformity Psychiatric: Calm. Review of Systems - Review of Systems Constitutional: Positive for malaise/fatigue. Respiratory: Positive for cough. Cardiovascular: Positive for chest pain. Gastrointestinal: Positive for nausea. Musculoskeletal: Positive for back pain. Lines: peripheral IV forearm CHEST XRAY: pending LAB Recent Labs 10/20/19 
0448 10/19/19 
0400 10/18/19 0320 10/17/19 
1351 WBC 10.6 11.3* 13.4*  --   
HGB 8.4* 8.1* 7.2* 7.8* HCT 24.2* 23.3* 20.8* 23.0*  
* 142* 147*  --   
INR  --  1.8  --   --   
 
Recent Labs 10/20/19 
0448 10/19/19 
0400 10/18/19 
0320 * 132* 133* K 3.8 3.7 4.5  
CL 93* 93* 96* CO2 27 28 26 * 239* 230* BUN 61* 65* 70* CREA 4.13* 4.06* 5.07* MG 2.4 2.3 2.1 ALB 1.9* 1.8* 1.7* SGOT 425* 600* 609* Lab Results Component Value Date/Time Calcium 9.9 10/20/2019 04:48 AM  
 Phosphorus 5.7 (H) 10/12/2019 10:02 PM  
 
Assessment:  (Medical Decision Making) Patient Active Problem List  
Diagnosis Code  Hypercalcemia E83.52  
 Anemia D64.9  MARCELA (acute kidney injury) ([facility] N17.9  Diabetes mellitus ([facility] E11.9  
 Essential hypertension I10  
 Paroxysmal atrial fibrillation (HCC) I48.0  Acute respiratory failure with hypoxia (HCC) J96.01  
 Pulmonary hypertension (HCC) I27.20  Hyperproteinemia- with likely hyperviscosity E88.09  
 Acute metabolic encephalopathy W42.41  
 Methemoglobinemia D74.9  Jaundice R17  Acquired hemolytic anemia (HCC) D59.9 Plan:  (Medical Decision Making) Hospital Problems  Never Reviewed Codes Class Noted POA Acute respiratory failure with hypoxia [facility]) ICD-10-CM: J96.01 
ICD-9-CM: 518.81  10/15/2019 No  
 Oxygenation imperoved Pulmonary hypertension (HCC) ICD-10-CM: I27.20 ICD-9-CM: 416.8  10/15/2019 Yes RV dilated on echo - ? PE versus chronic. No previous echo for review Hyperproteinemia- with likely hyperviscosity ICD-10-CM: E88.09 
ICD-9-CM: 273.8  10/15/2019 Unknown Viscosity results pending * (Principal) Hypercalcemia ICD-10-CM: M77.56 
ICD-9-CM: 275.42  10/12/2019 Yes Calcium back up some today. Anemia ICD-10-CM: D64.9 ICD-9-CM: 285.9  10/12/2019 Yes Worse. MARCELA (acute kidney [facility]) [facility] ICD-10-CM: N17.9 ICD-9-CM: 584.9  10/12/2019 Yes Worse. Nephrology now involved. Urine dark but good output- on HD 2nd session today Hospital Problems  Never Reviewed Codes Class Noted POA Methemoglobinemia ICD-10-CM: D74.9 ICD-9-CM: 289.7  10/17/2019 Unknown S/p methylene blue administration- re check methemoglobin level. Hemolysis, ? G6PD deficiency- Hematology following. Discussed with wife, continue supportive care- 
 
Progressive hyper bilirubinema- hemolysis +/-  liver injury- continue to monitor GI on board- no evidence off biliary obstruction. D/C heparin drip VQ- low prob Can transfer to floor from [address] [place] [address] point- nothing to add to his care- will sign off and be available upon further request. 
 
 
 
Scott Sears MD 
 
More than 50% of time documented was spent face-to-face contact with the patient and in the care of the patient on the floor/unit where the patient is located.

## 2019-10-20 NOTE — PROGRESS NOTES
10/20/2019 Admit Date: 10/12/2019 Subjective: CHIEF COMPLAINT- confused HPI Overall- stable Diet- Pureed Appetite-poor Pilar Divers Vomiting-no Pain-min abd - poorly described BM-none Bleeding-no Medications-reviewed and adjusted as appropriate IV FLUIDS-reviewed FAM HX-per H&P SH-per H&P 
 tob-yes 
          etoh-no History reviewed. No pertinent past medical history. History reviewed. No pertinent surgical history. ROS-- 
               RESP-neg CARDIAC-neg -neg Further ROS as per PMH and PSH- see problem list     
                   
 
Objective:  
 
Visit Vitals /87 (BP 1 Location: Left arm, BP Patient Position: At rest;Head of bed elevated (Comment degrees)) Pulse 75 Temp 97.7 °F (36.5 °C) Resp 20 Ht 6' (1.829 m) Wt 99.3 kg (218 lb 14.7 oz) SpO2 98% BMI 29.69 kg/m² Intake/Output Summary (Last 24 hours) at 10/20/2019 4119 Last data filed at 10/20/2019 4290 Gross per 24 hour Intake 445.63 ml Output 1828 ml Net -1382.37 ml EXAM:   
 NEURO-confused HEENT-icteric LUNGS-clear [place]  ABD-soft , min tenderness, no rebound, good bs EXT-no edema LABS- 
Lab Results Component Value Date/Time WBC 10.6 10/20/2019 04:48 AM  
 RBC 2.73 (L) 10/20/2019 04:48 AM  
 HGB 8.4 (L) 10/20/2019 04:48 AM  
 HCT 24.2 (L) 10/20/2019 04:48 AM  
 PLATELET 106 (L) 95/12/4561 04:48 AM  
 
Lab Results Component Value Date/Time  Sodium 133 (L) 10/20/2019 04:48 AM  
 Potassium 3.8 10/20/2019 04:48 AM  
 Chloride 93 (L) 10/20/2019 04:48 AM  
 CO2 27 10/20/2019 04:48 AM  
 Anion gap 13 10/20/2019 04:48 AM  
 Glucose 269 (H) 10/20/2019 04:48 AM  
 BUN 61 (H) 10/20/2019 04:48 AM  
 Creatinine 4.13 (H) 10/20/2019 04:48 AM  
 GFR est AA 18 (L) 10/20/2019 04:48 AM  
 GFR est non-AA 15 (L) 10/20/2019 04:48 AM  
 Calcium 9.9 10/20/2019 04:48 AM  
 Magnesium 2.4 10/20/2019 04:48 AM  
 Phosphorus 5.7 (H) 10/12/2019 10:02 PM  
 Albumin 1.9 (L) 10/20/2019 04:48 AM  
 Bilirubin, total 21.5 (H) 10/20/2019 04:48 AM  
 Protein, total 10.8 (H) 10/20/2019 04:48 AM  
 Globulin 8.9 (H) 10/20/2019 04:48 AM  
 A-G Ratio 0.2 (L) 10/20/2019 04:48 AM  
 AST (SGOT) 425 (H) 10/20/2019 04:48 AM  
 ALT (SGPT) 226 (H) 10/20/2019 04:48 AM  
 
 
   
 
Assessment:  
 
Principal Problem: Hypercalcemia (10/12/2019) Active Problems: 
  Anemia (10/12/2019) MARCELA (acute kidney injury) ([address] (10/12/2019) Acute respiratory failure with hypoxia ([address] (10/15/2019) Pulmonary hypertension ([address] (10/15/2019) Hyperproteinemia- with likely hyperviscosity (10/15/2019) Acute metabolic encephalopathy (30/66/0236) Methemoglobinemia (10/17/2019) Jaundice (10/19/2019) Acquired hemolytic anemia ([address] (10/19/2019) 
 
progressive jaundice of unclear etiology as per consult ? Cholestasis? There are no interventional options At this point he is not in overt liver failure- alb up a little and mild coagulopathy TB rising up to 21.5 today w nl AP at 96, elevated ,  Plan:  
 
Unfortunately we have nothing to offer as this is multifactorial 
Ammonia slightly up on 10/19 and could contribute but doubt this is true HE No results from Lactulose added yesterday - will increase to TID Nothing else to offer Continue lactulose and monitor stools Could add xifaxin Only time will tell if liver will recover Will sign off- call if needed Jefry Saucedo NP Patient seen and examined in collaboration with Dr Taty Valenzuela. Assessment and plan per Dr Nimco Wolfe.

## 2019-10-20 NOTE — PROGRESS NOTES
TRANSFER - IN REPORT: 
 
Verbal report received from 309 Lois Marques rn(name) on Figueroa Weber  being received from ICU 3104(unit) for routine progression of care Report consisted of patients Situation, Background, Assessment and  
Recommendations(SBAR). Information from the following report(s) SBAR was reviewed with the receiving nurse. Opportunity for questions and clarification was provided. Assessment completed upon patients arrival to unit and care assumed.

## 2019-10-20 NOTE — PROGRESS NOTES
Problem: Patient Education: Go to Patient Education Activity Goal: Patient/Family Education Description 1. Patient will complete lower body bathing and dressing with min A and adaptive equipment as needed. 2. Patient will complete toileting with SBA. 3. Patient will tolerate 23 minutes of OT treatment with 2-3 rest breaks to increase activity tolerance for ADLs. 4. Patient will complete functional transfers with CGA and adaptive equipment as needed. 5. Patient will tolerate 10 minutes sitting balance edge of bed for increased ability to perform BADLs. 6. Patient will complete functional activity while seated unsupported, edge of bed, with SBA. Timeframe: 7 visits Outcome: Progressing Towards Goal 
 
 
OCCUPATIONAL THERAPY: Initial Assessment, Daily Note and AM 10/20/2019 INPATIENT: OT Visit Days: 1 [place] / [place] / Product Type: Medicare /  
  
NAME/AGE/GENDER: Macarena Villa is a 75 y.o. male PRIMARY DIAGNOSIS:  Hypercalcemia [E83.52] Anemia [D64.9] MARCELA (acute kidney injury) ([facility] [N17.9] Hypercalcemia Hypercalcemia ICD-10: Treatment Diagnosis:  
 · Generalized Muscle Weakness (M62.81) · Other lack of cordination (R27.8) · Difficulty in walking, Not elsewhere classified (R26.2) Precautions/Allergies: 
  Fall precautions Patient has no known allergies. ASSESSMENT:  
 
Mr. Ary Appiah presents in ICU for hypercalcemia, anemia, and MARCELA. Co-evaluation/treatment completed with PT. Currently on 2L 02. Upon arrival, pt supine in bed with wife at bedside. Pt very lethargic today, however responds to voice and follows commands. Only oriented to person this session. Pt lives with wife and son in a 1-story home with 0 steps to enter. At baseline, pt was independent with ADLs and functional mobility with occasional use of SPC for mobility (as needed). Endorses no hx of falls. Today, pt required mod A x 2 to edge of bed.  OT treatment initiated today to address balance, posture, and activity tolerance while PT addressed functional mobility/transfers. Static sitting balance was initially poor, requiring constant support with prop sitting; moderate lean to L side while in unsupported sitting. Pt better able to maintain upright posture with max verbal and tactile cueing provided. BUE AROM generally decreased but WFL; BUE strength noted as 4/5 throughout; coordination and sensation. Pt stood with min A x 2 and able to take steps toward [facility] with min A x 2 x RW. Pt returned back to supine in bed with mod A x 2. Left with needs met, call light within reach, RN notified, and wife at bedside. At this time, Terell Calhoun is functioning below baseline for ADLs and functional mobility. Pt would benefit from skilled OT services to address OT goals and plan of care. At this time, patient is appropriate for Co-treatment with physical  therapy due to patient's clinical complexity, current cognitive status, decreased overall endurance/tolerance levels, as well as need for high level assistance and cues and intervention to complete functional transfers/mobility and functional tasks in safe manner. Terell Calhoun is appropriate for a multidisciplinary co-treatment of PT and OT to address goals of both disciplines. \ This section established at most recent assessment PROBLEM LIST (Impairments causing functional limitations): 1. Decreased Strength 2. Decreased ADL/Functional Activities 3. Decreased Transfer Abilities 4. Decreased Ambulation Ability/Technique 5. Decreased Balance 6. Decreased Activity Tolerance 7. Decreased Cognition INTERVENTIONS PLANNED: (Benefits and precautions of occupational therapy have been discussed with the patient.) 1. Activities of daily living training 2. Adaptive equipment training 3. Balance training 4. Clothing management 5. Community reintergration 6. Donning&doffing training 7. Neuromuscular re-eduation 8. Re-evaluation 9. Therapeutic activity 10. Therapeutic exercise TREATMENT PLAN: Frequency/Duration: Follow patient 3x/week to address above goals. Rehabilitation Potential For Stated Goals: Good REHAB RECOMMENDATIONS (at time of discharge pending progress):   
Placement: It is my opinion, based on this patient's performance to date, that Mr. Ilana Eagle may benefit from intensive therapy at a [address] [place] [address] after discharge due to the functional deficits listed above that are likely to improve with skilled rehabilitation and concerns that he/she may be unsafe to be unsupervised at home due to decline in ability to safely perform ADLs and functional mobility. . 
Equipment:  
? 3 in 1 chair OCCUPATIONAL PROFILE AND HISTORY:  
History of Present Injury/Illness (Reason for Referral): 
See H&P Past Medical History/Comorbidities: Mr. Ilana Eagle  has no past medical history on file. Mr. Ilana Eagle  has no past surgical history on file. Social History/Living Environment:  
Home Environment: Private residence # Steps to Enter: 0 One/Two Story Residence: One story Living Alone: No 
Support Systems: Spouse/Significant Other/Partner, Family member(s) Patient Expects to be Discharged to[de-identified] Private residence Current DME Used/Available at Home: Cane, straight Tub or Shower Type: Tub/Shower combination Prior Level of Function/Work/Activity: 
Independent with ADLs and fx mobility with occasional use of cane ( as needed) Personal Factors:   
      Sex:  male Age:  75 y.o. Number of Personal Factors/Comorbidities that affect the Plan of Care: Brief history (0):  LOW COMPLEXITY ASSESSMENT OF OCCUPATIONAL PERFORMANCE[de-identified]  
Activities of Daily Living:  
Basic ADLs (From Assessment) Complex ADLs (From Assessment) Feeding: Setup Oral Facial Hygiene/Grooming: Setup Bathing: Maximum assistance Upper Body Dressing: Minimum assistance Lower Body Dressing: Maximum assistance Toileting: Maximum assistance Instrumental ADL Meal Preparation: Total assistance Homemaking: Total assistance Grooming/Bathing/Dressing Activities of Daily Living Cognitive Retraining Safety/Judgement: Decreased awareness of environment;Decreased awareness of need for assistance;Decreased awareness of need for safety;Decreased insight into deficits; Fall prevention Bed/Mat Mobility Rolling: Moderate assistance;Assist x2 Supine to Sit: Moderate assistance;Assist x2 Sit to Stand: Minimum assistance;Assist x2 Stand to Sit: Minimum assistance;Assist x2 Scooting: Moderate assistance Most Recent Physical Functioning:  
Gross Assessment: 
AROM: Generally decreased, functional 
Strength: Generally decreased, functional 
Coordination: Generally decreased, functional 
Sensation: Intact Posture: 
  
Balance: 
Sitting: Impaired Sitting - Static: Fair (occasional) Sitting - Dynamic: Poor (constant support) Standing: Impaired Standing - Static: Fair Standing - Dynamic : Poor Bed Mobility: 
Rolling: Moderate assistance;Assist x2 Supine to Sit: Moderate assistance;Assist x2 Scooting: Moderate assistance Wheelchair Mobility: 
  
Transfers: 
Sit to Stand: Minimum assistance;Assist x2 Stand to Sit: Minimum assistance;Assist x2 Patient Vitals for the past 6 hrs: 
 BP BP Patient Position SpO2 O2 Flow Rate (L/min) Pulse 10/20/19 0528 157/81  100 %  72  
10/20/19 0559 135/73  100 %  77  
10/20/19 0628 148/83  100 %  75  
10/20/19 0658 166/87 At rest;Head of bed elevated (Comment degrees) 98 % 2 l/min 75  
10/20/19 0759 159/74  100 %  87  
10/20/19 0811   100 % 4 l/min   
10/20/19 0828 163/74  95 %  74  
10/20/19 0904 155/86  100 %  82  
10/20/19 0957   95 %  83  
10/20/19 0958 179/79  98 %    
10/20/19 1049 156/76 At rest 94 %  86 Mental Status Neurologic State: Eyes open to voice, Lethargic, Drowsy Orientation Level: Oriented to person, Disoriented to place, Disoriented to situation, Disoriented to time Cognition: Follows commands, Impaired decision making Perception: Cues to maintain midline in sitting Perseveration: Perseverates during conversation Safety/Judgement: Decreased awareness of environment, Decreased awareness of need for assistance, Decreased awareness of need for safety, Decreased insight into deficits, Fall prevention Physical Skills Involved: 
1. Balance 2. Strength 3. Activity Tolerance 4. Gross Motor Control Cognitive Skills Affected (resulting in the inability to perform in a timely and safe manner): 1. Perception 2. Executive Function 3. Immediate Memory 4. Short Term Recall 5. Long Term Memory 6. Sustained Attention 7. Divided Attention Psychosocial Skills Affected: 1. Habits/Routines 2. Environmental Adaptation Number of elements that affect the Plan of Care: 5+:  HIGH COMPLEXITY CLINICAL DECISION MAKIN[address] [place] [id number] AM-PAC 6 Clicks Daily Activity Inpatient Short Form How much help from another person does the patient currently need. .. Total A Lot A Little None 1. Putting on and taking off regular lower body clothing? ? 1   ? 2   ? 3   ? 4  
2. Bathing (including washing, rinsing, drying)? ? 1   ? 2   ? 3   ? 4  
3. Toileting, which includes using toilet, bedpan or urinal?   ? 1   ? 2   ? 3   ? 4  
4. Putting on and taking off regular upper body clothing? ? 1   ? 2   ? 3   ? 4  
5. Taking care of personal grooming such as brushing teeth? ? 1   ? 2   ? 3   ? 4  
6. Eating meals? ? 1   ? 2   ? 3   ? 4  
© 2007, Trustees of [address] [place] [id number], under license to [employer]. All rights reserved Score:  Initial: 15 Most Recent: X (Date: -- ) Interpretation of Tool:  Represents activities that are increasingly more difficult (i.e. Bed mobility, Transfers, Gait). Medical Necessity: · Patient demonstrates good ·  rehab potential due to higher previous functional level. Reason for Services/Other Comments: 
· Patient continues to require skilled intervention due to medical complications and patient unable to attend/participate in therapy as expected · . Use of outcome tool(s) and clinical judgement create a POC that gives a: LOW COMPLEXITY  
 
 
 
TREATMENT:  
(In addition to Assessment/Re-Assessment sessions the following treatments were rendered) Pre-treatment Symptoms/Complaints:  No complaints Pain: Initial:  
Pain Intensity 1: 0 /10 Post Session:  same Neuromuscular Re-education: ( 15 minutes):  Exercise/activities per grid below to improve balance, coordination and posture. Required moderate visual, verbal, manual and tactile cues to promote static and dynamic balance in sitting. OT treatment initiated today to address balance, posture, and activity tolerance while PT addressed functional mobility/transfers. Static sitting balance was initially poor, requiring constant support with prop sitting; moderate lean to L side while in unsupported sitting. Pt better able to maintain upright posture with max verbal and tactile cueing provided. Braces/Orthotics/Lines/Etc:  
· IV 
· ICU monitors · O2 Device: Nasal cannula Treatment/Session Assessment:   
· Response to Treatment:  tolerated well with no issues noted. · Interdisciplinary Collaboration:  
o Physical Therapist 
o Occupational Therapist 
o Registered Nurse · After treatment position/precautions:  
o Supine in bed 
o Bed alarm/tab alert on 
o Bed/Chair-wheels locked 
o Bed in low position 
o Call light within reach 
o RN notified 
o Family at bedside · Compliance with Program/Exercises: Will assess as treatment progresses. · Recommendations/Intent for next treatment session: \"Next visit will focus on advancements to more challenging activities and reduction in assistance provided\". Total Treatment Duration: OT Patient Time In/Time Out Time In: 6643 Time Out: 5134 Derek Rush, OT

## 2019-10-20 NOTE — PROGRESS NOTES
Bedside report from [address] [facility], [address] [facility] 
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 
Wife at side sleeping Repositioned pt in bed, denies pain or needs at this time 
vss on monitor, controlled afib/bbb, sats 100% on 2lnc. Will continue care and assessment.

## 2019-10-20 NOTE — PROGRESS NOTES
Hospitalist Note Admit Date:  10/12/2019  9:36 PM  
Name:  Denise Mccann Age:  75 y.o. 
:  1945 MRN:  [id number] PCP:  None Treatment Team: Attending Provider: Otilia Rubio DO; Consulting Provider: Susan Mosher MD; Consulting Provider: Hannah Avila MD; Consulting Provider: Shellie Edmond NP; Care Manager: Checo Gerber RN; Primary Nurse: Leidy Grant RN; Physical Therapist: Santiago Méndez, PT, DPT 
 
HPI/Subjective:  
 
 
Mr. Nicolasa Garcia is a 74 yo male with PMH of AFIB (not anticoagulated) , DM2, HLP admitted with malaise and weakness, found with hypercalcemia, anemia, MARCELA. CT head showed lytic lesions of skull concerning for multiple myeloma versus metastatic disease. He has been seen by oncology and managed with hydration, calcitonin, transfusion. He has been a new dialysis start this admit due to failure of renal recovery. On 10-16-19 he required ICU transfer due to acute hypoxia requiring BIPAP. There was a large discrepancy between pulse ox (low)  and ABG (high paO2). Co-oximeter showed elevated methemoglobin of 9. He received methylene blue on 10-16-19. G6PD also low. Oncology was pending bone marrow biopsy not done to date. 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 Additionally there was some concern for PE contribution to respiratory failure thus he has been placed on IV heparin as too unstable for V/Q scan and unable to have CTA chest due to MARCELA. Eventually V/Q scan completed and low probability thus heparin stopped. He has been on IV antibiotics for possible pneumonia. LFTS elevated. ABD US shows thickended gallbladder wall without biliary dilatation. GI has evaluated and recommends lactulose due to mildly elevated ammonia level. He has developed a hemolytic anemia as well that contributes to elevated liver testing. Repeat CT head done for possible facial droop shows skull lytic lesions. Palliative care following. Discharge plans pending 10-20-19 confused, lethargic, wife present Objective:  
 
Patient Vitals for the past 24 hrs: 
 Temp Pulse Resp BP SpO2  
10/20/19 1049 95.9 °F (35.5 °C) 86 20 156/76 94 % 10/20/19 0958    179/79 98 % 10/20/19 0957  83 21  95 % 10/20/19 0904  82 20 155/86 100 % 10/20/19 0828  74 22 163/74 95 % 10/20/19 0811     100 % 10/20/19 0759  87 23 159/74 100 % 10/20/19 0658 97.7 °F (36.5 °C) 75 20 166/87 98 % 10/20/19 0628  75 19 148/83 100 % 10/20/19 0559  77 20 135/73 100 % 10/20/19 0528  72 24 157/81 100 % 10/20/19 0459  70 13 157/74 100 % 10/20/19 0358  74 15 161/78 100 % 10/20/19 0328  83 26 153/76 100 % 10/20/19 0318 97 °F (36.1 °C)      
10/20/19 0259  90 23 156/70 99 % 10/20/19 0228  66 16 162/79 100 % 10/20/19 0159  68 20 150/77 95 % 10/20/19 0128  75 24 160/72 100 % 10/20/19 0059  89 23 155/76 100 % 10/20/19 0029  86 16 166/73 100 % 10/19/19 2358  89 21 171/80 100 % 10/19/19 2329  78 16 159/72 100 % 10/19/19 2317 97 °F (36.1 °C)      
10/19/19 2300  82  162/80   
10/19/19 2258  79 25 169/80 99 % 10/19/19 2228  91 20 159/80 100 % 10/19/19 2158  80 23 164/81 100 % 10/19/19 2128  93 22 161/82 100 % 10/19/19 2058  72 20 148/82 100 % 10/19/19 2029  63 17 158/73 100 % 10/19/19 1958 96.6 °F (35.9 °C) 75 18 158/79 100 % 10/19/19 1928  86 22 156/85 100 % 10/19/19 1859  68 21 163/89 100 % 10/19/19 1829  76 17 158/75 100 % 10/19/19 1758  80 20 168/76 94 % 10/19/19 1754  74  158/77   
10/19/19 1728  67 23 158/77 97 % 10/19/19 1658  79 26 152/70 100 % 10/19/19 1628  72 18 142/73 100 % 10/19/19 1600 97.5 °F (36.4 °C)      
10/19/19 1558  65 28 130/73 95 % 10/19/19 1528  67 18 134/66 100 % 10/19/19 1458  73 27 147/73 100 % 10/19/19 1429  66 27 (!) 159/92 93 % 10/19/19 1358  68 21 179/79 100 % Oxygen Therapy O2 Sat (%): 94 % (10/20/19 1049) Pulse via Oximetry: 72 beats per minute (10/20/19 0958) O2 Device: Nasal cannula (10/20/19 0811) O2 Flow Rate (L/min): 4 l/min (10/20/19 0811) O2 Temperature: 87.8 °F (31 °C) (10/18/19 0807) FIO2 (%): 35 % (10/18/19 0808) Estimated body mass index is 29.69 kg/m² as calculated from the following: 
  Height as of this encounter: 6' (1.829 m). Weight as of this encounter: 99.3 kg (218 lb 14.7 oz). Intake/Output Summary (Last 24 hours) at 10/20/2019 1353 Last data filed at 10/20/2019 1028 Gross per 24 hour Intake 587 ml Output 0 ml Net 587 ml  
   
*Note that automatically entered I/Os may not be accurate; dependent on patient compliance with collection and accurate  by techs. General:    Elderly, confused, no distress CV:   irregular. No murmur, rub, or gallop. No edema Lungs:   CTAB. No wheezing, rhonchi, or rales. Anterior Abdomen:   Soft, nontender, nondistended. Decreased BS Extremities: Warm and dry Skin:     No rashes or jaundice. Neuro:  Confused, Nahid Yrn Data Review: 
I have reviewed all labs, meds, and studies from the last 24 hours: 
 
Recent Results (from the past 24 hour(s)) GLUCOSE, POC Collection Time: 10/19/19  5:37 PM  
Result Value Ref Range Glucose (POC) 261 (H) 65 - 100 mg/dL GLUCOSE, POC Collection Time: 10/19/19 11:02 PM  
Result Value Ref Range Glucose (POC) 270 (H) 65 - 100 mg/dL CBC WITH AUTOMATED DIFF Collection Time: 10/20/19  4:48 AM  
Result Value Ref Range WBC 10.6 4.3 - 11.1 K/uL  
 RBC 2.73 (L) 4.23 - 5.6 M/uL HGB 8.4 (L) 13.6 - 17.2 g/dL HCT 24.2 (L) 41.1 - 50.3 % MCV 88.6 79.6 - 97.8 FL  
 MCH 30.8 26.1 - 32.9 PG  
 MCHC 34.7 31.4 - 35.0 g/dL  
 RDW 16.3 (H) 11.9 - 14.6 % PLATELET 644 (L) 760 - 450 K/uL MPV 11.3 9.4 - 12.3 FL ABSOLUTE NRBC 1.06 (H) 0.0 - 0.2 K/uL NEUTROPHILS 84 (H) 43 - 78 % LYMPHOCYTES 7 (L) 13 - 44 % MONOCYTES 6 4.0 - 12.0 % EOSINOPHILS 0 (L) 0.5 - 7.8 % BASOPHILS 0 0.0 - 2.0 % IMMATURE GRANULOCYTES 3 0.0 - 5.0 %  
 ABS. NEUTROPHILS 9.0 (H) 1.7 - 8.2 K/UL  
 ABS. LYMPHOCYTES 0.7 0.5 - 4.6 K/UL  
 ABS. MONOCYTES 0.6 0.1 - 1.3 K/UL  
 ABS. EOSINOPHILS 0.0 0.0 - 0.8 K/UL  
 ABS. BASOPHILS 0.0 0.0 - 0.2 K/UL  
 ABS. IMM. GRANS. 0.3 0.0 - 0.5 K/UL  
 RBC COMMENTS MODERATE 
ROULEAUX 
    
 RBC COMMENTS SLIGHT 
ANISOCYTOSIS + POIKILOCYTOSIS 
    
 RBC COMMENTS OCCASIONAL 
HYPOCHROMIA PLATELET COMMENTS DECREASED    
 DF AUTOMATED METABOLIC PANEL, COMPREHENSIVE Collection Time: 10/20/19  4:48 AM  
Result Value Ref Range Sodium 133 (L) 136 - 145 mmol/L Potassium 3.8 3.5 - 5.1 mmol/L Chloride 93 (L) 98 - 107 mmol/L  
 CO2 27 21 - 32 mmol/L Anion gap 13 7 - 16 mmol/L Glucose 269 (H) 65 - 100 mg/dL BUN 61 (H) 8 - 23 MG/DL Creatinine 4.13 (H) 0.8 - 1.5 MG/DL  
 GFR est AA 18 (L) >60 ml/min/1.73m2 GFR est non-AA 15 (L) >60 ml/min/1.73m2 Calcium 9.9 8.3 - 10.4 MG/DL Bilirubin, total 21.5 (H) 0.2 - 1.1 MG/DL  
 ALT (SGPT) 226 (H) 12 - 65 U/L  
 AST (SGOT) 425 (H) 15 - 37 U/L Alk. phosphatase 96 50 - 136 U/L Protein, total 10.8 (H) 6.3 - 8.2 g/dL Albumin 1.9 (L) 3.2 - 4.6 g/dL Globulin 8.9 (H) 2.3 - 3.5 g/dL A-G Ratio 0.2 (L) 1.2 - 3.5 BILIRUBIN, DIRECT Collection Time: 10/20/19  4:48 AM  
Result Value Ref Range Bilirubin, direct 18.0 (H) <0.4 MG/DL MAGNESIUM Collection Time: 10/20/19  4:48 AM  
Result Value Ref Range Magnesium 2.4 1.8 - 2.4 mg/dL URIC ACID Collection Time: 10/20/19  4:48 AM  
Result Value Ref Range Uric acid 3.6 2.6 - 6.0 MG/DL  
GLUCOSE, POC Collection Time: 10/20/19  6:10 AM  
Result Value Ref Range Glucose (POC) 298 (H) 65 - 100 mg/dL All Micro Results Procedure Component Value Units Date/Time INFLUENZA A & B AG (RAPID TEST) [[id number]] Collected:  10/13/19 0028  Order Status:  Completed Specimen:  Nasopharyngeal from Nasal washing Updated:  10/13/19 7742 Influenza A Ag NEGATIVE Comment: NEGATIVE FOR THE PRESENCE OF INFLUENZA A ANTIGEN 
INFECTION DUE TO INFLUENZA A CANNOT BE RULED OUT. BECAUSE THE ANTIGEN PRESENT IN THE SAMPLE MAY BE BELOW 
THE DETECTION LIMIT OF THE TEST. A NEGATIVE TEST IS PRESUMPTIVE AND IT IS RECOMMENDED THAT THESE RESULTS BE CONFIRMED BY VIRAL CULTURE OR AN FDA-CLEARED INFLUENZA A AND B MOLECULAR ASSAY. Influenza B Ag NEGATIVE Comment: NEGATIVE FOR THE PRESENCE OF INFLUENZA B ANTIGEN 
INFECTION DUE TO INFLUENZA B CANNOT BE RULED OUT. BECAUSE THE ANTIGEN PRESENT IN THE SAMPLE MAY BE BELOW 
THE DETECTION LIMIT OF THE TEST. A NEGATIVE TEST IS PRESUMPTIVE AND IT IS RECOMMENDED THAT THESE RESULTS BE CONFIRMED BY VIRAL CULTURE OR AN FDA-CLEARED INFLUENZA A AND B MOLECULAR ASSAY. Source NASOPHARYNGEAL Results for orders placed or performed during the hospital encounter of 10/12/19  
2D ECHO COMPLETE ADULT (TTE) W OR WO CONTR Narrative [place] [facility] [address] [place], [address] [facility] 
[phone] Transthoracic Echocardiogram 
2D, M-mode, Doppler, and Color Doppler Jose Georges 
MR #: [id number] : [id number] Age: 75 years Gender: Male Study date: 14-Oct-2019 Account #: [de-identified] Height: 72 in 
Weight: 223.5 lb 
BSA: 2.24 mï¾² Status:Routine Location: [facility] BP: 131/ 69 Allergies: NO KNOWN ALLERGENS Sonographer:  FENG Rodriguez Group:  [facility] Referring Physician:  Marlen Sahu. Rahul Casas NP Reading Physician:  DR. Bharat Noble MD 
 
INDICATIONS: Likely new multiple myeloma diagnosis, will need chemotherapy PROCEDURE: This was a routine study. A transthoracic echocardiogram was 
performed. The study included complete 2D imaging, M-mode, complete spectral 
Doppler, and color Doppler. Intravenous contrast (Definity) was administered. Image quality was adequate. LEFT VENTRICLE: Size was normal. Systolic function was normal. Ejection 
fraction was estimated to be greater than 55 %. There were no regional wall 
motion abnormalities. Wall thickness was at the upper limits of normal.  
Doppler 
parameters were consistent with diastolic dysfunction. Avg E/e': 14.6. VENTRICULAR SEPTUM: There was \"bounce\" motion. These changes are consistent 
with a conduction abnormality or paced rhythm. RIGHT VENTRICLE: The ventricle was mildly dilated. Systolic function was  
mildly 
reduced. Estimated peak pressure was in the range of 45-50 mmHg. LEFT ATRIUM: The atrium was moderately dilated. RIGHT ATRIUM: The atrium was mildly dilated. SYSTEMIC VEINS: IVC: The inferior vena cava was normal in size and course. Respirophasic changes were normal. 
 
AORTIC VALVE: The valve was trileaflet. Leaflets exhibited good mobility. There 
was no evidence for stenosis. There was mild regurgitation. MITRAL VALVE: Valve structure was normal. There was no evidence for stenosis. There was trivial regurgitation. TRICUSPID VALVE: The valve structure was normal. There was no evidence for 
stenosis. There was mild regurgitation. PULMONIC VALVE: Not well visualized. There was no evidence for stenosis. There 
was trivial regurgitation. PERICARDIUM: There was no pericardial effusion. AORTA: The root exhibited dilatation. There was dilatation of the ascending 
aorta. PULMONARY ARTERY: The artery was mildly to moderately dilated. SUMMARY: 
 
-  Left ventricle: Systolic function was normal. Ejection fraction was 
estimated to be greater than 55 %. There were no regional wall motion 
abnormalities. Wall thickness was at the upper limits of normal. 
 
-  Ventricular septum: There was \"bounce\" motion. These changes are  
consistent 
with a conduction abnormality or paced rhythm. -  Right ventricle: The ventricle was mildly dilated. Systolic function was mildly reduced. Estimated peak pressure was in the range of 45-50 mmHg. -  Aorta, systemic arteries: There was dilatation of the ascending aorta. The 
aortic root diameter was 40 mm. The ascending aorta maximal AP dimension was 40 
mm. 
 
-  Pulmonary arteries: The artery was mildly to moderately dilated. -  Additional impressions: Consider imaging of aortic dimensions in 6 months 
with either repeat echocardiogram or CT/MRA if aortic size not previously 
documented. No comparison echocardiogram available for review. MEASUREMENT TABLES 
 
2D MEASUREMENTS Aorta   (Reference normals) Root diam   40 mm   (--) AAo max AP diam   40 mm   (--) SYSTEM MEASUREMENT TABLES 
 
2D mode AoR Diam (2D): 4 cm 
LA Dimension (2D): 4.5 cm Left Atrium Systolic Volume Index; Method of Disks, Biplane; 2D mode;: 46  
ml/m2 IVS/LVPW (2D): 1.2 IVSd (2D): 1.3 cm LVIDd (2D): 5.1 cm 
LVIDs (2D): 3.2 cm 
LVOT Area (2D): 3.5 cm2 LVPWd (2D): 1.1 cm RVIDd (2D): 2.7 cm Tissue Doppler Imaging LV Peak Early Villafuerte Tissue Thom; Lateral MA (TDI): 13.2 cm/s LV Peak Early Villafuerte Tissue Thom; Medial MA (TDI): 5.3 cm/s Unspecified Scan Mode Peak Grad; Mean; Antegrade Flow: 4 mm[Hg] Vmax; Antegrade Flow: 105 cm/s LVOT Diam: 2.1 cm 
MV Peak Thom/LV Peak Tissue Thom E-Wave; Lateral MA: 8.4 MV Peak Thom/LV Peak Tissue Thom E-Wave; Medial MA: 20.8 Prepared and signed by 
 
DR. Cristhian Oconnor MD 
Signed 14-Oct-2019 13:43:41 Current Meds: 
Current Facility-Administered Medications Medication Dose Route Frequency  lactulose (CHRONULAC) 10 gram/15 mL solution 30 mL  20 g Oral TID  insulin lispro (HUMALOG) injection   SubCUTAneous Q6H  
 0.9% sodium chloride infusion 250 mL  250 mL IntraVENous PRN  
 dilTIAZem (CARDIZEM) IR tablet 60 mg  60 mg Oral AC&HS  hydrALAZINE (APRESOLINE) 20 mg/mL injection 10 mg  10 mg IntraVENous Q6H PRN  
 0.9% sodium chloride infusion 250 mL  250 mL IntraVENous PRN  
  famotidine (PF) (PEPCID) 20 mg in sodium chloride 0.9% 10 mL injection  20 mg IntraVENous DAILY  0.9% sodium chloride infusion 250 mL  250 mL IntraVENous PRN  
 0.9% sodium chloride infusion 250 mL  250 mL IntraVENous PRN  
 cefTRIAXone (ROCEPHIN) 1 g in 0.9% sodium chloride (MBP/ADV) 50 mL  1 g IntraVENous Q24H  
 azithromycin (ZITHROMAX) 500 mg in 0.9% sodium chloride (MBP/ADV) 250 mL  500 mg IntraVENous Q24H  
 allopurinol (ZYLOPRIM) tablet 50 mg  50 mg Oral DAILY  sodium chloride (NS) flush 5-40 mL  5-40 mL IntraVENous Q8H  
 sodium chloride (NS) flush 5-40 mL  5-40 mL IntraVENous PRN  
 acetaminophen (TYLENOL) tablet 650 mg  650 mg Oral Q4H PRN  
 ondansetron (ZOFRAN) injection 4 mg  4 mg IntraVENous Q4H PRN  
 diphenhydrAMINE (BENADRYL) capsule 25 mg  25 mg Oral Q6H PRN Other Studies (last 24 hours): No results found. Assessment and Plan:  
 
Hospital Problems as of 10/20/2019 Never Reviewed Codes Class Noted - Resolved POA Jaundice ICD-10-CM: R17 
ICD-9-CM: 782.4  10/19/2019 - Present No  
   
 Acquired hemolytic anemia (HCC) ICD-10-CM: D59.9 ICD-9-CM: 283.9  10/19/2019 - Present Unknown Methemoglobinemia ICD-10-CM: D74.9 ICD-9-CM: 289.7  10/17/2019 - Present Unknown Acute metabolic encephalopathy [facility]: G93.41 
ICD-9-CM: 348.31  10/16/2019 - Present Unknown Acute respiratory failure with hypoxia [facility]) ICD-10-CM: J96.01 
ICD-9-CM: 518.81  10/15/2019 - Present No  
   
 Pulmonary hypertension (HCC) (Chronic) ICD-10-CM: J09.51 ICD-9-CM: 416.8  10/15/2019 - Present Yes Hyperproteinemia- with likely hyperviscosity ICD-10-CM: E88.09 
ICD-9-CM: 273.8  10/15/2019 - Present Unknown * (Principal) Hypercalcemia ICD-10-CM: K90.63 
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: suspected based on constellation of findings despite pending bone marrow biopsy, appreciate nephrology and hematology , s/p 4 doses pulse IV decadron · MARCELA on dialysis: per nephrology · Methemoglobinemia: s/p methylene blue once · Acute hypoxic respiratory failure: on NC , per pulmonary, stopped IV heparin for possible PE as V/Q scan negative, also on antibiotics for possible pneumonia x 8 days · Anemia and thrombocytopenia: transfuse per oncology parameters, hemolysis workup positive · Elevated LFTS: some hemolysis and intrahepatic contribution, appreciated GI, started lactulose for mildly elevated ammonia and encephalopathy, otherwise not many options · HTN: on cardizem/ prn hydralazine · AFIB: taking cardizem, no anticoagulation · Hyperglycemia: due to steroids, on SSI , add lower dose lantus, check A1C · Elevated troponin: likely demand, ECHO with preserved EF · Acute metabolic encephalopathy: improving , still confused, consulted SLP , trial of lactulose DC planning/Dispo:  Pending - ok to transfer to floor Diet:  DIET NUTRITIONAL SUPPLEMENTS 
DIET PUREED 
DVT ppx:  SCD Signed: Delmi Torres MD

## 2019-10-20 NOTE — PROGRESS NOTES
TRANSFER - OUT REPORT: 
 
Verbal report given to SUBHA Bull on [employer]  being transferred to [address] [place] [address] for routine progression of care Report consisted of patients Situation, Background, Assessment and  
Recommendations(SBAR). Information from the following report(s) SBAR, Kardex, ED Summary, Procedure Summary, Intake/Output, MAR, Recent Results and Cardiac Rhythm afib with BBB was reviewed with the receiving nurse. Lines:  
Peripheral IV 10/16/19 Left Forearm (Active) Site Assessment Clean, dry, & intact 10/19/2019  7:58 PM  
Phlebitis Assessment 0 10/19/2019  7:58 PM  
Infiltration Assessment 0 10/19/2019  7:58 PM  
Dressing Status Clean, dry, & intact 10/19/2019  7:58 PM  
Dressing Type Transparent 10/19/2019  7:58 PM  
Hub Color/Line Status Patent; Flushed 10/19/2019  7:58 PM  
Alcohol Cap Used No 10/19/2019  7:00 AM  
  
 
Opportunity for questions and clarification was provided. Wife at bedside and gathered all of pts personal belongings except his house slippers. Wife is going home for the morning and will return later today. New room number given to wife who voiced understanding of tx. Patient transported with: 
 Monitor O2 @ 2 liters

## 2019-10-20 NOTE — PROGRESS NOTES
[facility] Hematology & Oncology Inpatient Hematology / Oncology Progress Note Admission Date: 10/12/2019  9:36 PM 
Reason for Admission/Hospital Course: Hypercalcemia [E83.52] Anemia [D64.9] MARCELA (acute kidney injury) ([address] [N17.9] 24 Hour Events: 
Afebrile, hypertensive at times, on nasal cannula Kappa FLC 13k SPEP with m-spike 4.89 Not stable enough for BMbx at this time S/P 4 days of pulse dex On dialysis Bili up to 21.5 Family at bedside Plans to transfer back to the floor today ROS: 
Unable to assess - pt somnolent 10 point review of systems is otherwise negative with the exception of the elements mentioned above in the HPI. No Known Allergies OBJECTIVE: 
Patient Vitals for the past 8 hrs: 
 BP Temp Pulse Resp SpO2  
10/20/19 1534 119/66 97.3 °F (36.3 °C) 70  97 % 10/20/19 1049 156/76 95.9 °F (35.5 °C) 86 20 94 % 10/20/19 0958 179/79    98 % 10/20/19 0957   83 21 95 % 10/20/19 0904 155/86  82 20 100 % 10/20/19 0828 163/74  74 22 95 % 10/20/19 0811     100 % 10/20/19 0759 159/74  87 23 100 % Temp (24hrs), Av °F (36.1 °C), Min:95.9 °F (35.5 °C), Max:97.7 °F (36.5 °C) 
 
10/20 07 - 10/20 1900 In: 742 [P.O.:237; I.V.:300] Out: - Physical Exam: 
Constitutional: Ill-appearing elderly male in no acute distress, lying comfortably in the hospital bed. HEENT: Normocephalic and atraumatic. Oropharynx is clear, mucous membranes are moist.  Neck supple without JVD. No thyromegaly present. L occipital mass. Skin Warm and dry. No bruising and no rash noted. No erythema. No pallor. Respiratory Lungs are clear to auscultation bilaterally. O2.   
CVS Normal rate, irregular rhythm and normal S1 and S2. No murmurs, gallops, or rubs. Abdomen Soft, nontender and nondistended, normoactive bowel sounds. No palpable mass. No hepatosplenomegaly. Neuro Grossly nonfocal with no obvious sensory or motor deficits. MSK Normal range of motion in general.  No edema and no tenderness. Psych Calm, cooperative. Somnolent Labs: 
   
Recent Labs 10/20/19 
0448 10/19/19 
0400 10/18/19 
0320 WBC 10.6 11.3* 13.4*  
RBC 2.73* 2.64* 2.24* HGB 8.4* 8.1* 7.2* HCT 24.2* 23.3* 20.8* MCV 88.6 88.3 92.9 MCH 30.8 30.7 32.1 MCHC 34.7 34.8 34.6  
RDW 16.3* 16.2* 17.4*  
* 142* 147* GRANS 84* 79* 78 LYMPH 7* 10* 13 MONOS 6 6 6 EOS 0* 0* 0*  
BASOS 0 0 0 IG 3 5 3 DF AUTOMATED AUTOMATED MANUAL ANEU 9.0* 8.9* 10.5* ABL 0.7 1.1 1.7 ABM 0.6 0.7 0.8 CHAVA 0.0 0.0 0.0 ABB 0.0 0.0 0.0 AIG 0.3 0.6* 0.4 Recent Labs 10/20/19 
0448 10/19/19 
0400 10/18/19 
0320 * 132* 133* K 3.8 3.7 4.5  
CL 93* 93* 96* CO2 27 28 26 AGAP 13 11 11 * 239* 230* BUN 61* 65* 70* CREA 4.13* 4.06* 5.07* GFRAA 18* 19* 14* GFRNA 15* 15* 12*  
CA 9.9 10.0 10.7* SGOT 425* 600* 609* AP 96 83 81 TP 10.8* 10.8* 10.4* ALB 1.9* 1.8* 1.7*  
GLOB 8.9* 9.0* 8.7* AGRAT 0.2* 0.2* 0.2* MG 2.4 2.3 2.1 Imaging: 
Jorgito Harris [[id number]] Collected: 10/14/19 9598 Order Status: Completed Updated: 10/14/19 7364 Narrative:    
Renal ultrasound. CLINICAL INDICATION:  Acute on chronic renal disease PROCEDURE: Realtime grayscale color Doppler evaluation of the kidneys and 
bladder. COMPARISON: No prior similar studies available for direct comparison. FINDINGS: The right kidney is normal in size but diffusely increased in 
echogenicity measuring 12.2 cm. A 2.4 cm simple cyst is noted off the midpole. The left kidney is normal in size and diffusely increased in echogenicity 
measuring 11.5 cm. A 1.2 cm simple cyst is noted within the midpole region. There is an indeterminate echogenic focus within the renal parenchyma likely a 
small stone. There is no hydronephrosis. The bladder is unremarkable. The aorta 
is normal in caliber.   
Impression:    
IMPRESSION:  
 1. Bilateral diffuse increased renal cortical echogenicity can be seen with 
medical renal disease. 2. Indeterminate calcifications in the left renal parenchyma. Nephrolithiasis 
favored. 3. No hydronephrosis. 4. Bilateral simple renal cysts. IR BX BONE MARROW DIAGNOSTIC [[id number]] Order Status: No result XR BONE SURVEY LTD (METS) [[id number]] Collected: 10/13/19 1121 Order Status: Completed Updated: 10/13/19 1127 Narrative:    
History: Low back, lateral rib, and left posterior shoulder pain EXAM: Metastatic bone survey FINDINGS: Innumerable lytic lesions are present throughout the skull, including 
a large lesion within the occipital portion of the calvarium measuring 5 cm. Degenerative change of the cervical, thoracic, and lumbar spine noted without 
additional lytic foci. The included lungs are clear. Multiple lytic lesions seen 
within the left humerus. No definite lytic foci within the femoral bones are 
within the pelvis, though evaluation the pelvis is limited due to overlying 
bowel gas. Impression:    
IMPRESSION: 
 
Innumerable lytic foci within the skull as well as within the left humerus. Findings suggestive of multiple myeloma or metastatic disease. XR CHEST PA LAT [[id number]] Collected: 10/12/19 2328 Order Status: Completed Updated: 10/12/19 2330 Narrative:    
EXAM: Chest x-ray. INDICATION: Cough. COMPARISON: None. TECHNIQUE: Frontal and lateral view chest x-ray. FINDINGS: The lungs are clear. The cardiac size, mediastinal contour and 
pulmonary vasculature are normal. No pneumothorax or pleural effusion is seen. Impression:    
IMPRESSION: No acute process. CT HEAD WITHOUT CONTRAST [[id number]] Collected: 10/12/19 2323 Order Status: Completed Updated: 10/12/19 2330 Narrative:    
EXAM: Noncontrast CT head. INDICATION: Dizziness. COMPARISON: None. TECHNIQUE: Axial noncontrast CT images of the head were obtained.  Radiation dose reduction techniques were used for this study.  Our CT scanners use one or 
all of the following:  Automated exposure control, adjustment of the mA and/or 
kV according to patient size, iterative reconstruction. FINDINGS: There is a 3.3 x 2.9 cm lytic soft tissue mass in the left occipital 
bone, which minimally indents the underlying left occipital lobe. There is no 
midline shift or significant mass effect. There are also innumerable smaller 
subcentimeter round lytic lesions throughout the calvarium. Brain volume is 
appropriate for age. No acute infarct, hemorrhage or evidence of hydrocephalus 
is seen. The basal cisterns are preserved. The visualized paranasal sinuses and 
mastoid air cells are clear. There has been bilateral eye surgery. Impression:    
IMPRESSION:  
1. 3.3 cm lytic soft tissue mass in the left occipital bone, minimally indenting 
the underlying left occipital lobe. In addition, there are innumerable 
subcentimeter lytic lesions throughout the remainder of the calvarium. These 
could relate to metastatic disease or multiple myeloma. 2. No acute infarct or hemorrhage. Medications: 
Current Facility-Administered Medications Medication Dose Route Frequency  lactulose (CHRONULAC) 10 gram/15 mL solution 30 mL  20 g Oral TID  insulin glargine (LANTUS) injection 5 Units  5 Units SubCUTAneous QHS  insulin lispro (HUMALOG) injection   SubCUTAneous Q6H  
 0.9% sodium chloride infusion 250 mL  250 mL IntraVENous PRN  
 dilTIAZem (CARDIZEM) IR tablet 60 mg  60 mg Oral AC&HS  hydrALAZINE (APRESOLINE) 20 mg/mL injection 10 mg  10 mg IntraVENous Q6H PRN  
 0.9% sodium chloride infusion 250 mL  250 mL IntraVENous PRN  
 famotidine (PF) (PEPCID) 20 mg in sodium chloride 0.9% 10 mL injection  20 mg IntraVENous DAILY  0.9% sodium chloride infusion 250 mL  250 mL IntraVENous PRN  
 0.9% sodium chloride infusion 250 mL  250 mL IntraVENous PRN  
  cefTRIAXone (ROCEPHIN) 1 g in 0.9% sodium chloride (MBP/ADV) 50 mL  1 g IntraVENous Q24H  
 azithromycin (ZITHROMAX) 500 mg in 0.9% sodium chloride (MBP/ADV) 250 mL  500 mg IntraVENous Q24H  
 allopurinol (ZYLOPRIM) tablet 50 mg  50 mg Oral DAILY  sodium chloride (NS) flush 5-40 mL  5-40 mL IntraVENous Q8H  
 sodium chloride (NS) flush 5-40 mL  5-40 mL IntraVENous PRN  
 acetaminophen (TYLENOL) tablet 650 mg  650 mg Oral Q4H PRN  
 ondansetron (ZOFRAN) injection 4 mg  4 mg IntraVENous Q4H PRN  
 diphenhydrAMINE (BENADRYL) capsule 25 mg  25 mg Oral Q6H PRN  
 
 
 
ASSESSMENT: 
 
Problem List  Never Reviewed Codes Class Noted Jaundice ICD-10-CM: R17 
ICD-9-CM: 782.4  10/19/2019 Acquired hemolytic anemia (HCC) ICD-10-CM: D59.9 ICD-9-CM: 283.9  10/19/2019 Methemoglobinemia ICD-10-CM: D74.9 ICD-9-CM: 289.7  10/17/2019 Acute metabolic encephalopathy GJN-51-GD: G93.41 
ICD-9-CM: 348.31  10/16/2019 Acute respiratory failure with hypoxia [facility]) ICD-10-CM: J96.01 
ICD-9-CM: 518.81  10/15/2019 Pulmonary hypertension (HCC) (Chronic) ICD-10-CM: I27.20 ICD-9-CM: 416.8  10/15/2019 Hyperproteinemia- with likely hyperviscosity ICD-10-CM: E88.09 
ICD-9-CM: 273.8  10/15/2019 Diabetes mellitus (HCC) (Chronic) ICD-10-CM: E11.9 ICD-9-CM: 250.00  10/13/2019 Essential hypertension (Chronic) ICD-10-CM: I10 
ICD-9-CM: 401.9  10/13/2019 Overview Signed 10/13/2019  1:04 AM by Lizz Fernandez MD  
  Last Assessment & Plan: Hypertension is unchanged. Continue current treatment regimen. Blood pressure will be reassessed at the next regular appointment. Paroxysmal atrial fibrillation (HCC) (Chronic) ICD-10-CM: I48.0 ICD-9-CM: 427.31  10/13/2019 Overview Signed 10/13/2019  1:04 AM by Lizz Fernandez MD  
  Last Assessment & Plan: He has had no recurrences. I still think he remains a bleeding risk.  I would like to hold off of [address] for now--and check again on him in 3 months. Continue dilt--but change to 360 mg tabs. * (Principal) Hypercalcemia ICD-10-CM: D99.41 
ICD-9-CM: 275.42  10/12/2019 Anemia ICD-10-CM: D64.9 ICD-9-CM: 285.9  10/12/2019 MARCELA (acute kidney injury) ([facility] [address] ICD-10-CM: N17.9 ICD-9-CM: 584.9  10/12/2019 Mr. Nette Whitley is a 75 y.o. male admitted on 10/12/2019 with a primary diagnosis of hypercalcemia and possible metastatic malignancy/myeloma. Mr. Nette Whitley is a gentleman who has received no formal medical care over the past several years. He stated that he had no primary care physician. Lab data from [facility] in 2017 show a creatinine that evelyn as high as 6.1 and was 2.80 at discharge. His chronic baseline is unknown. As noted, there is a history of diabetes mellitus. For two-three months leading to this admission, he has gradually deteriorated with low back discomfort, forgetfulness, anorexia and 35 pound weight loss. He has noted a growth on the back of his head. He was finally prevailed upon to come to the hospital for evaluation. Data are listed below but he was found to be in acute renal failure, hypercalcemic, anemic and with multiple lytic lesions involving his calvarium including a 3 cm destructive lesion involving the left occipital bone. PLAN: 
Concern for myeloma - We will initiate workup for myeloma based on strong presumption and if negative will pursue other alternatives. - Check skeletal survey, SPEP, serum free light chains, beta 2 microglobulin , LDH, urine IEP, marrow biopsy 10/14 Skeletal survey with innumerable lytic foci w/i the skull and L humerus. SPEP/UPEP/FLC/Beta 2 pending. Awaiting BMbx. 10/15 Kappa FLC 13k. Awaiting BMbx. 
10/16 SPEP with m-spike 4.89. Not stable enough for BMbx at this time. Will go ahead with pulse dose steroids - Dex 40mg IV x 4 days.   Had code status discussion, wishes to remain full code, but wife states he would not want to be on vent for an extended period of time. 10/18 On pulse dose steroids, D3 of 4.  
10/19 D4/ 4 pulse dex. 10/20 s/p 4 days of pulse dex Hypercalcemia - Treatment of his hypercalcemia may be problematic with elevated creatinine and elevated BNP. Continue fluids and I have added calcitonin. Use Lasix to balance I/O's. Hold of on Zometa for now given creatinine. 10/14 CCa++ down to 12.2. Con't IVF. 10/15 CCa++ 13.1. Con't calcitonin. 10/16 CCa++ up to 14. 
10/18 CCa++ down to 12.5 
10/19 CCa ++ down to 11.8  
10/20 CCa ++ down to 11.6 MARCELA 
- Nephrology should be on board to manage what is likely acute kidney injury from ? myeloma, hypercalcemia, etc. Superimposed on CRF. Hopefully this will reverse but dialysis could be an equally potential outcome. 10/14 Cr 4.6. Renal US c/w medical renal disease and possible L nephrolithiasis. Neph consult pending. 10/16 Cr 5.01. Neph following. 10/17 Started dialysis yesterday. Cr 4.72 
10/18 Cr 5.07. Continues on dialysis. 10/19 Cr. 4.06 today. Dialysis today. 10/20 Cr 4.13 today, continues on dialysis. Hyperuricemia / TLS 
10/14 Uric acid 12.9. Rasburicase and renally dosed allopurinol ordered. 10/15 Uric acid down to 1.3 
10/20 uric acid 3.6 today Dyspnea / hypoxia 10/15 On Optiflow. CXR/CT chest pending. Pulm following. On Azith/Roland. On empiric heparin gtt. . Echo with dilated RV and pulm HTN. 
10/16 CXR with volume overload. Plans for VQ scan when more stable. Pulm following. 10/17 Transferred to ICU for resp distress, now on BiPAP. On Azith/Rocephin. 10/18 on Hiflow O2. On Azith/Rocephin. 10/19 on nasal cannula. Continues Azith/Rocephin  
10/20 still on nasal cannula/azith/rocephin. Anemia 10/16 Transfuse 1 unit PRBCs 
10/17 Hgb 6.7 s/p 2 units. Check hemolysis labs. PBRCs ordered. 10/18 Hgb 7.2 s/p tx. OK to give PRBCs today with dialysis. Hemolytic anemia noted. Hapto <8. Check ricardo. 10/19 Hgb up to 8.1 today. Ricardo neg. 10/20 Hgb stable at 8.4 today. Hyperbilirubinemia 10/17 Bili up to 10.9. Check hemolysis labs inc frac bili. 10/18 Bili 16.6. Frac bili mostly direct. RUQ US pending. 10/19 Bili up to 20. ALT worse today at 210,  down from 609. US showed gallbladder wall thickening, hepatomegaly which has worsened since 2017, and right renal parenchymal hyperechogenicity. Ammonia elevated this AM at 32- GI added lactulose 10/29 Bilirubin up to 21.5 today. Goals and plan of care reviewed with the family. All questions answered to the best of our ability. Thank you for allowing us to participate in the care of Mr. Diya White. Poor prognosis. Justa Traore NP [facility] Hematology & Oncology [address] [place] [address] Office : [phone] Fax : [phone]

## 2019-10-20 NOTE — PROGRESS NOTES
Patient admitted to floor oriented to room and call light system. Dual skin assessment completed with Roney Salmon rn. Skin intact. Patient with incomprehensible words. Oozing bloody drainage at dialysis catheter. Dressing changed using sterile technique.

## 2019-10-20 NOTE — PROGRESS NOTES
[facility] PROGRESS NOTE Follow up for: Dorys Obando Subjective:  
 
Patient seen and examined. Chart, notes, labs, imaging, results all reviewed. On NC oxygen 
icterus seen S/p 4 sessions of hd in a row Hemodynamically stable - Transferring to floor today ROS: 
UTO Objective:  
Exam: 
Vitals:  
 10/20/19 0631 10/20/19 6687 10/20/19 2752 10/20/19 5210 BP: 135/73 148/83 166/87 Pulse: 77 75 75 Resp: 20 19 20 Temp:   97.7 °F (36.5 °C) SpO2: 100% 100% 98% 100% Weight:      
Height:      
 
 
 
Intake/Output Summary (Last 24 hours) at 10/20/2019 0197 Last data filed at 10/20/2019 6531 Gross per 24 hour Intake 400 ml Output 1828 ml Net -1428 ml Current Facility-Administered Medications Medication Dose Route Frequency  lactulose (CHRONULAC) 10 gram/15 mL solution 30 mL  20 g Oral TID  insulin lispro (HUMALOG) injection   SubCUTAneous Q6H  
 0.9% sodium chloride infusion 250 mL  250 mL IntraVENous PRN  
 dilTIAZem (CARDIZEM) IR tablet 60 mg  60 mg Oral AC&HS  hydrALAZINE (APRESOLINE) 20 mg/mL injection 10 mg  10 mg IntraVENous Q6H PRN  
 0.9% sodium chloride infusion 250 mL  250 mL IntraVENous PRN  
 famotidine (PF) (PEPCID) 20 mg in sodium chloride 0.9% 10 mL injection  20 mg IntraVENous DAILY  0.9% sodium chloride infusion 250 mL  250 mL IntraVENous PRN  
 0.9% sodium chloride infusion 250 mL  250 mL IntraVENous PRN  
 cefTRIAXone (ROCEPHIN) 1 g in 0.9% sodium chloride (MBP/ADV) 50 mL  1 g IntraVENous Q24H  
 azithromycin (ZITHROMAX) 500 mg in 0.9% sodium chloride (MBP/ADV) 250 mL  500 mg IntraVENous Q24H  
 allopurinol (ZYLOPRIM) tablet 50 mg  50 mg Oral DAILY  sodium chloride (NS) flush 5-40 mL  5-40 mL IntraVENous Q8H  
 sodium chloride (NS) flush 5-40 mL  5-40 mL IntraVENous PRN  
 acetaminophen (TYLENOL) tablet 650 mg  650 mg Oral Q4H PRN  
 ondansetron (ZOFRAN) injection 4 mg  4 mg IntraVENous Q4H PRN  
  diphenhydrAMINE (BENADRYL) capsule 25 mg  25 mg Oral Q6H PRN  
 
 
EXAM 
GEN - on  oxygen CV - S1, S2, RRR, no rub, murmur, or gallop Lung - b/l crackles present Abd - soft, nontender, BS present Ext - no edema Recent Labs 10/20/19 
0448 10/19/19 
0400 10/18/19 
0320 WBC 10.6 11.3* 13.4* HGB 8.4* 8.1* 7.2* HCT 24.2* 23.3* 20.8* * 142* 147* Recent Labs 10/20/19 
0448 10/19/19 
0400 10/18/19 
0320 * 132* 133* K 3.8 3.7 4.5  
CL 93* 93* 96* CO2 27 28 26 BUN 61* 65* 70* CREA 4.13* 4.06* 5.07* CA 9.9 10.0 10.7* * 239* 230* MG 2.4 2.3 2.1 Assessment and Plan:  
 
1. ? 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 Hypercalcemia Better with dialysis 3. Mild Hyponatremia Should resolve with hd  
 
4. Hyperuricemia Received rasburicase 5. Methemoglobinemia - received methylene blue 6. Increased LFT Seen by GI On lactulose for elevated ammonia Explained POC to his wife bedside Anne Bills MD

## 2019-10-21 NOTE — PROGRESS NOTES
[facility] PROGRESS NOTE Follow up for: MARCELA Subjective:  
 
Patient seen and examined. Chart, notes, labs, imaging, results all reviewed. Seen on dialysis. Goal UF 3.4 kg. Tolerating well. Remains confused. Trying to sit up on dialysis. /52, HR 50. ROS: 
UTO due to confusion Objective:  
Exam: 
Vitals:  
 10/21/19 7537 10/21/19 0831 10/21/19 8031 10/21/19 7353 BP: 112/81 124/57 95/89 114/52 Pulse: 61 76 (!) 51 (!) 50 Resp:      
Temp:      
SpO2:      
Weight:      
Height:      
 
 
 
Intake/Output Summary (Last 24 hours) at 10/21/2019 8382 Last data filed at 10/20/2019 1147 Gross per 24 hour Intake 237 ml Output 1 ml Net 236 ml  
 
 
Current Facility-Administered Medications Medication Dose Route Frequency  insulin lispro (HUMALOG) injection   SubCUTAneous AC&HS  insulin glargine (LANTUS) injection 10 Units  10 Units SubCUTAneous QHS  tuberculin injection 5 Units  5 Units IntraDERMal ONCE  
 lactulose (CHRONULAC) 10 gram/15 mL solution 30 mL  20 g Oral TID  
 0.9% sodium chloride infusion 250 mL  250 mL IntraVENous PRN  
 dilTIAZem (CARDIZEM) IR tablet 60 mg  60 mg Oral AC&HS  hydrALAZINE (APRESOLINE) 20 mg/mL injection 10 mg  10 mg IntraVENous Q6H PRN  
 0.9% sodium chloride infusion 250 mL  250 mL IntraVENous PRN  
 famotidine (PF) (PEPCID) 20 mg in sodium chloride 0.9% 10 mL injection  20 mg IntraVENous DAILY  0.9% sodium chloride infusion 250 mL  250 mL IntraVENous PRN  
 0.9% sodium chloride infusion 250 mL  250 mL IntraVENous PRN  
 cefTRIAXone (ROCEPHIN) 1 g in 0.9% sodium chloride (MBP/ADV) 50 mL  1 g IntraVENous Q24H  
 azithromycin (ZITHROMAX) 500 mg in 0.9% sodium chloride (MBP/ADV) 250 mL  500 mg IntraVENous Q24H  
 allopurinol (ZYLOPRIM) tablet 50 mg  50 mg Oral DAILY  sodium chloride (NS) flush 5-40 mL  5-40 mL IntraVENous Q8H  
 sodium chloride (NS) flush 5-40 mL  5-40 mL IntraVENous PRN  
  acetaminophen (TYLENOL) tablet 650 mg  650 mg Oral Q4H PRN  
 ondansetron (ZOFRAN) injection 4 mg  4 mg IntraVENous Q4H PRN  
 diphenhydrAMINE (BENADRYL) capsule 25 mg  25 mg Oral Q6H PRN  
 
 
EXAM 
GEN - on oxygen, confused, no distress HEENT - sclera with icterus CV - S1, S2, RRR, no rub, murmur, or gallop Lung - CTA bilaterally Abd - soft, nontender, BS present Ext - no edema Recent Labs 10/21/19 
0631 10/20/19 
0448 10/19/19 
0400 WBC 11.3* 10.6 11.3* HGB 8.3* 8.4* 8.1* HCT 24.6* 24.2* 23.3*  
* 125* 142* Recent Labs 10/21/19 
0631 10/20/19 
0448 10/19/19 
0400 * 133* 132* K 3.6 3.8 3.7 CL 95* 93* 93* CO2 24 27 28 BUN 99* 61* 65* CREA 6.60* 4.13* 4.06* CA 9.8 9.9 10.0 * 269* 239* MG 2.9* 2.4 2.3 Assessment and Plan:  
 
? 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 M-spike. Skeltal survey with innumerable lytic foci in the skull and left humerus, CT head with multiple lytic lesions involving the calvarium including a 3 cm destructive lesion involving the left occipital bone - S/p 4 HD sessions in a row last week 
- HD today for clearance and volume 
- Started on decadron per oncology Likely Multiple Myeloma 
- on pulse dose decadron 
- bone marrow biopsy still needs to be done - held until patient stable for procedure 
- heme/onc following Hypercalcemia - Better with dialysis Mild Hyponatremia 
- improving with hd Hyperuricemia  
- Received rasburicase Methemoglobinemia 
- s/p methylene blue 10/16 Abnormal LFTs 
- progressive jaundice of unclear etiology - Seen by GI - felt multifactorial. No evidence of obstruction. Not much to offer - On lactulose for elevated ammonia Anemia and thrombocytopenia 
- transfusions per oncology 
- hemolysis workup positive SENIA Robbins

## 2019-10-21 NOTE — DIALYSIS
TRANSFER IN - DIALYSIS Received patient in dialysis unit  from [facility] (unit) for ordered procedure. Consent verified for renal replacement therapy. Patient responds to voice and touch and vital signs stable. /72 P70 Hemodialysis initiated using right I J catheter. Aspirated and flushed both ports without difficulty. Dressing clean, dry and intact. Machine settings per MD order. Will monitor during treatment.

## 2019-10-21 NOTE — DIALYSIS
TRANSFER OUT- DIALYSIS Hemodialysis treatment completed without complications. Patient alert and VS stable  /47  P 72 2.0 Kgs removed. Flushed both ports with 10 mL of NS.  CVC dressing clean, dry, and intact, tego caps intact, bilateral lumens wrapped with 4x4 gauze. Patient to [id number] after dialysis.

## 2019-10-21 NOTE — PROGRESS NOTES
Problem: Dysphagia (Adult) Goal: *Speech Goal: (INSERT TEXT) Description LTG: Patient will tolerate least restrictive diet without overt signs or symptoms of airway compromise. STG: Patient will tolerate puree diet and thin liquids without overt signs or symptoms of airway compromise. STG: Patient will participate po trials of chewable textures for possible diet upgrade. Outcome: Progressing Towards Goal 
 
SPEECH LANGUAGE PATHOLOGY: DYSPHAGIA- Daily Note 1 NAME/AGE/GENDER: Terell Calhoun is a 75 y.o. male DATE: 10/21/2019 PRIMARY DIAGNOSIS: Hypercalcemia [E83.52] Anemia [D64.9] MARCELA (acute kidney injury) ([facility] [address] [N17.9] ICD-10: Treatment Diagnosis: R13.11 Dysphagia, Oral Phase INTERDISCIPLINARY COLLABORATION: Registered Nurse and Physician PRECAUTIONS/ALLERGIES: Patient has no known allergies. SUBJECTIVE Patient very drowsy and requiring moderate verbal and tactile stim to stay awake for po intake. Minimal amount of noontime meal accepted before he declined additional trials. Wife at bedside Orientation:  
Person Place Pain: Pain Scale 1: Numeric (0 - 10) Pain Intensity 1: 0 OBJECTIVE Patient seen for diet tolerance. Moderate verbal and tactile stim for patient to stay awake during trials. Slowed oral prep with puree texture. Mild oral reside after the swallow which cleared with liquid wash. He consumed serial sips of thin liquids by straw without overt s/sx of airway compromise. ASSESSMENT Patient's level of alertness impacting his ability to meet nutritional needs orally. No s/sx of airway compromise with puree/thin liquids, but minimal number of trials accepted. Unable to trials chewable textures due to drowsiness and patient declining additional trials. Recommend continued puree diet/thin liquids. Medications crushed in puree. Only provide po intake when fully awake and alert. Anticipate poor po intake given current level of alertness. Tool Used: Dysphagia Outcome and Severity Scale (EVA) Score Comments Normal Diet  [] 7 With no strategies or extra time needed Functional Swallow  [] 6 May have mild oral or pharyngeal delay Mild Dysphagia  [] 5 Which may require one diet consistency restricted Mild-Moderate Dysphagia  [] 4 With 1-2 diet consistencies restricted Moderate Dysphagia  [x] 3 With 2 or more diet consistencies restricted Moderate-Severe Dysphagia  [] 2 With partial PO strategies (trials with ST only) Severe Dysphagia  [] 1 With inability to tolerate any PO safely Score:  Initial: 3 Most Recent: 3 (Date 10/21/19 ) Interpretation of Tool: The Dysphagia Outcome and Severity Scale (EVA) is a simple, easy-to-use, 7-point scale developed to systematically rate the functional severity of dysphagia based on objective assessment and make recommendations for diet level, independence level, and type of nutrition. PLAN   
FREQUENCY/DURATION: Continue to follow patient 3 times a week for duration of hospital stay to address above goals. - Recommendations for next treatment session: Next treatment will address diet tolerance, po trials for possible advancement REHABILITATION POTENTIAL FOR STATED GOALS: Good COMPLIANCE WITH PROGRAM/EXERCISES: Will assess as treatment progresses CONTINUATION OF SKILLED SERVICES/MEDICAL NECESSITY: 
? Patient is expected to demonstrate progress in  swallow function and diet tolerance in order to  improve swallow safety, work toward diet advancement and decrease aspiration risk. ? Patient continues to require skilled intervention due to dysphagia. RECOMMENDATIONS  
DIET:  
? PO:  Pureed ? Liquids:  regular thin MEDICATIONS: Crushed in puree ASPIRATION PRECAUTIONS · Slow rate of intake · Small bites/sips · Upright at 90 degrees during meal 
  
COMPENSATORY STRATEGIES/MODIFICATIONS · None · Only feed when fully awake and alert EDUCATION: 
 · Recommendations discussed with Nursing · Family · Patient RECOMMENDATIONS for CONTINUED SPEECH THERAPY: YES: Anticipate need for ongoing speech therapy during this hospitalization and at next level of care. SAFETY: 
After treatment position/precautions: · Upright in bed · RN notified · Wife at bedside Total Treatment Duration:  
Time In: 4589 Time Out: 2042 [address] CCC-SLP

## 2019-10-21 NOTE — PROGRESS NOTES
Pt is on remote tele running Afib with PVCs per monitor room. Hourly rounds completed on patient. All needs are met. No complaints at this time. Bed locked in and in low position. Call light within reach. Will report to oncoming nurse at bedside.

## 2019-10-21 NOTE — PROGRESS NOTES
Rn notified that patient has been placed on a cont O2 moniitor that does NOT have remote telemetry. Patient will have to be monitored. Audible alarrms have been set. ABG drawn and resulted to SUBHA Acosta.

## 2019-10-21 NOTE — PROGRESS NOTES
Interdisciplinary Rounds completed 10/21/19. Nursing, Case Management, Physician and PT present. Plan of care reviewed and updated. Nephrology, pulmonary, GI and palliative care following.

## 2019-10-21 NOTE — PROGRESS NOTES
[facility] Inpatient Hematology / Oncology Progress Note Admission Date: 10/12/2019  9:36 PM 
Reason for Admission/Hospital Course: Hypercalcemia [E83.52] Anemia [D64.9] MARCELA (acute kidney injury) ([facility] [address] [N17.9] 24 Hour Events: 
Afebrile, hypertensive at times, on nasal cannula Kappa FLC 13k SPEP with m-spike 4.89 Not stable enough for BMbx at this time S/P 4 days of pulse dex On dialysis Bili up to 21.0 Wife at bedside ROS: 
Unable to assess - pt somnolent 10 point review of systems is otherwise negative with the exception of the elements mentioned above in the HPI. No Known Allergies OBJECTIVE: 
Patient Vitals for the past 8 hrs: 
 BP Pulse 10/21/19 1125 101/48 (!) 48  
10/21/19 1058 110/59 (!) 55  
10/21/19 1034 98/52 66  
10/21/19 1029 91/79 (!) 53  
10/21/19 1025 105/64 (!) 57  
10/21/19 0959 95/58 (!) 48  
10/21/19 0929 114/52 (!) 50  
10/21/19 0859 95/89 (!) 51  
10/21/19 0831 124/57 76  
10/21/19 0804 112/81 61  
10/21/19 0731 125/73 (!) 57 Temp (24hrs), Av.4 °F (36.3 °C), Min:97.3 °F (36.3 °C), Max:97.5 °F (36.4 °C) 
 
10/21 0701 - 10/21 1900 In: -  
Out:  Physical Exam: 
Constitutional: Ill-appearing elderly male in no acute distress, lying comfortably in the hospital bed. HEENT: Normocephalic and atraumatic. Oropharynx is clear, mucous membranes are moist. Sclera with icterus. Strabismus Skin Warm and dry. No bruising and no rash noted. No erythema. No pallor. Respiratory Lungs are clear to auscultation bilaterally. O2.   
CVS Normal rate, irregular rhythm and normal S1 and S2. No murmurs, gallops, or rubs. Abdomen Soft, nontender and nondistended, normoactive bowel sounds. No palpable mass. No hepatosplenomegaly. Neuro Grossly nonfocal with no obvious sensory or motor deficits. MSK Normal range of motion in general.  No edema and no tenderness. Psych Somnolent Labs: 
   
Recent Labs 10/21/19 
0631 10/20/19 
0448 10/19/19 
0400 WBC 11.3* 10.6 11.3*  
RBC 2.69* 2.73* 2.64* HGB 8.3* 8.4* 8.1* HCT 24.6* 24.2* 23.3*  
MCV 91.4 88.6 88.3 MCH 30.9 30.8 30.7 MCHC 33.7 34.7 34.8  
RDW 17.1* 16.3* 16.2*  
* 125* 142* GRANS 85* 84* 79* LYMPH 5* 7* 10* MONOS 5 6 6 EOS 3 0* 0*  
BASOS 0 0 0 IG 2 3 5 DF MANUAL AUTOMATED AUTOMATED ANEU 9.6* 9.0* 8.9* ABL 0.6 0.7 1.1 ABM 0.6 0.6 0.7 CHAVA 0.3 0.0 0.0 ABB 0.0 0.0 0.0 AIG 0.2 0.3 0.6* Recent Labs 10/21/19 
0631 10/20/19 
0448 10/19/19 
0400 * 133* 132* K 3.6 3.8 3.7 CL 95* 93* 93* CO2 24 27 28 AGAP 16 13 11 * 269* 239* BUN 99* 61* 65* CREA 6.60* 4.13* 4.06* GFRAA 11* 18* 19* GFRNA 9* 15* 15* CA 9.8 9.9 10.0 SGOT 289* 425* 600*  96 83 TP 10.4* 10.8* 10.8* ALB 1.9* 1.9* 1.8*  
GLOB 8.5* 8.9* 9.0* AGRAT 0.2* 0.2* 0.2* MG 2.9* 2.4 2.3 Imaging: 
Carmella Jacobson [[id number]] Collected: 10/14/19 0971 Order Status: Completed Updated: 10/14/19 9936 Narrative:    
Renal ultrasound. CLINICAL INDICATION:  Acute on chronic renal disease PROCEDURE: Realtime grayscale color Doppler evaluation of the kidneys and 
bladder. COMPARISON: No prior similar studies available for direct comparison. FINDINGS: The right kidney is normal in size but diffusely increased in 
echogenicity measuring 12.2 cm. A 2.4 cm simple cyst is noted off the midpole. The left kidney is normal in size and diffusely increased in echogenicity 
measuring 11.5 cm. A 1.2 cm simple cyst is noted within the midpole region. There is an indeterminate echogenic focus within the renal parenchyma likely a 
small stone. There is no hydronephrosis. The bladder is unremarkable. The aorta 
is normal in caliber. Impression:    
IMPRESSION:  
1. Bilateral diffuse increased renal cortical echogenicity can be seen with 
medical renal disease. 2. Indeterminate calcifications in the left renal parenchyma. Nephrolithiasis 
favored. 3. No hydronephrosis. 4. Bilateral simple renal cysts. IR BX BONE MARROW DIAGNOSTIC [[id number]] Order Status: No result XR BONE SURVEY LTD (METS) [[id number]] Collected: 10/13/19 1121 Order Status: Completed Updated: 10/13/19 1127 Narrative:    
History: Low back, lateral rib, and left posterior shoulder pain EXAM: Metastatic bone survey FINDINGS: Innumerable lytic lesions are present throughout the skull, including 
a large lesion within the occipital portion of the calvarium measuring 5 cm. Degenerative change of the cervical, thoracic, and lumbar spine noted without 
additional lytic foci. The included lungs are clear. Multiple lytic lesions seen 
within the left humerus. No definite lytic foci within the femoral bones are 
within the pelvis, though evaluation the pelvis is limited due to overlying 
bowel gas. Impression:    
IMPRESSION: 
 
Innumerable lytic foci within the skull as well as within the left humerus. Findings suggestive of multiple myeloma or metastatic disease. XR CHEST PA LAT [[id number]] Collected: 10/12/19 2328 Order Status: Completed Updated: 10/12/19 2330 Narrative:    
EXAM: Chest x-ray. INDICATION: Cough. COMPARISON: None. TECHNIQUE: Frontal and lateral view chest x-ray. FINDINGS: The lungs are clear. The cardiac size, mediastinal contour and 
pulmonary vasculature are normal. No pneumothorax or pleural effusion is seen. Impression:    
IMPRESSION: No acute process. CT HEAD WITHOUT CONTRAST [[id number]] Collected: 10/12/19 2323 Order Status: Completed Updated: 10/12/19 2330 Narrative:    
EXAM: Noncontrast CT head. INDICATION: Dizziness. COMPARISON: None. TECHNIQUE: Axial noncontrast CT images of the head were obtained.  Radiation 
dose reduction techniques were used for this study.  Our CT scanners use one or all of the following:  Automated exposure control, adjustment of the mA and/or 
kV according to patient size, iterative reconstruction. FINDINGS: There is a 3.3 x 2.9 cm lytic soft tissue mass in the left occipital 
bone, which minimally indents the underlying left occipital lobe. There is no 
midline shift or significant mass effect. There are also innumerable smaller 
subcentimeter round lytic lesions throughout the calvarium. Brain volume is 
appropriate for age. No acute infarct, hemorrhage or evidence of hydrocephalus 
is seen. The basal cisterns are preserved. The visualized paranasal sinuses and 
mastoid air cells are clear. There has been bilateral eye surgery. Impression:    
IMPRESSION:  
1. 3.3 cm lytic soft tissue mass in the left occipital bone, minimally indenting 
the underlying left occipital lobe. In addition, there are innumerable 
subcentimeter lytic lesions throughout the remainder of the calvarium. These 
could relate to metastatic disease or multiple myeloma. 2. No acute infarct or hemorrhage. Medications: 
Current Facility-Administered Medications Medication Dose Route Frequency  insulin lispro (HUMALOG) injection   SubCUTAneous AC&HS  insulin glargine (LANTUS) injection 10 Units  10 Units SubCUTAneous QHS  tuberculin injection 5 Units  5 Units IntraDERMal ONCE  
 lactulose (CHRONULAC) 10 gram/15 mL solution 30 mL  20 g Oral TID  
 0.9% sodium chloride infusion 250 mL  250 mL IntraVENous PRN  
 dilTIAZem (CARDIZEM) IR tablet 60 mg  60 mg Oral AC&HS  hydrALAZINE (APRESOLINE) 20 mg/mL injection 10 mg  10 mg IntraVENous Q6H PRN  
 0.9% sodium chloride infusion 250 mL  250 mL IntraVENous PRN  
 famotidine (PF) (PEPCID) 20 mg in sodium chloride 0.9% 10 mL injection  20 mg IntraVENous DAILY  0.9% sodium chloride infusion 250 mL  250 mL IntraVENous PRN  
 0.9% sodium chloride infusion 250 mL  250 mL IntraVENous PRN  
  allopurinol (ZYLOPRIM) tablet 50 mg  50 mg Oral DAILY  sodium chloride (NS) flush 5-40 mL  5-40 mL IntraVENous Q8H  
 sodium chloride (NS) flush 5-40 mL  5-40 mL IntraVENous PRN  
 acetaminophen (TYLENOL) tablet 650 mg  650 mg Oral Q4H PRN  
 ondansetron (ZOFRAN) injection 4 mg  4 mg IntraVENous Q4H PRN  
 diphenhydrAMINE (BENADRYL) capsule 25 mg  25 mg Oral Q6H PRN  
 
 
 
ASSESSMENT: 
 
Problem List  Never Reviewed Codes Class Noted Jaundice ICD-10-CM: R17 
ICD-9-CM: 782.4  10/19/2019 Acquired hemolytic anemia (HCC) ICD-10-CM: D59.9 ICD-9-CM: 283.9  10/19/2019 Methemoglobinemia ICD-10-CM: D74.9 ICD-9-CM: 289.7  10/17/2019 Acute metabolic encephalopathy SNY-93-DO: G93.41 
ICD-9-CM: 348.31  10/16/2019 Acute respiratory failure with hypoxia [facility]) ICD-10-CM: J96.01 
ICD-9-CM: 518.81  10/15/2019 Pulmonary hypertension (HCC) (Chronic) ICD-10-CM: I27.20 ICD-9-CM: 416.8  10/15/2019 Hyperproteinemia- with likely hyperviscosity ICD-10-CM: E88.09 
ICD-9-CM: 273.8  10/15/2019 Diabetes mellitus (HCC) (Chronic) ICD-10-CM: E11.9 ICD-9-CM: 250.00  10/13/2019 Essential hypertension (Chronic) ICD-10-CM: I10 
ICD-9-CM: 401.9  10/13/2019 Overview Signed 10/13/2019  1:04 AM by Zack Marroquin MD  
  Last Assessment & Plan: Hypertension is unchanged. Continue current treatment regimen. Blood pressure will be reassessed at the next regular appointment. Paroxysmal atrial fibrillation (HCC) (Chronic) ICD-10-CM: I48.0 ICD-9-CM: 427.31  10/13/2019 Overview Signed 10/13/2019  1:04 AM by Zack Marroquin MD  
  Last Assessment & Plan: He has had no recurrences. I still think he remains a bleeding risk. I would like to hold off of [address] for now--and check again on him in 3 months. Continue dilt--but change to 360 mg tabs. * (Principal) Hypercalcemia ICD-10-CM: I99.87 
ICD-9-CM: 275.42  10/12/2019 Anemia ICD-10-CM: D64.9 ICD-9-CM: 285.9  10/12/2019 MARCELA (acute kidney injury) ([facility] [address] ICD-10-CM: N17.9 ICD-9-CM: 584.9  10/12/2019 Mr. Brinda Kinsey is a 75 y.o. male admitted on 10/12/2019 with a primary diagnosis of hypercalcemia and possible metastatic malignancy/myeloma. Mr. Brinda Kinsey is a gentleman who has received no formal medical care over the past several years. He stated that he had no primary care physician. Lab data from [facility] in 2017 show a creatinine that evelyn as high as 6.1 and was 2.80 at discharge. His chronic baseline is unknown. As noted, there is a history of diabetes mellitus. For two-three months leading to this admission, he has gradually deteriorated with low back discomfort, forgetfulness, anorexia and 35 pound weight loss. He has noted a growth on the back of his head. He was finally prevailed upon to come to the hospital for evaluation. Data are listed below but he was found to be in acute renal failure, hypercalcemic, anemic and with multiple lytic lesions involving his calvarium including a 3 cm destructive lesion involving the left occipital bone. PLAN: 
Concern for myeloma - We will initiate workup for myeloma based on strong presumption and if negative will pursue other alternatives. - Check skeletal survey, SPEP, serum free light chains, beta 2 microglobulin , LDH, urine IEP, marrow biopsy 10/14 Skeletal survey with innumerable lytic foci w/i the skull and L humerus. SPEP/UPEP/FLC/Beta 2 pending. Awaiting BMbx. 10/15 Kappa FLC 13k. Awaiting BMbx. 
10/16 SPEP with m-spike 4.89. Not stable enough for BMbx at this time. Will go ahead with pulse dose steroids - Dex 40mg IV x 4 days. Had code status discussion, wishes to remain full code, but wife states he would not want to be on vent for an extended period of time. 10/18 On pulse dose steroids, D3 of 4.  
10/19 D4/ 4 pulse dex. 10/20 s/p 4 days of pulse dex Hypercalcemia - Treatment of his hypercalcemia may be problematic with elevated creatinine and elevated BNP. Continue fluids and I have added calcitonin. Use Lasix to balance I/O's. Hold of on Zometa for now given creatinine. 10/14 CCa++ down to 12.2. Con't IVF. 10/15 CCa++ 13.1. Con't calcitonin. 10/16 CCa++ up to 14. 
10/18 CCa++ down to 12.5 
10/19 CCa ++ down to 11.8  
10/20 CCa ++ down to 11.6  
10/21 calcium 9.8 MARCELA 
- Nephrology should be on board to manage what is likely acute kidney injury from ? myeloma, hypercalcemia, etc. Superimposed on CRF. Hopefully this will reverse but dialysis could be an equally potential outcome. 10/14 Cr 4.6. Renal US c/w medical renal disease and possible L nephrolithiasis. Neph consult pending. 10/16 Cr 5.01. Neph following. 10/17 Started dialysis yesterday. Cr 4.72 
10/18 Cr 5.07. Continues on dialysis. 10/19 Cr. 4.06 today. Dialysis today. 10/20 Cr 4.13 today, continues on dialysis. Hyperuricemia / TLS 
10/14 Uric acid 12.9. Rasburicase and renally dosed allopurinol ordered. 10/15 Uric acid down to 1.3 
10/20 uric acid 3.6 today 10/21 uric acid 7.6. Give rasburicase. 3 mg today Dyspnea / hypoxia 10/15 On Optiflow. CXR/CT chest pending. Pulm following. On Azith/Roland. On empiric heparin gtt. . Echo with dilated RV and pulm HTN. 
10/16 CXR with volume overload. Plans for VQ scan when more stable. Pulm following. 10/17 Transferred to ICU for resp distress, now on BiPAP. On Azith/Rocephin. 10/18 on Hiflow O2. On Azith/Rocephin. 10/19 on nasal cannula. Continues Azith/Rocephin  
10/20 still on nasal cannula/azith/rocephin. Anemia 10/16 Transfuse 1 unit PRBCs 
10/17 Hgb 6.7 s/p 2 units. Check hemolysis labs. PBRCs ordered. 10/18 Hgb 7.2 s/p tx. OK to give PRBCs today with dialysis. Hemolytic anemia noted. Hapto <8. Check ricardo. 10/19 Hgb up to 8.1 today. Ricardo neg. 10/20 Hgb stable at 8.4 today. Hyperbilirubinemia 10/17 Bili up to 10.9. Check hemolysis labs inc frac bili. 10/18 Bili 16.6. Frac bili mostly direct. RUQ US pending. 10/19 Bili up to 20. ALT worse today at 210,  down from 609. US showed gallbladder wall thickening, hepatomegaly which has worsened since 2017, and right renal parenchymal hyperechogenicity. Ammonia elevated this AM at 32- GI added lactulose 10/21 Bilirubin up to 21 today. MRCP liver. GI has signed off. Goals and plan of care reviewed with the wife. Attempted to call son. We will hopefully be able to have family discussion tomorrow. All questions answered to the best of our ability. Thank you for allowing us to participate in the care of Mr. Huseyin Saab. Poor prognosis. Jania Salamanca NP [facility] [id number] [place] [address] Office : [phone] Fax : [phone] I personally saw, exammed and counselled the patient, and discussed with NP, agree with above history/assessment/plan. 75 y. o.male reportedly of good baseline PS developed weight loss and head mass and admitted with hypercalcemia and MARCELA, found bone lesions, high M spike 4.89, acute high direct bilirubinemia, elevated uric acid and received rasburicase. I discussed wit pt and wife that pt has a fatal cancer of myeloma but treatment would be of very high risk given his comorbidities macario hyperbilirubinemia, considered liver failure and GI signed off, but no evidence of significant acute liver damage, check liver MRCP to evaluate. Wife mentioned pt previously expressed desire of no aggressive rx, need to discuss with son. Will follow. Jia Rojo M.D. [place] [id number] [address] Office : [phone] Fax : [phone]

## 2019-10-21 NOTE — PROGRESS NOTES
Problem: Falls - Risk of 
Goal: *Absence of Falls Description Document Fabby Patches Fall Risk and appropriate interventions in the flowsheet. Outcome: Progressing Towards Goal 
Note:  
Fall Risk Interventions: 
Mobility Interventions: Bed/chair exit alarm, Patient to call before getting OOB, PT Consult for mobility concerns, OT consult for ADLs Mentation Interventions: Bed/chair exit alarm, Family/sitter at bedside, Increase mobility, More frequent rounding, Reorient patient, Room close to nurse's station, Toileting rounds Medication Interventions: Bed/chair exit alarm, Patient to call before getting OOB, Teach patient to arise slowly Elimination Interventions: Call light in reach, Patient to call for help with toileting needs, Stay With Me (per policy), Urinal in reach Problem: Patient Education: Go to Patient Education Activity Goal: Patient/Family Education Outcome: Progressing Towards Goal

## 2019-10-21 NOTE — PROGRESS NOTES
Nutrition follow-up: 
Initial assessment based on early LOS. Assessment: Anthropometrics: Ht - 6'0\", wgt - 97.3 kg (ICU bed 10/18/19), BMI - 29.1 c/w overweight, edema - 1+ BLEs. Macronutrient Needs (81 kg - IBW): Estimated calorie needs - 1419-2866 ada/day (30-35 ada/kgIBW/day) Estimated protein needs -  gm pro/day (1.2-1.3 gm pro/kgIBW/day) Max CHO/day - 354 gm CHO/day (50% ada/day) Fluid/day - minimal volume (renal guidelines) Intake/Comparative Standards: Only 1 meal recorded since last RD assessment: 0%. Observed noon tray, only bites of protein portion. Patient drank 100% diet Pepsi. Glucerna unopened on tray. Pertinent Labs: BUN 70, creatinine 5.07, t bili 16.6, AM glucose 230; POC glucose 856,660,131. Pertinent Medications: 
           Decadron, Zithromax, Rocephin. Diet: 
           NPO. ST evaluation today -  Continue pureed diet, thin liquids, only provide PO when patient is fully awake and alert. Food/Nutrition History: 
           75year old gentleman with a h/o diabetes and HTN admitted with severe malaise and poor po intake over the last few months resulting in a reported, but not documented 35 pound weight loss. Work up is pending for suspected Multiple Myeloma with myeloma kidney. He completed 4 days of steroids. Bone marrow needed to confirm MM. Acute kidney injury requiring HD. Liver function impaired. Palliative care has been consulted. Patient seen sleeping with wife and daughter at bedside. He did not awaken during RD visit. His wife reports that patient is taking a few bites of meals. She states he is taking in what he needs. She reports that he really wanted a diet Pepsi so her son brought him one. Patient as able tor drink 100%. Wife does not provide information on Glucerna consumption.  
  
Diagnosis (Nutrition):  
Inadequate oral intake related to decreased ability to consume sufficient oral intake as evidenced by reported weight loss PTA and currently unsafe for any substantial oral intake. 
  
Intervention: 
Meals and Snacks: Continue diet per SLP Nutrition Supplement: Continue Glucerna TID. Explained to wife and daughter to encourage intake of Glucerna when alert and awake to help meet protein and calorie needs. Explained that current PO intake is not patient's nutrition needs and Glucerna will help get closer to those needs. Anticipated poor PO to continue. Enteral Nutrition: Pending POC, consider starting a TF with Nepro @ 13 ml/hr with a 17 ml/hr water flush and advance as tolerated to the goal rate of 53 ml/hr - 2286 calories/day (94% calorie goal), 103 grams protein/day (100% protein goal), 212 grams CHO/day (does not exceed max CHO limit) and 1337 ml water/day (renal guidelines). Nutrition Discharge Plan: Too soon to determine. [address] [place] [address], [phone], [address] [facility]

## 2019-10-21 NOTE — PROGRESS NOTES
Met with family at bedside. Patient's son Hortensia Ko, [phone], stated they are agreeable to SNF at this time. Would like referrals sent to [facility] and [facility] rehab. Referrals sent. Awaiting response.

## 2019-10-21 NOTE — PROGRESS NOTES
Hospitalist Progress Note   
10/21/2019 Admit Date: 10/12/2019  9:36 PM  
NAME: Fred Johnson :  1945 MRN:  [id number] Attending: Giovanny Espinoza DO 
PCP:  None SUBJECTIVE:  
74 yo male with PMH of AFIB (not anticoagulated) , DM2, HLP admitted with malaise and weakness, found with hypercalcemia, anemia, MARCELA. CT head showed lytic lesions of skull concerning for multiple myeloma versus metastatic disease. He has been seen by oncology and managed with hydration, calcitonin, transfusion. He has been a new dialysis start this admit due to failure of renal recovery. On 10-16-19 he required ICU transfer due to acute hypoxia requiring BIPAP. There was a large discrepancy between pulse ox (low)  and ABG (high paO2). Co-oximeter showed elevated methemoglobin of 9. He received methylene blue on 10-16-19. Concern for possible G6PD def? High level of methemoglobin thought possible related to dose of Rasburicase Oncology was pending bone marrow biopsy not done to date. 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 Additionally there was some concern for PE contribution to respiratory failure thus he has been placed on IV heparin as too unstable for V/Q scan and unable to have CTA chest due to MARCELA. Eventually V/Q scan completed and low probability thus heparin stopped. He has been on IV antibiotics for possible pneumonia. LFTS elevated. ABD US shows thickended gallbladder wall without biliary dilatation. GI has evaluated and recommends lactulose due to mildly elevated ammonia level. He has developed a hemolytic anemia as well that contributes to elevated liver testing. Repeat CT head done for possible facial droop shows skull lytic lesions. Palliative care following. 10/21 - pt lethargic today. Seen after HD. No new complaints. Wife at bedside. Review of Systems negative with exception of pertinent positives noted above PHYSICAL EXAM  
 
Visit Vitals /65 Pulse 71  
 Temp 98.2 °F (36.8 °C) Resp 17 Ht 6' (1.829 m) Wt 99.3 kg (218 lb 14.7 oz) SpO2 94% BMI 29.69 kg/m² Temp (24hrs), Av.6 °F (36.4 °C), Min:97.3 °F (36.3 °C), Max:98.2 °F (36.8 °C) Oxygen Therapy O2 Sat (%): 94 % (10/21/19 1555) Pulse via Oximetry: 72 beats per minute (10/20/19 0958) O2 Device: Nasal cannula (10/20/19 08) O2 Flow Rate (L/min): 4 l/min (10/20/19 08) O2 Temperature: 87.8 °F (31 °C) (10/18/19 0807) FIO2 (%): 35 % (10/18/19 0808) Intake/Output Summary (Last 24 hours) at 10/21/2019 1855 Last data filed at 10/21/2019 1125 Gross per 24 hour Intake  Output 2000 ml Net -2000 ml General: No acute distress, lethargic but awakens to physical stimuli  
Lungs:  CTA Bilaterally. 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  Acute metabolic encephalopathy   Acute respiratory failure with hypoxia ([address] 10/15/2019  Pulmonary hypertension ([address] 10/15/2019  Hyperproteinemia- with likely hyperviscosity 10/15/2019  Hypercalcemia 10/12/2019  Anemia 10/12/2019  MARCELA (acute kidney injury) ([address] 10/12/2019 Plan: · 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 Received another dose of Rasburicase today. Have now listed as an allergy d/t concern of relation to methemoglobinemia. Will place on continuous pulse ox. Check abg. · Acute hypoxic respiratory failure: on NC , per pulmonary, stopped IV heparin for possible PE as V/Q scan negative, also on antibiotics for possible pneumonia x 8 days · Anemia and thrombocytopenia: transfuse per oncology parameters, hemolysis workup positive · Elevated LFTS: some hemolysis and intrahepatic contribution, appreciated GI, started lactulose for mildly elevated ammonia and encephalopathy, otherwise not many options. Repeat Ammonia given pt lethargy · HTN: on cardizem/ prn hydralazine · AFIB: taking cardizem, no anticoagulation · Hyperglycemia: due to steroids, on SSI , increase Lantus today. HA1C 5.5 · Elevated troponin: likely demand, ECHO with preserved EF · Acute metabolic encephalopathy: improving , still confused, consulted SLP , trial of lactulose  
  
DVT Prophylaxis: scds Signed By: Lindsay Islas DO October 21, 2019

## 2019-10-21 NOTE — PROGRESS NOTES
Palliative Care Progress Note Patient: Chaz Bullard MRN: [id number]  SSN: [id number] YOB: 1945  Age: 75 y.o. Sex: male Assessment/Plan: Chief Complaint/Interval History: Weaned to NC, tolerating HD Principal Diagnosis: · Altered Mental Status R41.82 Additional Diagnoses: · Debility, Unspecified  R53.81 
· Frailty  R54 · Counseling, Encounter for Medical Advice  Z71.9 
· Encounter for Palliative Care  Z51.5 Palliative Performance Scale (PPS) PPS: 30 Medical Decision Making:  
Reviewed and summarized notes and events over the weekend. Reviewed labs and imaging: CBC, CMP Visited with patient's wife while patient in dialysis. She has poor insight into patient's condition, but she does understand he is very sick, in liver and renal failure. She is worried about financial matters, as it seems that Mr. Maci Little took care of that; she is on the phone with her son during visit. Will return as able to discuss his medical condition further. Patient seen while on HD. He is lethargic, but awakens easily. He is pleasantly confused, oriented to person only. Will continue to follow for goals of care discussions. Will discuss findings with members of the interdisciplinary team.   
 
More than 50% of this 25 minute visit was spent counseling and coordination of care as outlined above. Subjective:  
 
Review of Systems unable to obtain due to lethargy. Objective:  
 
Visit Vitals /48 Pulse (!) 48 Temp 97.4 °F (36.3 °C) Resp 22 Ht 6' (1.829 m) Wt 218 lb 14.7 oz (99.3 kg) SpO2 93% BMI 29.69 kg/m² Physical Exam: 
 
General:  Calm. No acute distress. Eyes:  Conjunctivae/corneas clear Nose: Nares normal. Septum midline. Neck: Supple, symmetrical, trachea midline. Lungs:   Unlabored on NC. Heart:  Regular rate and rhythm. Abdomen:   Soft, non-tender, non-distended. Extremities: Normal, atraumatic, no cyanosis. Skin: Skin color, texture, turgor normal. No rash. Neurologic: Lethargic, awakens easily and calmly confused. Psych: Oriented to person only. Signed By: Jovanny Mcguire NP October 21, 2019

## 2019-10-22 NOTE — PROGRESS NOTES
[facility] PROGRESS NOTE Follow up for: MARCELA Subjective:  
Patient seen and examined. Chart, notes, labs, imaging, results all reviewed. Remains confused. No distress. ROS: 
UTO due to confusion Objective:  
Exam: 
Vitals:  
 10/22/19 0015 10/22/19 7835 10/22/19 1467 10/22/19 5219 BP: 123/70 112/55  108/60 Pulse: 89 66 Resp: 20 18 Temp: 97.4 °F (36.3 °C) 97.4 °F (36.3 °C) SpO2: 98% 99% Weight:   96.7 kg (213 lb 1.6 oz) Height:      
 
 
 
Intake/Output Summary (Last 24 hours) at 10/22/2019 1037 Last data filed at 10/21/2019 1125 Gross per 24 hour Intake  Output 2000 ml Net -2000 ml Current Facility-Administered Medications Medication Dose Route Frequency  lip protectant (BLISTEX) ointment 1 Each  1 Each Topical PRN  
 insulin lispro (HUMALOG) injection   SubCUTAneous AC&HS  insulin glargine (LANTUS) injection 10 Units  10 Units SubCUTAneous QHS  tuberculin injection 5 Units  5 Units IntraDERMal ONCE  
 lactulose (CHRONULAC) 10 gram/15 mL solution 30 mL  20 g Oral TID  
 0.9% sodium chloride infusion 250 mL  250 mL IntraVENous PRN  
 dilTIAZem (CARDIZEM) IR tablet 60 mg  60 mg Oral AC&HS  hydrALAZINE (APRESOLINE) 20 mg/mL injection 10 mg  10 mg IntraVENous Q6H PRN  
 0.9% sodium chloride infusion 250 mL  250 mL IntraVENous PRN  
 famotidine (PF) (PEPCID) 20 mg in sodium chloride 0.9% 10 mL injection  20 mg IntraVENous DAILY  0.9% sodium chloride infusion 250 mL  250 mL IntraVENous PRN  
 0.9% sodium chloride infusion 250 mL  250 mL IntraVENous PRN  
 allopurinol (ZYLOPRIM) tablet 50 mg  50 mg Oral DAILY  sodium chloride (NS) flush 5-40 mL  5-40 mL IntraVENous Q8H  
 sodium chloride (NS) flush 5-40 mL  5-40 mL IntraVENous PRN  
 acetaminophen (TYLENOL) tablet 650 mg  650 mg Oral Q4H PRN  
 ondansetron (ZOFRAN) injection 4 mg  4 mg IntraVENous Q4H PRN  
 diphenhydrAMINE (BENADRYL) capsule 25 mg  25 mg Oral Q6H PRN  
 
 
EXAM 
 GEN - on oxygen, confused, no distress HEENT - sclera with icterus CV - S1, S2, RRR, no rub, murmur, or gallop Lung - CTA bilaterally Abd - soft, nontender, BS present Ext - no edema Recent Labs 10/22/19 
1062 10/21/19 
0631 10/20/19 
0448 WBC 11.4* 11.3* 10.6 HGB 9.2* 8.3* 8.4* HCT 27.7* 24.6* 24.2*  
* 127* 125* Recent Labs 10/22/19 
0120 10/21/19 
0631 10/20/19 
0448 * 135* 133* K 3.8 3.6 3.8 CL 94* 95* 93* CO2 25 24 27 BUN 78* 99* 61* CREA 5.84* 6.60* 4.13* CA 9.7 9.8 9.9 * 275* 269* MG 2.5* 2.9* 2.4 Assessment and Plan:  
 
? 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 M-spike. Skeltal survey with innumerable lytic foci in the skull and left humerus, CT head with multiple lytic lesions involving the calvarium including a 3 cm destructive lesion involving the left occipital bone 
- started HD 10/16/19 
- HD tomorrow for clearance and volume 
- Started on decadron per oncology Likely Multiple Myeloma 
- on pulse dose decadron 
- bone marrow biopsy still needs to be done - held until patient stable for procedure 
- heme/onc following Hypercalcemia - Better with dialysis Mild Hyponatremia 
- improving with hd Hyperuricemia  
- Received rasburicase Methemoglobinemia 
- s/p methylene blue 10/16 Abnormal LFTs 
- progressive jaundice of unclear etiology - Seen by GI - felt multifactorial. No evidence of obstruction. Not much to offer - On lactulose for elevated ammonia Anemia and thrombocytopenia 
- transfusions per oncology 
- hemolysis workup positive SENIA Caballero

## 2019-10-22 NOTE — CONSULTS
[facility] RECONSULT Note Primary GI Physician: Taty Valenzuela MD 
Consulting GI Physician: Einar Dance, MD 
Referring Provider:  Rubén Madison MD 
 
Consult Date:  10/22/2019 Admit Date:  10/12/2019 Chief Complaint:  Hyperbilirubinemia Subjective:  
 
History of Present Illness: Mr. Yecenia Bolivar is a 76 yo male with PMHx of AFIB, DM2, HLP admitted with malaise and weakness, found with hypercalcemia, anemia, MARCELA. CT head showed lytic lesions of skull concerning for MM versus metastatic disease. Hematology on board, IVFs, received calcitonin, bone marrow bx planned for today, and subsequent chemo treatment planned/pending. On 10-16-19 he required ICU transfer due to acute hypoxia requiring BIPAP. There was a large discrepancy between pulse ox (low)  and ABG (high paO2). Co-oximeter showed elevated methemoglobin of 9. He received methylene blue on 10-16-19.  LFTs were normal on presentation except for AST in the 50s. Became acutely elevated on 10-17-19: t bili 10.9, ALT 37, , and AP 81. Noted progressive jaundice w/ t bili remaining in the 19-20s range, both direct > indirect. He has persistent anemia which appears to be hemolytic. He had an episode of hypoxia related to methemoglobinemia. Patient received Rasburicase on 10/14 and 10/21 and now listed as an allergy as this medication can result in methemoglobinemia. 10/22 MRCP: Iron deposition within the liver and spleen. The liver appears morphologically normal. Gallbladder sludge. No biliary dilation, Serum iron 56, TIBC 146, Transferrin Sat 38, Ferritin 9984. Hematology requesting possible ERCP as a potential net benefit to patient prior to starting chemo and in the setting of liver failure. PMH: 
History reviewed. PSH: 
History reviewed. Allergies: Allergies Allergen Reactions  Rasburicase Other (comments) Possible methemoglobinemia. (G6PD was within reference range when tested on 10/16/19) Home Medications: 
Prior to Admission medications Medication Sig Start Date End Date Taking? Authorizing Provider  
atorvastatin (LIPITOR) 20 mg tablet Take 20 mg by mouth. Provider, Historical  
dilTIAZem XR (DILACOR XR) 240 mg XR capsule Take 240 mg by mouth. 3/16/17   Provider, Historical  
glipiZIDE (GLUCOTROL) 10 mg tablet Take 10 mg by mouth. Provider, Historical  
 
 
Hospital Medications: 
Current Facility-Administered Medications Medication Dose Route Frequency  lip protectant (BLISTEX) ointment 1 Each  1 Each Topical PRN  
 insulin lispro (HUMALOG) injection   SubCUTAneous AC&HS  insulin glargine (LANTUS) injection 10 Units  10 Units SubCUTAneous QHS  tuberculin injection 5 Units  5 Units IntraDERMal ONCE  
 lactulose (CHRONULAC) 10 gram/15 mL solution 30 mL  20 g Oral TID  
 0.9% sodium chloride infusion 250 mL  250 mL IntraVENous PRN  
 dilTIAZem (CARDIZEM) IR tablet 60 mg  60 mg Oral AC&HS  hydrALAZINE (APRESOLINE) 20 mg/mL injection 10 mg  10 mg IntraVENous Q6H PRN  
 0.9% sodium chloride infusion 250 mL  250 mL IntraVENous PRN  
 famotidine (PF) (PEPCID) 20 mg in sodium chloride 0.9% 10 mL injection  20 mg IntraVENous DAILY  0.9% sodium chloride infusion 250 mL  250 mL IntraVENous PRN  
 0.9% sodium chloride infusion 250 mL  250 mL IntraVENous PRN  
 allopurinol (ZYLOPRIM) tablet 50 mg  50 mg Oral DAILY  sodium chloride (NS) flush 5-40 mL  5-40 mL IntraVENous Q8H  
 sodium chloride (NS) flush 5-40 mL  5-40 mL IntraVENous PRN  
 acetaminophen (TYLENOL) tablet 650 mg  650 mg Oral Q4H PRN  
 ondansetron (ZOFRAN) injection 4 mg  4 mg IntraVENous Q4H PRN  
 diphenhydrAMINE (BENADRYL) capsule 25 mg  25 mg Oral Q6H PRN Social History: 
Social History Tobacco Use  Smoking status: Current Every Day Smoker Packs/day: 1.00 Years: 21.00 Pack years: 21.00  Tobacco comment: quit in 1979 Substance Use Topics  Alcohol use: Not Currently Pt denies any history of drug use, blood transfusions, or tattoos. Family History: 
Family History Problem Relation Age of Onset  Heart Disease Mother  Diabetes Father  Stroke Father  Heart Disease Father  No Known Problems Sister Review of Systems: A detailed 10 system ROS is obtained, with pertinent positives as listed above. All others are negative. Diet:   
 
Objective:  
 
Physical Exam: 
Vitals: 
Visit Vitals /59 Pulse 76 Temp 97.9 °F (36.6 °C) Resp 19 Ht 6' (1.829 m) Wt 96.7 kg (213 lb 1.6 oz) SpO2 96% BMI 28.90 kg/m² Gen:  Pt is very ill-appearing, NAD, lethargic. Skin:  Extremities and face reveal no rashes. HEENT: Sclerae icteric. Strabismus noted. Cardiovascular: Regular rate and rhythm. No murmurs, gallops, or rubs. Respiratory:  Comfortable breathing with no accessory muscle use. Clear breath sounds anteriorly with no wheezes, rales, or rhonchi. GI:  Abdomen nondistended, soft, and nontender. Normal active bowel sounds. No enlargement of the liver or spleen. No masses palpable. Rectal:  Deferred Musculoskeletal:  No pitting edema of the lower legs. Neurological:  Lethargic. Psychiatric:  Lethargic/somnolent. Lymphatic:  No cervical or supraclavicular adenopathy. Laboratory:   
Recent Labs 10/22/19 
5435 10/21/19 
0631 10/20/19 
0448 WBC 11.4* 11.3* 10.6 HGB 9.2* 8.3* 8.4* HCT 27.7* 24.6* 24.2*  
* 127* 125* MCV 92.3 91.4 88.6 * 135* 133* K 3.8 3.6 3.8 CL 94* 95* 93* CO2 25 24 27 BUN 78* 99* 61* CREA 5.84* 6.60* 4.13* CA 9.7 9.8 9.9 MG 2.5* 2.9* 2.4 * 275* 269*  110 96 SGOT 242* 289* 425* * 208* 226* TBILI 19.5* 21.0* 21.5*  
CBIL  --   --  18.0* ALB 2.0* 1.9* 1.9* TP 11.0* 10.4* 10.8* Assessment:  
 
Principal Problem: Hypercalcemia (10/12/2019) Active Problems: 
Anemia (10/12/2019) MARCELA (acute kidney injury) ([facility] (10/12/2019) Acute respiratory failure with hypoxia ([facility] (10/15/2019) Pulmonary hypertension ([facility] (10/15/2019) Hyperproteinemia- with likely hyperviscosity (10/15/2019) Acute metabolic encephalopathy (79/89/4625) Methemoglobinemia (10/17/2019) Jaundice (10/19/2019) Acquired hemolytic anemia ([facility] (10/19/2019) 76 y/o male p/w malaise and weakness, found with hypercalcemia, anemia, MARCELA - all concerning for  MM vs metastatic disease. His hospital course has been further complicated by the events mentioned above. Noted progressive jaundice w/ t bili remaining in the 19-20s range, both direct > indirect. He has persistent anemia which appears to be hemolytic. He had an episode of hypoxia related to methemoglobinemia. Patient received Rasburicase on 10/14 and 10/21 and now listed as an allergy as this medication can result in methemoglobinemia/hyperbilirubinemia. MRCP: Iron deposition within the liver and spleen. The liver appears morphologically normal. Gallbladder sludge. No biliary dilation. Hepatic iron deposition is likely secondary to hemolytic anemia. Serum iron 56, TIBC 146, Transferrin Sat 38, Ferritin 9984. Hematology requesting possible ERCP as a potential net benefit to patient prior to starting chemo and in the setting of liver failure. Plan:  
 
Unfortunately, there is no role for ERCP given the lack of biliary obstruction on imaging. Most of his pathology stems from a hepatic cellular level and performing an ERCP will not improve his current situation. Case d/w Dr. Morgan Angeles. Patient was made DNR. Continue lactulose. Check PT/INR and give Vit K. Patient is seen and examined in collaboration with Dr. Angely Madera. Assessment and plan as per Dr. Melissa Benites. Vicenta Powell PA-C [facility]

## 2019-10-22 NOTE — PROGRESS NOTES
Palliative Care Progress Note Patient: Brad Campos MRN: [id number]  SSN: [id number] YOB: 1945  Age: 75 y.o. Sex: male Assessment/Plan: Chief Complaint/Interval History: pt alert with some confusion. Appears comfortable this am 
 
Principal Diagnosis: · Altered Mental Status R41.82 Additional Diagnoses: · Debility, Unspecified  R53.81 
· Delirium  F05 · Counseling, Encounter for Medical Advice  Z71.9 
· Encounter for Palliative Care  Z51.5 Palliative Performance Scale (PPS) PPS: 30 Medical Decision Making:  
Reviewed and summarized labs and imaging. Met with pt son at bedside. Pt alert but confused. Updated son on current medical conditions and concerns including liver failure and ongoing encephalopathy. Answered questions. Wife has noted wishes to maintain full code status but did not wish for extended period of life support should pt not improve. Will discuss findings with members of the interdisciplinary team.   
 
More than 50% of this 25 minute visit was spent counseling and coordination of care as outlined above. Subjective:  
 
Review of Systems negative with the exception of as noted above Objective:  
 
Visit Vitals /60 Pulse 66 Temp 97.4 °F (36.3 °C) Resp 18 Ht 6' (1.829 m) Wt 213 lb 1.6 oz (96.7 kg) SpO2 99% BMI 28.90 kg/m² Physical Exam: 
 
General:  Cooperative. No acute distress. Eyes:  Conjunctivae/corneas clear Nose: Nares normal. Septum midline. Neck: Supple, symmetrical, trachea midline, no JVD Lungs:   Clear to auscultation bilaterally, unlabored Heart:  Regular rate and rhythm, no murmur Abdomen:   Soft, non-tender, non-distended Extremities: Normal, atraumatic, no cyanosis or edema Skin: Skin color, texture, turgor normal. No rash or lesions. Neurologic: Nonfocal  
Psych: Alert and oriented Signed By: Dominic Sarabia MD   
 October 22, 2019

## 2019-10-22 NOTE — PROGRESS NOTES
SPEECH PATHOLOGY NOTE: 
 
 
Attempted to see patient, however drowsy and son at bedside reports regurgitation of limited amount consumed at lunch. Will re-attempt at later time/date as schedule permits and patient appropriate. RN notified.   
 
 
 
[address] CCC-SLP

## 2019-10-22 NOTE — PROGRESS NOTES
Hospitalist Progress Note   
10/22/2019 Admit Date: 10/12/2019  9:36 PM  
NAME: Puja Hein :  1945 MRN:  [id number] Attending: Yelitza Calderón DO 
PCP:  None SUBJECTIVE:  
76 yo male with PMH of AFIB (not anticoagulated) , DM2, HLP admitted with malaise and weakness, found with hypercalcemia, anemia, MARCELA. CT head showed lytic lesions of skull concerning for multiple myeloma versus metastatic disease. He has been seen by oncology and managed with hydration, calcitonin, transfusion. He has been a new dialysis start this admit due to failure of renal recovery. On 10-16-19 he required ICU transfer due to acute hypoxia requiring BIPAP. There was a large discrepancy between pulse ox (low)  and ABG (high paO2). Co-oximeter showed elevated methemoglobin of 9. He received methylene blue on 10-16-19. Concern for possible G6PD def? High level of methemoglobin thought possible related to dose of Rasburicase Oncology was pending bone marrow biopsy not done to date. 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 Additionally there was some concern for PE contribution to respiratory failure thus he has been placed on IV heparin as too unstable for V/Q scan and unable to have CTA chest due to MARCELA. Eventually V/Q scan completed and low probability thus heparin stopped. He has been on IV antibiotics for possible pneumonia. LFTS elevated. ABD US shows thickended gallbladder wall without biliary dilatation. GI has evaluated and recommends lactulose due to mildly elevated ammonia level. He has developed a hemolytic anemia as well that contributes to elevated liver testing. Repeat CT head done for possible facial droop shows skull lytic lesions. Palliative care following. 10/21 - pt lethargic today. Seen after HD. No new complaints. Wife at bedside. 10/22 - more alert than yesterday but still quite lethargic. Was able to identify son and respond yes/no. Review of Systems negative with exception of pertinent positives noted above PHYSICAL EXAM  
 
Visit Vitals /62 Pulse 82 Temp 98 °F (36.7 °C) Resp 18 Ht 6' (1.829 m) Wt 96.7 kg (213 lb 1.6 oz) SpO2 96% BMI 28.90 kg/m² Temp (24hrs), Av.7 °F (36.5 °C), Min:97.4 °F (36.3 °C), Max:98 °F (36.7 °C) Oxygen Therapy O2 Sat (%): 96 % (10/22/19 1608) Pulse via Oximetry: 72 beats per minute (10/20/19 0958) O2 Device: Nasal cannula (10/20/19 08) O2 Flow Rate (L/min): 4 l/min (10/20/19 08) O2 Temperature: 87.8 °F (31 °C) (10/18/19 0807) FIO2 (%): 35 % (10/18/19 0808) No intake or output data in the 24 hours ending 10/22/19 1756 General: No acute distress, lethargic but awakens to physical stimuli  
Lungs:  CTA Bilaterally. 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  Acute metabolic encephalopathy 3805  Acute respiratory failure with hypoxia ([address] 10/15/2019  Pulmonary hypertension ([address] 10/15/2019  Hyperproteinemia- with likely hyperviscosity 10/15/2019  Hypercalcemia 10/12/2019  Anemia 10/12/2019  MARCELA (acute kidney injury) ([address] 10/12/2019 Plan: · 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 Received another dose of Rasburicase today. Have now listed as an allergy d/t concern of relation to methemoglobinemia.  No hypoxia noted o/n and methemoglobin level wnl by bedside co-oximetry · Acute hypoxic respiratory failure: on NC, per pulmonary, stopped IV heparin for possible PE as V/Q scan negative, also on antibiotics for possible pneumonia x 8 days · Anemia and thrombocytopenia: transfuse per oncology parameters, hemolysis workup positive · Elevated LFTS: some hemolysis and intrahepatic contribution, appreciated GI, started lactulose for mildly elevated ammonia and encephalopathy, otherwise not many options. MRCP showing iron deposition likely consequence of hemolytic anemia. Ferritin high in setting of acute phase reactant. No role for ERCP per GI.  
· HTN: on cardizem/ prn hydralazine · AFIB: taking cardizem, no anticoagulation · Hyperglycemia: due to steroids, on SSI , increase Lantus today. HA1C 5.5 · Elevated troponin: likely demand, ECHO with preserved EF · Acute metabolic encephalopathy: improving , still confused, consulted SLP , trial of lactulose ·  
  
DVT Prophylaxis: scds Present for conversation b/w pt's son and oncology. D/w pt's son his code status wishes - requests for pt to be DNR. Signed By: Yaneth Yeung DO October 22, 2019

## 2019-10-22 NOTE — PROGRESS NOTES
Hourly rounds completed this shift. All needs met at this time. Pt sleeping in bed with wife at bedside. Pt still on tele and continuous O2 sat monitor. Will continue to monitor and give report to oncoming nurse.

## 2019-10-22 NOTE — PROGRESS NOTES
Problem: Falls - Risk of 
Goal: *Absence of Falls Description Document [place] Fall Risk and appropriate interventions in the flowsheet. Outcome: Progressing Towards Goal 
Note:  
Fall Risk Interventions: 
Mobility Interventions: Bed/chair exit alarm, Communicate number of staff needed for ambulation/transfer, OT consult for ADLs, Patient to call before getting OOB, PT Consult for mobility concerns, PT Consult for assist device competence Mentation Interventions: Adequate sleep, hydration, pain control, Bed/chair exit alarm, Door open when patient unattended, Evaluate medications/consider consulting pharmacy, Family/sitter at bedside, Increase mobility, More frequent rounding Medication Interventions: Assess postural VS orthostatic hypotension, Evaluate medications/consider consulting pharmacy, Patient to call before getting OOB, Teach patient to arise slowly Elimination Interventions: Call light in reach, Patient to call for help with toileting needs, Stay With Me (per policy), Toilet paper/wipes in reach, Toileting schedule/hourly rounds Problem: Patient Education: Go to Patient Education Activity Goal: Patient/Family Education Outcome: Progressing Towards Goal 
  
Problem: Pressure Injury - Risk of 
Goal: *Prevention of pressure injury Description Document Adalid Scale and appropriate interventions in the flowsheet. Outcome: Progressing Towards Goal 
Note:  
Pressure Injury Interventions: 
Sensory Interventions: Assess changes in LOC, Assess need for specialty bed, Avoid rigorous massage over bony prominences, Check visual cues for pain, Discuss PT/OT consult with provider, Keep linens dry and wrinkle-free, Maintain/enhance activity level, Minimize linen layers, Monitor skin under medical devices, Pad between skin to skin, Pressure redistribution bed/mattress (bed type) Moisture Interventions: Absorbent underpads, Apply protective barrier, creams and emollients, Check for incontinence Q2 hours and as needed, Limit adult briefs, Maintain skin hydration (lotion/cream), Minimize layers, Moisture barrier, Offer toileting Q_hr Activity Interventions: Assess need for specialty bed, Increase time out of bed, Pressure redistribution bed/mattress(bed type), PT/OT evaluation Mobility Interventions: Assess need for specialty bed, HOB 30 degrees or less, Pressure redistribution bed/mattress (bed type), PT/OT evaluation Nutrition Interventions: Document food/fluid/supplement intake Friction and Shear Interventions: Apply protective barrier, creams and emollients, HOB 30 degrees or less, Lift sheet, Minimize layers, Lift team/patient mobility team 
 
  
 
 
 
  
Problem: Patient Education: Go to Patient Education Activity Goal: Patient/Family Education Outcome: Progressing Towards Goal 
  
Problem: Gas Exchange - Impaired Goal: *Absence of hypoxia Outcome: Progressing Towards Goal 
  
Problem: Delirium Treatment Goal: *Level of consciousness restored to baseline Outcome: Progressing Towards Goal 
Goal: *Level of environmental perceptions restored to baseline Outcome: Progressing Towards Goal 
Goal: *Sensory perception restored to baseline Outcome: Progressing Towards Goal 
Goal: *Emotional stability restored to baseline Outcome: Progressing Towards Goal 
Goal: *Functional assessment restored to baseline Outcome: Progressing Towards Goal 
Goal: *Absence of falls Outcome: Progressing Towards Goal 
Goal: *Will remain free of delirium, CAM Score negative Outcome: Progressing Towards Goal 
Goal: *Cognitive status will be restored to baseline Outcome: Progressing Towards Goal 
Goal: Interventions Outcome: Progressing Towards Goal 
  
Problem: Patient Education: Go to Patient Education Activity Goal: Patient/Family Education Outcome: Progressing Towards Goal 
  
Problem: Dysphagia (Adult) Goal: *Speech Goal: (INSERT TEXT) Description LTG: Patient will tolerate least restrictive diet without overt signs or symptoms of airway compromise. STG: Patient will tolerate puree diet and thin liquids without overt signs or symptoms of airway compromise. STG: Patient will participate po trials of chewable textures for possible diet upgrade. Outcome: Progressing Towards Goal 
  
Problem: Patient Education: Go to Patient Education Activity Goal: Patient/Family Education Outcome: Progressing Towards Goal 
  
Problem: Nutrition Deficit Goal: *Optimize nutritional status Outcome: Progressing Towards Goal 
  
Problem: Patient Education: Go to Patient Education Activity Goal: Patient/Family Education Description 1. Patient will complete lower body bathing and dressing with min A and adaptive equipment as needed. 2. Patient will complete toileting with SBA. 3. Patient will tolerate 23 minutes of OT treatment with 2-3 rest breaks to increase activity tolerance for ADLs. 4. Patient will complete functional transfers with CGA and adaptive equipment as needed. 5. Patient will tolerate 10 minutes sitting balance edge of bed for increased ability to perform BADLs. 6. Patient will complete functional activity while seated unsupported, edge of bed, with SBA. Timeframe: 7 visits Outcome: Progressing Towards Goal 
  
Problem: Patient Education: Go to Patient Education Activity Goal: Patient/Family Education Outcome: Progressing Towards Goal 
  
Problem: Acute Renal Failure: Discharge Outcomes Goal: *Optimal pain control at patient's stated goal 
Outcome: Progressing Towards Goal 
Goal: *Urinary output within identified parameters Outcome: Progressing Towards Goal 
Goal: *Hemodynamically stable Outcome: Progressing Towards Goal 
Goal: *Tolerating diet Outcome: Progressing Towards Goal 
Goal: *Lab values stabilized Outcome: Progressing Towards Goal 
Goal: *Verbalizes understanding and describes medication purposes and frequencies Outcome: Progressing Towards Goal 
Goal: *Medication reconciliation Outcome: Progressing Towards Goal

## 2019-10-22 NOTE — PROGRESS NOTES
10/22/19 1800 Dual Skin Pressure Injury Assessment Dual Skin Pressure Injury Assessment WDL Second Care Provider (Based on [address] [place] [address]) Jacqui Alva RN Skin Integumentary Skin Integumentary (WDL) WDL Pressure  Injury Documentation No Pressure Injury Noted-Pressure Ulcer Prevention Initiated Skin Color Appropriate for ethnicity Skin Condition/Temp Dry; Warm  
Skin Integrity Intact Turgor Non-tenting Hair Growth Present Varicosities Absent

## 2019-10-22 NOTE — PROGRESS NOTES
Patient will be getting bone marrow biopsy at bedside in the AM.  Patient will need to have this done before going to dialysis. Made dialysis nurse aware and will pass message to night shift nurse.

## 2019-10-22 NOTE — PROGRESS NOTES
END OF SHIFT NOTE: 
 
Intake/Output No intake/output data recorded. Voiding: NO 
Catheter: NO 
Drain:   
 
 
 
 
 
Stool:  0 occurrences. Stool Assessment Stool Color: Bela Shine (10/20/19 1833) Stool Appearance: Soft (10/20/19 1833) Stool Amount: Large (10/20/19 1833) Stool Source/Status: Rectum (10/20/19 1833) Emesis:  0 occurrences. Emesis Assessment Appearance: Undigested food (10/13/19 0848) Emesis Amount: Small (10/14/19 0744) VITAL SIGNS Patient Vitals for the past 12 hrs: 
 Temp Pulse Resp BP SpO2  
10/22/19 1802    93/63   
10/22/19 1801 97.6 °F (36.4 °C) 76 16 (!) 87/60 100 % 10/22/19 1608 98 °F (36.7 °C) 82 18 116/62 96 % 10/22/19 1104 97.9 °F (36.6 °C) 76 19 112/59 96 % 10/22/19 0855  75 18 108/60 100 % Pain Assessment Pain 1 Pain Scale 1: Numeric (0 - 10) (10/22/19 1451) Pain Intensity 1: 0 (10/22/19 1451) Patient Stated Pain Goal: 0 (10/22/19 1052) Pain Reassessment 1: Patient resting w/respiratory rate greater than 10 (10/14/19 0305) Pain Location 1: Back (10/13/19 0131) Pain Orientation 1: Left;Right (10/12/19 2133) Pain Description 1: Aching; Sore (10/13/19 0131) Pain Intervention(s) 1: Rest;Position (10/13/19 0131) Ambulating No 
 
Additional Information: Pt transferred from [facility] at 1800. Pt drowsy, able to state name. Family at bedside. Pt will have bedside Bmbx tomorrow BEFORE dialysis. Shift report will be given to oncoming nurse at the bedside.  
 
Ralf Cook RN

## 2019-10-22 NOTE — PROGRESS NOTES
Telephone report received from [facility] 6th floor. Verbal report given to SUBHA Dunbar. Corina to be primary RN when pt transfers to 5th floor.

## 2019-10-22 NOTE — PROGRESS NOTES
[facility] Hematology & Oncology Inpatient Hematology / Oncology Progress Note Admission Date: 10/12/2019  9:36 PM 
Reason for Admission/Hospital Course: Hypercalcemia [E83.52] Anemia [D64.9] MARCELA (acute kidney injury) ([address] [N17.9] 24 Hour Events: 
BMbx in am at bedside with Dr. Meri Milton prior to going to dialysis Transfer to 5th floor Bone survey 10/13-Innumerable lytic foci within the skull as well as within the left humerus. Findings suggestive of multiple myeloma or metastatic disease. ROS: 
Able to answer some questions appropriately. Able to identify his son. 10 point review of systems is otherwise negative with the exception of the elements mentioned above in the HPI. 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 (G6PD was within reference range when tested on 10/16/19) OBJECTIVE: 
Patient Vitals for the past 8 hrs: 
 BP Temp Pulse Resp SpO2 Weight 10/22/19 0855 108/60  75 18 100 %   
10/22/19 0621      213 lb 1.6 oz (96.7 kg) 10/22/19 0417 112/55 97.4 °F (36.3 °C) 66 18 99 %  Temp (24hrs), Av.7 °F (36.5 °C), Min:97.4 °F (36.3 °C), Max:98.2 °F (36.8 °C) No intake/output data recorded. Physical Exam: 
Constitutional: Ill-appearing elderly male in no acute distress, lying comfortably in the hospital bed. HEENT: Normocephalic and atraumatic. Oropharynx is clear, mucous membranes are moist. Sclera with icterus. Strabismus Skin Warm and dry. No bruising and no rash noted. No erythema. No pallor. Respiratory Lungs are clear to auscultation bilaterally. O2.   
CVS Normal rate, irregular rhythm and normal S1 and S2. No murmurs, gallops, or rubs. Abdomen Soft, nontender and nondistended, normoactive bowel sounds. Neuro Grossly nonfocal with no obvious sensory or motor deficits. MSK Normal range of motion in general.  No edema and no tenderness. Psych Somnolent Labs: Recent Labs 10/22/19 
9084 10/21/19 
0631 10/20/19 
0448 WBC 11.4* 11.3* 10.6 RBC 3.00* 2.69* 2.73* HGB 9.2* 8.3* 8.4* HCT 27.7* 24.6* 24.2*  
MCV 92.3 91.4 88.6 MCH 30.7 30.9 30.8 MCHC 33.2 33.7 34.7  
RDW 18.6* 17.1* 16.3*  
* 127* 125* GRANS 90* 85* 84* LYMPH 5* 5* 7*  
MONOS 4 5 6 EOS 0* 3 0* BASOS 0 0 0 IG 1 2 3  
DF MANUAL MANUAL AUTOMATED ANEU  --  9.6* 9.0* ABL  --  0.6 0.7 ABM  --  0.6 0.6 CHAVA  --  0.3 0.0 ABB  --  0.0 0.0 AIG  --  0.2 0.3 Recent Labs 10/22/19 
7814 10/21/19 
0631 10/20/19 
0448 * 135* 133* K 3.8 3.6 3.8 CL 94* 95* 93* CO2 25 24 27 AGAP 13 16 13 * 275* 269* BUN 78* 99* 61* CREA 5.84* 6.60* 4.13* GFRAA 12* 11* 18* GFRNA 10* 9* 15* CA 9.7 9.8 9.9 SGOT 242* 289* 425*  110 96 TP 11.0* 10.4* 10.8* ALB 2.0* 1.9* 1.9*  
GLOB 9.0* 8.5* 8.9* AGRAT 0.2* 0.2* 0.2* MG 2.5* 2.9* 2.4 PHOS 6.5*  --   --   
 
 
 
Imaging: 
Bruno Stokes COMP [[id number]] Collected: 10/14/19 3246 Order Status: Completed Updated: 10/14/19 1744 Narrative:    
Renal ultrasound. CLINICAL INDICATION:  Acute on chronic renal disease PROCEDURE: Realtime grayscale color Doppler evaluation of the kidneys and 
bladder. COMPARISON: No prior similar studies available for direct comparison. FINDINGS: The right kidney is normal in size but diffusely increased in 
echogenicity measuring 12.2 cm. A 2.4 cm simple cyst is noted off the midpole. The left kidney is normal in size and diffusely increased in echogenicity 
measuring 11.5 cm. A 1.2 cm simple cyst is noted within the midpole region. There is an indeterminate echogenic focus within the renal parenchyma likely a 
small stone. There is no hydronephrosis. The bladder is unremarkable. The aorta 
is normal in caliber. Impression:    
IMPRESSION:  
1. Bilateral diffuse increased renal cortical echogenicity can be seen with 
medical renal disease. 2. Indeterminate calcifications in the left renal parenchyma. Nephrolithiasis 
favored. 3. No hydronephrosis. 4. Bilateral simple renal cysts. IR BX BONE MARROW DIAGNOSTIC [[id number]] Order Status: No result XR BONE SURVEY LTD (METS) [[id number]] Collected: 10/13/19 1121 Order Status: Completed Updated: 10/13/19 1127 Narrative:    
History: Low back, lateral rib, and left posterior shoulder pain EXAM: Metastatic bone survey FINDINGS: Innumerable lytic lesions are present throughout the skull, including 
a large lesion within the occipital portion of the calvarium measuring 5 cm. Degenerative change of the cervical, thoracic, and lumbar spine noted without 
additional lytic foci. The included lungs are clear. Multiple lytic lesions seen 
within the left humerus. No definite lytic foci within the femoral bones are 
within the pelvis, though evaluation the pelvis is limited due to overlying 
bowel gas. Impression:    
IMPRESSION: 
 
Innumerable lytic foci within the skull as well as within the left humerus. Findings suggestive of multiple myeloma or metastatic disease. XR CHEST PA LAT [[id number]] Collected: 10/12/19 2328 Order Status: Completed Updated: 10/12/19 2330 Narrative:    
EXAM: Chest x-ray. INDICATION: Cough. COMPARISON: None. TECHNIQUE: Frontal and lateral view chest x-ray. FINDINGS: The lungs are clear. The cardiac size, mediastinal contour and 
pulmonary vasculature are normal. No pneumothorax or pleural effusion is seen. Impression:    
IMPRESSION: No acute process. CT HEAD WITHOUT CONTRAST [[id number]] Collected: 10/12/19 2323 Order Status: Completed Updated: 10/12/19 2330 Narrative:    
EXAM: Noncontrast CT head. INDICATION: Dizziness. COMPARISON: None. TECHNIQUE: Axial noncontrast CT images of the head were obtained.  Radiation 
dose reduction techniques were used for this study.  Our CT scanners use one or all of the following:  Automated exposure control, adjustment of the mA and/or 
kV according to patient size, iterative reconstruction. FINDINGS: There is a 3.3 x 2.9 cm lytic soft tissue mass in the left occipital 
bone, which minimally indents the underlying left occipital lobe. There is no 
midline shift or significant mass effect. There are also innumerable smaller 
subcentimeter round lytic lesions throughout the calvarium. Brain volume is 
appropriate for age. No acute infarct, hemorrhage or evidence of hydrocephalus 
is seen. The basal cisterns are preserved. The visualized paranasal sinuses and 
mastoid air cells are clear. There has been bilateral eye surgery. Impression:    
IMPRESSION:  
1. 3.3 cm lytic soft tissue mass in the left occipital bone, minimally indenting 
the underlying left occipital lobe. In addition, there are innumerable 
subcentimeter lytic lesions throughout the remainder of the calvarium. These 
could relate to metastatic disease or multiple myeloma. 2. No acute infarct or hemorrhage. Medications: 
Current Facility-Administered Medications Medication Dose Route Frequency  lip protectant (BLISTEX) ointment 1 Each  1 Each Topical PRN  
 insulin lispro (HUMALOG) injection   SubCUTAneous AC&HS  insulin glargine (LANTUS) injection 10 Units  10 Units SubCUTAneous QHS  tuberculin injection 5 Units  5 Units IntraDERMal ONCE  
 lactulose (CHRONULAC) 10 gram/15 mL solution 30 mL  20 g Oral TID  
 0.9% sodium chloride infusion 250 mL  250 mL IntraVENous PRN  
 dilTIAZem (CARDIZEM) IR tablet 60 mg  60 mg Oral AC&HS  hydrALAZINE (APRESOLINE) 20 mg/mL injection 10 mg  10 mg IntraVENous Q6H PRN  
 0.9% sodium chloride infusion 250 mL  250 mL IntraVENous PRN  
 famotidine (PF) (PEPCID) 20 mg in sodium chloride 0.9% 10 mL injection  20 mg IntraVENous DAILY  0.9% sodium chloride infusion 250 mL  250 mL IntraVENous PRN  
  0.9% sodium chloride infusion 250 mL  250 mL IntraVENous PRN  
 allopurinol (ZYLOPRIM) tablet 50 mg  50 mg Oral DAILY  sodium chloride (NS) flush 5-40 mL  5-40 mL IntraVENous Q8H  
 sodium chloride (NS) flush 5-40 mL  5-40 mL IntraVENous PRN  
 acetaminophen (TYLENOL) tablet 650 mg  650 mg Oral Q4H PRN  
 ondansetron (ZOFRAN) injection 4 mg  4 mg IntraVENous Q4H PRN  
 diphenhydrAMINE (BENADRYL) capsule 25 mg  25 mg Oral Q6H PRN  
 
 
 
ASSESSMENT: 
 
Problem List  Never Reviewed Codes Class Noted Jaundice ICD-10-CM: R17 
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 
ICD-9-CM: 348.31  10/16/2019 Acute respiratory failure with hypoxia [facility]) ICD-10-CM: J96.01 
ICD-9-CM: 518.81  10/15/2019 Pulmonary hypertension (HCC) (Chronic) ICD-10-CM: I27.20 ICD-9-CM: 416.8  10/15/2019 Hyperproteinemia- with likely hyperviscosity ICD-10-CM: E88.09 
ICD-9-CM: 273.8  10/15/2019 Diabetes mellitus (HCC) (Chronic) ICD-10-CM: E11.9 ICD-9-CM: 250.00  10/13/2019 Essential hypertension (Chronic) ICD-10-CM: I10 
ICD-9-CM: 401.9  10/13/2019 Overview Signed 10/13/2019  1:04 AM by Nancy Thompson MD  
  Last Assessment & Plan: Hypertension is unchanged. Continue current treatment regimen. Blood pressure will be reassessed at the next regular appointment. Paroxysmal atrial fibrillation (HCC) (Chronic) ICD-10-CM: I48.0 ICD-9-CM: 427.31  10/13/2019 Overview Signed 10/13/2019  1:04 AM by Nancy Thompson MD  
  Last Assessment & Plan: He has had no recurrences. I still think he remains a bleeding risk. I would like to hold off of [address] for now--and check again on him in 3 months. Continue dilt--but change to 360 mg tabs. * (Principal) Hypercalcemia ICD-10-CM: V65.34 
ICD-9-CM: 275.42  10/12/2019 Anemia ICD-10-CM: D64.9 ICD-9-CM: 285.9  10/12/2019 MARCELA (acute kidney injury) ([facility] [address] ICD-10-CM: N17.9 ICD-9-CM: 584.9  10/12/2019 Mr. Yecenia Bolivar is a 75 y.o. male admitted on 10/12/2019 with a primary diagnosis of hypercalcemia and possible metastatic malignancy/myeloma. Mr. Yecenia Bolivar is a gentleman who has received no formal medical care over the past several years. He stated that he had no primary care physician. Lab data from [facility] in 2017 show a creatinine that evelyn as high as 6.1 and was 2.80 at discharge. His chronic baseline is unknown. As noted, there is a history of diabetes mellitus. For two-three months leading to this admission, he has gradually deteriorated with low back discomfort, forgetfulness, anorexia and 35 pound weight loss. He has noted a growth on the back of his head. He was finally prevailed upon to come to the hospital for evaluation. Data are listed below but he was found to be in acute renal failure, hypercalcemic, anemic and with multiple lytic lesions involving his calvarium including a 3 cm destructive lesion involving the left occipital bone. PLAN: 
Concern for myeloma - We will initiate workup for myeloma based on strong presumption and if negative will pursue other alternatives. - Check skeletal survey, SPEP, serum free light chains, beta 2 microglobulin , LDH, urine IEP, marrow biopsy 10/14 Skeletal survey with innumerable lytic foci w/i the skull and L humerus. SPEP/UPEP/FLC/Beta 2 pending. Awaiting BMbx. 10/15 Kappa FLC 13k. Awaiting BMbx. 
10/16 SPEP with m-spike 4.89. Not stable enough for BMbx at this time. Will go ahead with pulse dose steroids - Dex 40mg IV x 4 days. Had code status discussion, wishes to remain full code, but wife states he would not want to be on vent for an extended period of time. 10/18 On pulse dose steroids, D3 of 4.  
10/19 D4/ 4 pulse dex. 10/20 s/p 4 days of pulse dex 10/22 BMbx then 1/2 dose CyBorD. Bone survey 10/13 Innumerable lytic foci within the skull as well as within the left humerus. Findings suggestive of multiple myeloma or metastatic disease. Hypercalcemia - Treatment of his hypercalcemia may be problematic with elevated creatinine and elevated BNP. Continue fluids and I have added calcitonin. Use Lasix to balance I/O's. Hold of on Zometa for now given creatinine. 10/14 CCa++ down to 12.2. Con't IVF. 10/15 CCa++ 13.1. Con't calcitonin. 10/16 CCa++ up to 14. 
10/18 CCa++ down to 12.5 
10/19 CCa ++ down to 11.8  
10/20 CCa ++ down to 11.6  
10/21 calcium 9.8 MARCELA 
- Nephrology should be on board to manage what is likely acute kidney injury from ? myeloma, hypercalcemia, etc. Superimposed on CRF. Hopefully this will reverse but dialysis could be an equally potential outcome. 10/14 Cr 4.6. Renal US c/w medical renal disease and possible L nephrolithiasis. Neph consult pending. 10/16 Cr 5.01. Neph following. 10/17 Started dialysis yesterday. Cr 4.72 
10/18 Cr 5.07. Continues on dialysis. 10/19 Cr. 4.06 today. Dialysis today. 10/20 Cr 4.13 today, continues on dialysis. Dyspnea / hypoxia 10/15 On Optiflow. CXR/CT chest pending. Pulm following. On Azith/Roland. On empiric heparin gtt. . Echo with dilated RV and pulm HTN. 
10/16 CXR with volume overload. Plans for VQ scan when more stable. Pulm following. 10/17 Transferred to ICU for resp distress, now on BiPAP. On Azith/Rocephin. 10/18 on Hiflow O2. On Azith/Rocephin. 10/19 on nasal cannula. Continues Azith/Rocephin  
10/20 still on nasal cannula/azith/rocephin. Anemia 10/16 Transfuse 1 unit PRBCs 
10/17 Hgb 6.7 s/p 2 units. Check hemolysis labs. PBRCs ordered. 10/18 Hgb 7.2 s/p tx. OK to give PRBCs today with dialysis. Hemolytic anemia noted. Hapto <8. Check ricardo. 10/19 Hgb up to 8.1 today. Ricardo neg. 10/20 Hgb stable at 8.4 today. Hyperbilirubinemia 10/17 Bili up to 10.9. Check hemolysis labs inc frac bili. 10/18 Bili 16.6. Frac bili mostly direct. RUQ US pending. 10/19 Bili up to 20. ALT worse today at 210,  down from 609. US showed gallbladder wall thickening, hepatomegaly which has worsened since 2017, and right renal parenchymal hyperechogenicity. Ammonia elevated this AM at 32- GI added lactulose 10/21 Bilirubin up to 21 today. MRCP liver. GI has signed off.  
10/22 MRCP: Iron deposition within the liver and spleen. The liver appears morphologically normal. Gallbladder sludge. No biliary dilation. Primary checking Tsat and ferritin and also will see if GI would do ERCP for gallbladder sludge which can potentially be beneficial especially with the starting of chemo Methemoglobinemia 
- s/p methylene blue 10/16 Hyperuricemia 10/22 received rasburicase 10/14 and 10/21. Rasburicase can result in methemoglobinemia in some patients so it is   
now listed as an allergy. 10/22/2019 We had a lengthy discussion with patient's son Mauricio Bell ([phone])  (Dr. Freya Hassan also present). With patient's permission, conversation occurred outside of patient room. His son was advised of the likely prognosis of MM as well prognosis with and without treatment. He discussed with family and his father and their wish is to proceed with BMbx then treatment. Dr. Eleonora Saavedra will perform bx at bedside in am.  He will need to be transferred to 5th floor. Also discussed code status and patient is now DNR. Clotilde Rodriguez NP [facility] Hematology [facility] On[facility] [id number] [address] HospitalMessageMe [address] Office : [phone] Fax : [phone] I personally saw, exammed and counselled the patient, and discussed with NP, agree with above history/assessment/plan. 75 y. o.male reportedly of good baseline PS developed weight loss and head mass and admitted with hypercalcemia and MARCELA, found bone lesions, high M spike 4.89, acute high direct bilirubinemia, elevated uric acid and received rasburicase. I discussed wit pt and wife that pt has a fatal cancer of myeloma but treatment would be of very high risk given his comorbidities macario hyperbilirubinemia, considered liver failure and GI signed off, but no evidence of significant acute liver damage, check liver MRCP showed much gall bladder sludge. Dr. Merchant Necessary and I had an extensive discussion with son and clarified his severe condition would be fatal if no cancer rx is pursue, although chemotherapy would be of very high risk, he agreed with DNR status, but would discuss with pt regarding his goal, will arrange marrow then start half dose VCD if decided to pursue the high risk rx. Rico Valiente M.D. [address] Office : [phone]

## 2019-10-23 PROBLEM — C90.00 MULTIPLE MYELOMA NOT HAVING ACHIEVED REMISSION (HCC): Status: ACTIVE | Noted: 2019-01-01

## 2019-10-23 NOTE — CONSULTS
[address] Cardiology Consultation Date of  Admission: 10/12/2019  9:36 PM  
 
Primary Care Physician: none Primary Cardiologist: previously seen at [facility] 2017 Referring Physician: Rozina Lira NP for Hematology/Oncology Consulting Physician: Dr. Los Gonzales CC/Reason for consult: AF, hypotension HPI:  Cristiane Shelby is a 75 y.o. AAM who has received no formal medical care over the past several years. Care Everywhere reviewed showing [facility] hospitalization 2/2017 due to sepsis, left nephrolithiasis, left hydronephrosis with creatinine that evelyn as high as 6.1, PAF and anemia. He was seen by [facility] and started on Cardizem  mg but no OAC due to renal failure, anemia and h/o reported GIB. He follwed up with Dr. Melinda Tubbs 3/17 and was in NSR and Cardizem XR was increased to 360 mg daily. Pt has not returned for medical care. He reportedly has a history of diabetes mellitus. For the past 2-3 months he has had a gradual deterioration with low back discomfort, forgetfulness, anorexia and 35 pound weight loss. He has noted a growth on the back of his head. He was brought to [facility] ED for evaluation. In the ED, he had MARCELA, hypercalcemia, anemia and had a CT of the head showing multiple lytic lesions involving the calvarium including a 3 cm destructive lesion involving the left occipital bone. Skeletal survey showed innumerable lytic foci within the skull and left humerus. Oncology is following. His calcium was treated with calcitonin and he has been on IVF. Nephrology was consulted and HD has been started. Pt has had AF/flutter since admission that is fairly rate controlled and his BP has been consistently low. [address] Cardiology was consulted for AF and hypotension. Pt is seen on HD today and unable to be awoken. History gathered from chart. Echo done on 10/13 showing EF 55%, LAE, PHTN. 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 (G6PD was within reference range when tested on 10/16/19) Social History Socioeconomic History  Marital status:  Spouse name: Not on file  Number of children: Not on file  Years of education: Not on file  Highest education level: Not on file Occupational History  Occupation: retired police/ Social Needs  Financial resource strain: Not on file  Food insecurity:  
  Worry: Not on file Inability: Not on file  Transportation needs:  
  Medical: Not on file Non-medical: Not on file Tobacco Use  Smoking status: Current Every Day Smoker Packs/day: 1.00 Years: 21.00 Pack years: 21.00  Tobacco comment: quit in 1979 Substance and Sexual Activity  Alcohol use: Not Currently  Drug use: Not on file  Sexual activity: Not on file Lifestyle  Physical activity:  
  Days per week: Not on file Minutes per session: Not on file  Stress: Not on file Relationships  Social connections:  
  Talks on phone: Not on file Gets together: Not on file Attends [employer] service: Not on file Active member of club or organization: Not on file Attends meetings of clubs or organizations: Not on file Relationship status: Not on file  Intimate partner violence:  
  Fear of current or ex partner: Not on file Emotionally abused: Not on file Physically abused: Not on file Forced sexual activity: Not on file Other Topics Concern  Not on file Social History Narrative . Lives with wife Family History Problem Relation Age of Onset  Heart Disease Mother  Diabetes Father  Stroke Father  Heart Disease Father  No Known Problems Sister Current Facility-Administered Medications Medication Dose Route Frequency  lip protectant (BLISTEX) ointment 1 Each  1 Each Topical PRN  
  insulin glargine (LANTUS) injection 13 Units  13 Units SubCUTAneous QHS  insulin lispro (HUMALOG) injection   SubCUTAneous AC&HS  
 lactulose (CHRONULAC) 10 gram/15 mL solution 30 mL  20 g Oral TID  
 0.9% sodium chloride infusion 250 mL  250 mL IntraVENous PRN  
 dilTIAZem (CARDIZEM) IR tablet 60 mg  60 mg Oral AC&HS  hydrALAZINE (APRESOLINE) 20 mg/mL injection 10 mg  10 mg IntraVENous Q6H PRN  
 0.9% sodium chloride infusion 250 mL  250 mL IntraVENous PRN  
 famotidine (PF) (PEPCID) 20 mg in sodium chloride 0.9% 10 mL injection  20 mg IntraVENous DAILY  0.9% sodium chloride infusion 250 mL  250 mL IntraVENous PRN  
 0.9% sodium chloride infusion 250 mL  250 mL IntraVENous PRN  
 allopurinol (ZYLOPRIM) tablet 50 mg  50 mg Oral DAILY  sodium chloride (NS) flush 5-40 mL  5-40 mL IntraVENous Q8H  
 sodium chloride (NS) flush 5-40 mL  5-40 mL IntraVENous PRN  
 acetaminophen (TYLENOL) tablet 650 mg  650 mg Oral Q4H PRN  
 ondansetron (ZOFRAN) injection 4 mg  4 mg IntraVENous Q4H PRN  
 diphenhydrAMINE (BENADRYL) capsule 25 mg  25 mg Oral Q6H PRN Review of symptoms: 
Unable to obtain Subjective:  
Physical Exam 
 
Visit Vitals BP 95/49 Pulse 76 Temp 97.4 °F (36.3 °C) Resp 16 Ht 6' (1.829 m) Wt 93.9 kg (207 lb) SpO2 98% BMI 28.07 kg/m² General Appearance:  Well developed, unresponsive (HD nurse reports he has not awoken for them the entire run) Neck: Supple. Chest:   Lungs diminished bilaterally. Cardiovascular:  Irregular rate and rhythm, S1, S2 Abdomen:   Soft, non-tender, bowel sounds are active. Extremities: No edema bilaterally. Skin: Warm and dry. Labs:  
Recent Results (from the past 24 hour(s)) GLUCOSE, POC Collection Time: 10/22/19  3:47 PM  
Result Value Ref Range Glucose (POC) 225 (H) 65 - 100 mg/dL PROTHROMBIN TIME + INR Collection Time: 10/22/19  3:53 PM  
Result Value Ref Range Prothrombin time 20.8 (H) 11.7 - 14.5 sec INR 1.7 GLUCOSE, POC Collection Time: 10/22/19  9:08 PM  
Result Value Ref Range Glucose (POC) 166 (H) 65 - 100 mg/dL METABOLIC PANEL, COMPREHENSIVE Collection Time: 10/23/19  2:34 AM  
Result Value Ref Range Sodium 134 (L) 136 - 145 mmol/L Potassium 3.8 3.5 - 5.1 mmol/L Chloride 92 (L) 98 - 107 mmol/L  
 CO2 27 21 - 32 mmol/L Anion gap 15 7 - 16 mmol/L Glucose 152 (H) 65 - 100 mg/dL  (H) 8 - 23 MG/DL Creatinine 7.71 (H) 0.8 - 1.5 MG/DL  
 GFR est AA 9 (L) >60 ml/min/1.73m2 GFR est non-AA 7 (L) >60 ml/min/1.73m2 Calcium 9.6 8.3 - 10.4 MG/DL Bilirubin, total 21.0 (H) 0.2 - 1.1 MG/DL  
 ALT (SGPT) 181 (H) 12 - 65 U/L  
 AST (SGOT) 209 (H) 15 - 37 U/L Alk. phosphatase 132 50 - 136 U/L Protein, total 10.8 (H) 6.3 - 8.2 g/dL Albumin 1.8 (L) 3.2 - 4.6 g/dL Globulin 9.0 (H) 2.3 - 3.5 g/dL A-G Ratio 0.2 (L) 1.2 - 3.5    
CBC WITH AUTOMATED DIFF Collection Time: 10/23/19  2:34 AM  
Result Value Ref Range WBC 12.0 (H) 4.3 - 11.1 K/uL  
 RBC 3.03 (L) 4.23 - 5.6 M/uL HGB 9.3 (L) 13.6 - 17.2 g/dL HCT 28.0 (L) 41.1 - 50.3 % MCV 92.4 79.6 - 97.8 FL  
 MCH 30.7 26.1 - 32.9 PG  
 MCHC 33.2 31.4 - 35.0 g/dL  
 RDW 18.6 (H) 11.9 - 14.6 % PLATELET 321 (L) 633 - 450 K/uL MPV 11.4 9.4 - 12.3 FL ABSOLUTE NRBC 0.94 (H) 0.0 - 0.2 K/uL NEUTROPHILS 90 (H) 43 - 78 % LYMPHOCYTES 5 (L) 13 - 44 % MONOCYTES 3 (L) 4.0 - 12.0 % EOSINOPHILS 0 (L) 0.5 - 7.8 % BASOPHILS 0 0.0 - 2.0 % IMMATURE GRANULOCYTES 2 0.0 - 5.0 %  
 ABS. NEUTROPHILS 10.8 (H) 1.7 - 8.2 K/UL  
 ABS. LYMPHOCYTES 0.6 0.5 - 4.6 K/UL  
 ABS. MONOCYTES 0.4 0.1 - 1.3 K/UL  
 ABS. EOSINOPHILS 0.0 0.0 - 0.8 K/UL  
 ABS. BASOPHILS 0.0 0.0 - 0.2 K/UL  
 ABS. IMM. GRANS. 0.2 0.0 - 0.5 K/UL  
 RBC COMMENTS MODERATE 
ROULEAUX 
    
 RBC COMMENTS SLIGHT 
POLYCHROMASIA 
    
 RBC COMMENTS MODERATE 
TARGET CELLS 
    
 RBC COMMENTS SLIGHT ANISOCYTOSIS 
    
 WBC COMMENTS Result Confirmed By Smear PLATELET COMMENTS ADEQUATE    
 DF AUTOMATED MAGNESIUM Collection Time: 10/23/19  2:34 AM  
Result Value Ref Range Magnesium 2.7 (H) 1.8 - 2.4 mg/dL URIC ACID Collection Time: 10/23/19  2:34 AM  
Result Value Ref Range Uric acid <2.6 (L) 2.6 - 6.0 MG/DL  
GLUCOSE, POC Collection Time: 10/23/19  7:40 AM  
Result Value Ref Range Glucose (POC) 173 (H) 65 - 100 mg/dL BONE MARROW PREP & ALFARO STAIN Collection Time: 10/23/19 10:00 AM  
Result Value Ref Range Bone Marrow Prep & Minetta Hover FOR HEMATOLOGY SERVICES RENDERED Pt has been seen and examined by Dr. Flavio Rhoades. He agrees with the following assessment and plan. Assessment/Plan:  
  
 Diagnosis  Multiple myeloma- w/u and treatment per Hen/onc  Hypotension- likely multifactorial- nutritional component with low albumin- replacement per primary team, no volume being removed with HD per nephrology, holding all BP meds, IVF as needed, r/o sepsis per primary team, liberalize sodium in diet, monitor closely  Atrial fibrillation/flutter- rate controlled w/o any rate slowing meds since 10/21 according to [facility], limited options with hypotension and renal failure. Would hold Cardizem with low BP and controlled rates. Resume if BP improves  Acquired hemolytic anemia (HCC)  Methemoglobinemia  Acute metabolic encephalopathy  Acute respiratory failure with hypoxia (HCC)  Pulmonary hypertension ([address]  Hyperproteinemia- with likely hyperviscosity  Diabetes mellitus ([address]  Jaundice- GI consulted and no need for ERCP, per primary team  
 Paroxysmal atrial fibrillation ([address]  Hypercalcemia  Anemia  MARCELA (acute kidney injury)/CKD ([address]- started on HD per nephrology Thank you for consulting [facility] Cardiology and allowing us to participate in the care of this patient. We will continue to follow along with you.  
 
Mckenzie Solis PA-C

## 2019-10-23 NOTE — PROCEDURES
Bone Marrow Biopsy and Aspiration Procedure Note Ordering MD: Dr. Marek Pace Patient: Bertin Toney Reason for procedure: M-spike, lytic lesions Bone marrow biopsy and aspiration procedure and potential risks including bleeding, infection, trauma and pain were reviewed with the patient. Informed consent obtained. Patients's name and birth were double checked. He did not wish for systemic premedication. Patient assisted to left lateral recumbent position. Right posterior superior iliac spine prepped and draped in the usual sterile fashion, and 8 ml of 1% lidocaine was used for local anesthesia of all layer from skin to periosteum of the  posterior superior iliac spine area. Adequate anesthesia was achieved. The biopsy needle was inserted into right posterior superior iliac spine and a core biopsy was obtained without difficulty. The needle was reinserted and 7 ml of bone marrow aspirated for appropriate testing. The patient tolerated the procedure well. No excessive bleeding or hematoma formation was present. No immediate complications were observed. The patient was instructed to leave a pressure bandage on for 24 hours, then can be removed. After removal of the bandage, the patient can bathe and clean the site with soap and water. He was instructed to call with any excess bleeding or signs or symptoms of infection. Procedure time: 30 Minutes Specimens sent for: Bone marrow morphology, flow cytometry and cytogenetics studies. Miller Aquino MD PhD 
[facility] Pathology

## 2019-10-23 NOTE — PROGRESS NOTES
END OF SHIFT NOTE: 
 
Intake/Output 10/23 0701 - 10/23 1900 In: 27 [P.O.:30] Out: 0 Voiding: NO 
Catheter: NO 
Drain:   
 
 
 
 
 
Stool:  0 occurrences. Stool Assessment Stool Color: Coye Goes (10/20/19 1833) Stool Appearance: Soft (10/20/19 1833) Stool Amount: Large (10/20/19 1833) Stool Source/Status: Rectum (10/20/19 1833) Emesis:  0 occurrences. Emesis Assessment Appearance: Undigested food (10/13/19 0848) Emesis Amount: Small (10/14/19 0744) VITAL SIGNS Patient Vitals for the past 12 hrs: 
 Temp Pulse Resp BP SpO2  
10/23/19 1608 98.3 °F (36.8 °C) (!) 110 15 (!) 81/54 100 % 10/23/19 1600  (!) 112     
10/23/19 1524  73  105/51   
10/23/19 1515  65  (!) 80/52   
10/23/19 1501  70  102/52   
10/23/19 1432  76  95/49   
10/23/19 1356  (!) 58  (!) 82/36   
10/23/19 1325  (!) 59  90/43   
10/23/19 1324  (!) 58  (!) 76/51   
10/23/19 1256  (!) 59  (!) 88/53   
10/23/19 1234  73  (!) 87/53   
10/23/19 1233  (!) 57  (!) 73/43   
10/23/19 1200  (!) 102     
10/23/19 1152  72  (!) 89/69   
10/23/19 0743  79  97/56  Pain Assessment Pain 1 Pain Scale 1: Numeric (0 - 10) (10/22/19 2000) Pain Intensity 1: 0 (10/22/19 2000) Patient Stated Pain Goal: 0 (10/22/19 1052) Pain Reassessment 1: Patient resting w/respiratory rate greater than 10 (10/14/19 0305) Pain Location 1: Back (10/13/19 0131) Pain Orientation 1: Left;Right (10/12/19 2133) Pain Description 1: Aching; Sore (10/13/19 0131) Pain Intervention(s) 1: Rest;Position (10/13/19 0131) Ambulating No 
 
Additional Information: Pt resting in bed. Dressing to rt hip from bone marrow biopsy, dressing reinforced r/t some bleeding post bath. Dressing may be removed at 10 am 10/24/19 per MD. Pt has been lethargic and semi responsive this shift. Very little PO intake. Pt had dialysis this shift. Pt will need to be consented and educated on chemotherapy in the morning. No other needs at this time. Shift report given to oncoming nurse at the bedside. Tati Beck

## 2019-10-23 NOTE — PROGRESS NOTES
[facility] Hematology & Oncology Inpatient Hematology / Oncology Progress Note Admission Date: 10/12/2019  9:36 PM 
Reason for Admission/Hospital Course: Hypercalcemia [E83.52] Anemia [D64.9] MARCELA (acute kidney injury) ([address] [N17.9] 24 Hour Events: 
BMbx at bedside today Planning to start chemo after dialysis today Wife and son at bedside ROS: sleeping 10 point review of systems is otherwise negative with the exception of the elements mentioned above in the HPI. 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 (G6PD was within reference range when tested on 10/16/19) OBJECTIVE: 
Patient Vitals for the past 8 hrs: 
 BP Pulse 10/23/19 1152 (!) 89/69 72  
10/23/19 0743 97/56 79 Temp (24hrs), Av.6 °F (36.4 °C), Min:97.4 °F (36.3 °C), Max:98 °F (36.7 °C) No intake/output data recorded. Physical Exam: 
Constitutional: Ill-appearing elderly male in no acute distress, lying comfortably in the hospital bed. HEENT: Normocephalic and atraumatic. Oropharynx is clear, mucous membranes are moist. Sclera with icterus. Strabismus Skin Warm and dry. No bruising and no rash noted. No erythema. No pallor. Respiratory Lungs are clear to auscultation bilaterally. O2.   
CVS Irregular rate, irregular rhythm and normal S1 and S2. No murmurs, gallops, or rubs. Abdomen Soft, nontender and nondistended, normoactive bowel sounds. Neuro Lethargic MSK Normal range of motion in general.  No edema and no tenderness. Psych Somnolent Labs: 
   
Recent Labs 10/23/19 
0234 10/22/19 
4554 10/21/19 
3719 WBC 12.0* 11.4* 11.3*  
RBC 3.03* 3.00* 2.69* HGB 9.3* 9.2* 8.3* HCT 28.0* 27.7* 24.6* MCV 92.4 92.3 91.4 MCH 30.7 30.7 30.9 MCHC 33.2 33.2 33.7 RDW 18.6* 18.6* 17.1*  
* 132* 127* GRANS 90* 90* 85* LYMPH 5* 5* 5*  
MONOS 3* 4 5  
EOS 0* 0* 3 BASOS 0 0 0 IG 2 1 2  
 DF AUTOMATED MANUAL MANUAL ANEU 10.8*  --  9.6* ABL 0.6  --  0.6 ABM 0.4  --  0.6 CHAVA 0.0  --  0.3 ABB 0.0  --  0.0 AIG 0.2  --  0.2 Recent Labs 10/23/19 
0234 10/22/19 
1036 10/21/19 
5586 * 132* 135* K 3.8 3.8 3.6 CL 92* 94* 95* CO2 27 25 24 AGAP 15 13 16 * 226* 275* * 78* 99* CREA 7.71* 5.84* 6.60* GFRAA 9* 12* 11* GFRNA 7* 10* 9*  
CA 9.6 9.7 9.8 SGOT 209* 242* 289*  120 110 TP 10.8* 11.0* 10.4* ALB 1.8* 2.0* 1.9*  
GLOB 9.0* 9.0* 8.5* AGRAT 0.2* 0.2* 0.2* MG 2.7* 2.5* 2.9*  
PHOS  --  6.5*  --   
 
 
 
Imaging: 
US RETROPERITONEUM COMP [[id number]] Collected: 10/14/19 8952 Order Status: Completed Updated: 10/14/19 7195 Narrative:    
Renal ultrasound. CLINICAL INDICATION:  Acute on chronic renal disease PROCEDURE: Realtime grayscale color Doppler evaluation of the kidneys and 
bladder. COMPARISON: No prior similar studies available for direct comparison. FINDINGS: The right kidney is normal in size but diffusely increased in 
echogenicity measuring 12.2 cm. A 2.4 cm simple cyst is noted off the midpole. The left kidney is normal in size and diffusely increased in echogenicity 
measuring 11.5 cm. A 1.2 cm simple cyst is noted within the midpole region. There is an indeterminate echogenic focus within the renal parenchyma likely a 
small stone. There is no hydronephrosis. The bladder is unremarkable. The aorta 
is normal in caliber. Impression:    
IMPRESSION:  
1. Bilateral diffuse increased renal cortical echogenicity can be seen with 
medical renal disease. 2. Indeterminate calcifications in the left renal parenchyma. Nephrolithiasis 
favored. 3. No hydronephrosis. 4. Bilateral simple renal cysts. IR BX BONE MARROW DIAGNOSTIC [[id number]] Order Status: No result XR BONE SURVEY LTD (METS) [[id number]] Collected: 10/13/19 1121 Order Status: Completed Updated: 10/13/19 1127 Narrative:    
 History: Low back, lateral rib, and left posterior shoulder pain EXAM: Metastatic bone survey FINDINGS: Innumerable lytic lesions are present throughout the skull, including 
a large lesion within the occipital portion of the calvarium measuring 5 cm. Degenerative change of the cervical, thoracic, and lumbar spine noted without 
additional lytic foci. The included lungs are clear. Multiple lytic lesions seen 
within the left humerus. No definite lytic foci within the femoral bones are 
within the pelvis, though evaluation the pelvis is limited due to overlying 
bowel gas. Impression:    
IMPRESSION: 
 
Innumerable lytic foci within the skull as well as within the left humerus. Findings suggestive of multiple myeloma or metastatic disease. XR CHEST PA LAT [[id number]] Collected: 10/12/19 2328 Order Status: Completed Updated: 10/12/19 2330 Narrative:    
EXAM: Chest x-ray. INDICATION: Cough. COMPARISON: None. TECHNIQUE: Frontal and lateral view chest x-ray. FINDINGS: The lungs are clear. The cardiac size, mediastinal contour and 
pulmonary vasculature are normal. No pneumothorax or pleural effusion is seen. Impression:    
IMPRESSION: No acute process. CT HEAD WITHOUT CONTRAST [[id number]] Collected: 10/12/19 2323 Order Status: Completed Updated: 10/12/19 2330 Narrative:    
EXAM: Noncontrast CT head. INDICATION: Dizziness. COMPARISON: None. TECHNIQUE: Axial noncontrast CT images of the head were obtained.  Radiation 
dose reduction techniques were used for this study.  Our CT scanners use one or 
all of the following:  Automated exposure control, adjustment of the mA and/or 
kV according to patient size, iterative reconstruction. FINDINGS: There is a 3.3 x 2.9 cm lytic soft tissue mass in the left occipital 
bone, which minimally indents the underlying left occipital lobe. There is no 
midline shift or significant mass effect. There are also innumerable smaller subcentimeter round lytic lesions throughout the calvarium. Brain volume is 
appropriate for age. No acute infarct, hemorrhage or evidence of hydrocephalus 
is seen. The basal cisterns are preserved. The visualized paranasal sinuses and 
mastoid air cells are clear. There has been bilateral eye surgery. Impression:    
IMPRESSION:  
1. 3.3 cm lytic soft tissue mass in the left occipital bone, minimally indenting 
the underlying left occipital lobe. In addition, there are innumerable 
subcentimeter lytic lesions throughout the remainder of the calvarium. These 
could relate to metastatic disease or multiple myeloma. 2. No acute infarct or hemorrhage. Medications: 
Current Facility-Administered Medications Medication Dose Route Frequency  lip protectant (BLISTEX) ointment 1 Each  1 Each Topical PRN  
 insulin glargine (LANTUS) injection 13 Units  13 Units SubCUTAneous QHS  insulin lispro (HUMALOG) injection   SubCUTAneous AC&HS  
 lactulose (CHRONULAC) 10 gram/15 mL solution 30 mL  20 g Oral TID  
 0.9% sodium chloride infusion 250 mL  250 mL IntraVENous PRN  
 dilTIAZem (CARDIZEM) IR tablet 60 mg  60 mg Oral AC&HS  hydrALAZINE (APRESOLINE) 20 mg/mL injection 10 mg  10 mg IntraVENous Q6H PRN  
 0.9% sodium chloride infusion 250 mL  250 mL IntraVENous PRN  
 famotidine (PF) (PEPCID) 20 mg in sodium chloride 0.9% 10 mL injection  20 mg IntraVENous DAILY  0.9% sodium chloride infusion 250 mL  250 mL IntraVENous PRN  
 0.9% sodium chloride infusion 250 mL  250 mL IntraVENous PRN  
 allopurinol (ZYLOPRIM) tablet 50 mg  50 mg Oral DAILY  sodium chloride (NS) flush 5-40 mL  5-40 mL IntraVENous Q8H  
 sodium chloride (NS) flush 5-40 mL  5-40 mL IntraVENous PRN  
 acetaminophen (TYLENOL) tablet 650 mg  650 mg Oral Q4H PRN  
 ondansetron (ZOFRAN) injection 4 mg  4 mg IntraVENous Q4H PRN  
 diphenhydrAMINE (BENADRYL) capsule 25 mg  25 mg Oral Q6H PRN  
 
 
 
ASSESSMENT: 
 
 Problem List  Never Reviewed Codes Class Noted Multiple myeloma not having achieved remission ([facility] ICD-10-CM: C90.00 ICD-9-CM: 203.00  10/23/2019 Jaundice ICD-10-CM: R17 
ICD-9-CM: 782.4  10/19/2019 Acquired hemolytic anemia (HCC) ICD-10-CM: D59.9 ICD-9-CM: 283.9  10/19/2019 Methemoglobinemia ICD-10-CM: D74.9 ICD-9-CM: 289.7  10/17/2019 Acute metabolic encephalopathy F-91-LA: G93.41 
ICD-9-CM: 348.31  10/16/2019 Acute respiratory failure with hypoxia [facility]) ICD-10-CM: J96.01 
ICD-9-CM: 518.81  10/15/2019 Pulmonary hypertension (HCC) (Chronic) ICD-10-CM: I27.20 ICD-9-CM: 416.8  10/15/2019 Hyperproteinemia- with likely hyperviscosity ICD-10-CM: E88.09 
ICD-9-CM: 273.8  10/15/2019 Diabetes mellitus (HCC) (Chronic) ICD-10-CM: E11.9 ICD-9-CM: 250.00  10/13/2019 Essential hypertension (Chronic) ICD-10-CM: I10 
ICD-9-CM: 401.9  10/13/2019 Overview Signed 10/13/2019  1:04 AM by Charmayne Living, MD  
  Last Assessment & Plan: Hypertension is unchanged. Continue current treatment regimen. Blood pressure will be reassessed at the next regular appointment. Paroxysmal atrial fibrillation (HCC) (Chronic) ICD-10-CM: I48.0 ICD-9-CM: 427.31  10/13/2019 Overview Signed 10/13/2019  1:04 AM by Charmayne Living, MD  
  Last Assessment & Plan: He has had no recurrences. I still think he remains a bleeding risk. I would like to hold off of [address] for now--and check again on him in 3 months. Continue dilt--but change to 360 mg tabs. * (Principal) Hypercalcemia ICD-10-CM: P95.01 
ICD-9-CM: 275.42  10/12/2019 Anemia ICD-10-CM: D64.9 ICD-9-CM: 285.9  10/12/2019 MARCELA (acute kidney injury) ([facility] ICD-10-CM: N17.9 ICD-9-CM: 584.9  10/12/2019 Mr. Nicolasa Garcia is a 75 y.o. male admitted on 10/12/2019 with a primary diagnosis of hypercalcemia and possible metastatic malignancy/myeloma. MrMickey Baker is a gentleman who has received no formal medical care over the past several years. He stated that he had no primary care physician. Lab data from [facility] in 2017 show a creatinine that evelyn as high as 6.1 and was 2.80 at discharge. His chronic baseline is unknown. As noted, there is a history of diabetes mellitus. For two-three months leading to this admission, he has gradually deteriorated with low back discomfort, forgetfulness, anorexia and 35 pound weight loss. He has noted a growth on the back of his head. He was finally prevailed upon to come to the hospital for evaluation. Data are listed below but he was found to be in acute renal failure, hypercalcemic, anemic and with multiple lytic lesions involving his calvarium including a 3 cm destructive lesion involving the left occipital bone. PLAN: 
Concern for myeloma - We will initiate workup for myeloma based on strong presumption and if negative will pursue other alternatives. - Check skeletal survey, SPEP, serum free light chains, beta 2 microglobulin , LDH, urine IEP, marrow biopsy 10/14 Skeletal survey with innumerable lytic foci w/i the skull and L humerus. SPEP/UPEP/FLC/Beta 2 pending. Awaiting BMbx. 10/15 Kappa FLC 13k. Awaiting BMbx. 
10/16 SPEP with m-spike 4.89. Not stable enough for BMbx at this time. Will go ahead with pulse dose steroids - Dex 40mg IV x 4 days. Had code status discussion, wishes to remain full code, but wife states he would not want to be on vent for an extended period of time. 10/18 On pulse dose steroids, D3 of 4.  
10/19 D4/ 4 pulse dex. 10/20 s/p 4 days of pulse dex 10/22 BMbx then 1/2 dose CyBorD. Bone survey 10/13 Innumerable lytic foci within the skull as well as within the left humerus. Findings suggestive of multiple myeloma or metastatic disease. 10/23 BMbx today at bedside. Dose reduced cycle one chemo today. Velcade/dex/revlimid Hypercalcemia - Treatment of his hypercalcemia may be problematic with elevated creatinine and elevated BNP. Continue fluids and I have added calcitonin. Use Lasix to balance I/O's. Hold of on Zometa for now given creatinine. 10/14 CCa++ down to 12.2. Con't IVF. 10/15 CCa++ 13.1. Con't calcitonin. 10/16 CCa++ up to 14. 
10/18 CCa++ down to 12.5 
10/19 CCa ++ down to 11.8  
10/20 CCa ++ down to 11.6  
10/21 calcium 9.8 resolved MARCELA 
- Nephrology should be on board to manage what is likely acute kidney injury from ? myeloma, hypercalcemia, etc. Superimposed on CRF. Hopefully this will reverse but dialysis could be an equally potential outcome. 10/14 Cr 4.6. Renal US c/w medical renal disease and possible L nephrolithiasis. Neph consult pending. 10/16 Cr 5.01. Neph following. 10/17 Started dialysis yesterday. Cr 4.72 
10/18 Cr 5.07. Continues on dialysis. 10/19 Cr. 4.06 today. Dialysis today. 10/20 Cr 4.13 today, continues on dialysis. 10/23 per nephrology Dyspnea / hypoxia 10/15 On Optiflow. CXR/CT chest pending. Pulm following. On Azith/Roland. On empiric heparin gtt. . Echo with dilated RV and pulm HTN. 
10/16 CXR with volume overload. Plans for VQ scan when more stable. Pulm following. 10/17 Transferred to ICU for resp distress, now on BiPAP. On Azith/Rocephin. 10/18 on Hiflow O2. On Azith/Rocephin. 10/19 on nasal cannula. Continues Azith/Rocephin  
10/20 still on nasal cannula/azith/rocephin. Anemia 10/16 Transfuse 1 unit PRBCs 
10/17 Hgb 6.7 s/p 2 units. Check hemolysis labs. PBRCs ordered. 10/18 Hgb 7.2 s/p tx. OK to give PRBCs today with dialysis. Hemolytic anemia noted. Hapto <8. Check ricardo. 10/19 Hgb up to 8.1 today. Ricardo neg. 10/20 Hgb stable at 8.4 today 10/23 Transfuse per Aga SOPs. Hyperbilirubinemia 10/17 Bili up to 10.9. Check hemolysis labs inc frac bili. 10/18 Bili 16.6. Frac bili mostly direct. RUQ US pending. 10/19 Bili up to 20. ALT worse today at 210,  down from 609. US showed gallbladder wall thickening, hepatomegaly which has worsened since 2017, and right renal parenchymal hyperechogenicity. Ammonia elevated this AM at 32- GI added lactulose 10/21 Bilirubin up to 21 today. MRCP liver. GI has signed off.  
10/22 MRCP: Iron deposition within the liver and spleen. The liver appears morphologically normal. Gallbladder sludge. No biliary dilation. Primary checking Tsat and ferritin and also will see if GI would do ERCP for gallbladder sludge which can potentially be beneficial especially with the starting of chemo Methemoglobinemia 
- s/p methylene blue 10/16 Hyperuricemia 10/22 received rasburicase 10/14 and 10/21. Rasburicase can result in methemoglobinemia in some patients so it is   
now listed as an allergy. Afib/hypotension 10/23 consult cardiolgy 10/22/2019 We had a lengthy discussion with patient's son Shruthi Orr ([phone])  (Dr. Kerry Das also present). With patient's permission, conversation occurred outside of patient room. His son was advised of the likely prognosis of MM as well prognosis with and without treatment. He discussed with family and his father and their wish is to proceed with BMbx then treatment. Dr. Author Farrell will perform bx at bedside in am.  He will need to be transferred to 5th floor. Also discussed code status and patient is now DNR. Reyes Settles, NP [facility] Hematology & Oncology [id number] [address] Office : [phone] Fax : [phone] I personally saw, exammed and counselled the patient, and discussed with NP, agree with above history/assessment/plan. 75 y. o.male reportedly of good baseline PS developed weight loss and head mass and admitted with hypercalcemia and MARCELA, found bone lesions, high M spike 4.89, acute high direct bilirubinemia, elevated uric acid and received rasburicase.  I discussed wit pt and wife that pt has a fatal cancer of myeloma but treatment would be of very high risk given his comorbidities macario hyperbilirubinemia, considered liver failure and GI signed off, but no evidence of significant acute liver damage, check liver MRCP showed much gall bladder sludge. Dr. Callie Duncan and I had an extensive discussion with son and clarified his severe condition would be fatal if no cancer rx is pursue, although chemotherapy would be of very high risk, he agreed with DNR status, and discussed with pt regarding his goal and all desired to pursue chemo, marrow biopsied 10/23/19 and proceed with VRD and dose reduce velcade to 0.7mg/m2 sc, again discussed risk. Appreciate cardio input for afib and hypotension. 
  
 
Marleni Pulliam M.D. [address] Office : [phone]

## 2019-10-23 NOTE — PROGRESS NOTES
's follow-up visit requested by Palliative Care. Mr. Nette Whitley was in the ICU prior to this recent admission to the 5th floor. Upon arrival to patient's room Mr. Nette Whitley was resting with his eyes closed and his wife and son were at bedside. I conveyed care and concern and empathically listened as family share about Mr. Weber's hospital stay, Donivan Barges, and their lives together. The family spoke of their firm foundation and decision to proceed with the same care plan until God gives them reason to change course. I offered several spiritual interventions, including affirmation of ulisses & emotions, exploration of coping skills, and prayer as requested. The family expressed appreciation for the visit stating that they felt encouraged by my care. Chaplains remain available for support. Anand Jennings MDiv Board Certified [employer]

## 2019-10-23 NOTE — DIALYSIS
TRANSFER OUT- DIALYSIS Hemodialysis treatment completed. Unable to tolerate fluid removal today d/t low bps during treatment. See Flowsheet. Patient lethargic but open eyes to voice. and VS stable  Post Treatment /51  P 73   
 
 0 Kgs removed. Flushed both ports with 10 mL of NS.  CVC dressing clean, dry, and intact, tego caps intact, bilateral lumens wrapped with 4x4 gauze. Patient to 534 after dialysis.

## 2019-10-23 NOTE — PROGRESS NOTES
Nutrition follow-up:  
Attempted follow-up with patient today. Patient was off the floor for HD. Will attempt follow-up tomorrow. [address] [place] [address], [address], [address] [facility]

## 2019-10-23 NOTE — PROGRESS NOTES
SPEECH PATHOLOGY NOTE: 
 
Attempted to see patient for dysphagia treatment this AM. Patient sleeping upon arrival with family at bedside. Family requested treatment be held as bone marrow biopsy has just been completed and is leaving for dialysis soon. Will follow up at later date as patient is available to participate.  
 
 
 
Cely Antoine MS, CCC-SLP

## 2019-10-23 NOTE — PROGRESS NOTES
Patient now s/p BM biopsy and started on chemo for high suspicion of MM. Have d/w Oncology - agreeable to assume care. Hospitalist will sign off. Please call for any questions.

## 2019-10-23 NOTE — PROGRESS NOTES
Problem: Falls - Risk of 
Goal: *Absence of Falls Description Document Abdiaziz Sim Fall Risk and appropriate interventions in the flowsheet. Outcome: Progressing Towards Goal 
Note:  
Fall Risk Interventions: 
Mobility Interventions: Communicate number of staff needed for ambulation/transfer, Patient to call before getting OOB Mentation Interventions: Adequate sleep, hydration, pain control, Increase mobility Medication Interventions: Patient to call before getting OOB, Evaluate medications/consider consulting pharmacy Elimination Interventions: Call light in reach, Patient to call for help with toileting needs

## 2019-10-23 NOTE — DIALYSIS
TRANSFER IN - DIALYSIS Received patient in dialysis unit  from [facility]unit[facility] for ordered procedure. Consent verified for renal replacement therapy. Patient lethargic, opens eyes to voice and commands. Vital signs stable. /53 P57 Hemodialysis initiated using right I J catheter. Aspirated and flushed both ports without difficulty. Dressing clean, dry and intact. Machine settings per MD order. Heparin 0 unit bolus and 0 units/hr. Will monitor during treatment.

## 2019-10-23 NOTE — PROGRESS NOTES
Problem: Falls - Risk of 
Goal: *Absence of Falls Description Document Jackie Ugalde Fall Risk and appropriate interventions in the flowsheet. Outcome: Progressing Towards Goal 
Note:  
Fall Risk Interventions: 
Mobility Interventions: Communicate number of staff needed for ambulation/transfer, Patient to call before getting OOB Mentation Interventions: Adequate sleep, hydration, pain control, Increase mobility Medication Interventions: Patient to call before getting OOB, Evaluate medications/consider consulting pharmacy Elimination Interventions: Call light in reach, Patient to call for help with toileting needs Problem: Patient Education: Go to Patient Education Activity Goal: Patient/Family Education Outcome: Progressing Towards Goal 
  
Problem: Pressure Injury - Risk of 
Goal: *Prevention of pressure injury Description Document Adalid Scale and appropriate interventions in the flowsheet. Outcome: Progressing Towards Goal 
Note:  
Pressure Injury Interventions: 
Sensory Interventions: Avoid rigorous massage over bony prominences, Keep linens dry and wrinkle-free, Maintain/enhance activity level Moisture Interventions: Assess need for specialty bed, Apply protective barrier, creams and emollients, Maintain skin hydration (lotion/cream) Activity Interventions: Pressure redistribution bed/mattress(bed type), Increase time out of bed Mobility Interventions: Pressure redistribution bed/mattress (bed type) Nutrition Interventions: Document food/fluid/supplement intake Friction and Shear Interventions: Apply protective barrier, creams and emollients, Foam dressings/transparent film/skin sealants Problem: Patient Education: Go to Patient Education Activity Goal: Patient/Family Education Outcome: Progressing Towards Goal 
  
Problem: Gas Exchange - Impaired Goal: *Absence of hypoxia Outcome: Progressing Towards Goal 
  
Problem: Delirium Treatment Goal: *Absence of falls Outcome: Progressing Towards Goal 
Goal: Interventions Outcome: Progressing Towards Goal 
  
Problem: Patient Education: Go to Patient Education Activity Goal: Patient/Family Education Outcome: Progressing Towards Goal 
  
Problem: Dysphagia (Adult) Goal: *Speech Goal: (INSERT TEXT) Description LTG: Patient will tolerate least restrictive diet without overt signs or symptoms of airway compromise. STG: Patient will tolerate puree diet and thin liquids without overt signs or symptoms of airway compromise. STG: Patient will participate po trials of chewable textures for possible diet upgrade. Outcome: Progressing Towards Goal 
  
Problem: Patient Education: Go to Patient Education Activity Goal: Patient/Family Education Outcome: Progressing Towards Goal 
  
Problem: Nutrition Deficit Goal: *Optimize nutritional status Outcome: Progressing Towards Goal

## 2019-10-23 NOTE — PROGRESS NOTES
[facility] PROGRESS NOTE Follow up for: MARCELA Subjective:  
Patient seen and examined on dialysis. Goal UF 2 kg. VSS. Tolerating well so far. S/p bone marrow biopsy this am. Lethargic and confused. Catheter poorly functioning. Sluggish blood flows. Needs tunneled catheter. ROS: 
UTO due to confusion Objective:  
Exam: 
Vitals:  
 10/22/19 2310 10/23/19 0348 10/23/19 0743 10/23/19 1152 BP: 101/52 98/57 97/56 (!) 89/69 Pulse: 87 70 79 72 Resp: 16 16 Temp: 97.5 °F (36.4 °C) 97.4 °F (36.3 °C) SpO2: 100% 98% Weight:      
Height:      
 
 
 
Intake/Output Summary (Last 24 hours) at 10/23/2019 1201 Last data filed at 10/22/2019 2310 Gross per 24 hour Intake 180 ml Output  Net 180 ml  
 
 
Current Facility-Administered Medications Medication Dose Route Frequency  lip protectant (BLISTEX) ointment 1 Each  1 Each Topical PRN  
 insulin glargine (LANTUS) injection 13 Units  13 Units SubCUTAneous QHS  insulin lispro (HUMALOG) injection   SubCUTAneous AC&HS  
 lactulose (CHRONULAC) 10 gram/15 mL solution 30 mL  20 g Oral TID  
 0.9% sodium chloride infusion 250 mL  250 mL IntraVENous PRN  
 dilTIAZem (CARDIZEM) IR tablet 60 mg  60 mg Oral AC&HS  hydrALAZINE (APRESOLINE) 20 mg/mL injection 10 mg  10 mg IntraVENous Q6H PRN  
 0.9% sodium chloride infusion 250 mL  250 mL IntraVENous PRN  
 famotidine (PF) (PEPCID) 20 mg in sodium chloride 0.9% 10 mL injection  20 mg IntraVENous DAILY  0.9% sodium chloride infusion 250 mL  250 mL IntraVENous PRN  
 0.9% sodium chloride infusion 250 mL  250 mL IntraVENous PRN  
 allopurinol (ZYLOPRIM) tablet 50 mg  50 mg Oral DAILY  sodium chloride (NS) flush 5-40 mL  5-40 mL IntraVENous Q8H  
 sodium chloride (NS) flush 5-40 mL  5-40 mL IntraVENous PRN  
 acetaminophen (TYLENOL) tablet 650 mg  650 mg Oral Q4H PRN  
 ondansetron (ZOFRAN) injection 4 mg  4 mg IntraVENous Q4H PRN  
  diphenhydrAMINE (BENADRYL) capsule 25 mg  25 mg Oral Q6H PRN  
 
 
EXAM 
GEN - on oxygen, confused, no distress HEENT - sclera with icterus CV - S1, S2, RRR, no rub, murmur, or gallop Lung - CTA bilaterally Abd - soft, nontender, BS present Ext - no edema Recent Labs 10/23/19 
0234 10/22/19 
8339 10/21/19 
9202 WBC 12.0* 11.4* 11.3* HGB 9.3* 9.2* 8.3* HCT 28.0* 27.7* 24.6*  
* 132* 127* Recent Labs 10/23/19 
0234 10/22/19 
3099 10/21/19 
7472 * 132* 135* K 3.8 3.8 3.6 CL 92* 94* 95* CO2 27 25 24 * 78* 99* CREA 7.71* 5.84* 6.60* CA 9.6 9.7 9.8 * 226* 275* MG 2.7* 2.5* 2.9*  
PHOS  --  6.5*  --   
 
 
Assessment and Plan:  
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 M-spike. Skeltal survey with innumerable lytic foci in the skull and left humerus, CT head with multiple lytic lesions involving the calvarium including a 3 cm destructive lesion involving the left occipital bone 
- started HD 10/16/19 
- HD today for clearance and volume 
- Started on decadron per oncology 
- needs temp cath changed to tunneled cath this week - ordered Likely Multiple Myeloma 
- s/p pulse dose decadron 
- bone marrow biopsy 10/23 
- heme/onc following Hypercalcemia - Better with dialysis Mild Hyponatremia 
- improving with hd Hyperuricemia  
- Received rasburicase Methemoglobinemia 
- s/p methylene blue 10/16 Abnormal LFTs 
- progressive jaundice of unclear etiology - Seen by GI - felt multifactorial. No evidence of obstruction. Not much to offer -  MRCP showing iron deposition likely consequence of hemolytic anemia. Ferritin high in setting of acute phase reactant. No role for ERCP per GI 
- On lactulose for elevated ammonia Anemia and thrombocytopenia 
- transfusions per oncology 
- hemolysis workup positive SENIA [place]

## 2019-10-23 NOTE — PROGRESS NOTES
[facility] Progress Note Admit Date:  10/12/2019 Today's Date:  10/23/2019 CC:  Hyperbilibubinemia Subjective:  
 
Patient: Asleep after procedure. Family at bedside. Medications:  
Current Facility-Administered Medications Medication Dose Route Frequency  lidocaine (XYLOCAINE) 10 mg/mL (1 %) injection 1 mL  10 mg IntraDERMal ONCE  
 lip protectant (BLISTEX) ointment 1 Each  1 Each Topical PRN  
 insulin glargine (LANTUS) injection 13 Units  13 Units SubCUTAneous QHS  insulin lispro (HUMALOG) injection   SubCUTAneous AC&HS  
 lactulose (CHRONULAC) 10 gram/15 mL solution 30 mL  20 g Oral TID  
 0.9% sodium chloride infusion 250 mL  250 mL IntraVENous PRN  
 dilTIAZem (CARDIZEM) IR tablet 60 mg  60 mg Oral AC&HS  hydrALAZINE (APRESOLINE) 20 mg/mL injection 10 mg  10 mg IntraVENous Q6H PRN  
 0.9% sodium chloride infusion 250 mL  250 mL IntraVENous PRN  
 famotidine (PF) (PEPCID) 20 mg in sodium chloride 0.9% 10 mL injection  20 mg IntraVENous DAILY  0.9% sodium chloride infusion 250 mL  250 mL IntraVENous PRN  
 0.9% sodium chloride infusion 250 mL  250 mL IntraVENous PRN  
 allopurinol (ZYLOPRIM) tablet 50 mg  50 mg Oral DAILY  sodium chloride (NS) flush 5-40 mL  5-40 mL IntraVENous Q8H  
 sodium chloride (NS) flush 5-40 mL  5-40 mL IntraVENous PRN  
 acetaminophen (TYLENOL) tablet 650 mg  650 mg Oral Q4H PRN  
 ondansetron (ZOFRAN) injection 4 mg  4 mg IntraVENous Q4H PRN  
 diphenhydrAMINE (BENADRYL) capsule 25 mg  25 mg Oral Q6H PRN Review of Systems: ROS was obtained, with pertinent positives as listed above. No chest pain or SOB. Diet:  Pureed; nutritional supplements Objective:  
Vitals: 
Visit Vitals BP 97/56 Pulse 79 Temp 97.4 °F (36.3 °C) Resp 16 Ht 6' (1.829 m) Wt 93.9 kg (207 lb) SpO2 98% BMI 28.07 kg/m² Intake/Output: 
No intake/output data recorded. 10/21 1901 - 10/23 0700 In: 180 [P.O.:180] Out: - Exam: 
General appearance: alert, cooperative, no distress SLEEPING FOLLOWING PROCEDURE; JAUNDICE Lungs: clear to auscultation bilaterally anteriorly Heart: regular rate and rhythm Abdomen: soft, non-tender. Bowel sounds normal. No masses, no organomegaly Extremities: extremities normal, atraumatic, no cyanosis or edema Data Review (Labs):   
Recent Labs 10/23/19 
0234 10/22/19 
1553 10/22/19 
0606 10/21/19 
1463 WBC 12.0*  --  11.4* 11.3* HGB 9.3*  --  9.2* 8.3* HCT 28.0*  --  27.7* 24.6*  
*  --  132* 127* MCV 92.4  --  92.3 91.4 *  --  132* 135* K 3.8  --  3.8 3.6 CL 92*  --  94* 95* CO2 27  --  25 24 *  --  78* 99* CREA 7.71*  --  5.84* 6.60* CA 9.6  --  9.7 9.8 MG 2.7*  --  2.5* 2.9*  
*  --  226* 275*   --  120 110 SGOT 209*  --  242* 289* *  --  190* 208* TBILI 21.0*  --  19.5* 21.0* ALB 1.8*  --  2.0* 1.9* TP 10.8*  --  11.0* 10.4* PTP  --  20.8*  --   --   
INR  --  1.7  --   --   
 
 
Assessment:  
 
Principal Problem: Hypercalcemia (10/12/2019) Active Problems: 
  Anemia (10/12/2019) MARCELA (acute kidney injury) ([facility] [address] (10/12/2019) Acute respiratory failure with hypoxia ([facility] [address] (10/15/2019) Pulmonary hypertension ([facility] [address] (10/15/2019) Hyperproteinemia- with likely hyperviscosity (10/15/2019) Acute metabolic encephalopathy (81/37/4003) Methemoglobinemia (10/17/2019) Jaundice (10/19/2019) Acquired hemolytic anemia ([address] (10/19/2019) 76 y/o male p/w malaise and weakness, found with hypercalcemia, anemia, MARCELA - all concerning for probable multiple myeloma with myeloma kidney/light chain cast nephropathy. This has been complicated by abnormal LFTS, hemolytic anemia and methemoglobinemia. Patient received Rasburicase on 10/14 and 10/21 and now listed as an allergy as this medication can result in methemoglobinemia/hyperbilirubinemia.   MRCP 10/22: Iron deposition within the liver and spleen. The liver appears morphologically normal. Gallbladder sludge. No biliary dilation. Hepatic iron deposition is likely secondary to hemolytic anemia. Hematology requested possible ERCP as a potential net benefit to patient prior to starting chemo and in the setting of liver failure. Unfortunately, there is nothing on imaging to suggest large duct obstruction. Increased bilirubin likely from intrahepatic source rather than obstructive process, therefore, no role for ERCP. Plan:  
 
-continue Lactulose 
-No plans for ERCP 
-Serial LFTS 
-Case discussed with Dr. Crystal Cosme, hospitalist and Dr. Jennifer Aburto, oncologist 
-We will sign off. Please call for further questions. Hans Shah. Dairoren Lunch in collaboration with Dr. Idalia Prather [facility]

## 2019-10-23 NOTE — DIALYSIS
Pt not tolerating fluid removal during dialysis d/t low bps (76/51) and see VS flowsheet. 300 NS given for BP support. BP recheck 90/43 P 59  Will continue to monitor.

## 2019-10-23 NOTE — PROGRESS NOTES
PT NOTE: 
 
Pt off floor for BM biopsy and then off floor to dialysis. Will check back as schedule allows.  
 
Gal Ellison, PTA

## 2019-10-24 NOTE — PROGRESS NOTES
[facility] Inpatient Hematology / Oncology Progress Note Admission Date: 10/12/2019  9:36 PM 
Reason for Admission/Hospital Course: Hypercalcemia [E83.52] Anemia [D64.9] MARCELA (acute kidney injury) ([address] [N17.9] 24 Hour Events: 
BMbx 10/23 Chemo today after family consent and chemo ed More awake today Wife at bedside-states he is eating and drinking well ROS: Lethargic 10 point review of systems is otherwise negative with the exception of the elements mentioned above in the HPI. 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 (G6PD was within reference range when tested on 10/16/19) OBJECTIVE: 
Patient Vitals for the past 8 hrs: 
 BP Temp Pulse Resp SpO2  
10/24/19 1004 113/49      
10/24/19 0715 (!) 84/50 97.9 °F (36.6 °C) (!) 106 16 98 % 10/24/19 0301 126/88 97.9 °F (36.6 °C) (!) 105 18 100 % Temp (24hrs), Av °F (36.7 °C), Min:97.7 °F (36.5 °C), Max:98.3 °F (36.8 °C) 
 
10/24 0701 - 10/24 1900 In: 250 [P.O.:250] Out: - Physical Exam: 
Constitutional: Ill-appearing elderly male in no acute distress, lying comfortably in the hospital bed. HEENT: Normocephalic and atraumatic. Oropharynx is clear, mucous membranes are moist. Sclera with icterus. Strabismus Skin Warm and dry. No bruising and no rash noted. No erythema. No pallor. Respiratory Lungs are clear to auscultation bilaterally. CVS Irregular rate, irregular rhythm and normal S1 and S2. No murmurs, gallops, or rubs. Abdomen Soft, nontender and nondistended, normoactive bowel sounds. Neuro More awake today MSK Normal range of motion in general.  No edema and no tenderness. Psych Calm and cooperative Labs: 
   
Recent Labs 10/24/19 
0302 10/23/19 
0234 10/22/19 
2537 WBC 11.6* 12.0* 11.4*  
RBC 2.68* 3.03* 3.00* HGB 8.2* 9.3* 9.2* HCT 24.3* 28.0* 27.7*  
MCV 90.7 92.4 92.3 MCH 30.6 30.7 30.7 MCHC 33.7 33.2 33.2 RDW 18.9* 18.6* 18.6*  
* 133* 132* GRANS 89* 90* 90* LYMPH 7* 5* 5*  
MONOS 3* 3* 4  
EOS 0* 0* 0*  
BASOS 0 0 0 IG 1 2 1 DF AUTOMATED AUTOMATED MANUAL ANEU 10.4* 10.8*  -- ABL 0.8 0.6  --   
ABM 0.3 0.4  --   
CHAVA 0.0 0.0  --   
ABB 0.0 0.0  --   
AIG 0.1 0.2  --   
 
  
Recent Labs 10/24/19 
0302 10/23/19 
0234 10/22/19 
5759 * 134* 132* K 4.0 3.8 3.8 CL 95* 92* 94* CO2 27 27 25 AGAP 11 15 13 * 152* 226* BUN 64* 101* 78* CREA 5.83* 7.71* 5.84* GFRAA 12* 9* 12* GFRNA 10* 7* 10* CA 9.0 9.6 9.7 SGOT 397* 209* 242* * 132 120  
TP 9.2* 10.8* 11.0* ALB 1.6* 1.8* 2.0*  
GLOB 7.6* 9.0* 9.0* AGRAT 0.2* 0.2* 0.2* MG 2.1 2.7* 2.5* PHOS  --   --  6.5* Imaging: 
Jaime Dillard [[id number]] Collected: 10/14/19 3416 Order Status: Completed Updated: 10/14/19 1465 Narrative:    
Renal ultrasound. CLINICAL INDICATION:  Acute on chronic renal disease PROCEDURE: Realtime grayscale color Doppler evaluation of the kidneys and 
bladder. COMPARISON: No prior similar studies available for direct comparison. FINDINGS: The right kidney is normal in size but diffusely increased in 
echogenicity measuring 12.2 cm. A 2.4 cm simple cyst is noted off the midpole. The left kidney is normal in size and diffusely increased in echogenicity 
measuring 11.5 cm. A 1.2 cm simple cyst is noted within the midpole region. There is an indeterminate echogenic focus within the renal parenchyma likely a 
small stone. There is no hydronephrosis. The bladder is unremarkable. The aorta 
is normal in caliber. Impression:    
IMPRESSION:  
1. Bilateral diffuse increased renal cortical echogenicity can be seen with 
medical renal disease. 2. Indeterminate calcifications in the left renal parenchyma. Nephrolithiasis 
favored. 3. No hydronephrosis. 4. Bilateral simple renal cysts. IR BX BONE MARROW DIAGNOSTIC [[id number]] Order Status: No result XR BONE SURVEY LTD ([facility]) [[id number]] Collected: 10/13/19 1121 Order Status: Completed Updated: 10/13/19 1127 Narrative:    
History: Low back, lateral rib, and left posterior shoulder pain EXAM: Metastatic bone survey FINDINGS: Innumerable lytic lesions are present throughout the skull, including 
a large lesion within the occipital portion of the calvarium measuring 5 cm. Degenerative change of the cervical, thoracic, and lumbar spine noted without 
additional lytic foci. The included lungs are clear. Multiple lytic lesions seen 
within the left humerus. No definite lytic foci within the femoral bones are 
within the pelvis, though evaluation the pelvis is limited due to overlying 
bowel gas. Impression:    
IMPRESSION: 
 
Innumerable lytic foci within the skull as well as within the left humerus. Findings suggestive of multiple myeloma or metastatic disease. XR CHEST PA LAT [[id number]] Collected: 10/12/19 2328 Order Status: Completed Updated: 10/12/19 2330 Narrative:    
EXAM: Chest x-ray. INDICATION: Cough. COMPARISON: None. TECHNIQUE: Frontal and lateral view chest x-ray. FINDINGS: The lungs are clear. The cardiac size, mediastinal contour and 
pulmonary vasculature are normal. No pneumothorax or pleural effusion is seen. Impression:    
IMPRESSION: No acute process. CT HEAD WITHOUT CONTRAST [[id number]] Collected: 10/12/19 2323 Order Status: Completed Updated: 10/12/19 2330 Narrative:    
EXAM: Noncontrast CT head. INDICATION: Dizziness. COMPARISON: None. TECHNIQUE: Axial noncontrast CT images of the head were obtained.  Radiation 
dose reduction techniques were used for this study.  Our CT scanners use one or 
all of the following:  Automated exposure control, adjustment of the mA and/or 
kV according to patient size, iterative reconstruction.  
 
FINDINGS: There is a 3.3 x 2.9 cm lytic soft tissue mass in the left occipital 
 bone, which minimally indents the underlying left occipital lobe. There is no 
midline shift or significant mass effect. There are also innumerable smaller 
subcentimeter round lytic lesions throughout the calvarium. Brain volume is 
appropriate for age. No acute infarct, hemorrhage or evidence of hydrocephalus 
is seen. The basal cisterns are preserved. The visualized paranasal sinuses and 
mastoid air cells are clear. There has been bilateral eye surgery. Impression:    
IMPRESSION:  
1. 3.3 cm lytic soft tissue mass in the left occipital bone, minimally indenting 
the underlying left occipital lobe. In addition, there are innumerable 
subcentimeter lytic lesions throughout the remainder of the calvarium. These 
could relate to metastatic disease or multiple myeloma. 2. No acute infarct or hemorrhage. Medications: 
Current Facility-Administered Medications Medication Dose Route Frequency  0.9% sodium chloride infusion  125 mL/hr IntraVENous CONTINUOUS  
 lip protectant (BLISTEX) ointment 1 Each  1 Each Topical PRN  
 insulin glargine (LANTUS) injection 13 Units  13 Units SubCUTAneous QHS  insulin lispro (HUMALOG) injection   SubCUTAneous AC&HS  
 lactulose (CHRONULAC) 10 gram/15 mL solution 30 mL  20 g Oral TID  
 0.9% sodium chloride infusion 250 mL  250 mL IntraVENous PRN  
 dilTIAZem (CARDIZEM) IR tablet 60 mg  60 mg Oral AC&HS  hydrALAZINE (APRESOLINE) 20 mg/mL injection 10 mg  10 mg IntraVENous Q6H PRN  
 0.9% sodium chloride infusion 250 mL  250 mL IntraVENous PRN  
 famotidine (PF) (PEPCID) 20 mg in sodium chloride 0.9% 10 mL injection  20 mg IntraVENous DAILY  0.9% sodium chloride infusion 250 mL  250 mL IntraVENous PRN  
 0.9% sodium chloride infusion 250 mL  250 mL IntraVENous PRN  
 allopurinol (ZYLOPRIM) tablet 50 mg  50 mg Oral DAILY  sodium chloride (NS) flush 5-40 mL  5-40 mL IntraVENous Q8H  
 sodium chloride (NS) flush 5-40 mL  5-40 mL IntraVENous PRN  
  acetaminophen (TYLENOL) tablet 650 mg  650 mg Oral Q4H PRN  
 ondansetron (ZOFRAN) injection 4 mg  4 mg IntraVENous Q4H PRN  
 diphenhydrAMINE (BENADRYL) capsule 25 mg  25 mg Oral Q6H PRN  
 
 
 
ASSESSMENT: 
 
Problem List  Never Reviewed Codes Class Noted Multiple myeloma not having achieved remission ([facility] [address] ICD-10-CM: C90.00 ICD-9-CM: 203.00  10/23/2019 Jaundice ICD-10-CM: R17 
ICD-9-CM: 782.4  10/19/2019 Acquired hemolytic anemia (HCC) ICD-10-CM: D59.9 ICD-9-CM: 283.9  10/19/2019 Methemoglobinemia ICD-10-CM: D74.9 ICD-9-CM: 289.7  10/17/2019 Acute metabolic encephalopathy FRX-94-WX: G93.41 
ICD-9-CM: 348.31  10/16/2019 Acute respiratory failure with hypoxia [facility]) ICD-10-CM: J96.01 
ICD-9-CM: 518.81  10/15/2019 Pulmonary hypertension (HCC) (Chronic) ICD-10-CM: I27.20 ICD-9-CM: 416.8  10/15/2019 Hyperproteinemia- with likely hyperviscosity ICD-10-CM: E88.09 
ICD-9-CM: 273.8  10/15/2019 Diabetes mellitus (HCC) (Chronic) ICD-10-CM: E11.9 ICD-9-CM: 250.00  10/13/2019 Essential hypertension (Chronic) ICD-10-CM: I10 
ICD-9-CM: 401.9  10/13/2019 Overview Signed 10/13/2019  1:04 AM by Daniel Broderick MD  
  Last Assessment & Plan: Hypertension is unchanged. Continue current treatment regimen. Blood pressure will be reassessed at the next regular appointment. Paroxysmal atrial fibrillation (HCC) (Chronic) ICD-10-CM: I48.0 ICD-9-CM: 427.31  10/13/2019 Overview Signed 10/13/2019  1:04 AM by Daniel Broderick MD  
  Last Assessment & Plan: He has had no recurrences. I still think he remains a bleeding risk. I would like to hold off of [address] for now--and check again on him in 3 months. Continue dilt--but change to 360 mg tabs. * (Principal) Hypercalcemia ICD-10-CM: W63.10 
ICD-9-CM: 275.42  10/12/2019 Anemia ICD-10-CM: D64.9 ICD-9-CM: 285.9  10/12/2019 MARCELA (acute kidney injury) ([facility] [address] ICD-10-CM: N17.9 ICD-9-CM: 584.9  10/12/2019 Mr. Wander Sanchez is a 75 y.o. male admitted on 10/12/2019 with a primary diagnosis of hypercalcemia and possible metastatic malignancy/myeloma. Mr. Wander Sanchez is a gentleman who has received no formal medical care over the past several years. He stated that he had no primary care physician. Lab data from [facility] in 2017 show a creatinine that evelyn as high as 6.1 and was 2.80 at discharge. His chronic baseline is unknown. As noted, there is a history of diabetes mellitus. For two-three months leading to this admission, he has gradually deteriorated with low back discomfort, forgetfulness, anorexia and 35 pound weight loss. He has noted a growth on the back of his head. He was finally prevailed upon to come to the hospital for evaluation. Data are listed below but he was found to be in acute renal failure, hypercalcemic, anemic and with multiple lytic lesions involving his calvarium including a 3 cm destructive lesion involving the left occipital bone. PLAN: 
Concern for myeloma - We will initiate workup for myeloma based on strong presumption and if negative will pursue other alternatives. - Check skeletal survey, SPEP, serum free light chains, beta 2 microglobulin , LDH, urine IEP, marrow biopsy 10/14 Skeletal survey with innumerable lytic foci w/i the skull and L humerus. SPEP/UPEP/FLC/Beta 2 pending. Awaiting BMbx. 10/15 Kappa FLC 13k. Awaiting BMbx. 
10/16 SPEP with m-spike 4.89. Not stable enough for BMbx at this time. Will go ahead with pulse dose steroids - Dex 40mg IV x 4 days. Had code status discussion, wishes to remain full code, but wife states he would not want to be on vent for an extended period of time. 10/18 On pulse dose steroids, D3 of 4.  
10/19 D4/ 4 pulse dex. 10/20 s/p 4 days of pulse dex 10/22 BMbx then 1/2 dose CyBorD.  Bone survey 10/13 Innumerable lytic foci within the skull as well as within the left humerus. Findings suggestive of multiple myeloma or metastatic disease. 10/23 BMbx today at bedside. Dose reduced cycle one chemo today. Velcade/dex/revlimid 10/24 Chemo today after family consent and chemo ed Hypercalcemia - Treatment of his hypercalcemia may be problematic with elevated creatinine and elevated BNP. Continue fluids and I have added calcitonin. Use Lasix to balance I/O's. Hold of on Zometa for now given creatinine. 10/14 CCa++ down to 12.2. Con't IVF. 10/15 CCa++ 13.1. Con't calcitonin. 10/16 CCa++ up to 14. 
10/18 CCa++ down to 12.5 
10/19 CCa ++ down to 11.8  
10/20 CCa ++ down to 11.6  
10/21 calcium 9.8 resolved MARCELA 
- Nephrology should be on board to manage what is likely acute kidney injury from ? myeloma, hypercalcemia, etc. Superimposed on CRF. Hopefully this will reverse but dialysis could be an equally potential outcome. 10/14 Cr 4.6. Renal US c/w medical renal disease and possible L nephrolithiasis. Neph consult pending. 10/16 Cr 5.01. Neph following. 10/17 Started dialysis yesterday. Cr 4.72 
10/18 Cr 5.07. Continues on dialysis. 10/19 Cr. 4.06 today. Dialysis today. 10/20 Cr 4.13 today, continues on dialysis. 10/23 per nephrology Dyspnea / hypoxia 10/15 On Optiflow. CXR/CT chest pending. Pulm following. On Azith/Roland. On empiric heparin gtt. . Echo with dilated RV and pulm HTN. 
10/16 CXR with volume overload. Plans for VQ scan when more stable. Pulm following. 10/17 Transferred to ICU for resp distress, now on BiPAP. On Azith/Rocephin. 10/18 on Hiflow O2. On Azith/Rocephin. 10/19 on nasal cannula. Continues Azith/Rocephin  
10/20 still on nasal cannula/azith/rocephin. Anemia 10/16 Transfuse 1 unit PRBCs 
10/17 Hgb 6.7 s/p 2 units. Check hemolysis labs. PBRCs ordered. 10/18 Hgb 7.2 s/p tx. OK to give PRBCs today with dialysis. Hemolytic anemia noted. Hapto <8. Check ricardo. 10/19 Hgb up to 8.1 today. Akhil neg. 10/20 Hgb stable at 8.4 today 10/23 Transfuse per Aga SOPs. Hyperbilirubinemia 10/17 Bili up to 10.9. Check hemolysis labs inc frac bili. 10/18 Bili 16.6. Frac bili mostly direct. RUQ US pending. 10/19 Bili up to 20. ALT worse today at 210,  down from 609. US showed gallbladder wall thickening, hepatomegaly which has worsened since 2017, and right renal parenchymal hyperechogenicity. Ammonia elevated this AM at 32- GI added lactulose 10/21 Bilirubin up to 21 today. MRCP liver. GI has signed off.  
10/22 MRCP: Iron deposition within the liver and spleen. The liver appears morphologically normal. Gallbladder sludge. No biliary dilation. Primary checking Tsat and ferritin and also will see if GI would do ERCP for gallbladder sludge which can potentially be beneficial especially with the starting of chemo Methemoglobinemia 
- s/p methylene blue 10/16 Hyperuricemia 10/22 received rasburicase 10/14 and 10/21. Rasburicase can result in methemoglobinemia in some patients so it is   
now listed as an allergy. Aga SOPs 
 
10/22/2019 We had a lengthy discussion with patient's son Fela Avilez ([phone])  (Dr. Percy Anglin also present). With patient's permission, conversation occurred outside of patient room. His son was advised of the likely prognosis of MM as well prognosis with and without treatment. He discussed with family and his father and their wish is to proceed with BMbx then treatment. Dr. Meri Milton will perform bx at bedside in am.  He will need to be transferred to 5th floor. Also discussed code status and patient is now DNR. Noemí Garcia NP [place] [facility] [id number] [address] Office : [phone] Fax : [phone] I personally saw, exammed and counselled the patient, and discussed with NP, agree with above history/assessment/plan. 75 y. o.male reportedly of good baseline PS developed weight loss and head mass and admitted with hypercalcemia and MARCELA, found bone lesions, high M spike 4.89, acute high direct bilirubinemia, elevated uric acid and received rasburicase. I discussed wit pt and wife that pt has a fatal cancer of myeloma but treatment would be of very high risk given his comorbidities macario hyperbilirubinemia, considered liver failure and GI signed off, but no evidence of significant acute liver damage, check liver MRCP showed much gall bladder sludge. Dr. Carlyn Goldberg and I had an extensive discussion with son and clarified his severe condition would be fatal if no cancer rx is pursue, although chemotherapy would be of very high risk, he agreed with DNR status, and discussed with pt regarding his goal and all desired to pursue chemo, marrow biopsied 10/23/19 and proceed with VRD and dose reduce velcade to 0.7mg/m2 sc 10/14/19. Appreciate cardio input for afib and hypotension. 
  
 
Neo Lee M.D. [address] Office : [phone]

## 2019-10-24 NOTE — PROGRESS NOTES
END OF SHIFT NOTE: 
 
Intake/Output No intake/output data recorded. Voiding: NO 
Catheter: NO 
Drain:   
 
 
 
 
 
Stool:  1 occurrences. Stool Assessment Stool Color: Wilmon Bence (10/24/19 0142) Stool Appearance: Soft (10/24/19 0142) Stool Amount: Medium (10/24/19 0142) Stool Source/Status: Rectum (10/24/19 0142) Emesis:  0 occurrences. Emesis Assessment Appearance: Undigested food (10/13/19 0848) Emesis Amount: Small (10/14/19 0744) VITAL SIGNS Patient Vitals for the past 12 hrs: 
 Temp Pulse Resp BP SpO2  
10/24/19 0301 97.9 °F (36.6 °C) (!) 105 18 126/88 100 % 10/23/19 2311 97.7 °F (36.5 °C) 84 18 94/52 99 % 10/23/19 2029 98.3 °F (36.8 °C) 84 16 103/61 98 % Pain Assessment Pain 1 Pain Scale 1: Numeric (0 - 10) (10/24/19 0300) Pain Intensity 1: 0 (10/24/19 0300) Patient Stated Pain Goal: 0 (10/24/19 0300) Pain Reassessment 1: Patient resting w/respiratory rate greater than 10 (10/14/19 0305) Pain Location 1: Back (10/13/19 0131) Pain Orientation 1: Left;Right (10/12/19 2133) Pain Description 1: Aching; Sore (10/13/19 0131) Pain Intervention(s) 1: Rest;Position (10/13/19 0131) Ambulating No 
 
Additional Information: Patient remained lethargic all night. Only opening eyes when we turned him to clean him up. Otherwise not really responsive and not taking anything in by mouth. VSS. No needs voiced. Shift report given to oncoming nurse at the bedside. Monika Tamez

## 2019-10-24 NOTE — PROGRESS NOTES
Massage Therapy Note 20 minute gentle massage to lower legs and feet while patient seated. Massage given with hypoallergenic massage lotion; skin more hydrated after. Patient reported feeling better, more relaxed after. No follow-up visit planned. IRASEMA Nielson

## 2019-10-24 NOTE — PROGRESS NOTES
Problem: Mobility Impaired (Adult and Pediatric) Goal: *Acute Goals and Plan of Care (Insert Text) Description LTG: 
(1.)Mr. Amanda Moy will move from supine to sit and sit to supine , scoot up and down and roll side to side in bed with SUPERVISION within 7 treatment day(s). (2.)Mr. Amanda Moy will transfer from bed to chair and chair to bed with CONTACT GUARD ASSIST using the least restrictive device within 7 treatment day(s). (3.)Mr. Amanda Moy will ambulate with CONTACT GUARD ASSIST for 80 feet with the least restrictive device within 7 treatment day(s). (4.)Mr. Amanda Moy will participate in therapeutic activity/exercises x 23 minutes for increased strength within 7 treatment days. ________________________________________________________________________________________________ Outcome: Progressing Towards Goal 
  
PHYSICAL THERAPY: Initial Assessment and AM 10/24/2019 INPATIENT: PT Visit Days : 1 [place] / [place] / Product Type: Medicare /   
  
NAME/AGE/GENDER: Ben Sinclair is a 75 y.o. male PRIMARY DIAGNOSIS: Hypercalcemia [E83.52] Anemia [D64.9] [facility] [N17.9] Hypercalcemia Hypercalcemia ICD-10: Treatment Diagnosis:  
 · Generalized Muscle Weakness (M62.81) · Difficulty in walking, Not elsewhere classified (R26.2) Precaution/Allergies: 
Rasburicase ASSESSMENT:  
 
Mr. Amanda Moy is a 75 y.o. male in the hospital for the above who was supine in bed upon arrival.  History provided by pt's wife who was at bedside given pt's confusion/lethargy. She reported that they live together in a one story house and PTA pt was independent with ambulation. Mr. Amanda Moy presents to PT with generally decreased AROM and strength in B LEs. Pt appeared confused/drowsy but became more alert with mobility. Pt was supine on contact looking very lethargic. Rolled side to side with mod assist to get cleaned up. Returned supine. BP taken and was 113/49.  He stated he wanted to get in the chair and when it was time to get to EOB he perked up. Sat to EOB with min A x 2 and additional time. BP taken again sitting and was 88/53. He was asymptomatic. He sat a couple min. Stood and with HHA x 2 he was able to take steps to turn and sit in the chair. Once in chair BP taken again and was 88/51. Pt still without symptoms. RN in and out during this time. Patient performed therapeutic strengthening exercises as listed below to improve endurance, balance and functional strength for transfers, gait and overall mobility. Patient required cues to perform exercises correctly. Left in chair. Wife and son present. At this time, patient is appropriate for Co-treatment with occupational therapy due to patient's decreased overall endurance/tolerance levels, as well as need for high level skilled assistance to complete functional transfers/mobility and functional tasks. Cristiane Shelby is appropriate for a multidisciplinary co-treatment of PT and OT to address goals of both disciplines. This section established at most recent assessment PROBLEM LIST (Impairments causing functional limitations): 1. Decreased Strength 2. Decreased ADL/Functional Activities 3. Decreased Transfer Abilities 4. Decreased Ambulation Ability/Technique 5. Decreased Balance 6. Decreased Activity Tolerance 7. Increased Fatigue 8. Decreased Cognition INTERVENTIONS PLANNED: (Benefits and precautions of physical therapy have been discussed with the patient.) 1. Balance Exercise 2. Bed Mobility 3. Family Education 4. Gait Training 5. Neuromuscular Re-education/Strengthening 6. Therapeutic Activites 7. Therapeutic Exercise/Strengthening 8. Transfer Training TREATMENT PLAN: Frequency/Duration: 3 times a week for duration of hospital stay Rehabilitation Potential For Stated Goals: Good REHAB RECOMMENDATIONS (at time of discharge pending progress):   
Placement: It is my opinion, based on this patient's performance to date, that Mr. Diya White may benefit from intensive therapy at a [address] [place] [address] after discharge due to the functional deficits listed above that are likely to improve with skilled rehabilitation and concerns that he/she may be unsafe to be unsupervised at home due to decreased functional mobility . Equipment:  
? None at this time HISTORY:  
History of Present Injury/Illness (Reason for Referral): 
See H&P Past Medical History/Comorbidities: Mr. Diya White  has no past medical history on file. Mr. Diya White  has no past surgical history on file. Social History/Living Environment:  
Home Environment: Private residence # Steps to Enter: 0 One/Two Story Residence: One story Living Alone: No 
Support Systems: Spouse/Significant Other/Partner Patient Expects to be Discharged to[de-identified] Unknown Current DME Used/Available at Home: Cane, straight Tub or Shower Type: Tub/Shower combination Prior Level of Function/Work/Activity: 
Lives with wife and PTA PTA pt was independent with ADLs and ambulation. Number of Personal Factors/Comorbidities that affect the Plan of Care: 0: LOW COMPLEXITY EXAMINATION:  
Most Recent Physical Functioning:  
Gross Assessment: 
  
         
  
Posture: 
  
Balance: 
Sitting - Static: Fair (occasional) Sitting - Dynamic: Fair (occasional) Standing - Static: Fair Standing - Dynamic : Fair Bed Mobility: 
Rolling: Moderate assistance Supine to Sit: Moderate assistance Wheelchair Mobility: 
  
Transfers: 
Sit to Stand: Minimum assistance;Assist x2 Stand to Sit: Minimum assistance;Assist x2 Bed to Chair: Minimum assistance;Assist x2 Gait: 
  
   
  
Body Structures Involved: 1. Nerves 2. Bones 3. Muscles Body Functions Affected: 1. Mental 
2. Sensory/Pain 3. Neuromusculoskeletal 
4. Movement Related Activities and Participation Affected: 1. Learning and Applying Knowledge 2. General Tasks and Demands 3. Communication 4. Mobility 5. Self Care 6. Domestic Life 7. Community, Social and [place] Number of elements that affect the Plan of Care: 4+: HIGH COMPLEXITY CLINICAL PRESENTATION:  
Presentation: Evolving clinical presentation with changing clinical characteristics: MODERATE COMPLEXITY CLINICAL DECISION MAKIN[address] [place] [address] AM-PAC 6 Clicks Basic Mobility Inpatient Short Form How much difficulty does the patient currently have. .. Unable A Lot A Little None 1. Turning over in bed (including adjusting bedclothes, sheets and blankets)? ? 1   ? 2   ? 3   ? 4  
2. Sitting down on and standing up from a chair with arms ( e.g., wheelchair, bedside commode, etc.)   ? 1   ? 2   ? 3   ? 4  
3. Moving from lying on back to sitting on the side of the bed?   ? 1   ? 2   ? 3   ? 4 How much help from another person does the patient currently need. .. Total A Lot A Little None 4. Moving to and from a bed to a chair (including a wheelchair)? ? 1   ? 2   ? 3   ? 4  
5. Need to walk in hospital room? ? 1   ? 2   ? 3   ? 4  
6. Climbing 3-5 steps with a railing? ? 1   ? 2   ? 3   ? 4  
© , Trustees of [address] [place] [address], under license to [employer]. All rights reserved Score:  Initial: 12 Most Recent: X (Date: -- ) Interpretation of Tool:  Represents activities that are increasingly more difficult (i.e. Bed mobility, Transfers, Gait). Medical Necessity:    
· Patient demonstrates good ·  rehab potential due to higher previous functional level. Reason for Services/Other Comments: 
· Patient continues to require skilled intervention due to decreased balance and functional mobility · . Use of outcome tool(s) and clinical judgement create a POC that gives a: Questionable prediction of patient's progress: MODERATE COMPLEXITY  
  
 
 
 
TREATMENT:  
(In addition to Assessment/Re-Assessment sessions the following treatments were rendered) Pre-treatment Symptoms/Complaints:  \"I want to get in that chair. \" 
Pain: Initial:  
Pain Intensity 1: 0  Post Session:  0 Therapeutic Activity: (    29 minutes): Therapeutic activities including Bed mobility and transfer, sitting EOB balance, STS transfer and amb to get to chair to improve mobility, strength, balance and coordination. Required min   to promote static and dynamic balance in standing and promote coordination of bilateral, lower extremity(s). Therapeutic Exercise: ( 11 min):  Exercises per grid below to improve mobility, strength and balance. Required min  visual and verbal cues to promote proper body alignment, promote proper body posture and promote proper body mechanics. Date: 
10/24/19 Date: 
 Date: Activity/Exercise Seated Parameters Parameters Parameters Heel raises X 20 B Toe raises X 20 B    
LAQ's X 20 B Hip Flex X 20 B Hip ABD X 20 B Braces/Orthotics/Lines/Etc:  
· IV 
· O2 Device: Nasal cannula Treatment/Session Assessment:   
· Response to Treatment:  Progressing. .  
· Interdisciplinary Collaboration:  
o Physical Therapy Assistant 
o Registered Nurse 
o Rehabilitation Attendant 
o Certified Nursing Assistant/Patient Care Technician · After treatment position/precautions:  
o Up in chair 
o Bed/Chair-wheels locked 
o Bed in low position 
o Call light within reach 
o RN notified 
o Family at bedside · Compliance with Program/Exercises: Will assess as treatment progresses · Recommendations/Intent for next treatment session: \"Next visit will focus on advancements to more challenging activities and reduction in assistance provided\". Total Treatment Duration: PT Patient Time In/Time Out Time In: 4276 Time Out: 1022 Zulay Holland PTA

## 2019-10-24 NOTE — PROGRESS NOTES
Nutrition follow-up: 
Initial assessment based on early LOS.          
Assessment: Anthropometrics: Ht - 6'0\", wgt - 95.8 kg (5 BMT bed 10/24), BMI - 28.6 c/w overweight, edema - trace BLEs. Macronutrient Needs (81 kg - IBW): Estimated calorie needs - 6941-1814 ada/day (30-35 ada/kgIBW/day) Estimated protein needs -  gm pro/day (1.2-1.3 gm pro/kgIBW/day) Max CHO/day - 354 gm CHO/day (50% ada/day)  
Fluid/day - minimal volume (renal guidelines) Intake/Comparative Standards: 
           Only 1 meal recorded since last RD assessment: 5%. Patient care assistant states patient took bites of grits this am. Patient was at dialysis during noon meal yesterday. Patient drank ~90% Glucerna this am. Patient continues to meet less than 25% EEN and EPN. Pertinent Labs: 
           CRB 28, creatinine 5.83, t bili 20.5, AM glucose 121; POC glucose (10/23) 173, 107, 119 (10/24) 110. Pertinent Medications: 
           SSI. Diet: 
           Puree, thin liquids Food/Nutrition History: 
           75year old gentleman with a h/o diabetes and HTN admitted with severe malaise and poor po intake over the last few months resulting in a reported, but not documented 35 pound weight loss. Work up is pending for suspected Multiple Myeloma with myeloma kidney. He completed 4 days of steroids. Bone marrow biopsy done yesterday. Acute kidney injury requiring HD, unable to complete yesterday. Liver function impaired. Plan to start chemo today.  
Patient seen this am after breakfast. He is more alert today and answers somewhat appropriately to questions. He is able to tell this RD that he prefers chocolate Glucerna. Wife states that she is pleased with patient's intake this am. She states that he eats better when staff helps him. She states that she has not been helping with meals. Patient care associate thinks patient may accept PO better if it is not puree.  PDA to bring soup with noon meal to trial.  
Diagnosis (Nutrition): 
 Inadequate oral intake related to decreased ability to consume sufficient oral intake as evidenced by reported weight loss PTA and currently unsafe for any substantial oral intake. 
  
Intervention: 
Meals and Snacks: Continue diet per SLP Nutrition Supplement: Continue Glucerna TID - flavor preference noted. Add Magic Cup TID to trial for acceptance. Encouraged wife to participate in helping with meals and beverages when patient awake and alert to increase oral intake.   
It seems patient may be more alert and able to take more PO Enteral Nutrition: If patient continues to take less than 25% meals, consider starting a TF with Nepro @ 13 ml/hr with a 17 ml/hr water flush and advance as tolerated to the goal rate of 53 ml/hr - 2286 calories/day (94% calorie goal), 103 grams protein/day (100% protein goal), 212 grams CHO/day (does not exceed max CHO limit) and 1337 ml water/day (renal guidelines). Nutrition Discharge Plan: Too soon to determine. [address] [place] [address], [address], [address] [facility]

## 2019-10-24 NOTE — PROGRESS NOTES
END OF SHIFT NOTE: 
 
Intake/Output 10/24 0701 - 10/24 1900 In: 113 [P.O.:870] Out: -   
Voiding: YES Catheter: NO 
Drain:   
 
 
 
 
 
Stool:  2 occurrences. Stool Assessment Stool Color: Brown (10/24/19 1700) Stool Appearance: Soft;Loose (10/24/19 1700) Stool Amount: Large (10/24/19 1700) Stool Source/Status: Rectum (10/24/19 1700) Emesis:  0 occurrences. Emesis Assessment Appearance: Undigested food (10/13/19 0848) Emesis Amount: Small (10/14/19 0744) VITAL SIGNS Patient Vitals for the past 12 hrs: 
 Temp Pulse Resp BP SpO2  
10/24/19 1633 97.3 °F (36.3 °C) 90 28 100/66 95 % 10/24/19 1136 97.9 °F (36.6 °C) (!) 106 28 (!) 86/54 98 % 10/24/19 1004    113/49   
10/24/19 0715 97.9 °F (36.6 °C) (!) 106 16 (!) 84/50 98 % Pain Assessment Pain 1 Pain Scale 1: Numeric (0 - 10) (10/24/19 1301) Pain Intensity 1: 0 (10/24/19 1301) Patient Stated Pain Goal: 0 (10/24/19 0300) Pain Reassessment 1: Patient resting w/respiratory rate greater than 10 (10/14/19 0305) Pain Location 1: Back (10/13/19 0131) Pain Orientation 1: Left;Right (10/12/19 2133) Pain Description 1: Aching; Sore (10/13/19 0131) Pain Intervention(s) 1: Rest;Position (10/13/19 0131) Ambulating Yes Additional Information: Pt received first does of chemotherapy this shift, subcutaneous Velcade per treatment plan, pt received 40 mg IV decadron as well. Pt tolerated well. Consents in chart for chemotherapy and verbal ok from NP to start chemo with lab values at present results. Pt ambulated to bathroom x 2 during shift, was up in chair for majority of shift, and was able to tolerate PO food intake. Diet switched from pureed to mechanical soft per Speech. No other needs at this time. Will continue to monitor. Shift report given to oncoming nurse at the bedside. Zonia Gamino

## 2019-10-24 NOTE — PROGRESS NOTES
Problem: Falls - Risk of 
Goal: *Absence of Falls Description Document Donata Carrel Fall Risk and appropriate interventions in the flowsheet. Outcome: Progressing Towards Goal 
Note:  
Fall Risk Interventions: 
Mobility Interventions: Communicate number of staff needed for ambulation/transfer, Patient to call before getting OOB Mentation Interventions: Adequate sleep, hydration, pain control, Evaluate medications/consider consulting pharmacy Medication Interventions: Patient to call before getting OOB Elimination Interventions: Call light in reach, Patient to call for help with toileting needs Problem: Patient Education: Go to Patient Education Activity Goal: Patient/Family Education Outcome: Progressing Towards Goal 
  
Problem: Pressure Injury - Risk of 
Goal: *Prevention of pressure injury Description Document Adalid Scale and appropriate interventions in the flowsheet. Outcome: Progressing Towards Goal 
Note:  
Pressure Injury Interventions: 
Sensory Interventions: Assess need for specialty bed, Assess changes in LOC, Check visual cues for pain, Keep linens dry and wrinkle-free Moisture Interventions: Apply protective barrier, creams and emollients, Check for incontinence Q2 hours and as needed Activity Interventions: Increase time out of bed, Pressure redistribution bed/mattress(bed type) Mobility Interventions: Pressure redistribution bed/mattress (bed type) Nutrition Interventions: Document food/fluid/supplement intake Friction and Shear Interventions: Apply protective barrier, creams and emollients Problem: Patient Education: Go to Patient Education Activity Goal: Patient/Family Education Outcome: Progressing Towards Goal 
  
Problem: Gas Exchange - Impaired Goal: *Absence of hypoxia Outcome: Progressing Towards Goal 
  
Problem: Delirium Treatment Goal: *Level of consciousness restored to baseline Outcome: Progressing Towards Goal 
 Goal: *Level of environmental perceptions restored to baseline Outcome: Progressing Towards Goal 
Goal: *Sensory perception restored to baseline Outcome: Progressing Towards Goal 
Goal: *Emotional stability restored to baseline Outcome: Progressing Towards Goal 
Goal: *Functional assessment restored to baseline Outcome: Progressing Towards Goal 
Goal: *Absence of falls Outcome: Progressing Towards Goal 
Goal: *Will remain free of delirium, CAM Score negative Outcome: Progressing Towards Goal 
Goal: *Cognitive status will be restored to baseline Outcome: Progressing Towards Goal 
Goal: Interventions Outcome: Progressing Towards Goal 
  
Problem: Patient Education: Go to Patient Education Activity Goal: Patient/Family Education Outcome: Progressing Towards Goal 
  
Problem: Dysphagia (Adult) Goal: *Speech Goal: (INSERT TEXT) Description LTG: Patient will tolerate least restrictive diet without overt signs or symptoms of airway compromise. STG: Patient will tolerate puree diet and thin liquids without overt signs or symptoms of airway compromise. STG: Patient will participate po trials of chewable textures for possible diet upgrade. Outcome: Progressing Towards Goal 
  
Problem: Patient Education: Go to Patient Education Activity Goal: Patient/Family Education Outcome: Progressing Towards Goal 
  
Problem: Nutrition Deficit Goal: *Optimize nutritional status Outcome: Progressing Towards Goal 
  
Problem: Patient Education: Go to Patient Education Activity Goal: Patient/Family Education Description 1. Patient will complete lower body bathing and dressing with min A and adaptive equipment as needed. 2. Patient will complete toileting with SBA. 3. Patient will tolerate 23 minutes of OT treatment with 2-3 rest breaks to increase activity tolerance for ADLs. 4. Patient will complete functional transfers with CGA and adaptive equipment as needed. 5. Patient will tolerate 10 minutes sitting balance edge of bed for increased ability to perform BADLs. 6. Patient will complete functional activity while seated unsupported, edge of bed, with SBA. Timeframe: 7 visits Outcome: Progressing Towards Goal 
  
Problem: Patient Education: Go to Patient Education Activity Goal: Patient/Family Education Outcome: Progressing Towards Goal 
  
Problem: Acute Renal Failure: Day 5 Goal: Psychosocial 
Outcome: Progressing Towards Goal 
  
Problem: Acute Renal Failure: Day 6 Goal: Off Pathway (Use only if patient is Off Pathway) Outcome: Progressing Towards Goal 
Goal: Activity/Safety Outcome: Progressing Towards Goal 
Goal: Diagnostic Test/Procedures Outcome: Progressing Towards Goal 
Goal: Nutrition/Diet Outcome: Progressing Towards Goal 
Goal: Discharge Planning Outcome: Progressing Towards Goal 
Goal: Medications Outcome: Progressing Towards Goal 
Goal: Respiratory Outcome: Progressing Towards Goal 
Goal: Treatments/Interventions/Procedures Outcome: Progressing Towards Goal 
Goal: Psychosocial 
Outcome: Progressing Towards Goal 
  
Problem: Acute Renal Failure: Discharge Outcomes Goal: *Optimal pain control at patient's stated goal 
Outcome: Progressing Towards Goal 
Goal: *Urinary output within identified parameters Outcome: Progressing Towards Goal 
Goal: *Hemodynamically stable Outcome: Progressing Towards Goal 
Goal: *Tolerating diet Outcome: Progressing Towards Goal 
Goal: *Lab values stabilized Outcome: Progressing Towards Goal 
Goal: *Verbalizes understanding and describes medication purposes and frequencies Outcome: Progressing Towards Goal 
Goal: *Medication reconciliation Outcome: Progressing Towards Goal

## 2019-10-24 NOTE — PROGRESS NOTES
SW followed up regarding DC planning coordination. Patient is accepted for STR at [facility] whenever medically ready. HD slot has been assigned for MWF at 2:30 at [facility] in [place], [place]. SW faxed HepB surface antigen and PPD to [facility] as requested. Patient to start chemotherapy treatment today per chart review. Awaiting medical readiness for DC to STR with OP dialysis slot coordinated.

## 2019-10-24 NOTE — PROGRESS NOTES
[facility] CARDIOLOGY PROGRESS NOTE 
      
 
10/24/2019 6:57 PM 
 
Admit Date: 10/12/2019 Subjective:  
Feeling better. No dizziness. Up ambulating today. ROS: 
Cardiovascular:  As noted above Objective:  
  
Vitals:  
 10/24/19 0715 10/24/19 1004 10/24/19 1136 10/24/19 1633 BP: (!) 84/50 113/49 (!) 86/54 100/66 Pulse: (!) 106  (!) 106 90 Resp: 16  28 28 Temp: 97.9 °F (36.6 °C)  97.9 °F (36.6 °C) 97.3 °F (36.3 °C) SpO2: 98%  98% 95% Weight:      
Height:      
 
 
Physical Exam: 
General-No Acute Distress Neck- supple, no JVD 
CV- irregular rate and rhythm no MRG Lung- clear bilaterally Abd- soft, nontender, nondistended Ext- no edema bilaterally. Skin- warm and dry Data Review:  
Recent Labs 10/24/19 
0302 10/23/19 
0234 10/22/19 
1553 * 134*  --   
K 4.0 3.8  --   
MG 2.1 2.7*  --   
BUN 64* 101*  --   
CREA 5.83* 7.71*  --   
* 152*  --   
WBC 11.6* 12.0*  --   
HGB 8.2* 9.3*  --   
HCT 24.3* 28.0*  --   
* 133*  --   
INR  --   --  1.7 Assessment/Plan:  
 
Principal Problem: Hypercalcemia (10/12/2019) Active Problems: 
  Anemia (10/12/2019) MARCELA (acute kidney injury) ([address] (10/12/2019) Acute respiratory failure with hypoxia ([address] (10/15/2019) Pulmonary hypertension ([address] (10/15/2019) Hyperproteinemia- with likely hyperviscosity (10/15/2019) Acute metabolic encephalopathy (82/23/3471) Methemoglobinemia (10/17/2019) Jaundice (10/19/2019) Acquired hemolytic anemia ([address] (10/19/2019) Multiple myeloma not having achieved remission ([address] (10/23/2019) 
   
//// Will put back on extended release diltiazem in the AM 
 
 
 
Allison Smith MD 
10/24/2019 6:57 PM

## 2019-10-24 NOTE — PROGRESS NOTES
SPEECH PATHOLOGY NOTE: 
 
Attempted to see patient for speech therapy this AM; however, PT at bedside. Will check back at later time/date as patient is available and as schedule permits.  
 
 
Corinne Antony MS, CCC-SLP

## 2019-10-24 NOTE — PROGRESS NOTES
Palliative Care Progress Note Patient: Sammie Hale MRN: [id number]  SSN: [id number] YOB: 1945  Age: 75 y.o. Sex: male Assessment/Plan: Chief Complaint/Interval History: sitting up in chair, family at bedside. Denies pain Principal Diagnosis: · Debility, Unspecified  R53.81 Additional Diagnoses: · Altered Mental Status R41.82 · Fatigue, Lethargy  R53.83 
· Frailty  R54 
· Encounter for Palliative Care  Z51.5 Palliative Performance Scale (PPS) PPS: 30 Medical Decision Making:  
Reviewed and summarized notes over last 24 hours Discussed case with appropriate providers Reviewed laboratory and x-ray data- CBC, CMP Pt sitting in chair, no distress noted. Wife and son at bedside. Pt oriented to person at present. He denies pain. Pt scheduled to start chemotherapy today. Family is hopeful this will improve his condition. Provided support. Will continue to follow. Will discuss findings with members of the interdisciplinary team.   
 
  
More than 50% of this 15 minute visit was spent counseling and coordination of care as outlined above. Subjective:  
 
Review of Systems: A comprehensive review of systems was negative except for:  
Constitutional: Positive for fatigue. Objective:  
 
Visit Vitals BP (!) 84/50 (BP 1 Location: Right arm, BP Patient Position: At rest) Pulse (!) 106 Temp 97.9 °F (36.6 °C) Resp 16 Ht 6' (1.829 m) Wt 211 lb 1.6 oz (95.8 kg) SpO2 98% BMI 28.63 kg/m² Physical Exam: 
 
General:  Cooperative. Debilitated. No acute distress. Eyes:  Conjunctivae/corneas clear. Sc;eral icterus Nose: Nares normal. Septum midline. Neck: Supple, symmetrical, trachea midline Lungs:   Clear to auscultation bilaterally, unlabored Heart:  Regular rate and rhythm Abdomen:   Soft, non-tender, non-distended Extremities: Normal, atraumatic, no cyanosis or edema Skin: Skin color, texture, turgor normal.  
 Neurologic: Nonfocal  
Psych: Alert and oriented to person Signed By: Bessy Johnson NP October 24, 2019

## 2019-10-24 NOTE — PROGRESS NOTES
Renal Progress Note Admission Date: 10/12/2019 Subjective:  
  
Feels ok. Family present Objective:  
 
Physical Exam:   
Patient Vitals for the past 8 hrs: 
 BP Temp Pulse Resp SpO2  
10/24/19 0715 (!) 84/50 97.9 °F (36.6 °C) (!) 106 16 98 % 10/24/19 0301 126/88 97.9 °F (36.6 °C) (!) 105 18 100 % Gen: comfortable , NAD HEENT: dry membranes, icteric sclera CV: S1, S2 
Lungs: Clear bilaterally Extem: no edema Current Facility-Administered Medications Medication Dose Route Frequency  0.9% sodium chloride infusion  125 mL/hr IntraVENous CONTINUOUS  
 lip protectant (BLISTEX) ointment 1 Each  1 Each Topical PRN  
 insulin glargine (LANTUS) injection 13 Units  13 Units SubCUTAneous QHS  insulin lispro (HUMALOG) injection   SubCUTAneous AC&HS  
 lactulose (CHRONULAC) 10 gram/15 mL solution 30 mL  20 g Oral TID  
 0.9% sodium chloride infusion 250 mL  250 mL IntraVENous PRN  
 dilTIAZem (CARDIZEM) IR tablet 60 mg  60 mg Oral AC&HS  hydrALAZINE (APRESOLINE) 20 mg/mL injection 10 mg  10 mg IntraVENous Q6H PRN  
 0.9% sodium chloride infusion 250 mL  250 mL IntraVENous PRN  
 famotidine (PF) (PEPCID) 20 mg in sodium chloride 0.9% 10 mL injection  20 mg IntraVENous DAILY  0.9% sodium chloride infusion 250 mL  250 mL IntraVENous PRN  
 0.9% sodium chloride infusion 250 mL  250 mL IntraVENous PRN  
 allopurinol (ZYLOPRIM) tablet 50 mg  50 mg Oral DAILY  sodium chloride (NS) flush 5-40 mL  5-40 mL IntraVENous Q8H  
 sodium chloride (NS) flush 5-40 mL  5-40 mL IntraVENous PRN  
 acetaminophen (TYLENOL) tablet 650 mg  650 mg Oral Q4H PRN  
 ondansetron (ZOFRAN) injection 4 mg  4 mg IntraVENous Q4H PRN  
 diphenhydrAMINE (BENADRYL) capsule 25 mg  25 mg Oral Q6H PRN Data Review:  
 
LABS:  
Recent Results (from the past 12 hour(s)) METABOLIC PANEL, COMPREHENSIVE Collection Time: 10/24/19  3:02 AM  
Result Value Ref Range  Sodium 133 (L) 136 - 145 mmol/L  
 Potassium 4.0 3.5 - 5.1 mmol/L Chloride 95 (L) 98 - 107 mmol/L  
 CO2 27 21 - 32 mmol/L Anion gap 11 7 - 16 mmol/L Glucose 121 (H) 65 - 100 mg/dL BUN 64 (H) 8 - 23 MG/DL Creatinine 5.83 (H) 0.8 - 1.5 MG/DL  
 GFR est AA 12 (L) >60 ml/min/1.73m2 GFR est non-AA 10 (L) >60 ml/min/1.73m2 Calcium 9.0 8.3 - 10.4 MG/DL Bilirubin, total 20.5 (H) 0.2 - 1.1 MG/DL  
 ALT (SGPT) 263 (H) 12 - 65 U/L  
 AST (SGOT) 397 (H) 15 - 37 U/L Alk. phosphatase 223 (H) 50 - 136 U/L Protein, total 9.2 (H) 6.3 - 8.2 g/dL Albumin 1.6 (L) 3.2 - 4.6 g/dL Globulin 7.6 (H) 2.3 - 3.5 g/dL A-G Ratio 0.2 (L) 1.2 - 3.5    
CBC WITH AUTOMATED DIFF Collection Time: 10/24/19  3:02 AM  
Result Value Ref Range WBC 11.6 (H) 4.3 - 11.1 K/uL  
 RBC 2.68 (L) 4.23 - 5.6 M/uL HGB 8.2 (L) 13.6 - 17.2 g/dL HCT 24.3 (L) 41.1 - 50.3 % MCV 90.7 79.6 - 97.8 FL  
 MCH 30.6 26.1 - 32.9 PG  
 MCHC 33.7 31.4 - 35.0 g/dL  
 RDW 18.9 (H) 11.9 - 14.6 % PLATELET 455 (L) 143 - 450 K/uL MPV 11.4 9.4 - 12.3 FL ABSOLUTE NRBC 0.35 (H) 0.0 - 0.2 K/uL NEUTROPHILS 89 (H) 43 - 78 % LYMPHOCYTES 7 (L) 13 - 44 % MONOCYTES 3 (L) 4.0 - 12.0 % EOSINOPHILS 0 (L) 0.5 - 7.8 % BASOPHILS 0 0.0 - 2.0 % IMMATURE GRANULOCYTES 1 0.0 - 5.0 %  
 ABS. NEUTROPHILS 10.4 (H) 1.7 - 8.2 K/UL  
 ABS. LYMPHOCYTES 0.8 0.5 - 4.6 K/UL  
 ABS. MONOCYTES 0.3 0.1 - 1.3 K/UL  
 ABS. EOSINOPHILS 0.0 0.0 - 0.8 K/UL  
 ABS. BASOPHILS 0.0 0.0 - 0.2 K/UL  
 ABS. IMM. GRANS. 0.1 0.0 - 0.5 K/UL  
 RBC COMMENTS MODERATE 
ROULEAUX 
    
 RBC COMMENTS OCCASIONAL 
TARGET CELLS 
    
 WBC COMMENTS Result Confirmed By Smear PLATELET COMMENTS SLIGHT    
 DF AUTOMATED MAGNESIUM Collection Time: 10/24/19  3:02 AM  
Result Value Ref Range Magnesium 2.1 1.8 - 2.4 mg/dL URIC ACID Collection Time: 10/24/19  3:02 AM  
Result Value Ref Range Uric acid <2.6 (L) 2.6 - 6.0 MG/DL  
GLUCOSE, POC  Collection Time: 10/24/19  7:20 AM  
 Result Value Ref Range Glucose (POC) 110 (H) 65 - 100 mg/dL Plan:  
 
Principal Problem: Hypercalcemia (10/12/2019) Active Problems: 
  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 M-spike. Skeltal survey with innumerable lytic foci in the skull and left humerus, CT head with multiple lytic lesions involving the calvarium including a 3 cm destructive lesion involving the left occipital bone 
- started HD 10/16/19 
- HD tomorrow for clearance. He doesn't hav much volume on and has not tolerated  
 
- needs temp cath changed to tunneled cath this week - ordered 
  
Likely Multiple Myeloma 
- s/p pulse dose decadron 
- bone marrow biopsy 10/23 
- starting chemo today  
  
Hypercalcemia - Better with dialysis  
  
Mild Hyponatremia 
- improving with hd  
 
  
Abnormal LFTs 
- progressive jaundice of unclear etiology - Seen by GI - felt multifactorial. No evidence of obstruction. Not much to offer -  MRCP showing iron deposition likely consequence of hemolytic anemia. Ferritin high in setting of acute phase reactant.  
  
Anemia and thrombocytopenia 
- transfusions per oncology 
- hemolysis workup positive Poor functional status

## 2019-10-25 NOTE — PROGRESS NOTES
Palliative Care Progress Note Patient: Ozzie Harris MRN: [id number]  SSN: [id number] YOB: 1945  Age: 75 y.o. Sex: male Assessment/Plan: Chief Complaint/Interval History: resting in bed, reports nausea/vomiting this morning Principal Diagnosis: · Debility, Unspecified  R53.81 Additional Diagnoses: · Altered Mental Status R41.82 · Fatigue, Lethargy  R53.83 
· Frailty  R54 · Nausea/Vomiting  R11.2 
· Encounter for Palliative Care  Z51.5 Palliative Performance Scale (PPS) PPS: 30 Medical Decision Making:  
Reviewed and summarized notes over last 24 hours Discussed case with appropriate providers- Louann Palacios RN Reviewed laboratory and x-ray data- CBC, CMP Pt resting in bed, no distress noted. Wife and son at bedside. Pt remains confused but pleasant. He states he has had nausea/vomiting this morning- family confirms. He reports he is feeling better presently. Pt denies pain. Pt received Velcade yesterday, and tolerated well. He is scheduled for dialysis today. Pt and family deny needs at present. Will continue to follow. Will discuss findings with members of the interdisciplinary team.   
 
  
More than 50% of this 15 minute visit was spent counseling and coordination of care as outlined above. Subjective:  
 
Review of Systems: A comprehensive review of systems was negative except for:  
Constitutional: Positive for fatigue. GI: positive for nausea/vomiting Objective:  
 
Visit Vitals /81 (BP 1 Location: Left arm, BP Patient Position: At rest) Pulse 87 Temp 97.8 °F (36.6 °C) Resp 20 Ht 6' (1.829 m) Wt 211 lb 1.6 oz (95.8 kg) SpO2 96% BMI 28.63 kg/m² Physical Exam: 
 
General:  Cooperative. Debilitated. No acute distress. Eyes:  Conjunctivae/corneas clear. Scleral icterus Nose: Nares normal. Septum midline. Neck: Supple, symmetrical, trachea midline Lungs:   Clear to auscultation bilaterally, unlabored Heart:  Regular rate and rhythm Abdomen:   Soft, non-tender, non-distended Extremities: Normal, atraumatic, no cyanosis or edema Skin: Skin color, texture, turgor normal.  
Neurologic: Nonfocal  
Psych: Alert and oriented to person Signed By: Jean-Pierre Avila NP October 25, 2019

## 2019-10-25 NOTE — PROGRESS NOTES
END OF SHIFT NOTE: 
 
Intake/Output 10/25 0701 - 10/25 1900 In: -  
Out: 2500 Voiding: YES Catheter: NO 
Drain:   
 
 
 
 
 
Stool:  0 occurrences. Stool Assessment Stool Color: Brown (10/24/19 1700) Stool Appearance: Soft;Loose (10/24/19 1700) Stool Amount: Large (10/24/19 1700) Stool Source/Status: Rectum (10/24/19 1700) Emesis:  0 occurrences. Emesis Assessment Appearance: Undigested food (10/13/19 0848) Emesis Amount: Small (10/14/19 0744) VITAL SIGNS Patient Vitals for the past 12 hrs: 
 Temp Pulse Resp BP SpO2  
10/25/19 1631 97.5 °F (36.4 °C) (!) 107 20 113/68 98 % 10/25/19 1556  (!) 118 20 139/83 96 % 10/25/19 1541  (!) 102 21 (!) 154/93 96 % 10/25/19 1532  (!) 113 20 142/89 96 % 10/25/19 1505 97.5 °F (36.4 °C) (!) 115 18 159/82 97 % 10/25/19 1418  60  124/57   
10/25/19 1401  87  130/76   
10/25/19 1330  89  129/85   
10/25/19 1257  82  108/83   
10/25/19 1228  88  118/85   
10/25/19 1158  76  (!) 153/96   
10/25/19 1134  84  (!) 143/98   
10/25/19 1051  87  (!) 148/93   
10/25/19 0806 97.8 °F (36.6 °C) 87 20 158/81 96 % Pain Assessment Pain 1 Pain Scale 1: Visual (10/25/19 1532) Pain Intensity 1: 0 (10/25/19 1556) Patient Stated Pain Goal: 0 (10/24/19 1924) Pain Reassessment 1: Patient resting w/respiratory rate greater than 10 (10/25/19 0225) Pain Location 1: Back (10/13/19 0131) Pain Orientation 1: Left;Right (10/12/19 2133) Pain Description 1: Aching; Sore (10/13/19 0131) Pain Intervention(s) 1: Rest;Position (10/13/19 0131) Ambulating No 
 
Additional Information:  
 
Shift report given to oncoming nurse at the bedside. Syl Whyte

## 2019-10-25 NOTE — PROGRESS NOTES
[facility] Hematology & Oncology Inpatient Hematology / Oncology Progress Note Admission Date: 10/12/2019  9:36 PM 
Reason for Admission/Hospital Course: Hypercalcemia [E83.52] Anemia [D64.9] MARCELA (acute kidney injury) ([address] [N17.9] 24 Hour Events: 
BMbx 10/23 pending results Received Velcade and dex Plan to give 1/2 dose cytoxan Revlimid script sent to CC More awake today ROS: More awake today. Answers some questions appropriately. 10 point review of systems is otherwise negative with the exception of the elements mentioned above in the HPI. 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 (G6PD was within reference range when tested on 10/16/19) OBJECTIVE: 
Patient Vitals for the past 8 hrs: 
 BP Temp Pulse Resp SpO2  
10/25/19 0806 158/81 97.8 °F (36.6 °C) 87 20 96 % 10/25/19 0314 140/76 97.7 °F (36.5 °C) 99 20 92 % Temp (24hrs), Av.7 °F (36.5 °C), Min:97.3 °F (36.3 °C), Max:97.9 °F (36.6 °C) No intake/output data recorded. Physical Exam: 
Constitutional: Ill-appearing elderly male in no acute distress, lying comfortably in the hospital bed. HEENT: Normocephalic and atraumatic. Oropharynx is clear, mucous membranes are moist. Sclera with icterus. Strabismus Skin Warm and dry. No bruising and no rash noted. No erythema. No pallor. Respiratory Lungs are clear to auscultation bilaterally. CVS Irregular rate, irregular rhythm and normal S1 and S2. No murmurs, gallops, or rubs. Abdomen Soft, nontender and nondistended, normoactive bowel sounds. Neuro More awake today MSK Normal range of motion in general.  No edema and no tenderness. Psych Calm and cooperative Labs: 
   
Recent Labs 10/25/19 
0327 10/24/19 
0302 10/23/19 
0234 WBC 14.0* 11.6* 12.0*  
RBC 2.52* 2.68* 3.03* HGB 7.6* 8.2* 9.3* HCT 23.2* 24.3* 28.0*  
MCV 92.1 90.7 92.4 MCH 30.2 30.6 30.7 MCHC 32.8 33.7 33.2 RDW 19.1* 18.9* 18.6*  
* 132* 133* GRANS 91* 89* 90* LYMPH 6* 7* 5* MONOS 1* 3* 3* EOS 0* 0* 0*  
BASOS 0 0 0 IG 2 1 2 DF AUTOMATED AUTOMATED AUTOMATED ANEU 12.8* 10.4* 10.8* ABL 0.8 0.8 0.6 ABM 0.1 0.3 0.4 CHAVA 0.0 0.0 0.0 ABB 0.0 0.0 0.0 AIG 0.3 0.1 0.2 Recent Labs 10/25/19 
0327 10/24/19 
0302 10/23/19 
0234 * 133* 134* K 4.8 4.0 3.8 CL 96* 95* 92* CO2 20* 27 27 AGAP 16 11 15 * 121* 152* BUN 76* 64* 101* CREA 7.41* 5.83* 7.71* GFRAA 9* 12* 9*  
GFRNA 8* 10* 7*  
CA 8.8 9.0 9.6 SGOT 450* 397* 209* * 223* 132  
TP 8.9* 9.2* 10.8* ALB 1.3* 1.6* 1.8*  
GLOB 7.6* 7.6* 9.0* AGRAT 0.2* 0.2* 0.2* MG 2.4 2.1 2.7* Imaging: 
Lilian Chavez [[id number]] Collected: 10/14/19 2412 Order Status: Completed Updated: 10/14/19 6566 Narrative:    
Renal ultrasound. CLINICAL INDICATION:  Acute on chronic renal disease PROCEDURE: Realtime grayscale color Doppler evaluation of the kidneys and 
bladder. COMPARISON: No prior similar studies available for direct comparison. FINDINGS: The right kidney is normal in size but diffusely increased in 
echogenicity measuring 12.2 cm. A 2.4 cm simple cyst is noted off the midpole. The left kidney is normal in size and diffusely increased in echogenicity 
measuring 11.5 cm. A 1.2 cm simple cyst is noted within the midpole region. There is an indeterminate echogenic focus within the renal parenchyma likely a 
small stone. There is no hydronephrosis. The bladder is unremarkable. The aorta 
is normal in caliber. Impression:    
IMPRESSION:  
1. Bilateral diffuse increased renal cortical echogenicity can be seen with 
medical renal disease. 2. Indeterminate calcifications in the left renal parenchyma. Nephrolithiasis 
favored. 3. No hydronephrosis. 4. Bilateral simple renal cysts. IR BX BONE MARROW DIAGNOSTIC [[id number]] Order Status: No result XR BONE SURVEY LTD ([facility]) [[id number]] Collected: 10/13/19 1121 Order Status: Completed Updated: 10/13/19 1127 Narrative:    
History: Low back, lateral rib, and left posterior shoulder pain EXAM: Metastatic bone survey FINDINGS: Innumerable lytic lesions are present throughout the skull, including 
a large lesion within the occipital portion of the calvarium measuring 5 cm. Degenerative change of the cervical, thoracic, and lumbar spine noted without 
additional lytic foci. The included lungs are clear. Multiple lytic lesions seen 
within the left humerus. No definite lytic foci within the femoral bones are 
within the pelvis, though evaluation the pelvis is limited due to overlying 
bowel gas. Impression:    
IMPRESSION: 
 
Innumerable lytic foci within the skull as well as within the left humerus. Findings suggestive of multiple myeloma or metastatic disease. XR CHEST PA LAT [[id number]] Collected: 10/12/19 2328 Order Status: Completed Updated: 10/12/19 2330 Narrative:    
EXAM: Chest x-ray. INDICATION: Cough. COMPARISON: None. TECHNIQUE: Frontal and lateral view chest x-ray. FINDINGS: The lungs are clear. The cardiac size, mediastinal contour and 
pulmonary vasculature are normal. No pneumothorax or pleural effusion is seen. Impression:    
IMPRESSION: No acute process. CT HEAD WITHOUT CONTRAST [[id number]] Collected: 10/12/19 2323 Order Status: Completed Updated: 10/12/19 2330 Narrative:    
EXAM: Noncontrast CT head. INDICATION: Dizziness. COMPARISON: None. TECHNIQUE: Axial noncontrast CT images of the head were obtained.  Radiation 
dose reduction techniques were used for this study.  Our CT scanners use one or 
all of the following:  Automated exposure control, adjustment of the mA and/or 
kV according to patient size, iterative reconstruction.  
 
FINDINGS: There is a 3.3 x 2.9 cm lytic soft tissue mass in the left occipital 
 bone, which minimally indents the underlying left occipital lobe. There is no 
midline shift or significant mass effect. There are also innumerable smaller 
subcentimeter round lytic lesions throughout the calvarium. Brain volume is 
appropriate for age. No acute infarct, hemorrhage or evidence of hydrocephalus 
is seen. The basal cisterns are preserved. The visualized paranasal sinuses and 
mastoid air cells are clear. There has been bilateral eye surgery. Impression:    
IMPRESSION:  
1. 3.3 cm lytic soft tissue mass in the left occipital bone, minimally indenting 
the underlying left occipital lobe. In addition, there are innumerable 
subcentimeter lytic lesions throughout the remainder of the calvarium. These 
could relate to metastatic disease or multiple myeloma. 2. No acute infarct or hemorrhage. Medications: 
Current Facility-Administered Medications Medication Dose Route Frequency  0.9% sodium chloride infusion  125 mL/hr IntraVENous CONTINUOUS  
 dilTIAZem CD (CARDIZEM CD) capsule 240 mg  240 mg Oral DAILY  lip protectant (BLISTEX) ointment 1 Each  1 Each Topical PRN  
 insulin glargine (LANTUS) injection 13 Units  13 Units SubCUTAneous QHS  insulin lispro (HUMALOG) injection   SubCUTAneous AC&HS  
 lactulose (CHRONULAC) 10 gram/15 mL solution 30 mL  20 g Oral TID  
 0.9% sodium chloride infusion 250 mL  250 mL IntraVENous PRN  
 hydrALAZINE (APRESOLINE) 20 mg/mL injection 10 mg  10 mg IntraVENous Q6H PRN  
 famotidine (PF) (PEPCID) 20 mg in sodium chloride 0.9% 10 mL injection  20 mg IntraVENous DAILY  allopurinol (ZYLOPRIM) tablet 50 mg  50 mg Oral DAILY  sodium chloride (NS) flush 5-40 mL  5-40 mL IntraVENous Q8H  
 sodium chloride (NS) flush 5-40 mL  5-40 mL IntraVENous PRN  
 acetaminophen (TYLENOL) tablet 650 mg  650 mg Oral Q4H PRN  
 ondansetron (ZOFRAN) injection 4 mg  4 mg IntraVENous Q4H PRN  
 diphenhydrAMINE (BENADRYL) capsule 25 mg  25 mg Oral Q6H PRN  
 
 ASSESSMENT: 
 
Problem List  Never Reviewed Codes Class Noted Multiple myeloma not having achieved remission ([facility] ICD-10-CM: C90.00 ICD-9-CM: 203.00  10/23/2019 Jaundice ICD-10-CM: R17 
ICD-9-CM: 782.4  10/19/2019 Acquired hemolytic anemia (HCC) ICD-10-CM: D59.9 ICD-9-CM: 283.9  10/19/2019 Methemoglobinemia ICD-10-CM: D74.9 ICD-9-CM: 289.7  10/17/2019 Acute metabolic encephalopathy [facility]38-LM: G93.41 
ICD-9-CM: 348.31  10/16/2019 Acute respiratory failure with hypoxia [facility]) ICD-10-CM: J96.01 
ICD-9-CM: 518.81  10/15/2019 Pulmonary hypertension (HCC) (Chronic) ICD-10-CM: I27.20 ICD-9-CM: 416.8  10/15/2019 Hyperproteinemia- with likely hyperviscosity ICD-10-CM: E88.09 
ICD-9-CM: 273.8  10/15/2019 Diabetes mellitus (HCC) (Chronic) ICD-10-CM: E11.9 ICD-9-CM: 250.00  10/13/2019 Essential hypertension (Chronic) ICD-10-CM: I10 
ICD-9-CM: 401.9  10/13/2019 Overview Signed 10/13/2019  1:04 AM by Anita Lainez MD  
  Last Assessment & Plan: Hypertension is unchanged. Continue current treatment regimen. Blood pressure will be reassessed at the next regular appointment. Paroxysmal atrial fibrillation (HCC) (Chronic) ICD-10-CM: I48.0 ICD-9-CM: 427.31  10/13/2019 Overview Signed 10/13/2019  1:04 AM by Anita Lainez MD  
  Last Assessment & Plan: He has had no recurrences. I still think he remains a bleeding risk. I would like to hold off of [address] for now--and check again on him in 3 months. Continue dilt--but change to 360 mg tabs. * (Principal) Hypercalcemia ICD-10-CM: W26.56 
ICD-9-CM: 275.42  10/12/2019 Anemia ICD-10-CM: D64.9 ICD-9-CM: 285.9  10/12/2019 MARCELA (acute kidney injury) [facility] ICD-10-CM: N17.9 ICD-9-CM: 584.9  10/12/2019 Mr. Brinda Kinsey is a 75 y.o. male admitted on 10/12/2019 with a primary diagnosis of hypercalcemia and possible metastatic malignancy/myeloma. Mr. Nestor Downs is a gentleman who has received no formal medical care over the past several years. He stated that he had no primary care physician. Lab data from [facility] in 2017 show a creatinine that evelyn as high as 6.1 and was 2.80 at discharge. His chronic baseline is unknown. As noted, there is a history of diabetes mellitus. For two-three months leading to this admission, he has gradually deteriorated with low back discomfort, forgetfulness, anorexia and 35 pound weight loss. He has noted a growth on the back of his head. He was finally prevailed upon to come to the hospital for evaluation. Data are listed below but he was found to be in acute renal failure, hypercalcemic, anemic and with multiple lytic lesions involving his calvarium including a 3 cm destructive lesion involving the left occipital bone. PLAN: 
Concern for myeloma - We will initiate workup for myeloma based on strong presumption and if negative will pursue other alternatives. - Check skeletal survey, SPEP, serum free light chains, beta 2 microglobulin , LDH, urine IEP, marrow biopsy 10/14 Skeletal survey with innumerable lytic foci w/i the skull and L humerus. SPEP/UPEP/FLC/Beta 2 pending. Awaiting BMbx. 10/15 Kappa FLC 13k. Awaiting BMbx. 
10/16 SPEP with m-spike 4.89. Not stable enough for BMbx at this time. Will go ahead with pulse dose steroids - Dex 40mg IV x 4 days. Had code status discussion, wishes to remain full code, but wife states he would not want to be on vent for an extended period of time. 10/18 On pulse dose steroids, D3 of 4.  
10/19 D4/ 4 pulse dex. 10/20 s/p 4 days of pulse dex 10/22 BMbx then 1/2 dose CyBorD. Bone survey 10/13 Innumerable lytic foci within the skull as well as within the left humerus. Findings suggestive of multiple myeloma or metastatic disease. 10/23 BMbx today at bedside. Dose reduced cycle one chemo today. Velcade/dex/revlimid 10/24 Chemo today after family consent and chemo ed 10/25 received velcade and dex 10/24. Give 1/2 dose cytoxan while waiting on Revlimid Hypercalcemia - Treatment of his hypercalcemia may be problematic with elevated creatinine and elevated BNP. Continue fluids and I have added calcitonin. Use Lasix to balance I/O's. Hold of on Zometa for now given creatinine. 10/14 CCa++ down to 12.2. Con't IVF. 10/15 CCa++ 13.1. Con't calcitonin. 10/16 CCa++ up to 14. 
10/18 CCa++ down to 12.5 
10/19 CCa ++ down to 11.8  
10/20 CCa ++ down to 11.6  
10/21 calcium 9.8 resolved MARCELA 
- Nephrology should be on board to manage what is likely acute kidney injury from ? myeloma, hypercalcemia, etc. Superimposed on CRF. Hopefully this will reverse but dialysis could be an equally potential outcome. 10/14 Cr 4.6. Renal US c/w medical renal disease and possible L nephrolithiasis. Neph consult pending. 10/16 Cr 5.01. Neph following. 10/17 Started dialysis yesterday. Cr 4.72 
10/18 Cr 5.07. Continues on dialysis. 10/19 Cr. 4.06 today. Dialysis today. 10/20 Cr 4.13 today, continues on dialysis. 10/23 per nephrology Dyspnea / hypoxia 10/15 On Optiflow. CXR/CT chest pending. Pulm following. On Azith/Roland. On empiric heparin gtt. . Echo with dilated RV and pulm HTN. 
10/16 CXR with volume overload. Plans for VQ scan when more stable. Pulm following. 10/17 Transferred to ICU for resp distress, now on BiPAP. On Azith/Rocephin. 10/18 on Hiflow O2. On Azith/Rocephin. 10/19 on nasal cannula. Continues Azith/Rocephin  
10/20 still on nasal cannula/azith/rocephin. Anemia 10/16 Transfuse 1 unit PRBCs 
10/17 Hgb 6.7 s/p 2 units. Check hemolysis labs. PBRCs ordered. 10/18 Hgb 7.2 s/p tx. OK to give PRBCs today with dialysis. Hemolytic anemia noted. Hapto <8. Check ricardo. 10/19 Hgb up to 8.1 today. Ricardo neg. 10/20 Hgb stable at 8.4 today 10/23 Transfuse per Aga SOPs. Hyperbilirubinemia 10/17 Bili up to 10.9. Check hemolysis labs inc frac bili. 10/18 Bili 16.6. Frac bili mostly direct. RUQ US pending. 10/19 Bili up to 20. ALT worse today at 210,  down from 609. US showed gallbladder wall thickening, hepatomegaly which has worsened since 2017, and right renal parenchymal hyperechogenicity. Ammonia elevated this AM at 32- GI added lactulose 10/21 Bilirubin up to 21 today. MRCP liver. GI has signed off.  
10/22 MRCP: Iron deposition within the liver and spleen. The liver appears morphologically normal. Gallbladder sludge. No biliary dilation. Primary checking Tsat and ferritin and also will see if GI would do ERCP for gallbladder sludge which can potentially be beneficial especially with the starting of chemo Methemoglobinemia 
- s/p methylene blue 10/16 Hyperuricemia 10/22 received rasburicase 10/14 and 10/21. Rasburicase can result in methemoglobinemia in some patients so it is   
now listed as an allergy. Aga SOPs 
 
10/22/2019 We had a lengthy discussion with patient's son Mauricio Bell ([phone])  (Dr. Freya Hassna also present). With patient's permission, conversation occurred outside of patient room. His son was advised of the likely prognosis of MM as well prognosis with and without treatment. He discussed with family and his father and their wish is to proceed with BMbx then treatment. Dr. Eleonora Saavedra will perform bx at bedside in am.  He will need to be transferred to 5th floor. Also discussed code status and patient is now DNR. Clotilde Rodriguez NP 3 [facility] Hematology & Oncology [address] [place] [address] Office : [phone] Fax : [phone] I personally saw, exammed and counselled the patient, and discussed with NP, agree with above history/assessment/plan. 75 y. o.male reportedly of good baseline PS developed weight loss and head mass and admitted with hypercalcemia and MARCELA, found bone lesions, high M spike 4.89, acute high direct bilirubinemia, elevated uric acid and received rasburicase. I discussed wit pt and wife that pt has a fatal cancer of myeloma but treatment would be of very high risk given his comorbidities macario hyperbilirubinemia, considered liver failure and GI signed off, but no evidence of significant acute liver damage, check liver MRCP showed much gall bladder sludge. Dr. Melida Babinski and I had an extensive discussion with son and clarified his severe condition would be fatal if no cancer rx is pursue, although chemotherapy would be of very high risk, he agreed with DNR status, and discussed with pt regarding his goal and all desired to pursue chemo, marrow biopsied 10/23/19 and proceed with VRD and dose reduce velcade to 0.7mg/m2 sc 10/24/19, Rev had to be reduced to 5mg daily for HD and need outpt FC work and not yet available, give cytoxan with dose reduction for now. 
  
 
Jennifer Harris M.D. [address] Office : [phone]

## 2019-10-25 NOTE — PROCEDURES
Department of Interventional Radiology 
[phone] Interventional Radiology Brief Procedure Note Patient: Terell Calhoun MRN: [id number]  SSN: [id number] YOB: 1945  Age: 75 y.o. Sex: male Date of Procedure: 10/25/2019 Pre-Procedure Diagnosis: ESRD Post-Procedure Diagnosis: SAME Procedure(s): Tunneled Central Venous Catheter Brief Description of Procedure: RIJV access Performed By: Almas Lemos MD  
 
Assistants: None Anesthesia:Lidocaine Estimated Blood Loss: Less than 10ml Specimens:  None Implants:  Tunnelled Hemodialysis Catheter Findings: Tip in RA Complications: None Recommendations: Elevate HOB x 1 hour. REady to use. Follow Up: PRN Signed By: Almas Lemos MD   
 October 25, 2019

## 2019-10-25 NOTE — DIALYSIS
TRANSFER OUT- DIALYSIS Hemodialysis treatment completed without complications. Patient alert and VS stable  /57  P 60   
 
 2.5 Kgs removed. Flushed both ports with 10 mL of NS.  CVC dressing clean, dry, and intact, tego caps intact, bilateral lumens wrapped with 4x4 gauze. Patient to IR after dialysis.

## 2019-10-25 NOTE — PROGRESS NOTES
TRANSFER - OUT REPORT: 
 
[facility] on [address]  being transferred to 5th floor (unit) for routine progression of care Report consisted of patients Situation, Background, Assessment and  
Recommendations(SBAR). Information from the following report(s) Procedure Summary and MAR was reviewed with the receiving nurse. Lines:  
Peripheral IV 10/16/19 Left Forearm (Active) Site Assessment Clean, dry, & intact 10/25/2019  7:30 AM  
Phlebitis Assessment 0 10/25/2019  7:30 AM  
Infiltration Assessment 0 10/25/2019  7:30 AM  
Dressing Status Clean, dry, & intact 10/25/2019  7:30 AM  
Dressing Type Disk with Chlorhexadine gluconate (CHG); Transparent 10/25/2019  7:30 AM  
Hub Color/Line Status Flushed;Patent 10/25/2019  7:30 AM  
Alcohol Cap Used No 10/25/2019  2:25 AM  
  
 
Opportunity for questions and clarification was provided. Patient transported with: 
 [email or web address]

## 2019-10-25 NOTE — PROGRESS NOTES
[facility] CARDIOLOGY PROGRESS NOTE 
      
 
10/25/2019 7:23 AM 
 
Admit Date: 10/12/2019 Subjective:  
Patient is stable and rate is controlled ROS: 
Cardiovascular:  As noted above Objective:  
  
Vitals:  
 10/24/19 1917 10/24/19 2136 10/24/19 2316 10/25/19 6685 BP: 113/67 116/69 119/62 140/76 Pulse: 89 94 93 99 Resp: 22  22 20 Temp: 97.6 °F (36.4 °C)  97.6 °F (36.4 °C) 97.7 °F (36.5 °C) SpO2: 94%  93% 92% Weight:      
Height:      
 
 
Physical Exam: 
General-No Acute Distress Neck- supple, no JVD 
CV- irregular rate and rhythm no MRG Lung- clear bilaterally Abd- soft, nontender, nondistended Ext- no edema bilaterally. Skin- warm and dry Data Review:  
Recent Labs 10/25/19 
0327 10/24/19 
0302  10/22/19 
1553 * 133*   < >  --   
K 4.8 4.0   < >  --   
MG 2.4 2.1   < >  --   
BUN 76* 64*   < >  --   
CREA 7.41* 5.83*   < >  --   
* 121*   < >  -- WBC 14.0* 11.6*   < >  --   
HGB 7.6* 8.2*   < >  --   
HCT 23.2* 24.3*   < >  --   
* 132*   < >  --   
INR  --   --   --  1.7  
 < > = values in this interval not displayed. Assessment/Plan:  
 
Principal Problem: Hypercalcemia (10/12/2019) Per primary team 
 
Active Problems: 
  Anemia (10/12/2019) Per primary team 
 
  MARCELA (acute kidney injury) ([facility] (10/12/2019) Per primary team 
 
  Acute respiratory failure with hypoxia ([facility] (10/15/2019) Per primary team 
 
  Pulmonary hypertension ([facility] (10/15/2019) Per primary team 
 
  Hyperproteinemia- with likely hyperviscosity (10/15/2019) Per primary team 
 
  Acute metabolic encephalopathy (25/10/7596) Per primary team 
 
  Methemoglobinemia (10/17/2019) Per primary team 
 
  Jaundice (10/19/2019) Per primary team 
 
  Acquired hemolytic anemia ([facility] (10/19/2019) Per primary team 
 
  [facility] (10/23/2019)   Per primary team 
 
 Atrial fibrillation - This is now rate controlled on cardizem. No new recommendations. He is not a candidate at this time for [facility]. Will be on standby Jose Dupree MD 
10/25/2019 7:23 AM

## 2019-10-25 NOTE — PROGRESS NOTES
END OF SHIFT NOTE: 
 
Intake/Output 10/24 1901 - 10/25 0700 In: 5979 [I.V.:1392] Out: -   
Voiding: NO 
Catheter: NO 
Drain:   
 
 
 
 
 
Stool:  0 occurrences. Stool Assessment Stool Color: Brown (10/24/19 1700) Stool Appearance: Soft;Loose (10/24/19 1700) Stool Amount: Large (10/24/19 1700) Stool Source/Status: Rectum (10/24/19 1700) Emesis:  0 occurrences. Emesis Assessment Appearance: Undigested food (10/13/19 0848) Emesis Amount: Small (10/14/19 0744) VITAL SIGNS Patient Vitals for the past 12 hrs: 
 Temp Pulse Resp BP SpO2  
10/25/19 0314 97.7 °F (36.5 °C) 99 20 140/76 92 % 10/24/19 2316 97.6 °F (36.4 °C) 93 22 119/62 93 % 10/24/19 2136  94  116/69   
10/24/19 1917 97.6 °F (36.4 °C) 89 22 113/67 94 % Pain Assessment Pain 1 Pain Scale 1: Visual (10/25/19 0225) Pain Intensity 1: 0 (10/25/19 0225) Patient Stated Pain Goal: 0 (10/24/19 1924) Pain Reassessment 1: Patient resting w/respiratory rate greater than 10 (10/25/19 0225) Pain Location 1: Back (10/13/19 0131) Pain Orientation 1: Left;Right (10/12/19 2133) Pain Description 1: Aching; Sore (10/13/19 0131) Pain Intervention(s) 1: Rest;Position (10/13/19 0131) Ambulating No 
 
Additional Information: VSS/Afebrile. Possibly going to IR for line change. Shift report given to oncoming nurse at the bedside.  
 
Nael Zacarias RN

## 2019-10-25 NOTE — PROGRESS NOTES
Figueroa Whitley Admission Date: 10/12/2019 Renal Daily Progress Note: 10/25/2019 The patient's chart is reviewed and the patient is discussed with the staff. Follow up MARCELA Subjective:  
Patient seen and examined on HD, dialyzing via right temp IJ catheter 350 Qb, UF 2600 lethargic but arousable, tolerating dialysis. Labs and chart reviewed- no sign of renal recovery ROS: 
denies CP, SOB, N/V/D, fever/ chills Current Facility-Administered Medications Medication Dose Route Frequency  0.9% sodium chloride infusion  125 mL/hr IntraVENous CONTINUOUS  
 dilTIAZem CD (CARDIZEM CD) capsule 240 mg  240 mg Oral DAILY  lip protectant (BLISTEX) ointment 1 Each  1 Each Topical PRN  
 insulin glargine (LANTUS) injection 13 Units  13 Units SubCUTAneous QHS  insulin lispro (HUMALOG) injection   SubCUTAneous AC&HS  
 lactulose (CHRONULAC) 10 gram/15 mL solution 30 mL  20 g Oral TID  
 0.9% sodium chloride infusion 250 mL  250 mL IntraVENous PRN  
 hydrALAZINE (APRESOLINE) 20 mg/mL injection 10 mg  10 mg IntraVENous Q6H PRN  
 famotidine (PF) (PEPCID) 20 mg in sodium chloride 0.9% 10 mL injection  20 mg IntraVENous DAILY  allopurinol (ZYLOPRIM) tablet 50 mg  50 mg Oral DAILY  sodium chloride (NS) flush 5-40 mL  5-40 mL IntraVENous Q8H  
 sodium chloride (NS) flush 5-40 mL  5-40 mL IntraVENous PRN  
 acetaminophen (TYLENOL) tablet 650 mg  650 mg Oral Q4H PRN  
 ondansetron (ZOFRAN) injection 4 mg  4 mg IntraVENous Q4H PRN  
 diphenhydrAMINE (BENADRYL) capsule 25 mg  25 mg Oral Q6H PRN Objective:  
 
Vitals:  
 10/24/19 2316 10/25/19 0314 10/25/19 0806 10/25/19 1051 BP: 119/62 140/76 158/81 (!) 148/93 Pulse: 93 99 87 87 Resp: 22 20 20 Temp: 97.6 °F (36.4 °C) 97.7 °F (36.5 °C) 97.8 °F (36.6 °C) SpO2: 93% 92% 96% Weight:      
Height:      
 
Intake and Output:  
10/23 1901 - 10/25 0700 In: 2262 [P.O.:870; I.V.:1392] Out: - No intake/output data recorded. Physical Exam:  
Constitutional:  the patient is well developed and in no acute distress, lethargic, follows commands, oriented to self and place HEENT:  Icteric sclera, pupils equal, oral mucosa moist 
Lungs: clear bilaterally, no wheezes Cardiovascular:  Irregularly irregular, S1, S2,  without Rub Abd/GI: soft and non-tender; with positive bowel sounds. Ext: warm without cyanosis. There is no ower leg edema. Skin:  no jaundice or rashes Psychiatric: Calm. Access: right IJ temp line- intact LAB Recent Labs 10/25/19 
0327 10/24/19 
0302 10/23/19 
0234 10/22/19 
1553 WBC 14.0* 11.6* 12.0*  --   
HGB 7.6* 8.2* 9.3*  --   
HCT 23.2* 24.3* 28.0*  --   
* 132* 133*  --   
INR  --   --   --  1.7 Recent Labs 10/25/19 
0327 10/24/19 
0302 10/23/19 
0234 * 133* 134* K 4.8 4.0 3.8 CL 96* 95* 92* CO2 20* 27 27 * 121* 152* BUN 76* 64* 101* CREA 7.41* 5.83* 7.71* MG 2.4 2.1 2.7* ALB 1.3* 1.6* 1.8* SGOT 450* 397* 209* No results for input(s): PH, PCO2, PO2, HCO3 in the last 72 hours. Assessment:  (Medical Decision Making) Hospital Problems  Never Reviewed Codes Class Noted POA Multiple myeloma not having achieved remission ([facility] ICD-10-CM: C90.00 ICD-9-CM: 203.00  10/23/2019 Unknown Jaundice ICD-10-CM: R17 
ICD-9-CM: 782.4  10/19/2019 No  
   
 Acquired hemolytic anemia (HCC) ICD-10-CM: D59.9 ICD-9-CM: 283.9  10/19/2019 Unknown Methemoglobinemia ICD-10-CM: D74.9 ICD-9-CM: 289.7  10/17/2019 Unknown Acute metabolic encephalopathy OIF-17-WP: G93.41 
ICD-9-CM: 348.31  10/16/2019 Unknown Acute respiratory failure with hypoxia [facility]) ICD-10-CM: J96.01 
ICD-9-CM: 518.81  10/15/2019 No  
   
 Pulmonary hypertension (HCC) (Chronic) ICD-10-CM: W66.35 ICD-9-CM: 416.8  10/15/2019 Yes  Hyperproteinemia- with likely hyperviscosity ICD-10-CM: E88.09 
 ICD-9-CM: 273.8  10/15/2019 Unknown * (Principal) Hypercalcemia ICD-10-CM: Z05.68 
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 M-spike. Skeltal survey with innumerable lytic foci in the skull and left humerus, CT head with multiple lytic lesions involving the calvarium including a 3 cm destructive lesion involving the left occipital bone 
- started HD 10/16/19 
-needs temp cath changed to tunneled cath this week - ordered 
-seen on HD tolerating dialysis current, hypotension BP labile- UF as tolerated 
  
Likely Multiple Myeloma 
- s/p pulse dose decadron 
- bone marrow biopsy 10/23 
- starting chemo 10/24 
  
Hypercalcemia - Better with dialysis  
  
Mild Hyponatremia 
- improving with hd  
  
 Abnormal LFTs 
- progressive jaundice of unclear etiology - Seen by GI - felt multifactorial. No evidence of obstruction. Not much to offer -  MRCP showing iron deposition likely consequence of hemolytic anemia. Ferritin high in setting of acute phase reactant.  
  
Anemia and thrombocytopenia 
- transfusions per oncology 
- hemolysis workup positive 
  
Debility  
-palliative following Benita Munguia NP

## 2019-10-25 NOTE — DIALYSIS
TRANSFER IN - DIALYSIS Received patient in dialysis unit  from [facility] (unit) for ordered procedure. Consent verified for renal replacement therapy. Patient alert and vital signs stable. /93 P87 Hemodialysis initiated using right I J catheter. Aspirated and flushed both ports without difficulty. Dressing clean, dry and intact. Machine settings per MD order. Will monitor during treatment.

## 2019-10-26 NOTE — PROGRESS NOTES
[facility] CARDIOLOGY PROGRESS NOTE 
      
 
10/26/2019 11:46 AM 
 
Admit Date: 10/12/2019 Subjective:  
Pt has been well controlled on Cardizem, Had report of CP with HR earlier above 120. Review of EKG trends show HR mainly from 100-110 today with minimal accelerations past that. PT is anemic with plans for RBC transfusion today. He has no complaints at the time of exam except for SOB which is at his baseline. ROS: 
Cardiovascular:  As noted above Objective:  
  
Vitals:  
 10/25/19 1924 10/25/19 2252 10/26/19 0400 10/26/19 1048 BP: 153/83 151/78 141/77 113/84 Pulse: (!) 119 (!) 111 (!) 112 (!) 117 Resp: 20 20 20 22 Temp: 97.5 °F (36.4 °C) 97.7 °F (36.5 °C) 97.5 °F (36.4 °C) 97.7 °F (36.5 °C) SpO2: 99% 98% 97% 98% Weight: 97.6 kg (215 lb 1.6 oz) Height:      
 
 
Physical Exam: 
General-No Acute Distress Neck- supple, no JVD 
CV- regular rate and rhythm no MRG Lung- clear bilaterally Abd- soft, nontender, nondistended Ext- no edema bilaterally. Skin- warm and dry Data Review:  
Recent Labs 10/26/19 
5048 10/26/19 
5908 10/25/19 
0327 *  --  132* K 4.5  --  4.8 MG 2.2  --  2.4 BUN 60*  --  76* CREA 5.41*  --  7.41* *  --  205* WBC  --  12.4* 14.0* HGB  --  7.4* 7.6* HCT  --  22.3* 23.2*  
PLT  --  139* 122* Assessment/Plan:  
 
Principal Problem: Hypercalcemia (10/12/2019) Treatment per primary team 
 
 
Active Problems: 
  Anemia (10/12/2019) Treatment per primary team, plans to transfuse today MARCELA (acute kidney injury) ([facility] (10/12/2019) Per primary Team 
 
  Acute respiratory failure with hypoxia ([facility] (10/15/2019) Per Primary Team 
 
  Pulmonary hypertension ([facility] (10/15/2019) Per Primary Team 
 
  Hyperproteinemia- with likely hyperviscosity (10/15/2019) Per Primary Acute metabolic encephalopathy (98/51/0958) Per Primary Team 
 
  Methemoglobinemia (10/17/2019) Per Primary Team 
 
  Jaundice (10/19/2019) Per Primary Team 
 
  Acquired hemolytic anemia ([facility] (10/19/2019) Per Primary Team 
 
  Multiple myeloma not having achieved remission ([facility] [facility] (10/23/2019) Per Primary Team 
 
Note: Pt has been treated for A Fib with Cardizem, has minimal elevation of HR above 110 at rest with adequate BP. He has had trouble with BP in the past .  Would not adjust Cardizem at this time but would wait to trend vitals after his transfusion to see if HR improves. Currently CP likely related to anemia. No ST segment changes noted on EKG.     
 
 
Raysa Miguel NP 
10/26/2019 11:46 AM

## 2019-10-26 NOTE — PROGRESS NOTES
Renal Progress Note Admission Date: 10/12/2019 Subjective:  
  
Sleepy but arrousable Objective:  
 
Physical Exam:   
Patient Vitals for the past 8 hrs: 
 BP Temp Pulse Resp SpO2  
10/26/19 0400 141/77 97.5 °F (36.4 °C) (!) 112 20 97 % Gen: comfortable , NAD HEENT: moist membranes CV: S1, S2 
Lungs: Clear bilaterally Extem: no edema Current Facility-Administered Medications Medication Dose Route Frequency  lidocaine-EPINEPHrine (PF) (XYLOCAINE) 1 %-1:200,000 injection  mg  1-10 mL SubCUTAneous Rad Multiple  0.9% sodium chloride infusion  125 mL/hr IntraVENous CONTINUOUS  
 dilTIAZem CD (CARDIZEM CD) capsule 240 mg  240 mg Oral DAILY  lip protectant (BLISTEX) ointment 1 Each  1 Each Topical PRN  
 insulin glargine (LANTUS) injection 13 Units  13 Units SubCUTAneous QHS  insulin lispro (HUMALOG) injection   SubCUTAneous AC&HS  
 lactulose (CHRONULAC) 10 gram/15 mL solution 30 mL  20 g Oral TID  
 0.9% sodium chloride infusion 250 mL  250 mL IntraVENous PRN  
 hydrALAZINE (APRESOLINE) 20 mg/mL injection 10 mg  10 mg IntraVENous Q6H PRN  
 famotidine (PF) (PEPCID) 20 mg in sodium chloride 0.9% 10 mL injection  20 mg IntraVENous DAILY  allopurinol (ZYLOPRIM) tablet 50 mg  50 mg Oral DAILY  sodium chloride (NS) flush 5-40 mL  5-40 mL IntraVENous Q8H  
 sodium chloride (NS) flush 5-40 mL  5-40 mL IntraVENous PRN  
 acetaminophen (TYLENOL) tablet 650 mg  650 mg Oral Q4H PRN  
 ondansetron (ZOFRAN) injection 4 mg  4 mg IntraVENous Q4H PRN  
 diphenhydrAMINE (BENADRYL) capsule 25 mg  25 mg Oral Q6H PRN Data Review:  
 
LABS:  
Recent Results (from the past 12 hour(s)) CBC WITH AUTOMATED DIFF Collection Time: 10/26/19  3:41 AM  
Result Value Ref Range WBC 12.4 (H) 4.3 - 11.1 K/uL  
 RBC 2.45 (L) 4.23 - 5.6 M/uL HGB 7.4 (L) 13.6 - 17.2 g/dL HCT 22.3 (L) 41.1 - 50.3 %  MCV 91.0 79.6 - 97.8 FL  
 MCH 30.2 26.1 - 32.9 PG  
 MCHC 33.2 31.4 - 35.0 g/dL  
 RDW 19.0 (H) 11.9 - 14.6 % PLATELET 843 (L) 930 - 450 K/uL MPV 11.2 9.4 - 12.3 FL ABSOLUTE NRBC 0.31 (H) 0.0 - 0.2 K/uL NEUTROPHILS 91 (H) 43 - 78 % LYMPHOCYTES 2 (L) 13 - 44 % MONOCYTES 5 4.0 - 12.0 % EOSINOPHILS 0 (L) 0.5 - 7.8 % BASOPHILS 0 0.0 - 2.0 % IMMATURE GRANULOCYTES 2 0.0 - 5.0 %  
 ABS. NEUTROPHILS 11.4 (H) 1.7 - 8.2 K/UL  
 ABS. LYMPHOCYTES 0.2 (L) 0.5 - 4.6 K/UL  
 ABS. MONOCYTES 0.6 0.1 - 1.3 K/UL  
 ABS. EOSINOPHILS 0.0 0.0 - 0.8 K/UL  
 ABS. BASOPHILS 0.0 0.0 - 0.2 K/UL  
 ABS. IMM. GRANS. 0.2 0.0 - 0.5 K/UL  
 DF AUTOMATED METABOLIC PANEL, COMPREHENSIVE Collection Time: 10/26/19  3:42 AM  
Result Value Ref Range Sodium 134 (L) 136 - 145 mmol/L Potassium 4.5 3.5 - 5.1 mmol/L Chloride 99 98 - 107 mmol/L  
 CO2 25 21 - 32 mmol/L Anion gap 10 7 - 16 mmol/L Glucose 185 (H) 65 - 100 mg/dL BUN 60 (H) 8 - 23 MG/DL Creatinine 5.41 (H) 0.8 - 1.5 MG/DL  
 GFR est AA 13 (L) >60 ml/min/1.73m2 GFR est non-AA 11 (L) >60 ml/min/1.73m2 Calcium 8.7 8.3 - 10.4 MG/DL Bilirubin, total 19.0 (H) 0.2 - 1.1 MG/DL  
 ALT (SGPT) 241 (H) 12 - 65 U/L  
 AST (SGOT) 354 (H) 15 - 37 U/L Alk. phosphatase 342 (H) 50 - 136 U/L Protein, total 8.9 (H) 6.3 - 8.2 g/dL Albumin 1.3 (L) 3.2 - 4.6 g/dL Globulin 7.6 (H) 2.3 - 3.5 g/dL A-G Ratio 0.2 (L) 1.2 - 3.5 MAGNESIUM Collection Time: 10/26/19  3:42 AM  
Result Value Ref Range Magnesium 2.2 1.8 - 2.4 mg/dL PHOSPHORUS Collection Time: 10/26/19  3:42 AM  
Result Value Ref Range Phosphorus 5.8 (H) 2.3 - 3.7 MG/DL  
URIC ACID Collection Time: 10/26/19  3:42 AM  
Result Value Ref Range Uric acid <2.6 (L) 2.6 - 6.0 MG/DL  
GLUCOSE, POC Collection Time: 10/26/19  8:35 AM  
Result Value Ref Range Glucose (POC) 178 (H) 65 - 100 mg/dL Plan:  
 
Principal Problem: Hypercalcemia (10/12/2019) Active Problems: 
  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  
- lots os clinical evidnece band bone marrow bx done 
- started HD 10/16/19 Last dialysis was Friday. Tolerated much better. He had some transient hypotension earlier the week Expect next HD Monday 
  
Multiple Myeloma 
- s/p pulse dose decadron 
- bone marrow biopsy 10/23 
- startedhemo 10/24 
  
  
 Abnormal LFTs 
- progressive jaundice of unclear etiology - Seen by GI - felt multifactorial. No evidence of obstruction. Not much to offer -  MRCP showing iron deposition likely consequence of hemolytic anemia. Ferritin high in setting of acute phase reactant.  
  
Anemia and thrombocytopenia 
- transfusions per oncology 
- hemolysis workup positive 
  
Debility  
-palliative following

## 2019-10-26 NOTE — PROGRESS NOTES
END OF SHIFT NOTE: 
 
Intake/Output 10/25 1901 - 10/26 0700 In: 2207 [I.V.:2207] Out: 0 Voiding: NO 
Catheter: NO 
Drain:   
 
 
 
 
 
Stool:  0 occurrences. Stool Assessment Stool Color: Brown (10/24/19 1700) Stool Appearance: Soft;Loose (10/24/19 1700) Stool Amount: Large (10/24/19 1700) Stool Source/Status: Rectum (10/24/19 1700) Emesis:  0 occurrences. Emesis Assessment Appearance: Undigested food (10/13/19 0848) Emesis Amount: Small (10/14/19 0744) VITAL SIGNS Patient Vitals for the past 12 hrs: 
 Temp Pulse Resp BP SpO2  
10/26/19 0400 97.5 °F (36.4 °C) (!) 112 20 141/77 97 % 10/25/19 2252 97.7 °F (36.5 °C) (!) 111 20 151/78 98 % 10/25/19 1924 97.5 °F (36.4 °C) (!) 119 20 153/83 99 % Pain Assessment Pain 1 Pain Scale 1: Visual (10/26/19 0215) Pain Intensity 1: 0 (10/26/19 0215) Patient Stated Pain Goal: 0 (10/25/19 1916) Pain Reassessment 1: Patient resting w/respiratory rate greater than 10 (10/26/19 0215) Pain Location 1: Back (10/13/19 0131) Pain Orientation 1: Left;Right (10/12/19 2133) Pain Description 1: Aching; Sore (10/13/19 0131) Pain Intervention(s) 1: Rest;Position (10/13/19 0131) Ambulating No 
 
Additional Information: VSS/Afebrile. Plans to start Cytoxan today. Shift report given to oncoming nurse at the bedside.  
 
Julio Henderson RN

## 2019-10-26 NOTE — PROGRESS NOTES
[facility] Hematology & Oncology Inpatient Hematology / Oncology Progress Note Admission Date: 10/12/2019  9:36 PM 
Reason for Admission/Hospital Course: Hypercalcemia [E83.52] Anemia [D64.9] MARCELA (acute kidney injury) ([address] [N17.9] 24 Hour Events: 
BMbx 10/23 pending results Received Velcade and dex 
give 1/2 dose cytoxan Revlimid script sent to [facility] More awake today ROS: More awake today. Answers some questions appropriately. 10 point review of systems is otherwise negative with the exception of the elements mentioned above in the HPI. 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 (G6PD was within reference range when tested on 10/16/19) OBJECTIVE: 
Patient Vitals for the past 8 hrs: 
 BP Temp Pulse Resp SpO2  
10/26/19 1048 113/84 97.7 °F (36.5 °C) (!) 117 22 98 % 10/26/19 0400 141/77 97.5 °F (36.4 °C) (!) 112 20 97 % Temp (24hrs), Av.6 °F (36.4 °C), Min:97.5 °F (36.4 °C), Max:97.7 °F (36.5 °C) 
 
10/26 0701 - 10/26 1900 In: 118 [P.O.:118] Out: 0 Physical Exam: 
Constitutional: Ill-appearing elderly male in no acute distress, lying comfortably in the hospital bed. HEENT: Normocephalic and atraumatic. Oropharynx is clear, mucous membranes are moist. Sclera with icterus. Strabismus Skin Warm and dry. No bruising and no rash noted. No erythema. No pallor. Respiratory Lungs are clear to auscultation bilaterally. CVS Irregular rate, irregular rhythm and normal S1 and S2. No murmurs, gallops, or rubs. Abdomen Soft, nontender and nondistended, normoactive bowel sounds. Neuro More awake today MSK Normal range of motion in general.  No edema and no tenderness. Psych Calm and cooperative Labs: 
   
Recent Labs 10/26/19 
3262 10/25/19 
0327 10/24/19 
0302 WBC 12.4* 14.0* 11.6*  
RBC 2.45* 2.52* 2.68* HGB 7.4* 7.6* 8.2* HCT 22.3* 23.2* 24.3*  
MCV 91.0 92.1 90.7 MCH 30.2 30.2 30.6 MCHC 33.2 32.8 33.7 RDW 19.0* 19.1* 18.9*  
* 122* 132* GRANS 91* 91* 89* LYMPH 2* 6* 7*  
MONOS 5 1* 3*  
EOS 0* 0* 0*  
BASOS 0 0 0 IG 2 2 1 DF AUTOMATED AUTOMATED AUTOMATED ANEU 11.4* 12.8* 10.4* ABL 0.2* 0.8 0.8 ABM 0.6 0.1 0.3 CHAVA 0.0 0.0 0.0 ABB 0.0 0.0 0.0 AIG 0.2 0.3 0.1 Recent Labs 10/26/19 
5747 10/25/19 
0327 10/24/19 
0302 * 132* 133* K 4.5 4.8 4.0  
CL 99 96* 95* CO2 25 20* 27 AGAP 10 16 11 * 205* 121* BUN 60* 76* 64* CREA 5.41* 7.41* 5.83* GFRAA 13* 9* 12* GFRNA 11* 8* 10* CA 8.7 8.8 9.0 SGOT 354* 450* 397* * 338* 223* TP 8.9* 8.9* 9.2* ALB 1.3* 1.3* 1.6*  
GLOB 7.6* 7.6* 7.6* AGRAT 0.2* 0.2* 0.2* MG 2.2 2.4 2.1 PHOS 5.8*  --   --   
 
 
 
Imaging: 
Laury Dalton COMP [[id number]] Collected: 10/14/19 9409 Order Status: Completed Updated: 10/14/19 2033 Narrative:    
Renal ultrasound. CLINICAL INDICATION:  Acute on chronic renal disease PROCEDURE: Realtime grayscale color Doppler evaluation of the kidneys and 
bladder. COMPARISON: No prior similar studies available for direct comparison. FINDINGS: The right kidney is normal in size but diffusely increased in 
echogenicity measuring 12.2 cm. A 2.4 cm simple cyst is noted off the midpole. The left kidney is normal in size and diffusely increased in echogenicity 
measuring 11.5 cm. A 1.2 cm simple cyst is noted within the midpole region. There is an indeterminate echogenic focus within the renal parenchyma likely a 
small stone. There is no hydronephrosis. The bladder is unremarkable. The aorta 
is normal in caliber. Impression:    
IMPRESSION:  
1. Bilateral diffuse increased renal cortical echogenicity can be seen with 
medical renal disease. 2. Indeterminate calcifications in the left renal parenchyma. Nephrolithiasis 
favored. 3. No hydronephrosis. 4. Bilateral simple renal cysts. IR BX BONE MARROW DIAGNOSTIC [[id number]] Order Status: No result XR BONE SURVEY LTD (METS) [[id number]] Collected: 10/13/19 1121 Order Status: Completed Updated: 10/13/19 1127 Narrative:    
History: Low back, lateral rib, and left posterior shoulder pain EXAM: Metastatic bone survey FINDINGS: Innumerable lytic lesions are present throughout the skull, including 
a large lesion within the occipital portion of the calvarium measuring 5 cm. Degenerative change of the cervical, thoracic, and lumbar spine noted without 
additional lytic foci. The included lungs are clear. Multiple lytic lesions seen 
within the left humerus. No definite lytic foci within the femoral bones are 
within the pelvis, though evaluation the pelvis is limited due to overlying 
bowel gas. Impression:    
IMPRESSION: 
 
Innumerable lytic foci within the skull as well as within the left humerus. Findings suggestive of multiple myeloma or metastatic disease. XR CHEST PA LAT [[id number]] Collected: 10/12/19 2328 Order Status: Completed Updated: 10/12/19 2330 Narrative:    
EXAM: Chest x-ray. INDICATION: Cough. COMPARISON: None. TECHNIQUE: Frontal and lateral view chest x-ray. FINDINGS: The lungs are clear. The cardiac size, mediastinal contour and 
pulmonary vasculature are normal. No pneumothorax or pleural effusion is seen. Impression:    
IMPRESSION: No acute process. CT HEAD WITHOUT CONTRAST [[id number]] Collected: 10/12/19 2323 Order Status: Completed Updated: 10/12/19 2330 Narrative:    
EXAM: Noncontrast CT head. INDICATION: Dizziness. COMPARISON: None. TECHNIQUE: Axial noncontrast CT images of the head were obtained.  Radiation 
dose reduction techniques were used for this study.  Our CT scanners use one or 
all of the following:  Automated exposure control, adjustment of the mA and/or 
kV according to patient size, iterative reconstruction. FINDINGS: There is a 3.3 x 2.9 cm lytic soft tissue mass in the left occipital 
bone, which minimally indents the underlying left occipital lobe. There is no 
midline shift or significant mass effect. There are also innumerable smaller 
subcentimeter round lytic lesions throughout the calvarium. Brain volume is 
appropriate for age. No acute infarct, hemorrhage or evidence of hydrocephalus 
is seen. The basal cisterns are preserved. The visualized paranasal sinuses and 
mastoid air cells are clear. There has been bilateral eye surgery. Impression:    
IMPRESSION:  
1. 3.3 cm lytic soft tissue mass in the left occipital bone, minimally indenting 
the underlying left occipital lobe. In addition, there are innumerable 
subcentimeter lytic lesions throughout the remainder of the calvarium. These 
could relate to metastatic disease or multiple myeloma. 2. No acute infarct or hemorrhage. Medications: 
Current Facility-Administered Medications Medication Dose Route Frequency  lidocaine-EPINEPHrine (PF) (XYLOCAINE) 1 %-1:200,000 injection  mg  1-10 mL SubCUTAneous Rad Multiple  0.9% sodium chloride infusion  125 mL/hr IntraVENous CONTINUOUS  
 dilTIAZem CD (CARDIZEM CD) capsule 240 mg  240 mg Oral DAILY  lip protectant (BLISTEX) ointment 1 Each  1 Each Topical PRN  
 insulin glargine (LANTUS) injection 13 Units  13 Units SubCUTAneous QHS  insulin lispro (HUMALOG) injection   SubCUTAneous AC&HS  
 lactulose (CHRONULAC) 10 gram/15 mL solution 30 mL  20 g Oral TID  
 0.9% sodium chloride infusion 250 mL  250 mL IntraVENous PRN  
 hydrALAZINE (APRESOLINE) 20 mg/mL injection 10 mg  10 mg IntraVENous Q6H PRN  
 famotidine (PF) (PEPCID) 20 mg in sodium chloride 0.9% 10 mL injection  20 mg IntraVENous DAILY  allopurinol (ZYLOPRIM) tablet 50 mg  50 mg Oral DAILY  sodium chloride (NS) flush 5-40 mL  5-40 mL IntraVENous Q8H  
 sodium chloride (NS) flush 5-40 mL  5-40 mL IntraVENous PRN  
  acetaminophen (TYLENOL) tablet 650 mg  650 mg Oral Q4H PRN  
 ondansetron (ZOFRAN) injection 4 mg  4 mg IntraVENous Q4H PRN  
 diphenhydrAMINE (BENADRYL) capsule 25 mg  25 mg Oral Q6H PRN  
 
 
 
ASSESSMENT: 
 
Problem List  Never Reviewed Codes Class Noted Multiple myeloma not having achieved remission ([facility] [address] ICD-10-CM: C90.00 ICD-9-CM: 203.00  10/23/2019 Jaundice ICD-10-CM: R17 
ICD-9-CM: 782.4  10/19/2019 Acquired hemolytic anemia (HCC) ICD-10-CM: D59.9 ICD-9-CM: 283.9  10/19/2019 Methemoglobinemia ICD-10-CM: D74.9 ICD-9-CM: 289.7  10/17/2019 Acute metabolic encephalopathy PMK-32-YC: G93.41 
ICD-9-CM: 348.31  10/16/2019 Acute respiratory failure with hypoxia [facility]) ICD-10-CM: J96.01 
ICD-9-CM: 518.81  10/15/2019 Pulmonary hypertension (HCC) (Chronic) ICD-10-CM: I27.20 ICD-9-CM: 416.8  10/15/2019 Hyperproteinemia- with likely hyperviscosity ICD-10-CM: E88.09 
ICD-9-CM: 273.8  10/15/2019 Diabetes mellitus (HCC) (Chronic) ICD-10-CM: E11.9 ICD-9-CM: 250.00  10/13/2019 Essential hypertension (Chronic) ICD-10-CM: I10 
ICD-9-CM: 401.9  10/13/2019 Overview Signed 10/13/2019  1:04 AM by Nani Litten, MD  
  Last Assessment & Plan: Hypertension is unchanged. Continue current treatment regimen. Blood pressure will be reassessed at the next regular appointment. Paroxysmal atrial fibrillation (HCC) (Chronic) ICD-10-CM: I48.0 ICD-9-CM: 427.31  10/13/2019 Overview Signed 10/13/2019  1:04 AM by Nani Litten, MD  
  Last Assessment & Plan: He has had no recurrences. I still think he remains a bleeding risk. I would like to hold off of [address] [place] [address] for now--and check again on him in 3 months. Continue dilt--but change to 360 mg tabs. * (Principal) Hypercalcemia ICD-10-CM: D58.50 
ICD-9-CM: 275.42  10/12/2019 Anemia ICD-10-CM: D64.9 ICD-9-CM: 285.9  10/12/2019 MARCELA (acute kidney injury) ([facility] [address] ICD-10-CM: N17.9 ICD-9-CM: 584.9  10/12/2019 Mr. Amanda Moy is a 75 y.o. male admitted on 10/12/2019 with a primary diagnosis of hypercalcemia and possible metastatic malignancy/myeloma. Mr. Amanda Moy is a gentleman who has received no formal medical care over the past several years. He stated that he had no primary care physician. Lab data from [facility] in 2017 show a creatinine that evelyn as high as 6.1 and was 2.80 at discharge. His chronic baseline is unknown. As noted, there is a history of diabetes mellitus. For two-three months leading to this admission, he has gradually deteriorated with low back discomfort, forgetfulness, anorexia and 35 pound weight loss. He has noted a growth on the back of his head. He was finally prevailed upon to come to the hospital for evaluation. Data are listed below but he was found to be in acute renal failure, hypercalcemic, anemic and with multiple lytic lesions involving his calvarium including a 3 cm destructive lesion involving the left occipital bone. PLAN: 
Concern for myeloma - We will initiate workup for myeloma based on strong presumption and if negative will pursue other alternatives. - Check skeletal survey, SPEP, serum free light chains, beta 2 microglobulin , LDH, urine IEP, marrow biopsy 10/14 Skeletal survey with innumerable lytic foci w/i the skull and L humerus. SPEP/UPEP/FLC/Beta 2 pending. Awaiting BMbx. 10/15 Kappa FLC 13k. Awaiting BMbx. 
10/16 SPEP with m-spike 4.89. Not stable enough for BMbx at this time. Will go ahead with pulse dose steroids - Dex 40mg IV x 4 days. Had code status discussion, wishes to remain full code, but wife states he would not want to be on vent for an extended period of time. 10/18 On pulse dose steroids, D3 of 4.  
10/19 D4/ 4 pulse dex. 10/20 s/p 4 days of pulse dex 10/22 BMbx then 1/2 dose CyBorD.  Bone survey 10/13 Innumerable lytic foci within the skull as well as within the left humerus. Findings suggestive of multiple myeloma or metastatic disease. 10/23 BMbx today at bedside. Dose reduced cycle one chemo today. Velcade/dex/revlimid 10/24 Chemo today after family consent and chemo ed 
10/25 received velcade and dex 10/24. Give 1/2 dose cytoxan while waiting on Revlimid Hypercalcemia - Treatment of his hypercalcemia may be problematic with elevated creatinine and elevated BNP. Continue fluids and I have added calcitonin. Use Lasix to balance I/O's. Hold of on Zometa for now given creatinine. 10/14 CCa++ down to 12.2. Con't IVF. 10/15 CCa++ 13.1. Con't calcitonin. 10/16 CCa++ up to 14. 
10/18 CCa++ down to 12.5 
10/19 CCa ++ down to 11.8  
10/20 CCa ++ down to 11.6  
10/21 calcium 9.8 resolved MARCELA 
- Nephrology should be on board to manage what is likely acute kidney injury from ? myeloma, hypercalcemia, etc. Superimposed on CRF. Hopefully this will reverse but dialysis could be an equally potential outcome. 10/14 Cr 4.6. Renal US c/w medical renal disease and possible L nephrolithiasis. Neph consult pending. 10/16 Cr 5.01. Neph following. 10/17 Started dialysis yesterday. Cr 4.72 
10/18 Cr 5.07. Continues on dialysis. 10/19 Cr. 4.06 today. Dialysis today. 10/20 Cr 4.13 today, continues on dialysis. 10/23 per nephrology Dyspnea / hypoxia 10/15 On Optiflow. CXR/CT chest pending. Pulm following. On Azith/Roland. On empiric heparin gtt. . Echo with dilated RV and pulm HTN. 
10/16 CXR with volume overload. Plans for VQ scan when more stable. Pulm following. 10/17 Transferred to ICU for resp distress, now on BiPAP. On Azith/Rocephin. 10/18 on Hiflow O2. On Azith/Rocephin. 10/19 on nasal cannula. Continues Azith/Rocephin  
10/20 still on nasal cannula/azith/rocephin. Anemia 10/16 Transfuse 1 unit PRBCs 
10/17 Hgb 6.7 s/p 2 units. Check hemolysis labs. PBRCs ordered. 10/18 Hgb 7.2 s/p tx. OK to give PRBCs today with dialysis. Hemolytic anemia noted. Hapto <8. Check ricardo. 10/19 Hgb up to 8.1 today. Ricardo neg. 10/20 Hgb stable at 8.4 today 10/23 Transfuse per Aga SOPs. Hyperbilirubinemia 10/17 Bili up to 10.9. Check hemolysis labs inc frac bili. 10/18 Bili 16.6. Frac bili mostly direct. RUQ US pending. 10/19 Bili up to 20. ALT worse today at 210,  down from 609. US showed gallbladder wall thickening, hepatomegaly which has worsened since 2017, and right renal parenchymal hyperechogenicity. Ammonia elevated this AM at 32- GI added lactulose 10/21 Bilirubin up to 21 today. MRCP liver. GI has signed off.  
10/22 MRCP: Iron deposition within the liver and spleen. The liver appears morphologically normal. Gallbladder sludge. No biliary dilation. Primary checking Tsat and ferritin and also will see if GI would do ERCP for gallbladder sludge which can potentially be beneficial especially with the starting of chemo Methemoglobinemia 
- s/p methylene blue 10/16 Hyperuricemia 10/22 received rasburicase 10/14 and 10/21. Rasburicase can result in methemoglobinemia in some patients so it is   
now listed as an allergy. Aga SOPs 
 
10/22/2019 We had a lengthy discussion with patient's son Fabrice Dumas ([phone])  (Dr. Alfredo Mckeon also present). With patient's permission, conversation occurred outside of patient room. His son was advised of the likely prognosis of MM as well prognosis with and without treatment. He discussed with family and his father and their wish is to proceed with BMbx then treatment. Dr. David Tamayo will perform bx at bedside in am.  He will need to be transferred to 5th floor. Also discussed code status and patient is now DNR. I personally saw, exammed and counselled the patient, and discussed with NP, agree with above history/assessment/plan. 75 y. o.male reportedly of good baseline PS developed weight loss and head mass and admitted with hypercalcemia and MARCELA, found bone lesions, high M spike 4.89, acute high direct bilirubinemia, elevated uric acid and received rasburicase. I discussed wit pt and wife that pt has a fatal cancer of myeloma but treatment would be of very high risk given his comorbidities macario hyperbilirubinemia, considered liver failure and GI signed off, but no evidence of significant acute liver damage, check liver MRCP showed much gall bladder sludge. Dr. Rene Jules and I had an extensive discussion with son and clarified his severe condition would be fatal if no cancer rx is pursue, although chemotherapy would be of very high risk, he agreed with DNR status, and discussed with pt regarding his goal and all desired to pursue chemo, marrow biopsied 10/23/19 and proceed with VRD and dose reduce velcade to 0.7mg/m2 sc 10/24/19, Rev had to be reduced to 5mg daily for HD and need outpt FC work and not yet available, give cytoxan with dose reduction for now, protein gap down, afib RVR again after Cardizem held for hypotension, re-consult cardiology, pRBC given the anemia and Shirley Maciel M.D. [address] Office : [phone]

## 2019-10-26 NOTE — PROGRESS NOTES
Problem: Falls - Risk of 
Goal: *Absence of Falls Description Document Kev Inman Fall Risk and appropriate interventions in the flowsheet. Outcome: Progressing Towards Goal 
Note:  
Fall Risk Interventions: 
Mobility Interventions: Communicate number of staff needed for ambulation/transfer Mentation Interventions: Adequate sleep, hydration, pain control, Room close to nurse's station Medication Interventions: Teach patient to arise slowly, Patient to call before getting OOB Elimination Interventions: Call light in reach, Patient to call for help with toileting needs Problem: Patient Education: Go to Patient Education Activity Goal: Patient/Family Education Outcome: Progressing Towards Goal 
  
Problem: Pressure Injury - Risk of 
Goal: *Prevention of pressure injury Description Document Adalid Scale and appropriate interventions in the flowsheet. Outcome: Progressing Towards Goal 
Note:  
Pressure Injury Interventions: 
Sensory Interventions: Assess changes in LOC, Assess need for specialty bed Moisture Interventions: Absorbent underpads, Apply protective barrier, creams and emollients Activity Interventions: Pressure redistribution bed/mattress(bed type) Mobility Interventions: Pressure redistribution bed/mattress (bed type) Nutrition Interventions: Document food/fluid/supplement intake, Offer support with meals,snacks and hydration Friction and Shear Interventions: HOB 30 degrees or less, Foam dressings/transparent film/skin sealants Problem: Patient Education: Go to Patient Education Activity Goal: Patient/Family Education Outcome: Progressing Towards Goal

## 2019-10-27 PROBLEM — R00.1 BRADYCARDIA: Status: ACTIVE | Noted: 2019-01-01

## 2019-10-27 PROBLEM — J69.0 ASPIRATION PNEUMONIA (HCC): Status: ACTIVE | Noted: 2019-01-01

## 2019-10-27 NOTE — PROGRESS NOTES
Pharmacokinetic Consult to Pharmacist 
 
Stephanie Stains is a 75 y.o. male being treated for bloodstream infection with vancomycin. Height: 6' (182.9 cm)  Weight: 106.4 kg (234 lb 8 oz) Lab Results Component Value Date/Time BUN 79 (H) 10/27/2019 03:23 AM  
 Creatinine 6.84 (H) 10/27/2019 03:23 AM  
 WBC 11.5 (H) 10/27/2019 03:21 AM  
 Procalcitonin 0.9 10/15/2019 03:40 AM  
 Lactic acid 0.8 10/15/2019 10:27 AM  
  
Estimated Creatinine Clearance: 11.8 mL/min (A) (based on SCr of 6.84 mg/dL (H)). CULTURES: 
pending Day 1 of vancomycin. Goal trough is 15-20. Vancomycin dose initiated as intermittent dosing. Will continue to follow patient. Thank you, Mini Dash, PHARMD

## 2019-10-27 NOTE — PROGRESS NOTES
Problem: Falls - Risk of 
Goal: *Absence of Falls Description Document Fabby Mejia Fall Risk and appropriate interventions in the flowsheet. Outcome: Progressing Towards Goal 
Note:  
Fall Risk Interventions: 
Mobility Interventions: Communicate number of staff needed for ambulation/transfer, Patient to call before getting OOB, PT Consult for mobility concerns Mentation Interventions: Adequate sleep, hydration, pain control, Evaluate medications/consider consulting pharmacy, Reorient patient, Family/sitter at bedside Medication Interventions: Evaluate medications/consider consulting pharmacy, Assess postural VS orthostatic hypotension Elimination Interventions: Call light in reach, Patient to call for help with toileting needs, Toilet paper/wipes in reach, Toileting schedule/hourly rounds Problem: Pressure Injury - Risk of 
Goal: *Prevention of pressure injury Description Document Adalid Scale and appropriate interventions in the flowsheet. Outcome: Progressing Towards Goal 
Note:  
Pressure Injury Interventions: 
Sensory Interventions: Assess changes in LOC, Maintain/enhance activity level, Pressure redistribution bed/mattress (bed type) Moisture Interventions: Absorbent underpads, Maintain skin hydration (lotion/cream) Activity Interventions: Pressure redistribution bed/mattress(bed type), PT/OT evaluation Mobility Interventions: HOB 30 degrees or less, Pressure redistribution bed/mattress (bed type) Nutrition Interventions: Offer support with meals,snacks and hydration, Document food/fluid/supplement intake Friction and Shear Interventions: Foam dressings/transparent film/skin sealants, HOB 30 degrees or less

## 2019-10-27 NOTE — PROGRESS NOTES
[employer] [facility] Inpatient Hematology / Oncology Progress Note Admission Date: 10/12/2019  9:36 PM 
Reason for Admission/Hospital Course: Hypercalcemia [E83.52] Anemia [D64.9] MARCELA (acute kidney injury) ([facility] [address] [N17.9] 24 Hour Events: 
BMbx 10/23 pending results Received Velcade and dex 
give 1/2 dose cytoxan Revlimid script sent to [facility] More awake today ROS: More awake today. Answers some questions appropriately. 10 point review of systems is otherwise negative with the exception of the elements mentioned above in the HPI. 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 (G6PD was within reference range when tested on 10/16/19) OBJECTIVE: 
Patient Vitals for the past 8 hrs: 
 BP Temp Pulse Resp SpO2  
10/27/19 0723 143/78 97.6 °F (36.4 °C) (!) 110 21 97 % 10/27/19 0315 140/84 97.6 °F (36.4 °C) 99 20 98 % Temp (24hrs), Av °F (36.7 °C), Min:97.4 °F (36.3 °C), Max:99 °F (37.2 °C) No intake/output data recorded. Physical Exam: 
Constitutional: Ill-appearing elderly male in no acute distress, lying comfortably in the hospital bed. HEENT: Normocephalic and atraumatic. Oropharynx is clear, mucous membranes are moist. Sclera with icterus. Strabismus Skin Warm and dry. No bruising and no rash noted. No erythema. No pallor. Respiratory Lungs are clear to auscultation bilaterally. CVS Irregular rate, irregular rhythm and normal S1 and S2. No murmurs, gallops, or rubs. Abdomen Soft, nontender and nondistended, normoactive bowel sounds. Neuro More awake today MSK Normal range of motion in general.  No edema and no tenderness. Psych Calm and cooperative Labs: 
   
Recent Labs 10/27/19 
0321 10/26/19 
9999 10/25/19 
0327 WBC 11.5* 12.4* 14.0*  
RBC 2.72* 2.45* 2.52* HGB 8.4* 7.4* 7.6* HCT 25.1* 22.3* 23.2*  
MCV 92.3 91.0 92.1 MCH 30.9 30.2 30.2 MCHC 33.5 33.2 32.8 RDW 18.4* 19.0* 19.1*  
* 139* 122* GRANS 90* 91* 91* LYMPH 4* 2* 6*  
MONOS 5 5 1* EOS 0* 0* 0*  
BASOS 0 0 0 IG 1 2 2 DF AUTOMATED AUTOMATED AUTOMATED ANEU 10.4* 11.4* 12.8* ABL 0.4* 0.2* 0.8 ABM 0.5 0.6 0.1 CHAVA 0.0 0.0 0.0 ABB 0.0 0.0 0.0 AIG 0.2 0.2 0.3 Recent Labs 10/27/19 
2832 10/26/19 
0709 10/25/19 
0327 * 134* 132* K 4.5 4.5 4.8  
 99 96* CO2 22 25 20* AGAP 12 10 16 * 185* 205* BUN 79* 60* 76* CREA 6.84* 5.41* 7.41* GFRAA 10* 13* 9*  
GFRNA 8* 11* 8*  
CA 8.4 8.7 8.8 SGOT 320* 354* 450* * 342* 338* TP 8.9* 8.9* 8.9* ALB 1.3* 1.3* 1.3*  
GLOB 7.6* 7.6* 7.6* AGRAT 0.2* 0.2* 0.2* MG 2.2 2.2 2.4 PHOS  --  5.8*  --   
 
 
 
Imaging: 
Jaime Dillard [[id number]] Collected: 10/14/19 9213 Order Status: Completed Updated: 10/14/19 0679 Narrative:    
Renal ultrasound. CLINICAL INDICATION:  Acute on chronic renal disease PROCEDURE: Realtime grayscale color Doppler evaluation of the kidneys and 
bladder. COMPARISON: No prior similar studies available for direct comparison. FINDINGS: The right kidney is normal in size but diffusely increased in 
echogenicity measuring 12.2 cm. A 2.4 cm simple cyst is noted off the midpole. The left kidney is normal in size and diffusely increased in echogenicity 
measuring 11.5 cm. A 1.2 cm simple cyst is noted within the midpole region. There is an indeterminate echogenic focus within the renal parenchyma likely a 
small stone. There is no hydronephrosis. The bladder is unremarkable. The aorta 
is normal in caliber. Impression:    
IMPRESSION:  
1. Bilateral diffuse increased renal cortical echogenicity can be seen with 
medical renal disease. 2. Indeterminate calcifications in the left renal parenchyma. Nephrolithiasis 
favored. 3. No hydronephrosis. 4. Bilateral simple renal cysts. IR BX BONE MARROW DIAGNOSTIC [[id number]] Order Status: No result XR BONE SURVEY LTD ([facility]) [[id number]] Collected: 10/13/19 1121 Order Status: Completed Updated: 10/13/19 1127 Narrative:    
History: Low back, lateral rib, and left posterior shoulder pain EXAM: Metastatic bone survey FINDINGS: Innumerable lytic lesions are present throughout the skull, including 
a large lesion within the occipital portion of the calvarium measuring 5 cm. Degenerative change of the cervical, thoracic, and lumbar spine noted without 
additional lytic foci. The included lungs are clear. Multiple lytic lesions seen 
within the left humerus. No definite lytic foci within the femoral bones are 
within the pelvis, though evaluation the pelvis is limited due to overlying 
bowel gas. Impression:    
IMPRESSION: 
 
Innumerable lytic foci within the skull as well as within the left humerus. Findings suggestive of multiple myeloma or metastatic disease. XR CHEST PA LAT [[id number]] Collected: 10/12/19 2328 Order Status: Completed Updated: 10/12/19 2330 Narrative:    
EXAM: Chest x-ray. INDICATION: Cough. COMPARISON: None. TECHNIQUE: Frontal and lateral view chest x-ray. FINDINGS: The lungs are clear. The cardiac size, mediastinal contour and 
pulmonary vasculature are normal. No pneumothorax or pleural effusion is seen. Impression:    
IMPRESSION: No acute process. CT HEAD WITHOUT CONTRAST [[id number]] Collected: 10/12/19 2323 Order Status: Completed Updated: 10/12/19 2330 Narrative:    
EXAM: Noncontrast CT head. INDICATION: Dizziness. COMPARISON: None. TECHNIQUE: Axial noncontrast CT images of the head were obtained.  Radiation 
dose reduction techniques were used for this study.  Our CT scanners use one or 
all of the following:  Automated exposure control, adjustment of the mA and/or 
kV according to patient size, iterative reconstruction.  
 
FINDINGS: There is a 3.3 x 2.9 cm lytic soft tissue mass in the left occipital 
 bone, which minimally indents the underlying left occipital lobe. There is no 
midline shift or significant mass effect. There are also innumerable smaller 
subcentimeter round lytic lesions throughout the calvarium. Brain volume is 
appropriate for age. No acute infarct, hemorrhage or evidence of hydrocephalus 
is seen. The basal cisterns are preserved. The visualized paranasal sinuses and 
mastoid air cells are clear. There has been bilateral eye surgery. Impression:    
IMPRESSION:  
1. 3.3 cm lytic soft tissue mass in the left occipital bone, minimally indenting 
the underlying left occipital lobe. In addition, there are innumerable 
subcentimeter lytic lesions throughout the remainder of the calvarium. These 
could relate to metastatic disease or multiple myeloma. 2. No acute infarct or hemorrhage. Medications: 
Current Facility-Administered Medications Medication Dose Route Frequency  0.9% sodium chloride infusion 250 mL  250 mL IntraVENous PRN  
 lidocaine-EPINEPHrine (PF) (XYLOCAINE) 1 %-1:200,000 injection  mg  1-10 mL SubCUTAneous Rad Multiple  0.9% sodium chloride infusion  125 mL/hr IntraVENous CONTINUOUS  
 dilTIAZem CD (CARDIZEM CD) capsule 240 mg  240 mg Oral DAILY  lip protectant (BLISTEX) ointment 1 Each  1 Each Topical PRN  
 insulin glargine (LANTUS) injection 13 Units  13 Units SubCUTAneous QHS  insulin lispro (HUMALOG) injection   SubCUTAneous AC&HS  
 lactulose (CHRONULAC) 10 gram/15 mL solution 30 mL  20 g Oral TID  
 0.9% sodium chloride infusion 250 mL  250 mL IntraVENous PRN  
 hydrALAZINE (APRESOLINE) 20 mg/mL injection 10 mg  10 mg IntraVENous Q6H PRN  
 famotidine (PF) (PEPCID) 20 mg in sodium chloride 0.9% 10 mL injection  20 mg IntraVENous DAILY  allopurinol (ZYLOPRIM) tablet 50 mg  50 mg Oral DAILY  sodium chloride (NS) flush 5-40 mL  5-40 mL IntraVENous Q8H  
 sodium chloride (NS) flush 5-40 mL  5-40 mL IntraVENous PRN  
  acetaminophen (TYLENOL) tablet 650 mg  650 mg Oral Q4H PRN  
 ondansetron (ZOFRAN) injection 4 mg  4 mg IntraVENous Q4H PRN  
 diphenhydrAMINE (BENADRYL) capsule 25 mg  25 mg Oral Q6H PRN  
 
 
 
ASSESSMENT: 
 
Problem List  Never Reviewed Codes Class Noted Multiple myeloma not having achieved remission ([facility] [address] ICD-10-CM: C90.00 ICD-9-CM: 203.00  10/23/2019 Jaundice ICD-10-CM: R17 
ICD-9-CM: 782.4  10/19/2019 Acquired hemolytic anemia (HCC) ICD-10-CM: D59.9 ICD-9-CM: 283.9  10/19/2019 Methemoglobinemia ICD-10-CM: D74.9 ICD-9-CM: 289.7  10/17/2019 Acute metabolic encephalopathy [facility]: G93.41 
ICD-9-CM: 348.31  10/16/2019 Acute respiratory failure with hypoxia [facility]) ICD-10-CM: J96.01 
ICD-9-CM: 518.81  10/15/2019 Pulmonary hypertension (HCC) (Chronic) ICD-10-CM: I27.20 ICD-9-CM: 416.8  10/15/2019 Hyperproteinemia- with likely hyperviscosity ICD-10-CM: E88.09 
ICD-9-CM: 273.8  10/15/2019 Diabetes mellitus (HCC) (Chronic) ICD-10-CM: E11.9 ICD-9-CM: 250.00  10/13/2019 Essential hypertension (Chronic) ICD-10-CM: I10 
ICD-9-CM: 401.9  10/13/2019 Overview Signed 10/13/2019  1:04 AM by Edward Coleman MD  
  Last Assessment & Plan: Hypertension is unchanged. Continue current treatment regimen. Blood pressure will be reassessed at the next regular appointment. Paroxysmal atrial fibrillation (HCC) (Chronic) ICD-10-CM: I48.0 ICD-9-CM: 427.31  10/13/2019 Overview Signed 10/13/2019  1:04 AM by Edward Coleman MD  
  Last Assessment & Plan: He has had no recurrences. I still think he remains a bleeding risk. I would like to hold off of 4 [facility] for now--and check again on him in 3 months. Continue dilt--but change to 360 mg tabs. * (Principal) Hypercalcemia ICD-10-CM: A22.58 
ICD-9-CM: 275.42  10/12/2019 Anemia ICD-10-CM: D64.9 ICD-9-CM: 285.9  10/12/2019 MARCELA (acute kidney injury) ([facility] [address] ICD-10-CM: N17.9 ICD-9-CM: 584.9  10/12/2019 Mr. Ilana Eagle is a 75 y.o. male admitted on 10/12/2019 with a primary diagnosis of hypercalcemia and possible metastatic malignancy/myeloma. Mr. Ilana Eagle is a gentleman who has received no formal medical care over the past several years. He stated that he had no primary care physician. Lab data from [facility] in 2017 show a creatinine that evelyn as high as 6.1 and was 2.80 at discharge. His chronic baseline is unknown. As noted, there is a history of diabetes mellitus. For two-three months leading to this admission, he has gradually deteriorated with low back discomfort, forgetfulness, anorexia and 35 pound weight loss. He has noted a growth on the back of his head. He was finally prevailed upon to come to the hospital for evaluation. Data are listed below but he was found to be in acute renal failure, hypercalcemic, anemic and with multiple lytic lesions involving his calvarium including a 3 cm destructive lesion involving the left occipital bone. PLAN: 
Concern for myeloma - We will initiate workup for myeloma based on strong presumption and if negative will pursue other alternatives. - Check skeletal survey, SPEP, serum free light chains, beta 2 microglobulin , LDH, urine IEP, marrow biopsy 10/14 Skeletal survey with innumerable lytic foci w/i the skull and L humerus. SPEP/UPEP/FLC/Beta 2 pending. Awaiting BMbx. 10/15 Kappa FLC 13k. Awaiting BMbx. 
10/16 SPEP with m-spike 4.89. Not stable enough for BMbx at this time. Will go ahead with pulse dose steroids - Dex 40mg IV x 4 days. Had code status discussion, wishes to remain full code, but wife states he would not want to be on vent for an extended period of time. 10/18 On pulse dose steroids, D3 of 4.  
10/19 D4/ 4 pulse dex. 10/20 s/p 4 days of pulse dex 10/22 BMbx then 1/2 dose CyBorD.  Bone survey 10/13 Innumerable lytic foci within the skull as well as within the left humerus. Findings suggestive of multiple myeloma or metastatic disease. 10/23 BMbx today at bedside. Dose reduced cycle one chemo today. Velcade/dex/revlimid 10/24 Chemo today after family consent and chemo ed 
10/25 received velcade and dex 10/24. Give 1/2 dose cytoxan while waiting on Revlimid Hypercalcemia - Treatment of his hypercalcemia may be problematic with elevated creatinine and elevated BNP. Continue fluids and I have added calcitonin. Use Lasix to balance I/O's. Hold of on Zometa for now given creatinine. 10/14 CCa++ down to 12.2. Con't IVF. 10/15 CCa++ 13.1. Con't calcitonin. 10/16 CCa++ up to 14. 
10/18 CCa++ down to 12.5 
10/19 CCa ++ down to 11.8  
10/20 CCa ++ down to 11.6  
10/21 calcium 9.8 resolved MARCELA 
- Nephrology should be on board to manage what is likely acute kidney injury from ? myeloma, hypercalcemia, etc. Superimposed on CRF. Hopefully this will reverse but dialysis could be an equally potential outcome. 10/14 Cr 4.6. Renal US c/w medical renal disease and possible L nephrolithiasis. Neph consult pending. 10/16 Cr 5.01. Neph following. 10/17 Started dialysis yesterday. Cr 4.72 
10/18 Cr 5.07. Continues on dialysis. 10/19 Cr. 4.06 today. Dialysis today. 10/20 Cr 4.13 today, continues on dialysis. 10/23 per nephrology Dyspnea / hypoxia 10/15 On Optiflow. CXR/CT chest pending. Pulm following. On Azith/Roland. On empiric heparin gtt. . Echo with dilated RV and pulm HTN. 
10/16 CXR with volume overload. Plans for VQ scan when more stable. Pulm following. 10/17 Transferred to ICU for resp distress, now on BiPAP. On Azith/Rocephin. 10/18 on Hiflow O2. On Azith/Rocephin. 10/19 on nasal cannula. Continues Azith/Rocephin  
10/20 still on nasal cannula/azith/rocephin. Anemia 10/16 Transfuse 1 unit PRBCs 
10/17 Hgb 6.7 s/p 2 units. Check hemolysis labs. PBRCs ordered. 10/18 Hgb 7.2 s/p tx. OK to give PRBCs today with dialysis. Hemolytic anemia noted. Hapto <8. Check ricardo. 10/19 Hgb up to 8.1 today. Ricardo neg. 10/20 Hgb stable at 8.4 today 10/23 Transfuse per Aga SOPs. Hyperbilirubinemia 10/17 Bili up to 10.9. Check hemolysis labs inc frac bili. 10/18 Bili 16.6. Frac bili mostly direct. RUQ US pending. 10/19 Bili up to 20. ALT worse today at 210,  down from 609. US showed gallbladder wall thickening, hepatomegaly which has worsened since 2017, and right renal parenchymal hyperechogenicity. Ammonia elevated this AM at 32- GI added lactulose 10/21 Bilirubin up to 21 today. MRCP liver. GI has signed off.  
10/22 MRCP: Iron deposition within the liver and spleen. The liver appears morphologically normal. Gallbladder sludge. No biliary dilation. Primary checking Tsat and ferritin and also will see if GI would do ERCP for gallbladder sludge which can potentially be beneficial especially with the starting of chemo Methemoglobinemia 
- s/p methylene blue 10/16 Hyperuricemia 10/22 received rasburicase 10/14 and 10/21. Rasburicase can result in methemoglobinemia in some patients so it is   
now listed as an allergy. Aga SOPs 
 
10/22/2019 We had a lengthy discussion with patient's son John Ramsey ([phone])  (Dr. Nimesh Dudley also present). With patient's permission, conversation occurred outside of patient room. His son was advised of the likely prognosis of MM as well prognosis with and without treatment. He discussed with family and his father and their wish is to proceed with BMbx then treatment. Dr. Kayla Abraham will perform bx at bedside in am.  He will need to be transferred to 5th floor. Also discussed code status and patient is now DNR. I personally saw, exammed and counselled the patient, and discussed with NP, agree with above history/assessment/plan. 75 y. o.male reportedly of good baseline PS developed weight loss and head mass and admitted with hypercalcemia and MARCELA, found bone lesions, high M spike 4.89, acute high direct bilirubinemia, elevated uric acid and received rasburicase. I discussed wit pt and wife that pt has a fatal cancer of myeloma but treatment would be of very high risk given his comorbidities macario hyperbilirubinemia, considered liver failure and GI signed off, but no evidence of significant acute liver damage, check liver MRCP showed much gall bladder sludge. Dr. Yg Headley and I had an extensive discussion with son and clarified his severe condition would be fatal if no cancer rx is pursue, although chemotherapy would be of very high risk, he agreed with DNR status, and discussed with pt regarding his goal and all desired to pursue chemo, marrow biopsied 10/23/19 and proceed with VRD and dose reduce velcade to 0.7mg/m2 sc 10/24/19, Rev had to be reduced to 5mg daily for HD and need outpt FC work and not yet available, give cytoxan with dose reduction for now, protein gap down, afib RVR again after Cardizem held for hypotension, re-consult cardiology and resumed cardizem, pRBC given the anemia and afib, tolerated cytoxan and velcade, bili appeared trending down. 
  
 
Jared Thompson M.D. [address] Office : [phone]

## 2019-10-27 NOTE — PROGRESS NOTES
Patient was calm, alert Family present and supportive Family shared their Sikh ulisses Joined together in prayer Shant Dubose, staff Katerine [phone], [phone] [facility] [address]  /   [email or web address]

## 2019-10-27 NOTE — PROGRESS NOTES
END OF SHIFT NOTE: 
 
Intake/Output No intake/output data recorded. Voiding: NO 
Catheter: NO 
Drain:   
 
 
 
 
 
Stool:  0 occurrences. Stool Assessment Stool Color: Brown (10/24/19 1700) Stool Appearance: Soft;Loose (10/24/19 1700) Stool Amount: Large (10/24/19 1700) Stool Source/Status: Rectum (10/24/19 1700) Emesis:  0 occurrences. Emesis Assessment Appearance: Undigested food (10/13/19 0848) Emesis Amount: Small (10/14/19 0744) VITAL SIGNS Patient Vitals for the past 12 hrs: 
 Temp Pulse Resp BP SpO2  
10/26/19 1813 98.5 °F (36.9 °C) 100 22 154/87 100 % 10/26/19 1721 97.5 °F (36.4 °C) 92 22 (!) 147/92 100 % 10/26/19 1615 98.4 °F (36.9 °C) (!) 107 22 157/78 100 % 10/26/19 1554 97.4 °F (36.3 °C) 98 22 153/82 99 % 10/26/19 1048 97.7 °F (36.5 °C) (!) 117 22 113/84 98 % Pain Assessment Pain 1 Pain Scale 1: Numeric (0 - 10) (10/26/19 1441) Pain Intensity 1: 0 (10/26/19 1441) Patient Stated Pain Goal: 0 (10/26/19 1441) Pain Reassessment 1: Patient resting w/respiratory rate greater than 10 (10/26/19 0215) Pain Location 1: Back (10/13/19 0131) Pain Orientation 1: Left;Right (10/12/19 2133) Pain Description 1: Aching; Sore (10/13/19 0131) Pain Intervention(s) 1: Rest;Position (10/13/19 0131) Ambulating No 
 
Additional Information: Pt confused. Wife and son at the bedside. One unit of PRBCs given. Cytoxan given as ordered. EKG complete; AFIB with RVR. Cardiology informed. No complaints noted at this time. Shift report given to Miguel A Alvarez RN oncoming nurse at the bedside.  
 
Laina Garcia RN

## 2019-10-27 NOTE — PROGRESS NOTES
Renal Progress Note Admission Date: 10/12/2019 Subjective: More alert. No complaints Objective:  
 
Physical Exam:   
Patient Vitals for the past 8 hrs: 
 BP Temp Pulse Resp SpO2  
10/27/19 0723 143/78 97.6 °F (36.4 °C) (!) 110 21 97 % 10/27/19 0315 140/84 97.6 °F (36.4 °C) 99 20 98 % Gen: comfortable , NAD HEENT: moist membranes CV: S1, S2 
Lungs: Clear bilaterally Extem: no edema Current Facility-Administered Medications Medication Dose Route Frequency  0.9% sodium chloride infusion 250 mL  250 mL IntraVENous PRN  
 lidocaine-EPINEPHrine (PF) (XYLOCAINE) 1 %-1:200,000 injection  mg  1-10 mL SubCUTAneous Rad Multiple  0.9% sodium chloride infusion  125 mL/hr IntraVENous CONTINUOUS  
 dilTIAZem CD (CARDIZEM CD) capsule 240 mg  240 mg Oral DAILY  lip protectant (BLISTEX) ointment 1 Each  1 Each Topical PRN  
 insulin glargine (LANTUS) injection 13 Units  13 Units SubCUTAneous QHS  insulin lispro (HUMALOG) injection   SubCUTAneous AC&HS  
 lactulose (CHRONULAC) 10 gram/15 mL solution 30 mL  20 g Oral TID  
 0.9% sodium chloride infusion 250 mL  250 mL IntraVENous PRN  
 hydrALAZINE (APRESOLINE) 20 mg/mL injection 10 mg  10 mg IntraVENous Q6H PRN  
 famotidine (PF) (PEPCID) 20 mg in sodium chloride 0.9% 10 mL injection  20 mg IntraVENous DAILY  allopurinol (ZYLOPRIM) tablet 50 mg  50 mg Oral DAILY  sodium chloride (NS) flush 5-40 mL  5-40 mL IntraVENous Q8H  
 sodium chloride (NS) flush 5-40 mL  5-40 mL IntraVENous PRN  
 acetaminophen (TYLENOL) tablet 650 mg  650 mg Oral Q4H PRN  
 ondansetron (ZOFRAN) injection 4 mg  4 mg IntraVENous Q4H PRN  
 diphenhydrAMINE (BENADRYL) capsule 25 mg  25 mg Oral Q6H PRN Data Review:  
 
LABS:  
Recent Results (from the past 12 hour(s)) CBC WITH AUTOMATED DIFF Collection Time: 10/27/19  3:21 AM  
Result Value Ref Range WBC 11.5 (H) 4.3 - 11.1 K/uL  
 RBC 2.72 (L) 4.23 - 5.6 M/uL HGB 8.4 (L) 13.6 - 17.2 g/dL HCT 25.1 (L) 41.1 - 50.3 % MCV 92.3 79.6 - 97.8 FL  
 MCH 30.9 26.1 - 32.9 PG  
 MCHC 33.5 31.4 - 35.0 g/dL  
 RDW 18.4 (H) 11.9 - 14.6 % PLATELET 462 (L) 160 - 450 K/uL MPV 11.0 9.4 - 12.3 FL ABSOLUTE NRBC 0.17 0.0 - 0.2 K/uL  
 DF AUTOMATED NEUTROPHILS 90 (H) 43 - 78 % LYMPHOCYTES 4 (L) 13 - 44 % MONOCYTES 5 4.0 - 12.0 % EOSINOPHILS 0 (L) 0.5 - 7.8 % BASOPHILS 0 0.0 - 2.0 % IMMATURE GRANULOCYTES 1 0.0 - 5.0 %  
 ABS. NEUTROPHILS 10.4 (H) 1.7 - 8.2 K/UL  
 ABS. LYMPHOCYTES 0.4 (L) 0.5 - 4.6 K/UL  
 ABS. MONOCYTES 0.5 0.1 - 1.3 K/UL  
 ABS. EOSINOPHILS 0.0 0.0 - 0.8 K/UL  
 ABS. BASOPHILS 0.0 0.0 - 0.2 K/UL  
 ABS. IMM. GRANS. 0.2 0.0 - 0.5 K/UL METABOLIC PANEL, COMPREHENSIVE Collection Time: 10/27/19  3:23 AM  
Result Value Ref Range Sodium 135 (L) 136 - 145 mmol/L Potassium 4.5 3.5 - 5.1 mmol/L Chloride 101 98 - 107 mmol/L  
 CO2 22 21 - 32 mmol/L Anion gap 12 7 - 16 mmol/L Glucose 133 (H) 65 - 100 mg/dL BUN 79 (H) 8 - 23 MG/DL Creatinine 6.84 (H) 0.8 - 1.5 MG/DL  
 GFR est AA 10 (L) >60 ml/min/1.73m2 GFR est non-AA 8 (L) >60 ml/min/1.73m2 Calcium 8.4 8.3 - 10.4 MG/DL Bilirubin, total 18.2 (H) 0.2 - 1.1 MG/DL  
 ALT (SGPT) 162 (H) 12 - 65 U/L  
 AST (SGOT) 320 (H) 15 - 37 U/L Alk. phosphatase 333 (H) 50 - 136 U/L Protein, total 8.9 (H) 6.3 - 8.2 g/dL Albumin 1.3 (L) 3.2 - 4.6 g/dL Globulin 7.6 (H) 2.3 - 3.5 g/dL A-G Ratio 0.2 (L) 1.2 - 3.5 MAGNESIUM Collection Time: 10/27/19  3:23 AM  
Result Value Ref Range Magnesium 2.2 1.8 - 2.4 mg/dL URIC ACID Collection Time: 10/27/19  3:23 AM  
Result Value Ref Range Uric acid 5.2 2.6 - 6.0 MG/DL  
GLUCOSE, POC Collection Time: 10/27/19  7:26 AM  
Result Value Ref Range Glucose (POC) 105 (H) 65 - 100 mg/dL Plan:  
 
Principal Problem: Hypercalcemia (10/12/2019) Active Problems: 
  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  
- lots of clinical evidence and bone marrow bx done 
- started HD 10/16/19 Last dialysis was Friday. Tolerated much better. He had some transient hypotension earlier the week Expect next HD Monday- orders written  
  
Multiple Myeloma 
- s/p pulse dose decadron 
- bone marrow biopsy 10/23 
- startedhemo 10/24 
  
  
 Abnormal LFTs 
- progressive jaundice of unclear etiology - Seen by GI - felt multifactorial. No evidence of obstruction. Not much to offer -  MRCP showing iron deposition likely consequence of hemolytic anemia. Ferritin high in setting of acute phase reactant.  
  
Anemia and thrombocytopenia 
- transfusions per oncology 
- hemolysis workup positive 
  
Debility  
-palliative following

## 2019-10-27 NOTE — PROGRESS NOTES
END OF SHIFT NOTE: 
 
Intake/Output No intake/output data recorded. Voiding: NO 
Catheter: NO 
Drain:   
 
 
 
 
 
Stool:  0 occurrences. Stool Assessment Stool Color: Brown (10/24/19 1700) Stool Appearance: Soft;Loose (10/24/19 1700) Stool Amount: Large (10/24/19 1700) Stool Source/Status: Rectum (10/24/19 1700) Emesis:  1 occurrences. Emesis Assessment Appearance: Brown;Yellow (10/27/19 0441) Emesis Amount: Medium (10/27/19 0441) VITAL SIGNS Patient Vitals for the past 12 hrs: 
 Temp Pulse Resp BP SpO2  
10/27/19 0315 97.6 °F (36.4 °C) 99 20 140/84 98 % 10/26/19 2318 99 °F (37.2 °C) 98 20 146/76 99 % 10/26/19 1938 98.2 °F (36.8 °C) 99 22 131/79 99 % Pain Assessment Pain 1 Pain Scale 1: Numeric (0 - 10) (10/26/19 2125) Pain Intensity 1: 0 (10/26/19 2125) Patient Stated Pain Goal: 0 (10/26/19 2125) Pain Reassessment 1: Patient resting w/respiratory rate greater than 10 (10/26/19 0215) Pain Location 1: Back (10/13/19 0131) Pain Orientation 1: Left;Right (10/12/19 2133) Pain Description 1: Aching; Sore (10/13/19 0131) Pain Intervention(s) 1: Rest;Position (10/13/19 0131) Ambulating No 
 
Additional Information: Afebrile. x1 episode of emesis this morning followed by mouth care. No urine or stool. Pr repositioned every 2-3 hours. Pt resting quietly. No visible s/sx of distress. No needs voiced at this time. Pt's wife is at bedside. Shift report will be given to oncoming nurse at the bedside.  
 
Zachary Ramos RN

## 2019-10-27 NOTE — PROGRESS NOTES
1856: Placed a call into on call regarding pt BP and RR 
1900: Spoke with Claire NP. Received orders to stop IVF, get 2 sets of BC, STAT CXR, and start on Cef. Abx.

## 2019-10-28 NOTE — PROGRESS NOTES
CRITICAL CARE NOTE Critical Care Daily Progress Note: 10/27/2019 Admission Date: 10/12/2019 The patient's chart is reviewed and the patient is discussed with the staff. Mr. Tavo Shen is a 74yo male admitted on 10/12 for hypercalcemia who was found to have acute renal failure requiring dialysis starting 10/16, hypercalcemia, lytic lesions, anemia. He has been treated with velcade/dex/revlimid/cytoxan and has had recent rising liver enzymes, bilirubin and ammonia. There is a documented note from Dr. Indira Solorio regarding discussion with patient's son on 10/22 outlined DNR plans. On 10/27 during code blue, patient's wife was present and reported that family understood that prolonged life support wouldn't be pursued. Family was in agreement. Subjective:  
 
Patient unresponsive with distended abdomen on ventilator. He has vomited and appears to have a right-sided pneumonia. BP is 89/50 and native HR is 40bpm, K of 6.8 after insulin 10U, D50, calcium chloride x1, 3 amps of bicarbonate. Abdomen quite distended and still requiring transcutaneous pacing. Current Facility-Administered Medications Medication Dose Route Frequency  traMADol (ULTRAM) tablet 50 mg  50 mg Oral Q6H PRN  
 [START ON 10/28/2019] cefepime (MAXIPIME) 0.5 g in 0.9% sodium chloride 100 mL IVPB  0.5 g IntraVENous Q24H  Vancomycin Intermittent Dosing Placeholder   Other Rx Dosing/Monitoring  guaiFENesin ER (MUCINEX) tablet 600 mg  600 mg Oral Q12H  piperacillin-tazobactam (ZOSYN) 4.5 g in 0.9% sodium chloride (MBP/ADV) 100 mL  4.5 g IntraVENous NOW  dextrose (D50W) 50% injection syrg      
 0.9% sodium chloride infusion 250 mL  250 mL IntraVENous PRN  
 lidocaine-EPINEPHrine (PF) (XYLOCAINE) 1 %-1:200,000 injection  mg  1-10 mL SubCUTAneous Rad Multiple  0.9% sodium chloride infusion  125 mL/hr IntraVENous CONTINUOUS  
 dilTIAZem CD (CARDIZEM CD) capsule 240 mg  240 mg Oral DAILY  lip protectant (BLISTEX) ointment 1 Each  1 Each Topical PRN  
 insulin glargine (LANTUS) injection 13 Units  13 Units SubCUTAneous QHS  insulin lispro (HUMALOG) injection   SubCUTAneous AC&HS  
 lactulose (CHRONULAC) 10 gram/15 mL solution 30 mL  20 g Oral TID  
 0.9% sodium chloride infusion 250 mL  250 mL IntraVENous PRN  
 hydrALAZINE (APRESOLINE) 20 mg/mL injection 10 mg  10 mg IntraVENous Q6H PRN  
 famotidine (PF) (PEPCID) 20 mg in sodium chloride 0.9% 10 mL injection  20 mg IntraVENous DAILY  allopurinol (ZYLOPRIM) tablet 50 mg  50 mg Oral DAILY  sodium chloride (NS) flush 5-40 mL  5-40 mL IntraVENous Q8H  
 sodium chloride (NS) flush 5-40 mL  5-40 mL IntraVENous PRN  
 acetaminophen (TYLENOL) tablet 650 mg  650 mg Oral Q4H PRN  
 ondansetron (ZOFRAN) injection 4 mg  4 mg IntraVENous Q4H PRN  
 diphenhydrAMINE (BENADRYL) capsule 25 mg  25 mg Oral Q6H PRN Review of Systems Unobtainable due to patient status. Objective:  
 
Vitals:  
 10/27/19 1829 10/27/19 1834 10/27/19 1837 10/27/19 1956 BP: (!) 83/49 (!) 82/44  110/60 Pulse: (!) 116 Resp: (!) 40 (!) 42 Temp: 98.3 °F (36.8 °C) SpO2: 92% Weight:   234 lb 8 oz (106.4 kg) Height:      
 
 
 
Intake/Output Summary (Last 24 hours) at 10/27/2019 2312 Last data filed at 10/27/2019 1946 Gross per 24 hour Intake 3486 ml Output 250 ml Net 3236 ml Physical Exam:         
Constitutional:  intubated and mechanically ventilated. Emesis everywhere. EENMT:  Sclera clear, pupils equal, oral mucosa moist 
Respiratory: diffuse wheezing Cardiovascular:  RRR with no M,G,R; 
Gastrointestinal:  soft with no tenderness; positive bowel sounds present Musculoskeletal:  warm with no cyanosis, no lower extremity edema Skin:  no jaundice or ecchymosis, cool extremities Neurologic: unresponsive LINES:   
ETT 
 
DRIPS:   
Dopamine@ 20 [email or web address] CXR:  RLL pneumonia Ventilator Settings Mode FIO2 Rate Tidal Volume Pressure PEEP  
   35 % Peak airway pressure:    
Minute ventilation: 15.5 l/min ABG:  
Recent Labs 10/27/19 
2259 PHI 6.990* PCO2I 70.5* PO2I 53* HCO3I 17.0*  
  
 
LAB Recent Labs 10/27/19 
2259 10/27/19 
2253 10/27/19 
2205 10/27/19 
2114 10/27/19 
1915 GLUCPOC 202* 85 78 68 68 Recent Labs 10/27/19 
0321 10/26/19 
9719 10/25/19 
0327 WBC 11.5* 12.4* 14.0* HGB 8.4* 7.4* 7.6* HCT 25.1* 22.3* 23.2*  
* 139* 122* Recent Labs 10/27/19 
3926 10/26/19 
0671 10/25/19 
0327 * 134* 132* K 4.5 4.5 4.8  
 99 96* CO2 22 25 20* * 185* 205* BUN 79* 60* 76* CREA 6.84* 5.41* 7.41* MG 2.2 2.2 2.4 PHOS  --  5.8*  --   
CA 8.4 8.7 8.8 ALB 1.3* 1.3* 1.3*  
SGOT 320* 354* 450* No results for input(s): LCAD, LAC in the last 72 hours. Assessment:  (Medical Decision Making) Hospital Problems  Never Reviewed Codes Class Noted POA [facility] ICD-10-CM: C90.00 ICD-9-CM: 203.00  10/23/2019 Unknown S/p chemo as above Jaundice ICD-10-CM: R17 
ICD-9-CM: 782.4  10/19/2019 No  
 Bilirubin 18.2, INR 1.2 Acquired hemolytic anemia (HCC) ICD-10-CM: D59.9 ICD-9-CM: 283.9  10/19/2019 Unknown Methemoglobinemia ICD-10-CM: D74.9 ICD-9-CM: 289.7  10/17/2019 Unknown Acute metabolic encephalopathy WOZ-57-DL: G93.41 
ICD-9-CM: 348.31  10/16/2019 Unknown Acute respiratory failure with hypoxia [facility]) ICD-10-CM: J96.01 
ICD-9-CM: 518.81  10/15/2019 No  
   
 Pulmonary hypertension (HCC) (Chronic) ICD-10-CM: A99.39 ICD-9-CM: 416.8  10/15/2019 Yes Hyperproteinemia- with likely hyperviscosity ICD-10-CM: E88.09 
ICD-9-CM: 273.8  10/15/2019 Unknown * (Principal) Hypercalcemia ICD-10-CM: K75.73 
ICD-9-CM: 275.42  10/12/2019 Yes Stable now Anemia ICD-10-CM: D64.9 ICD-9-CM: 285.9  10/12/2019 Yes MARCELA (acute kidney injury) ([facility] ICD-10-CM: N17.9 ICD-9-CM: 584.9  10/12/2019 Yes Will coordinate with nephrology about possible need for Hyperkalemia: bicarb x 3, calcium chloride x 3, insulin 10U,D50, albuterol continuous nebulizer. Dr. Amanda Pino aware and will update him soon based on patient's immediate clinical trajectory. Septic shock:  On dopamine, levophed with aspiration pneumonia as possible source, though abd also quite distended. KUB pending. Vanco/zosyn/cefepime. Bradycardia:  Acute, likely related to electrolyte and metabolic disturbances. Requiring transcutaneous pacing and cardiology is at bedside. Mr. Dwyane Aase is critically ill with hyperkalemia, acute respiratory failure, aspiration pneumonia, distended abdomen, bradycardia requiring transcutaneous pacing. Plan:  (Medical Decision Making) --  Updated family, they are aware of how gravely ill he is but would like aggressive measures at this time. --  Add zosyn to vanco/cefepime for suspected aspiration pneumonia. Will trend lactic acid and treat septic shock with bolus of normal saline. -- KUB given abdominal distention and vomiting. 
-- Will place central line for vasopressor support 
-- appreciate cardiology involvement for possible need for temp pacer. 
-- continue medical measures for hyperkalemia. Repeat K in 1h 
-- albuterol nebulizer continuous nebulizer now 
-- Full code. More than 50% of the time documented was spent in face-to-face contact with the patient and in the care of the patient on the floor/unit where the patient is located.  The patient is critically ill with respiratory failure, circulatory failure and requires high complexity decision making for assessment and support including frequent ventilator adjustment , frequent evaluation and titration of therapies , application of advanced monitoring technologies and extensive interpretation of multiple databases. Time devoted to patient care services described in this note- 20 min face to face/ 30 min total evaluation time Cumulative time devoted to patient care services by me for day of service -45 min Maria De Jesus Quick MD

## 2019-10-28 NOTE — PROCEDURES
Procedure: Endotracheal intubation Indication: Acute respiratory failure 518.81 Anesthesia: 
None A Carmen 3.0 laryngoscope was used to visualize the epiglottis and vocal cords. After positive identification of epiglottis and the vocal cords, lots of suctioning was required. A size 7.5 ET tube was placed into the trachea with direct visualization. Confirmation of endotracheal positionin. The ET tube was directly visualized passing through the vocal cords. 2.  CO2 colorimetry was employed immediately to verify tube in airway with appropriate color change indicating detection/lack of CO2.  
3.  Water vapor was seen within the ET tube, and auscultation of the abdomen revealed no bubbling sounds. 4.  Auscultation  And inspection of the chest after intubation showed symmetric chest excursion and symmetric air entry bilaterally. 5.  Chest X-ray has been ordered and is pending. The tube was secured at 24cm at the lip with a tube johnson. The patient has been placed on a mechanical ventilator. There were no complications.  
 
Raulito Jones MD

## 2019-10-28 NOTE — PROGRESS NOTES
Patient was intubated with a number 7.5 ET Tube. Tube placement verified ETCO2 monitor and auscultation. ET Tube is secured at the 24 cm tati at the lip and on the bilateral side. Patient was intubated by Dr. Mey Alvarado on the 2 attempt. Breath sounds are coarse and wheezing. Patient is Negative for subcutaneous air and chest excursion is symmetric. Trachea is midline. Patient is also Negative for cyanosis and is Negative for pitting edema. Patient placed on ventilator on documented settings. All alarms are set and audible. Resuscitation bag is at the head of the bed. Ventilator Settings Mode FIO2 Rate Tidal Volume Pressure PEEP I:E Ratio SIMV, Pressure support  60 %   12 500 ml  20 cm H2O  8 cm H20  1:2.0 Peak airway pressure: 29 cm H2O Minute ventilation: 7.9 l/min ABG: No results for input(s): PH, PCO2, PO2, HCO3 in the last 72 hours.

## 2019-10-28 NOTE — PROGRESS NOTES
Dr. Otto Polanco at bedside, inserting CVC. Pt remains hypotensive despite dopamine and levophed gtts. Verbal order to start vasopressin gtt.

## 2019-10-28 NOTE — PROGRESS NOTES
CODE BLUE Despite multiple vasopressors (dopamine, vasopressin, levophed, epi) the patient developed bradycardia and pulselessness at 0359. ACLS was initiated and CPR administered. Pulses were not regained despite aggressive efforts and the patient passed away at 0410. Family present and aware.  
Carlos Blankenship MD

## 2019-10-28 NOTE — PROGRESS NOTES
Pt remains hypotensive despite levo, dopamine, vaso and epi gtts. Discussed pt code status with family due to declining pt status. Per family, pt would \"want compressions\". Explained to family that pt was still listed as DNR in chart and would need to be changed if they wished for CPR to be performed. Pt wife POA wishes for that to be done at this time.

## 2019-10-28 NOTE — DISCHARGE SUMMARY
Death Summary Figueroa Almodovar Admission date:  10/12/2019 Discharge date:  10/28/2019 Admitting Diagnosis:  Hypercalcemia [E83.52] Anemia [D64.9] MARCELA (acute kidney injury) ([facility] [N17.9] Discharge Diagnosis:   
Problem List as of 10/28/2019 Never Reviewed Codes Class Noted - Resolved Bradycardia ICD-10-CM: R00.1 ICD-9-CM: 427.89  10/27/2019 - Present Aspiration pneumonia ([facility] ICD-10-CM: J69.0 ICD-9-CM: 507.0  10/27/2019 - Present Multiple myeloma not having achieved remission ([facility] ICD-10-CM: C90.00 ICD-9-CM: 203.00  10/23/2019 - Present Jaundice ICD-10-CM: R17 
ICD-9-CM: 782.4  10/19/2019 - Present Acquired hemolytic anemia (HCC) ICD-10-CM: D59.9 ICD-9-CM: 283.9  10/19/2019 - Present Methemoglobinemia ICD-10-CM: D74.9 ICD-9-CM: 289.7  10/17/2019 - Present Acute metabolic encephalopathy PRR-05-DG: G93.41 
ICD-9-CM: 348.31  10/16/2019 - Present Acute respiratory failure with hypoxia [facility]) ICD-10-CM: J96.01 
ICD-9-CM: 518.81  10/15/2019 - Present Pulmonary hypertension (HCC) (Chronic) ICD-10-CM: I27.20 ICD-9-CM: 416.8  10/15/2019 - Present Hyperproteinemia- with likely hyperviscosity ICD-10-CM: E88.09 
ICD-9-CM: 273.8  10/15/2019 - Present Diabetes mellitus (HCC) (Chronic) ICD-10-CM: E11.9 ICD-9-CM: 250.00  10/13/2019 - Present Essential hypertension (Chronic) ICD-10-CM: I10 
ICD-9-CM: 401.9  10/13/2019 - Present Overview Signed 10/13/2019  1:04 AM by Yanci Miller MD  
  Last Assessment & Plan: Hypertension is unchanged. Continue current treatment regimen. Blood pressure will be reassessed at the next regular appointment. Paroxysmal atrial fibrillation (HCC) (Chronic) ICD-10-CM: I48.0 ICD-9-CM: 427.31  10/13/2019 - Present Overview Signed 10/13/2019  1:04 AM by Yanci Miller MD  
  Last Assessment & Plan: He has had no recurrences. I still think he remains a bleeding risk.  I would like to hold off of [address] for now--and check again on him in 3 months. Continue dilt--but change to 360 mg tabs. * (Principal) Hypercalcemia ICD-10-CM: C96.97 
ICD-9-CM: 275.42  10/12/2019 - Present Anemia ICD-10-CM: D64.9 ICD-9-CM: 285.9  10/12/2019 - Present MARCELA (acute kidney injury) ([facility] ICD-10-CM: N17.9 ICD-9-CM: 584.9  10/12/2019 - Present Consultants: 
 
Studies/Procedures: 
 
Hospital course: Mr. Weber is a 75 y. o. male admitted on 10/12/2019 with a primary diagnosis of hypercalcemia and possible metastatic malignancy/myeloma. Mr. Odette Gomes is a gentleman who has received no formal medical care over the past several years. He he had no primary care physician. Lab data from [facility] in 2017 show a creatinine that evelyn as high as 6.1 and was 2.80 at discharge. His chronic baseline is unknown. For two-three months leading to this admission, he had gradually deteriorated with low back discomfort, forgetfulness, anorexia and 35 pound weight loss. He has noted a growth on the back of his head. He was finally prevailed upon to come to the hospital for evaluation. He was found to be in acute renal failure, hypercalcemic, anemic and with multiple lytic lesions involving his calvarium including a 3 cm destructive lesion involving the left occipital bone. Hypercalcemia resolved with calcitonin. He had MARCELA requiring regular dialysis. Bilirubin remained high without clear cause. MM labs highly suspicious. BMbx obtained then started on Velcade/Dex and 1/2 dose cytoxan while waiting on Revlimid. Last night he became unresponsive. Code Blue was called and ACLS protocol initiated. Of note -- patient was marked as DNR in the chart.  Per hospitalist note \"During the beginning of the code we were made aware of this so I went to speak with the patient's wife who confirmed to me that the patient has previously said that he WOULD want CPR and MV, but would not want this to be prolonged. I told the patient's wife that he currently needs both of those things emergently and she said she and the patient would both want resuscitation\". Despite aggressive efforts patient  at 410. Further details of stay are as follows:  
  
PLAN: 
Concern for myeloma - We will initiate workup for myeloma based on strong presumption and if negative will pursue other alternatives. - Check skeletal survey, SPEP, serum free light chains, beta 2 microglobulin , LDH, urine IEP, marrow biopsy 10/14 Skeletal survey with innumerable lytic foci w/i the skull and L humerus. SPEP/UPEP/FLC/Beta 2 pending. Awaiting BMbx. 10/15 Kappa FLC 13k. Awaiting BMbx. 
10/16 SPEP with m-spike 4.89. Not stable enough for BMbx at this time. Will go ahead with pulse dose steroids - Dex 40mg IV x 4 days. Had code status discussion, wishes to remain full code, but wife states he would not want to be on vent for an extended period of time. 10/18 On pulse dose steroids, D3 of 4.  
10/19 D4/ 4 pulse dex. 10/20 s/p 4 days of pulse dex 10/22 BMbx then 1/2 dose CyBorD. Bone survey 10/13 Innumerable lytic foci within the skull as well as within the left humerus. Findings suggestive of multiple myeloma or metastatic disease. 10/23 BMbx today at bedside. Dose reduced cycle one chemo today. Velcade/dex/revlimid 10/24 Chemo today after family consent and chemo ed 
10/25 received velcade and dex 10/24. Give 1/2 dose cytoxan while waiting on Revlimid 
  
Hypercalcemia - Treatment of his hypercalcemia may be problematic with elevated creatinine and elevated BNP. Continue fluids and I have added calcitonin. Use Lasix to balance I/O's. Hold of on Zometa for now given creatinine. 10/14 CCa++ down to 12.2. Con't IVF. 10/15 CCa++ 13.1. Con't calcitonin. 10/16 CCa++ up to 14. 
10/18 CCa++ down to 12.5 
10/19 CCa ++ down to 11.8  
10/20 CCa ++ down to 11.6 10/21 calcium 9.8 resolved 
  
MARCELA 
- Nephrology should be on board to manage what is likely acute kidney injury from ? myeloma, hypercalcemia, etc. Superimposed on CRF. Hopefully this will reverse but dialysis could be an equally potential outcome. 10/14 Cr 4.6. Renal US c/w medical renal disease and possible L nephrolithiasis. Neph consult pending. 10/16 Cr 5.01. Neph following. 10/17 Started dialysis yesterday. Cr 4.72 
10/18 Cr 5.07. Continues on dialysis. 10/19 Cr. 4.06 today. Dialysis today. 10/20 Cr 4.13 today, continues on dialysis. 10/23 per nephrology 
  
  
Dyspnea / hypoxia 10/15 On Optiflow. CXR/CT chest pending. Pulm following. On Azith/Roland. On empiric heparin gtt. . Echo with dilated RV and pulm HTN. 
10/16 CXR with volume overload. Plans for VQ scan when more stable. Pulm following. 10/17 Transferred to ICU for resp distress, now on BiPAP. On Azith/Rocephin. 10/18 on Hiflow O2. On Azith/Rocephin. 10/19 on nasal cannula. Continues Azith/Rocephin  
10/20 still on nasal cannula/azith/rocephin.  
  
Anemia 10/16 Transfuse 1 unit PRBCs 
10/17 Hgb 6.7 s/p 2 units. Check hemolysis labs. PBRCs ordered. 10/18 Hgb 7.2 s/p tx. OK to give PRBCs today with dialysis. Hemolytic anemia noted. Hapto <8. Check ricardo. 10/19 Hgb up to 8.1 today. Ricardo neg. 10/20 Hgb stable at 8.4 today 10/23 Transfuse per [facility] SOPs.  
  
Hyperbilirubinemia 10/17 Bili up to 10.9. Check hemolysis labs inc frac bili. 10/18 Bili 16.6. Frac bili mostly direct. RUQ US pending. 10/19 Bili up to 20. ALT worse today at 210,  down from 609. US showed gallbladder wall thickening, hepatomegaly which has worsened since 2017, and right renal parenchymal hyperechogenicity. Ammonia elevated this AM at 32- GI added lactulose 10/21 Bilirubin up to 21 today. MRCP liver. GI has signed off.  
10/22 MRCP: Iron deposition within the liver and spleen.  The liver appears morphologically normal. Gallbladder sludge. No biliary dilation. Primary checking Tsat and ferritin and also will see if GI would do ERCP for gallbladder sludge which can potentially be beneficial especially with the starting of chemo 
  
Methemoglobinemia 
- s/p methylene blue 10/16 Hyperuricemia 10/22 received rasburicase 10/14 and 10/21. Rasburicase can result in methemoglobinemia in some patients so it is   
now listed as an allergy. 
  
[facility] SOPs 
  
10/22/2019 We had a lengthy discussion with patient's son Shruthi Orr ([phone])  (Dr. Kerry Das also present). With patient's permission, conversation occurred outside of patient room. His son was advised of the likely prognosis of MM as well prognosis with and without treatment. He discussed with family and his father and their wish is to proceed with BMbx then treatment. Dr. Author Farrell will perform bx at bedside in am.  He will need to be transferred to 5th floor. Also discussed code status and patient is now DNR.  
  
I personally saw, exammed and counselled the patient, and discussed with NP, agree with above history/assessment/plan. 75 y. o.male reportedly of good baseline PS developed weight loss and head mass and admitted with hypercalcemia and MARCELA, found bone lesions, high M spike 4.89, acute high direct bilirubinemia, elevated uric acid and received rasburicase. I discussed wit pt and wife that pt has a fatal cancer of myeloma but treatment would be of very high risk given his comorbidities macario hyperbilirubinemia, considered liver failure and GI signed off, but no evidence of significant acute liver damage, check liver MRCP showed much gall bladder sludge.  Dr. Kerry Das and I had an extensive discussion with son and clarified his severe condition would be fatal if no cancer rx is pursue, although chemotherapy would be of very high risk, he agreed with DNR status, and discussed with pt regarding his goal and all desired to pursue chemo, marrow biopsied 10/23/19 and proceed with VRD and dose reduce velcade to 0.7mg/m2 sc 10/24/19, Rev had to be reduced to 5mg daily for HD and need outpt FC work and not yet available, give cytoxan with dose reduction for now, protein gap down, afib RVR again after Cardizem held for hypotension, re-consult cardiology and resumed cardizem, pRBC given the anemia and afib, tolerated cytoxan and velcade, bili appeared trending down. Final: 
--Pronounced dead at 0410. --Total discharge greater than 30 minutes in duration. Reyesmateus Cheikh, NP 
 
75 y. o.male reportedly of good baseline PS developed weight loss and head mass and admitted with hypercalcemia and MARCELA, found bone lesions, high M spike 4.89, acute high direct bilirubinemia, elevated uric acid and received rasburicase. I discussed wit pt and wife that pt has a fatal cancer of myeloma but treatment would be of very high risk given his comorbidities macario hyperbilirubinemia, considered liver failure and GI signed off, but no evidence of significant acute liver damage, check liver MRCP showed much gall bladder sludge. Dr. Gomez Pod and I had an extensive discussion with son and clarified his severe condition would be fatal if no cancer rx is pursue, although chemotherapy would be of very high risk, he agreed with DNR status, and discussed with pt regarding his goal and all desired to pursue chemo, marrow biopsied 10/23/19 and proceed with VRD and dose reduce velcade to 0.7mg/m2 sc 10/24/19, Rev had to be reduced to 5mg daily for HD and need outpt FC work and not yet available, give cytoxan with dose reduction for now, protein gap down, afib RVR again after Cardizem held for hypotension, re-consult cardiology and resumed cardizem, pRBC given the anemia and afib, tolerated cytoxan and velcade, bili appeared trending down.  However overnight he vomitted then became unresponsive, code blue was called and family reversed code, was intubated nonetheless  after much effort in ICU, pronounced dead at 04:10. 
Laurie Thibodeaux M.D. [facility] [place] [id number] [place] [address] Office : [phone]

## 2019-10-28 NOTE — PROGRESS NOTES
SQ BS  68 pt nauseated, call placed to hematology /oncology group.   Pt also received Lantus 13 units every night,

## 2019-10-28 NOTE — PROGRESS NOTES
Responded to code blue Provided supportive presence to wife and children  had visited with family a few hours earlier Family is united in their goals of care for patient They are realistic in short term goals (to give the patient every chance to survive) But also agree that prolonged efforts are not what the patient or they would like as a family. Dr Ibis Virk clarified with the family the goals of care. Family fully understands that patient is critically ill Prayer offered Will be with the family thru the night.  
 
Yan Daniels, staff [address], [address]  /   [email or web address]

## 2019-10-28 NOTE — PROGRESS NOTES
All lines removed from pt. House supervisor spoke with family regarding end of life wishes. Pt placed in body bag. Awaiting mortuary.

## 2019-10-28 NOTE — PROGRESS NOTES
Call placed to nephrology regarding verified potassium level, per Dr. Denisa Ochoa, pt to run dialysis tonight.

## 2019-10-28 NOTE — PROCEDURES
Procedure: CENTRAL LINE PLACEMENT Indication:  
Septic shock Summary: 
Informed consent was obtained. A time-out was performed. Using the Sonosite ultrasound with the 5-10 MHz vascular probe transducer and sterile technique, the left femoral vein was identified and under direct real time visualization was cannulated. The CVC kit guide wire was then inserted and its position within the LEFT femoral  vein verified in short and long axis views on vascular probe US. Using Seldinger technique, a 4 lumen catheter was then placed in the Left femoral vein, after dilation of entry port without difficulty. There was no significant bleeding during the procedure and good flush and drawback was noted. The CVC was then affixed with supplied 2.0 silk sutures via the proximal and distal limiters, with the insertion point affixed at 20 cm. A biostatic disc was applied to the entry port followed by a transparent dressing. There were no immediate complications.  
 
Blaine Thakkar MD

## 2019-10-28 NOTE — PROGRESS NOTES
Provided continuous care to family They were very thankful Waited for situation to be more stable and went with family into room Encouraged them Will follow closely due to critical nature Fred Goel, staff [address], [address]  /   [email or web address]

## 2019-10-28 NOTE — PROGRESS NOTES
SPEECH PATHOLOGY NOTE: 
 
Patient followed by speech therapy this admission for dysphagia, but has experienced a decline in status overnight. Currently intubated in ICU. Speech to sign off. Please consider re-consult when medically appropriate.   
 
U[address]to [address] CCC-SLP

## 2019-10-28 NOTE — PROGRESS NOTES
Received patient from 5th floor intubated and brought to the unit via ambu bag. Patient ET tube is 7.5 and 24 at the lip. Ventilator check has been completed and patient is resting on settings charted.

## 2019-10-28 NOTE — DIALYSIS
Arrived to initiate HD. Pt bp  70/40 and then at 56/41. Notified Dr. Kelly Rivera and received instruction to hold HD until more pressors are given and BPs are in the 80s.

## 2019-10-28 NOTE — PROGRESS NOTES
76 y/o AAM admitted with MM, now on oncology service and getting chemo. Nephrology following for HD. I responded to code blue around 1030pm. Family had reported to nursing that patient was vomiting, they then noticed that he was unresponsive. He had been bradycardic on tele prior. ACLS protocol was initiated for PEA. He rec'd numerous rounds of epi and some bicarb. ROSC achieved but had WCT so was shocked and later given 300mg amiodarone. He was emergently intubated. Dr. Nathen Garcia present. Will transfer to ICU. Our service not planning to follow. Of note -- patient is marked as DNR in the chart. During the beginning of the code we were made aware of this so I went to speak with the patient's wife who confirmed to me that the patient has previously said that he WOULD want CPR and MV, but would not want this to be prolonged. I told the patient's wife that he currently needs both of those things emergently and she said she and the patient would both want resuscitation.   
 
Joyce Rose MD

## 2019-10-28 NOTE — INTERVAL H&P NOTE
H&P Update: 
Figueroa Kinsey was seen and examined. History and physical has been reviewed. Significant clinical changes have occurred as noted:  CODE BLUE.  
Radha Aldana MD

## 2019-10-28 NOTE — PROGRESS NOTES
Keeping in touch with family as they wait in waiting area Provided a blanket as requested Encouraged them to try to get some rest 
They remain calm Kenney Funez, staff [address], [address]  /   [email or web address]

## 2019-10-28 NOTE — PROGRESS NOTES
END OF SHIFT NOTE: 
 
Intake/Output 10/27 1901 - 10/28 0700 In: 3250 [I.V.:3250] Out: -   
Voiding: NO 
Catheter: NO 
Drain:   
 
 
 
 
 
Stool:  0 occurrences. Stool Assessment Stool Color: Brown (10/24/19 1700) Stool Appearance: Soft;Loose (10/24/19 1700) Stool Amount: Large (10/24/19 1700) Stool Source/Status: Rectum (10/24/19 1700) Emesis:  0 occurrences. Emesis Assessment Appearance: Brown;Yellow (10/27/19 0441) Emesis Amount: Medium (10/27/19 0441) VITAL SIGNS Patient Vitals for the past 12 hrs: 
 Temp Pulse Resp BP SpO2  
10/27/19 1956    110/60   
10/27/19 1834   (!) 42 (!) 82/44   
10/27/19 1829 98.3 °F (36.8 °C) (!) 116 (!) 40 (!) 83/49 92 % 10/27/19 1128 98.3 °F (36.8 °C) (!) 124 20 140/70 97 % Pain Assessment Pain 1 Pain Scale 1: Numeric (0 - 10) (10/27/19 1946) Pain Intensity 1: 0 (10/27/19 1946) Patient Stated Pain Goal: 0 (10/27/19 0720) Pain Reassessment 1: Patient resting w/respiratory rate greater than 10 (10/27/19 1500) Pain Location 1: Back (10/13/19 0131) Pain Orientation 1: Left;Right (10/12/19 2133) Pain Description 1: Aching; Sore (10/13/19 0131) Pain Intervention(s) 1: Rest;Position (10/13/19 0131) Ambulating No 
 
Additional Information:  
 
Shift report given to oncoming nurse at the bedside. Syl Whyte

## 2019-10-28 NOTE — PROGRESS NOTES
Call placed to MD to update with abg result and pt status. Orders received for 2 additional amps of bicarb and to start bicarb gtt. MD informed of inability to start dialysis. MD also informed of code status conversation that took place with family.

## 2019-10-28 NOTE — PROGRESS NOTES
Follow-up critical care Abd series reviewed and is suggestive of SBO, being treated with NGT decompression and bowel rest.  
  
 
NGT output bloody and PPI bid started. Rhythm has stabilized with no current need for pacing and HR at 109. HD nurse coming in for dialysis for hyperkalemia, acidosis. Vasopressin added. Left femoral line placed after L IJ wire wouldn't pass beyond tunnelled catheter. Family aware of events.  
 
Sarah Estrada MD

## 2019-10-28 NOTE — PROGRESS NOTES
Cardiology Progress Note: 
 
Called by Intensivist for evaluation due to Pt s/p initially PEA treated with CPR with ACLS. He received Epi x3, Bicab x3 and CaCl x1. During intubation there was copious amounts of emesis in his pharynx. After intubation he developed WCT/VT and was given 300mg Amio and defib x1 with conversion to an irregular bradycardia requiring TC pacing. He was placed on IV Dopamine and Levophed. Initial POC labs with K 7.1. ABG with pH 6.99, PCO2 70.5, O2 53, HCO3 17.0. Cardiology asked to evaluate in the ICU. Pt admitted with newly dx MM undergoing chemotherapy. Now with elevated LFTs and renal failure requiring HD. We were previously following for AFib/AFlutter and he was managed on PO Dilt with good effect. On arrival Pt on monitor TC pacing at 50mAs and 60BPM with intermittent capture and perfusing HR 45-50. Pads not ideally positioned and these were replaced. Still with not great capture. Increased mAs sequentially to 80 and HR to 80 with a little better result. Pulm/Intensivist present and CXR obtained -- dx with acute aspiration PNA and started on IV abx. Electrolyte supplementation in progress. Abd noted to be distended and firm. KUB obtained with no free air but +SBO (has NTG in place to LIS). He spontaneously had HR increase to 90s on monitor and pacing held. Now back in Aflutter with HR 50s. BP maintaining on gtt. Critically ill and this was discussed with family by NP and Dr. Sharifa Frias. Discussed that should his HR decompensate -- TC not a long term option and temp pacer would be indicated. Wife and son agree with TC pacer if indicated. Will follow Time spent in critical care unit with Pt =75 mins

## 2019-10-28 NOTE — INTERVAL H&P NOTE
H&P Update: 
Gene Farida Query was seen and examined. History and physical has been reviewed.  Significant clinical changes have occurred as noted:  CODE BLUE

## 2019-10-28 NOTE — PROGRESS NOTES
Pt bradycardic. Monitor unable to read BP. Pt aliza down to aystole, code blue called. (See code flowsheet) Dr. Manny Cordova at bedside to pronounce. TOD 0410. House supervisor informed.  called. Family in waiting room, do not wish to see pt at this time. Per family and house supervisor, OK to remove ETT and NGT at this time.

## 2019-10-28 NOTE — PROGRESS NOTES
Death pronouncement Patient identity confirmed and upon evaluation there was no response to verbal or painful stimulus. On auscultation there were no heart sounds, and no carotid pulse was present. No breathing efforts were noted, no pupillary responses present. Family notified and nursing supervisor aware. No plans for autopsy were expressed. Time of death:  18 BRT # [id number]. SSN [id number] Certifying MD: Dr. Rebecca Tiwari service. Pronouncing MD:  Alena Macias MD [facility] license [id number].

## 2019-10-28 NOTE — PROGRESS NOTES
Report called to Demarco Almodovar RN. Pt transferred by bed to room 3109 with ICU staff from [facility].

## 2019-10-28 NOTE — PROGRESS NOTES
Code blue/ death Comforted family Shared stories of his life Prayed Family  is on the way Amira Vanessa, staff [address], [address]  /   [email or web address]

## 2019-10-28 NOTE — PROGRESS NOTES
CODE BLUE NOTE Called for code blue for bradycardia progressing to asystole with unresponsiveness at 2234. Telemetry called that patient was bradycardic and upon arrival of nurse patient was vomiting and minimally responsive. Developed asystole and CPR initiated. Emesis throughout bed and in posterior pharynx. Epi administered x 3, bicarb x 3 and calcium chloride x 1gm. FSBS 85.  Patient was intubated by me after 1 attempt by RT. Copious vomit in posterior pharynx. WCT noted at 0342 [id number] with DCCV at 120 and sinus bradycardia resulted with ROSC at 2245. Dopamine and TC pacing started with paced rhythm at 60bpm.  ABG with severe acidosis and hyperkalemia of >7 with confirmatory labs pending. 10U insulin/D50/2amps bicarb/2 amps calcium ordered. Patient now being transferred to ICU.  
 
Khang Metcalf MD

## 2019-10-31 LAB
BACTERIA SPEC CULT: ABNORMAL
GRAM STN SPEC: ABNORMAL
SERVICE CMNT-IMP: ABNORMAL

## 2019-11-01 LAB
BACTERIA SPEC CULT: NORMAL
BACTERIA SPEC CULT: NORMAL
SERVICE CMNT-IMP: NORMAL
SERVICE CMNT-IMP: NORMAL
